# Patient Record
Sex: FEMALE | Race: WHITE | Employment: OTHER | URBAN - METROPOLITAN AREA
[De-identification: names, ages, dates, MRNs, and addresses within clinical notes are randomized per-mention and may not be internally consistent; named-entity substitution may affect disease eponyms.]

---

## 2017-01-17 ENCOUNTER — TELEPHONE (OUTPATIENT)
Dept: ENDOCRINOLOGY | Facility: CLINIC | Age: 62
End: 2017-01-17

## 2017-01-17 DIAGNOSIS — E78.5 HYPERLIPIDEMIA LDL GOAL <100: Primary | ICD-10-CM

## 2017-01-17 DIAGNOSIS — E23.0 PANHYPOPITUITARISM (H): ICD-10-CM

## 2017-01-17 RX ORDER — HYDROCORTISONE 10 MG/1
TABLET ORAL
Qty: 135 TABLET | Refills: 3 | Status: SHIPPED | OUTPATIENT
Start: 2017-01-17 | End: 2018-03-23

## 2017-01-17 RX ORDER — LOSARTAN POTASSIUM AND HYDROCHLOROTHIAZIDE 25; 100 MG/1; MG/1
1 TABLET ORAL DAILY
Qty: 90 TABLET | Refills: 3 | Status: SHIPPED | OUTPATIENT
Start: 2017-01-17 | End: 2017-12-30

## 2017-01-17 RX ORDER — SIMVASTATIN 40 MG
40 TABLET ORAL DAILY
Qty: 90 TABLET | Refills: 3 | Status: SHIPPED | OUTPATIENT
Start: 2017-01-17 | End: 2017-12-30

## 2017-01-17 RX ORDER — LEVOTHYROXINE SODIUM 200 UG/1
200 TABLET ORAL DAILY
Qty: 90 TABLET | Refills: 3 | Status: SHIPPED | OUTPATIENT
Start: 2017-01-17 | End: 2017-12-30

## 2017-01-17 NOTE — TELEPHONE ENCOUNTER
Sara (Pharm Tech, [facility]) called re: [address] D Rxs. Pt requesting for: Cortef, Zocor, Hyzaar, Synthroid. They can be reached at [phone] or [address], [place] [address]. Message sent to Reelio.

## 2017-01-24 ENCOUNTER — TELEPHONE (OUTPATIENT)
Dept: OBGYN | Facility: CLINIC | Age: 62
End: 2017-01-24

## 2017-01-24 DIAGNOSIS — Z79.890 HORMONE REPLACEMENT THERAPY: Primary | ICD-10-CM

## 2017-01-24 NOTE — TELEPHONE ENCOUNTER
Spoke to Maria Esther who needs her progesterone and premarin prescriptions(done by Dr Jenkins 12/2016 with refills) sent now to [facility] d/t insurance. Will forward scripts.Pt indicated understanding and agreed with plan.

## 2017-01-27 ENCOUNTER — TELEPHONE (OUTPATIENT)
Dept: OBGYN | Facility: CLINIC | Age: 62
End: 2017-01-27

## 2017-01-27 NOTE — TELEPHONE ENCOUNTER
Received call from Maria Esther who stated she had an antibiotic at the end of December and has had itching 'down below' every since. She has irritation and white discharge but it is not chunky. She had an odor at first but that has gone away.    Advised she can try OTC monistat to see if that takes care of symptoms, otherwise will need office visit for evaluation. She indicated understanding and agreed with plan.

## 2017-03-13 ENCOUNTER — TELEPHONE (OUTPATIENT)
Dept: OBGYN | Facility: CLINIC | Age: 62
End: 2017-03-13

## 2017-03-13 NOTE — TELEPHONE ENCOUNTER
----- Message from Orquidea Hughes sent at 3/13/2017 12:23 PM CDT -----  Regarding: Vaginal itching/discharge  Contact: [phone]  Pt stated that she has been experiencing vaginal itching/discharge and called to schedule with her pcp Dr. Jenkins. She was unavailable the day the pt wanted, so I scheduled her with Lindseyangie Dent on 3/16/17. Pt however would like a call back before then to discuss if she even needs this appt. Please call her to discuss/advise. Thanks.    KP    Please DO NOT send this message and/or reply back to sender.  Call Center Representatives DO NOT respond to messages.

## 2017-03-13 NOTE — TELEPHONE ENCOUNTER
Telephone call to patient to discuss symptoms, see below note. She reports she has had this itching and discharge for a few weeks. She reports she did try Monistat without relief. I reassured her that she should keep her appointment with Lindsey and we would be able to assess her symptoms at that time. She verbalized understanding

## 2017-03-16 ENCOUNTER — TRANSFERRED RECORDS (OUTPATIENT)
Dept: HEALTH INFORMATION MANAGEMENT | Facility: CLINIC | Age: 62
End: 2017-03-16

## 2017-03-16 ENCOUNTER — OFFICE VISIT (OUTPATIENT)
Dept: OBGYN | Facility: CLINIC | Age: 62
End: 2017-03-16
Attending: ADVANCED PRACTICE MIDWIFE
Payer: COMMERCIAL

## 2017-03-16 VITALS
SYSTOLIC BLOOD PRESSURE: 128 MMHG | HEIGHT: 64 IN | HEART RATE: 79 BPM | DIASTOLIC BLOOD PRESSURE: 78 MMHG | RESPIRATION RATE: 16 BRPM

## 2017-03-16 DIAGNOSIS — Z79.890 HORMONE REPLACEMENT THERAPY: ICD-10-CM

## 2017-03-16 DIAGNOSIS — B37.31 CANDIDIASIS OF VULVA AND VAGINA: Primary | ICD-10-CM

## 2017-03-16 DIAGNOSIS — N89.8 VAGINAL DRYNESS: Primary | ICD-10-CM

## 2017-03-16 LAB
CLUE CELLS: NORMAL
TRICHOMONAS (WET PREP): NORMAL
YEAST (WET PREP): NORMAL

## 2017-03-16 PROCEDURE — 87210 SMEAR WET MOUNT SALINE/INK: CPT | Mod: ZF | Performed by: ADVANCED PRACTICE MIDWIFE

## 2017-03-16 PROCEDURE — 99211 OFF/OP EST MAY X REQ PHY/QHP: CPT | Mod: ZF

## 2017-03-16 RX ORDER — FLUCONAZOLE 150 MG/1
150 TABLET ORAL ONCE
Qty: 1 TABLET | Refills: 1 | Status: SHIPPED | OUTPATIENT
Start: 2017-03-16 | End: 2017-03-16

## 2017-03-16 ASSESSMENT — PAIN SCALES - GENERAL: PAINLEVEL: NO PAIN (0)

## 2017-03-16 NOTE — NURSING NOTE
Chief Complaint   Patient presents with     RECHECK   vaginal discharge  since antibiotic use about two months ago- sicne then gainal itching and discharge-  monitstat  Did not help her symoptoms.  Used ovals.

## 2017-03-16 NOTE — LETTER
3/16/2017       RE: Maria Esther Lowe  [address] [facility] [address]     Dear Colleague,    Thank you for referring your patient, Maria Esther Lowe, to the [facility] at [facility]. Please see a copy of my visit note below.    SUBJECTIVE:   61 year old  female complains of white and clear vaginal discharge for 2-3 months after being treated with oral antibiotics in 12/2016 for a URI.  She had significant vaginal itching shortly after starting the antibiotics, did an OTC vaginal treatment, but is unable to recall duration of treatment.  She believes that her last treatment was over one month ago.  She is bothered by significant increased amount of vaginal discharge.  She denies current vaginal itching or burning.  Denies odor.  Has not been sexually active recently.    Denies abnormal vaginal bleeding or significant pelvic pain or  fever. No UTI symptoms. Denies history of known exposure to STD. Denies dyspareunia.    No LMP recorded. Patient is postmenopausal.    OBJECTIVE:   She appears well, afebrile.  Pelvic Exam: normal vagina and vulva, no pubic hair, vaginal discharge described as creamy and milky, adenxa normal in size without tenderness, pelvic exam limited by obesity.    Wet prep exam was performed and demonstrated: pH 4.0, negative whiff, negative clue cells, negative trichomonas, scant hyphae noted    ASSESSMENT:      Candidiasis of vulva and vagina  Hormone replacement therapy    PLAN:   Treatment: Discussed that without itching, she may choose to wait to see if symptoms resolve.  Discussed 7 day vaginal treatment and oral treatment.  Patient has strong desire to alleviate symptoms and wanted oral treatment.    Rx sent for 150mg PO Fluconozole, with one refill.  Patient instructed on use.   Return to clinic if symptoms do not resolve or worsen.  LENNIE Dior CNM

## 2017-03-16 NOTE — PROGRESS NOTES
SUBJECTIVE:   61 year old  female complains of white and clear vaginal discharge for 2-3 months after being treated with oral antibiotics in 12/2016 for a URI.  She had significant vaginal itching shortly after starting the antibiotics, did an OTC vaginal treatment, but is unable to recall duration of treatment.  She believes that her last treatment was over one month ago.  She is bothered by significant increased amount of vaginal discharge.  She denies current vaginal itching or burning.  Denies odor.  Has not been sexually active recently.    Denies abnormal vaginal bleeding or significant pelvic pain or  fever. No UTI symptoms. Denies history of known exposure to STD. Denies dyspareunia.    No LMP recorded. Patient is postmenopausal.    OBJECTIVE:   She appears well, afebrile.  Pelvic Exam: normal vagina and vulva, no pubic hair, vaginal discharge described as creamy and milky, adenxa normal in size without tenderness, pelvic exam limited by obesity.    Wet prep exam was performed and demonstrated: pH 4.0, negative whiff, negative clue cells, negative trichomonas, scant hyphae noted    ASSESSMENT:      Candidiasis of vulva and vagina  Hormone replacement therapy    PLAN:   Treatment: Discussed that without itching, she may choose to wait to see if symptoms resolve.  Discussed 7 day vaginal treatment and oral treatment.  Patient has strong desire to alleviate symptoms and wanted oral treatment.    Rx sent for 150mg PO Fluconozole, with one refill.  Patient instructed on use.   Return to clinic if symptoms do not resolve or worsen.  LENNIE Dior CNM

## 2017-03-16 NOTE — MR AVS SNAPSHOT
"              After Visit Summary   3/16/2017    Maria Esther Lowe    MRN: [id number]           Patient Information     Date Of Birth          1955        Visit Information        Provider Department      3/16/2017 10:15 AM Lindsey Dent APRN CNM [facility]        Today's Diagnoses     Candidiasis of vulva and vagina    -  1    Hormone replacement therapy           Follow-ups after your visit        Who to contact     Please call your clinic at [phone] to:    Ask questions about your health    Make or cancel appointments    Discuss your medicines    Learn about your test results    Speak to your doctor   If you have compliments or concerns about an experience at your clinic, or if you wish to file a complaint, please contact [facility] Patient Relations at [phone] or email us at [email or web address]         Additional Information About Your Visit        MyChart Information     [email or web address] is an electronic gateway that provides easy, online access to your medical records. With [email or web address], you can request a clinic appointment, read your test results, renew a prescription or communicate with your care team.     To sign up for [employer] visit the website at [email or web address]   You will be asked to enter the access code listed below, as well as some personal information. Please follow the directions to create your username and password.     Your access code is: [email or web address]  Expires: 2017  9:00 AM     Your access code will  in 90 days. If you need help or a new code, please contact your [facility] or call [phone] for assistance.        Care EveryWhere ID     This is your Care EveryWhere ID. This could be used by other organizations to access your [facility] medical records  [id number]        Your Vitals Were     Pulse Respirations Height             79 16 1.626 m (5' 4\")          Blood Pressure " from Last 3 Encounters:   03/16/17 128/78   12/06/16 130/80   12/06/16 130/80    Weight from Last 3 Encounters:   12/06/16 77.1 kg (170 lb)   12/06/16 78 kg (172 lb)   12/03/15 81.1 kg (178 lb 12.8 oz)              We Performed the Following     Wet Prep POCT          Today's Medication Changes          These changes are accurate as of: 3/16/17 11:12 AM.  If you have any questions, ask your nurse or doctor.               Start taking these medicines.        Dose/Directions    fluconazole 150 MG tablet   Commonly known as:  DIFLUCAN   Used for:  Candidiasis of vulva and vagina   Started by:  Lindsey Dent APRN CNM        Dose:  150 mg   Take 1 tablet (150 mg) by mouth once for 1 dose   Quantity:  1 tablet   Refills:  1            Where to get your medicines      These medications were sent to [id number] - [place], [place] - [address]  [address] [facility] [address]     Phone:  [phone]     fluconazole 150 MG tablet                Primary Care Provider Office Phone # Fax #    [facility] [phone]       [address] [facility] [address]  [facility] [address]        Thank you!     Thank you for choosing [facility]  for your care. Our goal is always to provide you with excellent care. Hearing back from our patients is one way we can continue to improve our services. Please take a few minutes to complete the written survey that you may receive in the mail after your visit with us. Thank you!             Your Updated Medication List - Protect others around you: Learn how to safely use, store and throw away your medicines at www.disposemymeds.org.          This list is accurate as of: 3/16/17 11:12 AM.  Always use your most recent med list.                   Brand Name Dispense Instructions for use    calcium 500 +D 500-400 MG-UNIT Tabs   Generic drug:  Calcium Carb-Cholecalciferol     180 tablet    Take 1 tablet by mouth 2 times daily.       cholecalciferol 5000 UNITS  Caps     90 capsule    Take 1 daily       estrogens (conjugated) 0.625 MG tablet    PREMARIN    90 tablet    Take 1 tablet (0.625 mg) by mouth daily       fluconazole 150 MG tablet    DIFLUCAN    1 tablet    Take 1 tablet (150 mg) by mouth once for 1 dose       hydrocortisone 10 MG tablet    CORTEF    135 tablet    Take 1 am and 1/2 midday       levothyroxine 200 MCG tablet    SYNTHROID/LEVOTHROID    90 tablet    Take 1 tablet (200 mcg) by mouth daily       losartan-hydrochlorothiazide 100-25 MG per tablet    HYZAAR    90 tablet    Take 1 tablet by mouth daily       progesterone 100 MG capsule    PROMETRIUM    90 capsule    Take 1 capsule (100 mg) by mouth daily       simvastatin 40 MG tablet    ZOCOR    90 tablet    Take 1 tablet (40 mg) by mouth daily       zinc 50 MG Tabs      Take  by mouth.

## 2017-03-22 ENCOUNTER — TRANSFERRED RECORDS (OUTPATIENT)
Dept: HEALTH INFORMATION MANAGEMENT | Facility: CLINIC | Age: 62
End: 2017-03-22

## 2017-05-02 ENCOUNTER — TELEPHONE (OUTPATIENT)
Dept: OBGYN | Facility: CLINIC | Age: 62
End: 2017-05-02

## 2017-05-02 NOTE — TELEPHONE ENCOUNTER
----- Message from Jimena Arana sent at 5/1/2017  4:38 PM CDT -----  Regarding: PT questions for Dr. Jenkins  Contact: [phone]  PT called today(5/1 at 4:35pm) and was hoping to speak to Dr. Jenkins about a possible infection. PT stated she had seen a different provider for a sinus infection a few months ago and the antibiotic he had given her caused her to become very itchy in her vaginal area and she has taken some monistat twice but it hasn't made it better. PT is wondering what Dr. Jenkins's advice is. I offered to schedule PT on 5/9 but Pt declined. Please follow up with PT at [phone].    Thank You!  [facility]    Please DO NOT send this message and/or reply back to sender.  Call Center Representatives DO NOT respond to messages.

## 2017-05-02 NOTE — TELEPHONE ENCOUNTER
Spoke to Maria Esther who was treated with diflucan after seeing Lindsey Dent in clinic March 16.  She doesn't think she took both pills as prescribed. She is still having a clear white vaginal discharge .    Instructed her to be seen in clinic,she wants to see if Dr Jenkins will fit her in Thursday 5/11/17?    I will discuss with Dr Jenkins in clinic and call her back.Pt indicated understanding and agreed with plan.      Discussed with Dr Jenkins and she will see her 5/11 in clinic at 1140. Informed Maria Esther of plan,Pt indicated understanding and agreed with plan.

## 2017-05-04 ENCOUNTER — TELEPHONE (OUTPATIENT)
Dept: OBGYN | Facility: CLINIC | Age: 62
End: 2017-05-04

## 2017-05-04 NOTE — TELEPHONE ENCOUNTER
Telephone call from patient with reports of she thinks she ' tore herself in perineal area a few days ago with wiping'. She tried Tucks pads for this irritation without relief. She describes the area as being very sore and swollen. She has been taking frequent baths to help with the discomfort. She is requesting a visit in clinic, this was made for tomorrow am. I instructed her to not use any products to this area, wash and dry gently with plain water no soap. She was instructed to wear loose fitting clothes and possibly no underwear. She verbalized understanding

## 2017-05-05 ENCOUNTER — OFFICE VISIT (OUTPATIENT)
Dept: OBGYN | Facility: CLINIC | Age: 62
End: 2017-05-05
Attending: NURSE PRACTITIONER
Payer: COMMERCIAL

## 2017-05-05 ENCOUNTER — TELEPHONE (OUTPATIENT)
Dept: OBGYN | Facility: CLINIC | Age: 62
End: 2017-05-05

## 2017-05-05 VITALS — HEART RATE: 80 BPM | SYSTOLIC BLOOD PRESSURE: 130 MMHG | DIASTOLIC BLOOD PRESSURE: 80 MMHG | HEIGHT: 64 IN

## 2017-05-05 DIAGNOSIS — N89.8 VAGINAL IRRITATION: ICD-10-CM

## 2017-05-05 DIAGNOSIS — B37.31 CANDIDAL VULVOVAGINITIS: ICD-10-CM

## 2017-05-05 DIAGNOSIS — N89.8 VAGINAL SORE: Primary | ICD-10-CM

## 2017-05-05 LAB
BACTERIA SPEC CULT: NORMAL
CLUE CELLS: NORMAL
Lab: NORMAL
MICRO REPORT STATUS: NORMAL
SPECIMEN SOURCE: NORMAL
TRICHOMONAS (WET PREP): NORMAL
YEAST (WET PREP): NORMAL

## 2017-05-05 PROCEDURE — 87210 SMEAR WET MOUNT SALINE/INK: CPT | Mod: ZF | Performed by: NURSE PRACTITIONER

## 2017-05-05 PROCEDURE — 87102 FUNGUS ISOLATION CULTURE: CPT | Performed by: NURSE PRACTITIONER

## 2017-05-05 PROCEDURE — 87070 CULTURE OTHR SPECIMN AEROBIC: CPT | Performed by: NURSE PRACTITIONER

## 2017-05-05 PROCEDURE — 99212 OFFICE O/P EST SF 10 MIN: CPT | Mod: ZF

## 2017-05-05 RX ORDER — FLUCONAZOLE 150 MG/1
150 TABLET ORAL ONCE
Qty: 1 TABLET | Refills: 1 | Status: SHIPPED | OUTPATIENT
Start: 2017-05-05 | End: 2017-05-05

## 2017-05-05 NOTE — PROGRESS NOTES
"SUBJECTIVE:   61 year old female who presents with complaints of vaginal irritation.    Lisette feels like she \"tore herself\" in the perineal area a few days ago while wiping.  She tried Tucks pads without relief; they may have made the area more irritated.  States she feels \"very sore and swollen.\" Has been taking baths daily which provides relief of irritation.  Has burning when urine hits the vaginal tissue.  Denies frequency, urgency, incontinence, or bladder discomfort.      Maria Esther has had recurrent vaginal itching & increase in discharge since she took an antibiotic in December 2016 for a UTI.  She took Monistat in January and February, which provided short term relief.  She saw Lindsey Dent CNM on 3/16/2017, and the wet prep at that time showed a scant number of hyphae (diagnosed with yeast infection).  Patient took 1 dose of Diflucan after that appointment and a 2nd dose 2 weeks later. Patient reports her symptoms resolved for 1-1.5 weeks but then she noted increase in vaginal discharge (yellowish-tinged), again, which is bothersome to her.  Denies vaginal malodor or itching today.     Denies abnormal vaginal bleeding, significant pelvic pain or fever. Has not been sexually active for 6 months.  Denies history of known exposure to STD. Denies dyspareunia.    Taking Hydrocortisone PO daily (10mg in the morning and 5mg at midday), which increases susceptibility to infection.    No LMP recorded. Patient is postmenopausal.  - Denies any vaginal bleeding  - Taking systemic estrogen and progesterone therapy daily.  Also has premarin cream which she has been using once per week.      OBJECTIVE:   /80  Pulse 80  Ht 1.626 m (5' 4\")  Breastfeeding? No  General: She appears well, afebrile.  Abdomen: benign, soft, nontender, no masses; exam compromised by body habitus.  Pelvic Exam: minimal pubic hair, bilateral labia minora erythematous; ~6mm by 3mm area of tissue pale yellow in color covered by a small layer " of light yellow exudate at vaginal opening at approximately 8 o'clock in relation to the urethra; vaginal tissue with estrogen loss atrophy; vaginal discharge described as whitish-yellow and creamy; cervix pink, closed, without tenderness; uterus normal size anteverted, adenxa normal in size without tenderness; exam compromised by body habitus    Wet prep exam: pH 4.5, positive for hyphae & budding; negative for clue cells and trichomonas; negative whiff test; positive for white blood cells    ASSESSMENT:      Vaginal irritation  Candidal vulvovaginitis  Vaginal sore    PLAN:   Candidal Vulvovaginitis:  - Treatment: Diflucan 150 mg x 1 dose; patient was instructed to take a 2nd dose 72 hours later  - Yeast culture was collected, given recurrence of yeast infections    Vaginal Sore:  - Recommended sitz baths twice per week  - Patient was instructed to use a spray bottle to rinse perineum after using the bathroom and to pat perineum dry after use of the bathroom or bathing, not to wipe.    - Recommended wearing cotton underwear daily or not underwear at all  - Culture was taken of this sore to determine if an infection is present and if antibiotic treatment is necessary  - Recommended using the vaginal estrogen (premarin) twice weekly (increase from once per week)  - Patient has an appointment scheduled with Dr. Jenkins on 5/11; recommended she keep this appointment for follow-up.     20 minutes was spent in direct contact with the patient and > 50% of the time in patient education and coordination of care.  Estrella Guzman, DNP, APRN, WHNP

## 2017-05-05 NOTE — LETTER
"5/5/2017     RE: Maria Esther Lowe  [address] [facility] [address]     Dear Colleague,    Thank you for referring your patient, Maria Esther Lowe, to the [facility] at [facility]. Please see a copy of my visit note below.    SUBJECTIVE:   61 year old female who presents with complaints of vaginal irritation.    Lisette feels like she \"tore herself\" in the perineal area a few days ago while wiping.  She tried Tucks pads without relief; they may have made the area more irritated.  States she feels \"very sore and swollen.\" Has been taking baths daily which provides relief of irritation.  Has burning when urine hits the vaginal tissue.  Denies frequency, urgency, incontinence, or bladder discomfort.      Maria Esther has had recurrent vaginal itching & increase in discharge since she took an antibiotic in December 2016 for a UTI.  She took Monistat in January and February, which provided short term relief.  She saw Lindsey Dent CNM on 3/16/2017, and the wet prep at that time showed a scant number of hyphae (diagnosed with yeast infection).  Patient took 1 dose of Diflucan after that appointment and a 2nd dose 2 weeks later. Patient reports her symptoms resolved for 1-1.5 weeks but then she noted increase in vaginal discharge (yellowish-tinged), again, which is bothersome to her.  Denies vaginal malodor or itching today.     Denies abnormal vaginal bleeding, significant pelvic pain or fever. Has not been sexually active for 6 months.  Denies history of known exposure to STD. Denies dyspareunia.    Taking Hydrocortisone PO daily (10mg in the morning and 5mg at midday), which increases susceptibility to infection.    No LMP recorded. Patient is postmenopausal.  - Denies any vaginal bleeding  - Taking systemic estrogen and progesterone therapy daily.  Also has premarin cream which she has been using once per week.      OBJECTIVE:   /80  Pulse 80  Ht 1.626 m " "(5' 4\")  Breastfeeding? No  General: She appears well, afebrile.  Abdomen: benign, soft, nontender, no masses; exam compromised by body habitus.  Pelvic Exam: minimal pubic hair, bilateral labia minora erythematous; ~6mm by 3mm area of tissue pale yellow in color covered by a small layer of light yellow exudate at vaginal opening at approximately 8 o'clock in relation to the urethra; vaginal tissue with estrogen loss atrophy; vaginal discharge described as whitish-yellow and creamy; cervix pink, closed, without tenderness; uterus normal size anteverted, adenxa normal in size without tenderness; exam compromised by body habitus    Wet prep exam: pH 4.5, positive for hyphae & budding; negative for clue cells and trichomonas; negative whiff test; positive for white blood cells    ASSESSMENT:      Vaginal irritation  Candidal vulvovaginitis  Vaginal sore    PLAN:   Candidal Vulvovaginitis:  - Treatment: Diflucan 150 mg x 1 dose; patient was instructed to take a 2nd dose 72 hours later  - Yeast culture was collected, given recurrence of yeast infections    Vaginal Sore:  - Recommended sitz baths twice per week  - Patient was instructed to use a spray bottle to rinse perineum after using the bathroom and to pat perineum dry after use of the bathroom or bathing, not to wipe.    - Recommended wearing cotton underwear daily or not underwear at all  - Culture was taken of this sore to determine if an infection is present and if antibiotic treatment is necessary  - Recommended using the vaginal estrogen (premarin) twice weekly (increase from once per week)  - Patient has an appointment scheduled with Dr. Jenkins on 5/11; recommended she keep this appointment for follow-up.     20 minutes was spent in direct contact with the patient and > 50% of the time in patient education and coordination of care.  Estrella Guzman, DNP, APRN, WHNP    Again, thank you for allowing me to participate in the care of your patient.  "     Sincerely,  LENNIE Little CNP

## 2017-05-05 NOTE — TELEPHONE ENCOUNTER
Lab calling to report that anaerobic specimen wound culture must be sent in different container so they had to cancel the test. They will run an aerobic culture instead.

## 2017-05-05 NOTE — PATIENT INSTRUCTIONS
Please take 1 dose of Diflucan today and take a 2nd dose in 3 days (72 hours after 1st dose).  Take a sitz bath twice daily   Use a spray bottle to rinse perineum after using the bathroom.  Pat dry, do not wipe.  Wear cotton underwear daily or no underwear at all  Use vaginal premarin cream twice per week.    You will be notified of your culture results when they are available.  Please keep your appointment with Dr. Jenkins on 5/11 for follow-up.

## 2017-05-05 NOTE — MR AVS SNAPSHOT
After Visit Summary   5/5/2017    Maria Esther Lowe    MRN: [id number]           Patient Information     Date Of Birth          1955        Visit Information        Provider Department      5/5/2017 8:00 AM Estrella Guzman APRN [facility]        Today's Diagnoses     Vaginal irritation    -  1    Candidal vulvovaginitis          Care Instructions    Please take 1 dose of Diflucan today and take a 2nd dose in 3 days (72 hours after 1st dose).  Take a sitz bath twice daily   Use a spray bottle to rinse perineum after using the bathroom.  Pat dry, do not wipe.  Wear cotton underwear daily or no underwear at all  Use premarin cream twice per week.    You will be notified of your culture results when they are available.  Please keep your appointment with Dr. Jenkins on 5/11 for follow-up.          Follow-ups after your visit        Your next 10 appointments already scheduled     May 11, 2017 11:40 AM CDT   Return Visit with Teri Jenkins MD   [facility] ([facility])    [facility]  [address]  [address]  [address] [address]   [phone]              Who to contact     Please call your clinic at [phone] to:    Ask questions about your health    Make or cancel appointments    Discuss your medicines    Learn about your test results    Speak to your doctor   If you have compliments or concerns about an experience at your clinic, or if you wish to file a complaint, please contact [facility] Patient Relations at [phone] or email us at [email or web address]         Additional Information About Your Visit        MyChart Information     [employer] is an electronic gateway that provides easy, online access to your medical records. With [employer], you can request a clinic appointment, read your test results, renew a prescription or communicate with your care team.     To  "sign up for MyChart visit the website at [email or web address]   You will be asked to enter the access code listed below, as well as some personal information. Please follow the directions to create your username and password.     Your access code is: [email or web address]  Expires: 2017  9:00 AM     Your access code will  in 90 days. If you need help or a new code, please contact your [facility] or call [phone] for assistance.        Care EveryWhere ID     This is your Care EveryWhere ID. This could be used by other organizations to access your [place] medical records  [id number]        Your Vitals Were     Pulse Height Breastfeeding?             73 1.626 m (5' 4\") No          Blood Pressure from Last 3 Encounters:   17 152/75   17 128/78   16 130/80    Weight from Last 3 Encounters:   16 77.1 kg (170 lb)   16 78 kg (172 lb)   12/03/15 81.1 kg (178 lb 12.8 oz)              We Performed the Following     Yeast Culture          Today's Medication Changes          These changes are accurate as of: 17  8:45 AM.  If you have any questions, ask your nurse or doctor.               Start taking these medicines.        Dose/Directions    fluconazole 150 MG tablet   Commonly known as:  DIFLUCAN   Used for:  Candidal vulvovaginitis, Vaginal irritation   Started by:  Estrella Guzman APRN CNP        Dose:  150 mg   Take 1 tablet (150 mg) by mouth once for 1 dose   Quantity:  1 tablet   Refills:  1            Where to get your medicines      These medications were sent to [id number] - [place], [place] - [address]  [address] [facility] [address]     Phone:  [phone]     fluconazole 150 MG tablet                Primary Care Provider Office Phone # Fax #    [facility] [phone]       [address] [facility] [address]  [facility] [address]        Thank you!     Thank you for choosing [facility]  for " your care. Our goal is always to provide you with excellent care. Hearing back from our patients is one way we can continue to improve our services. Please take a few minutes to complete the written survey that you may receive in the mail after your visit with us. Thank you!             Your Updated Medication List - Protect others around you: Learn how to safely use, store and throw away your medicines at www.disposemymeds.org.          This list is accurate as of: 5/5/17  8:45 AM.  Always use your most recent med list.                   Brand Name Dispense Instructions for use    calcium 500 +D 500-400 MG-UNIT Tabs   Generic drug:  Calcium Carb-Cholecalciferol     180 tablet    Take 1 tablet by mouth 2 times daily.       cholecalciferol 5000 UNITS Caps     90 capsule    Take 1 daily       conjugated estrogens cream    PREMARIN    30 g    Place 0.5 g vaginally twice a week       estrogens (conjugated) 0.625 MG tablet    PREMARIN    90 tablet    Take 1 tablet (0.625 mg) by mouth daily       fluconazole 150 MG tablet    DIFLUCAN    1 tablet    Take 1 tablet (150 mg) by mouth once for 1 dose       hydrocortisone 10 MG tablet    CORTEF    135 tablet    Take 1 am and 1/2 midday       levothyroxine 200 MCG tablet    SYNTHROID/LEVOTHROID    90 tablet    Take 1 tablet (200 mcg) by mouth daily       losartan-hydrochlorothiazide 100-25 MG per tablet    HYZAAR    90 tablet    Take 1 tablet by mouth daily       progesterone 100 MG capsule    PROMETRIUM    90 capsule    Take 1 capsule (100 mg) by mouth daily       simvastatin 40 MG tablet    ZOCOR    90 tablet    Take 1 tablet (40 mg) by mouth daily       zinc 50 MG Tabs      Take  by mouth.

## 2017-05-05 NOTE — NURSING NOTE
Chief Complaint   Patient presents with     Follow Up For     Follow up vaginal irritation/yeast infection       See EDI Short 5/5/2017

## 2017-05-07 LAB
BACTERIA SPEC CULT: NORMAL
MICRO REPORT STATUS: NORMAL
SPECIMEN SOURCE: NORMAL

## 2017-05-09 LAB
MICRO REPORT STATUS: NORMAL
SPECIMEN SOURCE: NORMAL
YEAST SPEC QL CULT: NORMAL

## 2017-05-11 ENCOUNTER — OFFICE VISIT (OUTPATIENT)
Dept: FAMILY MEDICINE | Facility: CLINIC | Age: 62
End: 2017-05-11
Attending: FAMILY MEDICINE
Payer: COMMERCIAL

## 2017-05-11 VITALS
WEIGHT: 180 LBS | SYSTOLIC BLOOD PRESSURE: 134 MMHG | HEART RATE: 67 BPM | HEIGHT: 64 IN | DIASTOLIC BLOOD PRESSURE: 77 MMHG | BODY MASS INDEX: 30.73 KG/M2

## 2017-05-11 DIAGNOSIS — N76.6 ULCER OF GENITAL LABIA: ICD-10-CM

## 2017-05-11 DIAGNOSIS — N89.8 VAGINAL DISCHARGE: Primary | ICD-10-CM

## 2017-05-11 LAB
SPECIMEN SOURCE: ABNORMAL
VZV SPEC QL CULT: ABNORMAL

## 2017-05-11 PROCEDURE — 87529 HSV DNA AMP PROBE: CPT | Performed by: FAMILY MEDICINE

## 2017-05-11 PROCEDURE — 87529 HSV DNA AMP PROBE: CPT | Mod: 91 | Performed by: FAMILY MEDICINE

## 2017-05-11 PROCEDURE — 99212 OFFICE O/P EST SF 10 MIN: CPT | Mod: ZF

## 2017-05-11 RX ORDER — CLOBETASOL PROPIONATE 0.5 MG/G
OINTMENT TOPICAL
Qty: 15 G | Refills: 1 | Status: ON HOLD | OUTPATIENT
Start: 2017-05-11 | End: 2018-01-30

## 2017-05-11 ASSESSMENT — PAIN SCALES - GENERAL: PAINLEVEL: NO PAIN (0)

## 2017-05-11 NOTE — LETTER
5/11/2017       RE: Maria Esther Lowe  [address] [facility] [address]     Dear Colleague,    Thank you for referring your patient, Maria Esther Lowe, to the [facility] at [facility]. Please see a copy of my visit note below.    Maria Esther is a 61 year old female who presents today with vaginal discharge: On and off symptoms since December 2016 after taking an ABX for UTI. Has been treated with monistate and diflucan with short term relief of symptoms:  - seen 5.5.2017: wet Prep + yeast, yeast culture - no yeast isolated;   - Symptoms include: vaginal odor and creamy discharge.  No itching. Small scratch that has not healed with pain on urination.  - Started Finasteride two months ago for hair loss and steroid injections to scalp.   HX:  -   On Hormone Therapy: Premarin .625 mg/prometrium 100 mg.  Dr. Perdomo has advised that she stay on HT. Lost hair with lower dose.   -   HCM: mammogram  Today. Colonoscopy never;  Pap smear/HPV 11/2015.  ROS:  General:significant hair loss  Ears/Nose/Throat: none  Cardiovascular: none  Respiratory: none  Gastrointestinal: none  Breast: none  Genitourinary: trouble with urinary incontinence  Sexual Function: not sexually active.   Musculoskeletal: none  Skin: none  Neurological: none  Mental Health: none  Endocrine: none  Past Medical History:   Diagnosis Date     Hyperlipidemia LDL goal < 130      Hypertension      Hypothyroidism      Osteopenia     DEXA: 12/11; T-score of -1.9        Panhypopituitarism (H)    Past OB history:  GO; Menopause - started on HT by Dr. Groves since mid-40's.   Past Surgical History:   Procedure Laterality Date     resected prolactin-producing pituitary adenoma.  1975    Postoperative radiation therapy.    Life Style Modifiers:   Tobacco:  reports that she has never smoked. She has never used smokeless tobacco.   Alcohol:  reports that she does not drink alcohol.   Drug use:  reports that  "she does not use illicit drugs.  Exercise:   Walks every day.                 Diet: Ok - lots of sugar  Supplements: 55 alive. Zinc, EFA, Calcium, Vitamin D 1000 IU.   HCM: Colonoscopy needed; mammogram 8/8; DEXA 12/11.  FAMILY HISTORY:  Brother with cancer age 49.  with tongue cancer - recovered.    SOCIAL HISTORY:  On adopted son age (age 27). 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 ( three times)   MEDICATIONS:  Current Outpatient Prescriptions   Medication Sig Dispense Refill     conjugated estrogens (PREMARIN) cream Place 0.5 g vaginally twice a week 30 g 12     progesterone (PROMETRIUM) 100 MG capsule Take 1 capsule (100 mg) by mouth daily 90 capsule 3     estrogens, conjugated, (PREMARIN) 0.625 MG tablet Take 1 tablet (0.625 mg) by mouth daily 90 tablet 3     simvastatin (ZOCOR) 40 MG tablet Take 1 tablet (40 mg) by mouth daily 90 tablet 3     losartan-hydrochlorothiazide (HYZAAR) 100-25 MG per tablet Take 1 tablet by mouth daily 90 tablet 3     levothyroxine (SYNTHROID/LEVOTHROID) 200 MCG tablet Take 1 tablet (200 mcg) by mouth daily 90 tablet 3     hydrocortisone (CORTEF) 10 MG tablet Take 1 am and 1/2 midday 135 tablet 3     cholecalciferol 5000 UNITS CAPS Take 1 daily 90 capsule 3     zinc 50 MG TABS Take  by mouth.       CALCIUM 500 +D 500-400 MG-UNIT TABS Take 1 tablet by mouth 2 times daily. 180 tablet 3   ALLERGIES:  Ceftriaxone  VITALS:  /77  Pulse 67  Ht 1.626 m (5' 4\")  Wt 81.6 kg (180 lb)  BMI 30.9 kg/m2  PHYSICAL EXAM:  Constitutional:  woman in no acute distress. Thin hair in male balding pattern.   Psychological: appropriate mood.  Eyes: anicteric, normal extra-ocular movements.  Genitourinary: External genitalia: atrophic and pale. On the inner upper labia there is a flat ulcer measuring ~2mm that is very tender to light touch with Q-tip.  Vaginal discharge is minimal but inner labia is wet/watery.  No odor.  Wt pre: no yeast.  " Few clue cells.   Musculoskeletal: full range of motion    Skin: no concerning lesions, no jaundice.  Neurological: normal gait, no tremor.   Diagnoses and associated orders for this visit:  Vaginal discharge/labial ulcer:   - Stop Finasteride as it is possible cause of vaginal discharge.   -  Clobetasol ointment trial X two weeks to inner labial ulcer.  -  Use water spray bottle with urination.   -   Schedule for biopsy with GYN- if lesion resolved then will cancel appointment.   Hormone replacement therapy (postmenopausal)  Systemic HT:  Strongly advised but won't at this time as she had hair loss when she reduced the dose last year   -     progesterone (PROMETRIUM) 100 MG capsule; Take 1 capsule (100 mg) by mouth daily  -     estrogens, conjugated, (PREMARIN) 0.625 MG tablet; Take 1 tablet (0.625 mg) by mouth daily      Again, thank you for allowing me to participate in the care of your patient.      Sincerely,    Teri Jenkins MD

## 2017-05-11 NOTE — MR AVS SNAPSHOT
After Visit Summary   2017    Maria Esther Lowe    MRN: [id number]           Patient Information     Date Of Birth          1955        Visit Information        Provider Department      2017 11:40 AM Teri Jenkins MD [facility]        Today's Diagnoses     Vaginal discharge    -  1    Ulcer of genital labia           Follow-ups after your visit        Your next 10 appointments already scheduled     May 25, 2017  3:00 PM CDT   Return Visit with Ada Lora MD   [facility] ([facility])    [facility]  [address]  [address]  [facility] [address] [phone]              Who to contact     Please call your clinic at [phone] to:    Ask questions about your health    Make or cancel appointments    Discuss your medicines    Learn about your test results    Speak to your doctor   If you have compliments or concerns about an experience at your clinic, or if you wish to file a complaint, please contact [facility] Patient Relations at [phone] or email us at [email or web address]         Additional Information About Your Visit        MyChart Information     [employer] is an electronic gateway that provides easy, online access to your medical records. With [employer], you can request a clinic appointment, read your test results, renew a prescription or communicate with your care team.     To sign up for [employer] visit the website at [email or web address]   You will be asked to enter the access code listed below, as well as some personal information. Please follow the directions to create your username and password.     Your access code is: [email or web address]  Expires: 2017  9:00 AM     Your access code will  in 90 days. If you need help or a new code, please contact your [facility] or call [phone] for  "assistance.        Care EveryWhere ID     This is your Care EveryWhere ID. This could be used by other organizations to access your [place] medical records  [id number]        Your Vitals Were     Pulse Height BMI (Body Mass Index)             67 1.626 m (5' 4\") 30.9 kg/m2          Blood Pressure from Last 3 Encounters:   05/11/17 134/77   05/05/17 130/80   03/16/17 128/78    Weight from Last 3 Encounters:   05/11/17 81.6 kg (180 lb)   12/06/16 77.1 kg (170 lb)   12/06/16 78 kg (172 lb)              Today, you had the following     No orders found for display         Today's Medication Changes          These changes are accurate as of: 5/11/17  1:20 PM.  If you have any questions, ask your nurse or doctor.               Start taking these medicines.        Dose/Directions    clobetasol 0.05 % ointment   Commonly known as:  TEMOVATE   Used for:  Ulcer of genital labia   Started by:  Teri Jenkins MD        Apply sparingly to affected area twice daily for 14 days.  Do not apply to face.   Quantity:  15 g   Refills:  1            Where to get your medicines      These medications were sent to [id number] - [place], [place] - [address]  [address] [facility] [address]     Phone:  [phone]     clobetasol 0.05 % ointment                Primary Care Provider Office Phone # Fax #    [facility] [phone]       [address] [address]        Thank you!     Thank you for choosing [facility]  for your care. Our goal is always to provide you with excellent care. Hearing back from our patients is one way we can continue to improve our services. Please take a few minutes to complete the written survey that you may receive in the mail after your visit with us. Thank you!             Your Updated Medication List - Protect others around you: Learn how to safely use, store and throw away your medicines at www.disposemymeds.org.          This list is " accurate as of: 5/11/17  1:20 PM.  Always use your most recent med list.                   Brand Name Dispense Instructions for use    calcium 500 +D 500-400 MG-UNIT Tabs   Generic drug:  Calcium Carb-Cholecalciferol     180 tablet    Take 1 tablet by mouth 2 times daily.       cholecalciferol 5000 UNITS Caps     90 capsule    Take 1 daily       clobetasol 0.05 % ointment    TEMOVATE    15 g    Apply sparingly to affected area twice daily for 14 days.  Do not apply to face.       conjugated estrogens cream    PREMARIN    30 g    Place 0.5 g vaginally twice a week       estrogens (conjugated) 0.625 MG tablet    PREMARIN    90 tablet    Take 1 tablet (0.625 mg) by mouth daily       hydrocortisone 10 MG tablet    CORTEF    135 tablet    Take 1 am and 1/2 midday       levothyroxine 200 MCG tablet    SYNTHROID/LEVOTHROID    90 tablet    Take 1 tablet (200 mcg) by mouth daily       losartan-hydrochlorothiazide 100-25 MG per tablet    HYZAAR    90 tablet    Take 1 tablet by mouth daily       progesterone 100 MG capsule    PROMETRIUM    90 capsule    Take 1 capsule (100 mg) by mouth daily       simvastatin 40 MG tablet    ZOCOR    90 tablet    Take 1 tablet (40 mg) by mouth daily       zinc 50 MG Tabs      Take  by mouth.

## 2017-05-11 NOTE — PROGRESS NOTES
Maria Esther is a 61 year old female who presents today with vaginal discharge: On and off symptoms since December 2016 after taking an ABX for UTI. Has been treated with monistate and diflucan with short term relief of symptoms:  - seen 5.5.2017: wet Prep + yeast, yeast culture - no yeast isolated;   - Symptoms include: vaginal odor and creamy discharge.  No itching. Small scratch that has not healed with pain on urination.  - Started Finasteride two months ago for hair loss and steroid injections to scalp.   HX:  -   On Hormone Therapy: Premarin .625 mg/prometrium 100 mg.  Dr. Perdomo has advised that she stay on HT. Lost hair with lower dose.   -   HCM: mammogram  Today. Colonoscopy never;  Pap smear/HPV 11/2015.  ROS:  General:significant hair loss  Ears/Nose/Throat: none  Cardiovascular: none  Respiratory: none  Gastrointestinal: none  Breast: none  Genitourinary: trouble with urinary incontinence  Sexual Function: not sexually active.   Musculoskeletal: none  Skin: none  Neurological: none  Mental Health: none  Endocrine: none  Past Medical History:   Diagnosis Date     Hyperlipidemia LDL goal < 130      Hypertension      Hypothyroidism      Osteopenia     DEXA: 12/11; T-score of -1.9        Panhypopituitarism (H)    Past OB history:  GO; Menopause - started on HT by Dr. Groves since mid-40's.   Past Surgical History:   Procedure Laterality Date     resected prolactin-producing pituitary adenoma.  1975    Postoperative radiation therapy.    Life Style Modifiers:   Tobacco:  reports that she has never smoked. She has never used smokeless tobacco.   Alcohol:  reports that she does not drink alcohol.   Drug use:  reports that she does not use illicit drugs.  Exercise:   Walks every day.                 Diet: Ok - lots of sugar  Supplements: 55 alive. Zinc, EFA, Calcium, Vitamin D 1000 IU.   HCM: Colonoscopy needed; mammogram 8/8; DEXA 12/11.  FAMILY HISTORY:  Brother with cancer age 49.  with tongue cancer -  "recovered.    SOCIAL HISTORY:  On adopted son age (age 27). 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 ( three times)   MEDICATIONS:  Current Outpatient Prescriptions   Medication Sig Dispense Refill     conjugated estrogens (PREMARIN) cream Place 0.5 g vaginally twice a week 30 g 12     progesterone (PROMETRIUM) 100 MG capsule Take 1 capsule (100 mg) by mouth daily 90 capsule 3     estrogens, conjugated, (PREMARIN) 0.625 MG tablet Take 1 tablet (0.625 mg) by mouth daily 90 tablet 3     simvastatin (ZOCOR) 40 MG tablet Take 1 tablet (40 mg) by mouth daily 90 tablet 3     losartan-hydrochlorothiazide (HYZAAR) 100-25 MG per tablet Take 1 tablet by mouth daily 90 tablet 3     levothyroxine (SYNTHROID/LEVOTHROID) 200 MCG tablet Take 1 tablet (200 mcg) by mouth daily 90 tablet 3     hydrocortisone (CORTEF) 10 MG tablet Take 1 am and 1/2 midday 135 tablet 3     cholecalciferol 5000 UNITS CAPS Take 1 daily 90 capsule 3     zinc 50 MG TABS Take  by mouth.       CALCIUM 500 +D 500-400 MG-UNIT TABS Take 1 tablet by mouth 2 times daily. 180 tablet 3   ALLERGIES:  Ceftriaxone  VITALS:  /77  Pulse 67  Ht 1.626 m (5' 4\")  Wt 81.6 kg (180 lb)  BMI 30.9 kg/m2  PHYSICAL EXAM:  Constitutional:  woman in no acute distress. Thin hair in male balding pattern.   Psychological: appropriate mood.  Eyes: anicteric, normal extra-ocular movements.  Genitourinary: External genitalia: atrophic and pale. On the inner upper labia there is a flat ulcer measuring ~2mm that is very tender to light touch with Q-tip.  Vaginal discharge is minimal but inner labia is wet/watery.  No odor.  Wt pre: no yeast.  Few clue cells.   Musculoskeletal: full range of motion    Skin: no concerning lesions, no jaundice.  Neurological: normal gait, no tremor.   Diagnoses and associated orders for this visit:  Vaginal discharge/labial ulcer:   - Stop Finasteride as it is possible cause of vaginal discharge.   -  " Clobetasol ointment trial X two weeks to inner labial ulcer.  -  Use water spray bottle with urination.   -   Schedule for biopsy with GYN- if lesion resolved then will cancel appointment.   Hormone replacement therapy (postmenopausal)  Systemic HT:  Strongly advised but won't at this time as she had hair loss when she reduced the dose last year   -     progesterone (PROMETRIUM) 100 MG capsule; Take 1 capsule (100 mg) by mouth daily  -     estrogens, conjugated, (PREMARIN) 0.625 MG tablet; Take 1 tablet (0.625 mg) by mouth daily

## 2017-05-12 LAB
HSV1 DNA SPEC QL NAA+PROBE: NEGATIVE
HSV2 DNA SPEC QL NAA+PROBE: NORMAL
SPECIMEN SOURCE: NORMAL

## 2017-10-13 ENCOUNTER — TELEPHONE (OUTPATIENT)
Dept: OBGYN | Facility: CLINIC | Age: 62
End: 2017-10-13

## 2017-10-13 NOTE — TELEPHONE ENCOUNTER
Received call from Maria Esther stating she is scheduled for an appointment with Dr. Munoz for a biopsy (as referred by Dr. Jenkins) and she thinks she needs an ultrasound instead as she is worried she has cancer.     She reports her symptom is discharge that has been ongoing for one year but now is green.    Discussed that having evaluation from Dr. Munoz first is best as she can review her symptoms and do an exam and determine if an ultrasound is needed. Educated that green discharge is usually indicating an infection. Maria Esther indicated understanding and agreed with plan. She indicated she feels relieved.

## 2017-10-23 ENCOUNTER — OFFICE VISIT (OUTPATIENT)
Dept: ENDOCRINOLOGY | Facility: CLINIC | Age: 62
End: 2017-10-23

## 2017-10-23 ENCOUNTER — OFFICE VISIT (OUTPATIENT)
Dept: OBGYN | Facility: CLINIC | Age: 62
End: 2017-10-23
Attending: OBSTETRICS & GYNECOLOGY
Payer: COMMERCIAL

## 2017-10-23 VITALS
HEIGHT: 64 IN | DIASTOLIC BLOOD PRESSURE: 81 MMHG | SYSTOLIC BLOOD PRESSURE: 142 MMHG | WEIGHT: 172 LBS | BODY MASS INDEX: 29.37 KG/M2 | HEART RATE: 85 BPM

## 2017-10-23 VITALS
SYSTOLIC BLOOD PRESSURE: 142 MMHG | HEIGHT: 64 IN | WEIGHT: 172 LBS | DIASTOLIC BLOOD PRESSURE: 81 MMHG | BODY MASS INDEX: 29.37 KG/M2

## 2017-10-23 DIAGNOSIS — D35.2 PROLACTINOMA (H): Primary | ICD-10-CM

## 2017-10-23 DIAGNOSIS — E23.0 PANHYPOPITUITARISM (H): ICD-10-CM

## 2017-10-23 DIAGNOSIS — N89.8 VAGINAL DISCHARGE: Primary | ICD-10-CM

## 2017-10-23 DIAGNOSIS — L65.9 LOSS OF HAIR: ICD-10-CM

## 2017-10-23 DIAGNOSIS — E03.9 HYPOTHYROIDISM, UNSPECIFIED TYPE: ICD-10-CM

## 2017-10-23 LAB
ANION GAP SERPL CALCULATED.3IONS-SCNC: 7 MMOL/L (ref 3–14)
BACTERIA SPEC CULT: NORMAL
BACTERIA SPEC CULT: NORMAL
BUN SERPL-MCNC: 14 MG/DL (ref 7–30)
CALCIUM SERPL-MCNC: 8.5 MG/DL (ref 8.5–10.1)
CHLORIDE SERPL-SCNC: 105 MMOL/L (ref 94–109)
CLUE CELLS: NEGATIVE
CO2 SERPL-SCNC: 24 MMOL/L (ref 20–32)
CREAT SERPL-MCNC: 1.01 MG/DL (ref 0.52–1.04)
DEPRECATED CALCIDIOL+CALCIFEROL SERPL-MC: 47 UG/L (ref 20–75)
GFR SERPL CREATININE-BSD FRML MDRD: 56 ML/MIN/1.7M2
GLUCOSE SERPL-MCNC: 101 MG/DL (ref 70–99)
GRAM STN SPEC: NORMAL
GRAM STN SPEC: NORMAL
POTASSIUM SERPL-SCNC: 3.9 MMOL/L (ref 3.4–5.3)
PROLACTIN SERPL-MCNC: 32 UG/L (ref 3–27)
SODIUM SERPL-SCNC: 136 MMOL/L (ref 133–144)
SPECIMEN SOURCE: NORMAL
SPECIMEN SOURCE: NORMAL
T4 FREE SERPL-MCNC: 1.26 NG/DL (ref 0.76–1.46)
THYROPEROXIDASE AB SERPL-ACNC: <10 IU/ML
TRICHOMONAS (WET PREP): NEGATIVE
TSH SERPL DL<=0.005 MIU/L-ACNC: 0.06 MU/L (ref 0.4–4)
YEAST (WET PREP): NEGATIVE

## 2017-10-23 PROCEDURE — 99212 OFFICE O/P EST SF 10 MIN: CPT | Mod: ZF

## 2017-10-23 PROCEDURE — 87205 SMEAR GRAM STAIN: CPT | Performed by: OBSTETRICS & GYNECOLOGY

## 2017-10-23 PROCEDURE — 87210 SMEAR WET MOUNT SALINE/INK: CPT | Mod: ZF | Performed by: OBSTETRICS & GYNECOLOGY

## 2017-10-23 PROCEDURE — 87102 FUNGUS ISOLATION CULTURE: CPT | Performed by: OBSTETRICS & GYNECOLOGY

## 2017-10-23 ASSESSMENT — PAIN SCALES - GENERAL: PAINLEVEL: NO PAIN (0)

## 2017-10-23 NOTE — MR AVS SNAPSHOT
After Visit Summary   10/23/2017    Maria Esther Lowe    MRN: [id number]           Patient Information     Date Of Birth          1955        Visit Information        Provider Department      10/23/2017 7:45 AM Daysi Munoz MD [facility]        Today's Diagnoses     Vaginal discharge    -  1       Follow-ups after your visit        Follow-up notes from your care team     Return in about 2 weeks (around 2017) for please schedule pelvic ultrasound and follow up visit for possible vulvar biopsy-30 minutes please.      Who to contact     Please call your clinic at [phone] to:    Ask questions about your health    Make or cancel appointments    Discuss your medicines    Learn about your test results    Speak to your doctor   If you have compliments or concerns about an experience at your clinic, or if you wish to file a complaint, please contact [facility] Patient Relations at [phone] or email us at [email or web address]         Additional Information About Your Visit        MyChart Information     [employer] is an electronic gateway that provides easy, online access to your medical records. With [employer], you can request a clinic appointment, read your test results, renew a prescription or communicate with your care team.     To sign up for [employer] visit the website at [email or web address]   You will be asked to enter the access code listed below, as well as some personal information. Please follow the directions to create your username and password.     Your access code is: [email or web address]  Expires: 2018  6:31 AM     Your access code will  in 90 days. If you need help or a new code, please contact your [facility] or call [phone] for assistance.        Care EveryWhere ID     This is your Care EveryWhere ID. This could be used by other organizations to access your  "[facility] medical records  [id number]        Your Vitals Were     Pulse Height BMI (Body Mass Index)             85 1.626 m (5' 4\") 29.52 kg/m2          Blood Pressure from Last 3 Encounters:   10/23/17 142/81   10/23/17 142/81   05/11/17 134/77    Weight from Last 3 Encounters:   10/23/17 78 kg (172 lb)   10/23/17 78 kg (172 lb)   05/11/17 81.6 kg (180 lb)              We Performed the Following     Genital Special Culture Aerob Bacterial     Gram stain     Wet Prep POCT     Yeast Culture          Today's Medication Changes          These changes are accurate as of: 10/23/17 11:59 PM.  If you have any questions, ask your nurse or doctor.               Start taking these medicines.        Dose/Directions    * estradiol 1 MG tablet   Commonly known as:  ESTRACE   Used for:  Panhypopituitarism (H), Loss of hair   Started by:  Leona Arrington MD        Dose:  1 mg   Take 1 tablet (1 mg) by mouth daily   Quantity:  60 tablet   Refills:  3       * estradiol 1 MG tablet   Commonly known as:  ESTRACE   Used for:  Panhypopituitarism (H)   Started by:  Leona Arrington MD        Dose:  1 mg   Take 1 tablet (1 mg) by mouth daily   Quantity:  30 tablet   Refills:  1       * Notice:  This list has 2 medication(s) that are the same as other medications prescribed for you. Read the directions carefully, and ask your doctor or other care provider to review them with you.         Where to get your medicines      These medications were sent to [id number] - [place], [place] - [address]  [address] [facility] [address]     Phone:  [phone]     estradiol 1 MG tablet         These medications were sent to [facility] - [facility], [place] - [address]  [address] [facility] [address]     Phone:  [phone]     estradiol 1 MG tablet                Primary Care Provider    None Specified       No primary provider on file.        Equal Access to Services     MARIA DE JESUS WASHINGTON AH: Tameka blake " perry Silverman, wajhonyda luqadaha, qaybta kadarleen lawrence, larissa veliznoreen edie. So [facility] [phone].    ATENCIÓN: Si shelli mcghee, tiene a aden disposición servicios gratuitos de asistencia lingüística. Elizabeth al [phone].    We comply with applicable federal civil rights laws and [place] laws. We do not discriminate on the basis of race, color, national origin, age, disability, sex, sexual orientation, or gender identity.            Thank you!     Thank you for choosing [facility]  for your care. Our goal is always to provide you with excellent care. Hearing back from our patients is one way we can continue to improve our services. Please take a few minutes to complete the written survey that you may receive in the mail after your visit with us. Thank you!             Your Updated Medication List - Protect others around you: Learn how to safely use, store and throw away your medicines at www.disposemymeds.org.          This list is accurate as of: 10/23/17 11:59 PM.  Always use your most recent med list.                   Brand Name Dispense Instructions for use Diagnosis    calcium 500 +D 500-400 MG-UNIT Tabs   Generic drug:  Calcium Carb-Cholecalciferol     180 tablet    Take 1 tablet by mouth 2 times daily.        cholecalciferol 5000 UNITS Caps     90 capsule    Take 1 daily    Hyperparathyroidism (H)       clobetasol 0.05 % ointment    TEMOVATE    15 g    Apply sparingly to affected area twice daily for 14 days.  Do not apply to face.    Ulcer of genital labia       conjugated estrogens cream    PREMARIN    30 g    Place 0.5 g vaginally twice a week    Vaginal dryness       * estradiol 1 MG tablet    ESTRACE    60 tablet    Take 1 tablet (1 mg) by mouth daily    Panhypopituitarism (H), Loss of hair       * estradiol 1 MG tablet    ESTRACE    30 tablet    Take 1 tablet (1 mg) by mouth daily    Panhypopituitarism (H)       hydrocortisone 10 MG tablet    CORTEF     135 tablet    Take 1 am and 1/2 midday    Panhypopituitarism (H)       levothyroxine 200 MCG tablet    SYNTHROID/LEVOTHROID    90 tablet    Take 1 tablet (200 mcg) by mouth daily    Panhypopituitarism (H)       losartan-hydrochlorothiazide 100-25 MG per tablet    HYZAAR    90 tablet    Take 1 tablet by mouth daily    Panhypopituitarism (H)       progesterone 100 MG capsule    PROMETRIUM    90 capsule    Take 1 capsule (100 mg) by mouth daily    Hormone replacement therapy       simvastatin 40 MG tablet    ZOCOR    90 tablet    Take 1 tablet (40 mg) by mouth daily    Hyperlipidemia LDL goal <100       zinc 50 MG Tabs      Take  by mouth.        * Notice:  This list has 2 medication(s) that are the same as other medications prescribed for you. Read the directions carefully, and ask your doctor or other care provider to review them with you.

## 2017-10-23 NOTE — PROGRESS NOTES
CC/HPI:   Maria Esther Lowe is a 62 year old P0 who presents today with c/o vaginal discharge.  It has been going on for several months.  She has been treated with diflucan and Monistat and has only short term relief.  She was Seen by Dr. Jenkins in 5/17 for similar symptoms and a vulvar lesion was noted at that visit.  She was asked to return for a biopsy if the lesion did not resolve.  She states that she does not have any vulvar lesions currently.   She went though menopause age after pituitatry surgery at age 20-she has panhypopituitarism as a result.  She is currently on  Prometrium and Premarin for HRT and has Premarin cream but does not use it often.  She was on finasteride for hair loss but stopped it as it was though it could be contributing to her discharge.  She has not noticed a difference with stopping the medication.  She is wondering about an ultrasound because of the discharge.     HISTORIES:  Patient Active Problem List   Diagnosis     Prolactinoma (H)     Panhypopituitarism (H)     Hormone replacement therapy (postmenopausal)     Carpal tunnel syndrome     Past Medical History:   Diagnosis Date     Hyperlipidemia LDL goal < 130      Hypertension      Hypothyroidism      Osteopenia     DEXA: 12/11; T-score of -1.9        Panhypopituitarism (H)      Past Surgical History:   Procedure Laterality Date     resected prolactin-producing pituitary adenoma.  1975    Postoperative radiation therapy.      Current Outpatient Prescriptions   Medication Sig Dispense Refill     conjugated estrogens (PREMARIN) cream Place 0.5 g vaginally twice a week 30 g 12     progesterone (PROMETRIUM) 100 MG capsule Take 1 capsule (100 mg) by mouth daily 90 capsule 3     simvastatin (ZOCOR) 40 MG tablet Take 1 tablet (40 mg) by mouth daily 90 tablet 3     losartan-hydrochlorothiazide (HYZAAR) 100-25 MG per tablet Take 1 tablet by mouth daily 90 tablet 3     levothyroxine (SYNTHROID/LEVOTHROID) 200 MCG tablet Take 1 tablet  (200 mcg) by mouth daily 90 tablet 3     hydrocortisone (CORTEF) 10 MG tablet Take 1 am and 1/2 midday 135 tablet 3     cholecalciferol 5000 UNITS CAPS Take 1 daily 90 capsule 3     zinc 50 MG TABS Take  by mouth.       CALCIUM 500 +D 500-400 MG-UNIT TABS Take 1 tablet by mouth 2 times daily. 180 tablet 3     clobetasol (TEMOVATE) 0.05 % ointment Apply sparingly to affected area twice daily for 14 days.  Do not apply to face. (Patient not taking: Reported on 10/23/2017) 15 g 1     Allergies   Allergen Reactions     Ceftriaxone Other (See Comments)     Social History     Social History     Marital status:      Spouse name: N/A     Number of children: N/A     Years of education: N/A     Occupational History     Not on file.     Social History Main Topics     Smoking status: Never Smoker     Smokeless tobacco: Never Used     Alcohol use No     Drug use: No     Sexual activity: Yes     Partners: Male     Birth control/ protection: Post-menopausal     Other Topics Concern     Not on file     Social History Narrative    How much exercise per week? Daily    How much calcium per day?         How much caffeine per day?      How much vitamin D per day?      Do you/your family wear seatbelts?  Yes    Do you/your family use safety helmets? No    Do you/your family use sunscreen? Yes    Do you/your family keep firearms in the home? Yes    Do you/your family have a smoke detector(s)? Yes        Do you feel safe in your home? Yes    Has anyone ever touched you in an unwanted manner? No                     Family History   Problem Relation Age of Onset     DIABETES Paternal Grandmother      Depression Paternal Grandmother      Thyroid Disease Maternal Grandmother      Skin Cancer Father      Melanoma No family hx of           Gyn Hx:   No LMP recorded. Patient is postmenopausal.  Menses:   NA    Review Of Systems:  C: NEGATIVE for fever, chills  E: NEGATIVE for vision changes   R: NEGATIVE for significant cough or SOB  CV:  "NEGATIVE for chest pain, palpitations   GI: NEGATIVE for nausea, abdominal pain, heartburn, or change in bowel habits  : NEGATIVE for frequency, dysuria, or hematuria  M: NEGATIVE for significant arthralgias or myalgia  N: NEGATIVE for weakness, dizziness or paresthesias or headache    EXAM:  /81  Pulse 85  Ht 1.626 m (5' 4\")  Wt 78 kg (172 lb)  BMI 29.52 kg/m2  Body mass index is 29.52 kg/(m^2).    General - pleasant female in no acute distress.  Abdomen - soft, nontender, nondistended, no masses or organomegaly noted.  Musculoskeletal - no gross deformities.  Neurological - normal strength, sensation, and mental status.  Pelvic - vulva are very atrophic with agglutination of the labia minor and minora R>L, on the left there is complete loss of the labium minus, on the right there is a small remnant anteriorly, some scarring of the clitoral natarajan but it is still retractable.  BUS: within normal limits.  Vagina: atrophic, no lesions, scant amount of white/clear discharge in the vault.  Wet prep and yeast culture obtained.    Cervix: no lesions, polyps discharge or CMT.  Anus- normal, no lesions.    Microscopic wet-mount exam shows many basal cells, no evidence of yeast/trich/BV      ASSESSMENT    61 y/o P0 with likely atrophic vaginitis.  Will send yeast culture today to r/o atypical yeast (glabrata).  Vulvar anatomy c/w wither long standing atrophy vs lichen sclerosis.      Plan    Ultrasound ordered given her longstanding history of discharge to r/o ovarian or fallopian tube source.  Discussed vulvar biopsy to evaluate for lichen sclerosis.  She currently has no vulvar symptoms but is agreeable if the vaginal culture returns negative.  Likely will need increased vaginal estrogen if the above are normal for treatment of atrophic vaginitis.  Plan to RTC in 2 weeks to review results and for vulvar biopsy.    Daysi Munoz MD, FACOG    "

## 2017-10-23 NOTE — LETTER
10/23/2017       RE: Maria Esther Lowe  [address] [facility] [address]     Dear Colleague,    Thank you for referring your patient, Maria Esther Lowe, to the [facility] at [facility]. Please see a copy of my visit note below.    - Endocrinology Initial Consultation -    Reason for visit/consult:     Prolactinoma (H)  Panhypopituitarism (H)  Hypothyroidism, unspecified type    Primary care provider: No primary care provider on file.    HPI: A 61 yo female previous Dr. JAQUELINE Perdomo's patient, here for follow up. She has remote history of prolactinoma in her 20's, she underwent surgical removal in 1975 followed by RT. 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   Currently taking hydrocotisone 10 mg am only for at least several years, she has been doing well, she requires afternoon nap 1 pm for 20 minutes.   She has been taking LT4 was on 175 mcg and last year increased to 200 mcg with compliance.    Regarding hypogonadism, she has been taking premarin (conjugated estrogen) 0.625 mg. Progesterone (Prometrium) 100 mg daily. Her main concern today is about hair loss. After pituitary surgery, she lost lots hair, but after that with HRT, her hair started to grow. However past several months, she started to lose hair again and wishing to have more hair come back.     Body weight stable,   [facility] 10/23/2017 10/23/2017 5/11/2017   Weight 78.019 kg 78.019 kg 81.647 kg     Appetite: good, she is doing two jobs, She is  lives with her , adopted one child long time ago who is age 31 now.  No HA.       Past Medical/Surgical History:  Past Medical History:   Diagnosis Date     Hyperlipidemia LDL goal < 130      Hypertension      Hypothyroidism      Osteopenia     DEXA: 12/11; T-score of -1.9        Panhypopituitarism (H)      Past Surgical History:   Procedure Laterality Date     resected prolactin-producing pituitary adenoma.   "1975    Postoperative radiation therapy.        Allergies:  Allergies   Allergen Reactions     Ceftriaxone Other (See Comments)       Current Medications   Current Outpatient Prescriptions   Medication     clobetasol (TEMOVATE) 0.05 % ointment     conjugated estrogens (PREMARIN) cream     progesterone (PROMETRIUM) 100 MG capsule     simvastatin (ZOCOR) 40 MG tablet     losartan-hydrochlorothiazide (HYZAAR) 100-25 MG per tablet     levothyroxine (SYNTHROID/LEVOTHROID) 200 MCG tablet     hydrocortisone (CORTEF) 10 MG tablet     cholecalciferol 5000 UNITS CAPS     zinc 50 MG TABS     CALCIUM 500 +D 500-400 MG-UNIT TABS     No current facility-administered medications for this visit.        Family History:  Family History   Problem Relation Age of Onset     DIABETES Paternal Grandmother      Depression Paternal Grandmother      Thyroid Disease Maternal Grandmother      Skin Cancer Father      Melanoma No family hx of        Social History:  Social History   Substance Use Topics     Smoking status: Never Smoker     Smokeless tobacco: Never Used     Alcohol use No       ROS:  Full review of systems taken with the help of the intake sheet. Otherwise a complete 14 point review of systems was taken and is negative unless stated in the history above.    Physical Exam:   Blood pressure 142/81, height 1.626 m (5' 4\"), weight 78 kg (172 lb), not currently breastfeeding.  General: well appearing, no acute distress, pleasant and conversant,   Mental Status/neuro: alert and oriented  Skull: loss of hair++, frontal   Face: symmetrical, normal facial color  Eyes: anicteric, PERRL, no proptosis or lid lag  Visual field: intact  Occular motor: intact  Neck: suppler, no lymphadenopahty  Thyroid: normal size and texture, no nodule palpable, no bruits  Heart: regular rhythm, S1S2, no murmur appreciated  Lung: clear to auscultation bilaterally  Abdomen: soft, NT/ND, no hepatomegaly  Legs: no swelling or edema    Labs : I reviewed data from " epic and extract and summarize the pertinent data here.   Lab Results   Component Value Date     10/23/2017      Lab Results   Component Value Date    POTASSIUM 3.9 10/23/2017     Lab Results   Component Value Date    CHLORIDE 105 10/23/2017     Lab Results   Component Value Date    ARI 8.5 10/23/2017     Lab Results   Component Value Date    CO2 24 10/23/2017     Lab Results   Component Value Date    BUN 14 10/23/2017     Lab Results   Component Value Date    CR 1.01 10/23/2017     Lab Results   Component Value Date     10/23/2017     Lab Results   Component Value Date    TSH 0.06 10/23/2017     Lab Results   Component Value Date    T4 1.26 10/23/2017       Lab Results   Component Value Date    FSH  07/22/2016     <0.2  FSH Reference Range   Female: Follicular      2.5-10.2           Mid-cycle       3.4-33.4           Luteal          1.5-9.1           Postmenopausal  23.0-116.3         Lab Results   Component Value Date    PROLACTIN 30 12/06/2016       Assessment and Plan  62 year old female with following conditions  # Panhypopituitarism- hydorcoritisone dose low but she has been handling with with dose, I discused today and instructed if she feels weak, take 5 mg pm dose, and also reemphasized sick day rule, which she understood.     # Remote history of a prolactin-producing pituitary adenoma- PRL-30 last year, will repeat today.  Most recent MRI in 2011 per Dr. Perdomo's note is a predominantly empty sella. We will repeat MRI next year.    #Osteopenia. On Ca and VitminD, will check VitaminD level today    # Hair loss- She has panhypopituitarism, her hair loss may relate to Estrogen and Progesterone dose? Deficiency? Or formula? Difference, since there is no clear evidence, we will first try to switch from conjugated estrogen to estradiol,   Dose from conugated 0.625 mcg to estrace 1mg to start, she will observe her hair and we may need small adjustment.      - PRL, BMP, VitminD, TSH, free T4, TPO  today  - Brain MRI next year  - Will switch from Premarin 0.625 mcg daily to Estrace 1 mg daily to watch hair state for 2-3 month  - RTC with me in 1 year      --- Addendnum ---    LT4 200 mcg looks optimized dose.      10/23/2017 11:15   Sodium 136   Potassium 3.9   Chloride 105   Carbon Dioxide 24   Urea Nitrogen 14   Creatinine 1.01   GFR Estimate 56 (L)   GFR Estimate If Black 67   Calcium 8.5   Anion Gap 7   Prolactin 32 (H)   T4 Free 1.26   TSH 0.06 (L)   Glucose 101 (H)            I spent 45 minutes with this patient face to face and explained the conditions and plans (more than 50% of time was counseling/coordination of care, follow up plan for her hair loss) . The patient understood and is satisfied with today's visit. Return to clinic with me in 1 year.         Leona Arrington MD  Staff Physician  Endocrinology and Metabolism  License: [id number]

## 2017-10-23 NOTE — PROGRESS NOTES
- Endocrinology Initial Consultation -    Reason for visit/consult:     Prolactinoma (H)  Panhypopituitarism (H)  Hypothyroidism, unspecified type    Primary care provider: No primary care provider on file.    HPI: A 63 yo female previous Dr. JAQUELINE Perdomo's patient, here for follow up. She has remote history of prolactinoma in her 20's, she underwent surgical removal in 1975 followed by RT. 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   Currently taking hydrocotisone 10 mg am only for at least several years, she has been doing well, she requires afternoon nap 1 pm for 20 minutes.   She has been taking LT4 was on 175 mcg and last year increased to 200 mcg with compliance.    Regarding hypogonadism, she has been taking premarin (conjugated estrogen) 0.625 mg. Progesterone (Prometrium) 100 mg daily. Her main concern today is about hair loss. After pituitary surgery, she lost lots hair, but after that with HRT, her hair started to grow. However past several months, she started to lose hair again and wishing to have more hair come back.     Body weight stable,   ENDO VITALS-P 10/23/2017 10/23/2017 5/11/2017   Weight 78.019 kg 78.019 kg 81.647 kg     Appetite: good, she is doing two jobs, She is  lives with her , adopted one child long time ago who is age 31 now.  No HA.       Past Medical/Surgical History:  Past Medical History:   Diagnosis Date     Hyperlipidemia LDL goal < 130      Hypertension      Hypothyroidism      Osteopenia     DEXA: 12/11; T-score of -1.9        Panhypopituitarism (H)      Past Surgical History:   Procedure Laterality Date     resected prolactin-producing pituitary adenoma.  1975    Postoperative radiation therapy.        Allergies:  Allergies   Allergen Reactions     Ceftriaxone Other (See Comments)       Current Medications   Current Outpatient Prescriptions   Medication      "clobetasol (TEMOVATE) 0.05 % ointment     conjugated estrogens (PREMARIN) cream     progesterone (PROMETRIUM) 100 MG capsule     simvastatin (ZOCOR) 40 MG tablet     losartan-hydrochlorothiazide (HYZAAR) 100-25 MG per tablet     levothyroxine (SYNTHROID/LEVOTHROID) 200 MCG tablet     hydrocortisone (CORTEF) 10 MG tablet     cholecalciferol 5000 UNITS CAPS     zinc 50 MG TABS     CALCIUM 500 +D 500-400 MG-UNIT TABS     No current facility-administered medications for this visit.        Family History:  Family History   Problem Relation Age of Onset     DIABETES Paternal Grandmother      Depression Paternal Grandmother      Thyroid Disease Maternal Grandmother      Skin Cancer Father      Melanoma No family hx of        Social History:  Social History   Substance Use Topics     Smoking status: Never Smoker     Smokeless tobacco: Never Used     Alcohol use No       ROS:  Full review of systems taken with the help of the intake sheet. Otherwise a complete 14 point review of systems was taken and is negative unless stated in the history above.    Physical Exam:   Blood pressure 142/81, height 1.626 m (5' 4\"), weight 78 kg (172 lb), not currently breastfeeding.  General: well appearing, no acute distress, pleasant and conversant,   Mental Status/neuro: alert and oriented  Skull: loss of hair++, frontal   Face: symmetrical, normal facial color  Eyes: anicteric, PERRL, no proptosis or lid lag  Visual field: intact  Occular motor: intact  Neck: suppler, no lymphadenopahty  Thyroid: normal size and texture, no nodule palpable, no bruits  Heart: regular rhythm, S1S2, no murmur appreciated  Lung: clear to auscultation bilaterally  Abdomen: soft, NT/ND, no hepatomegaly  Legs: no swelling or edema    Labs : I reviewed data from epic and extract and summarize the pertinent data here.   Lab Results   Component Value Date     10/23/2017      Lab Results   Component Value Date    POTASSIUM 3.9 10/23/2017     Lab Results "   Component Value Date    CHLORIDE 105 10/23/2017     Lab Results   Component Value Date    ARI 8.5 10/23/2017     Lab Results   Component Value Date    CO2 24 10/23/2017     Lab Results   Component Value Date    BUN 14 10/23/2017     Lab Results   Component Value Date    CR 1.01 10/23/2017     Lab Results   Component Value Date     10/23/2017     Lab Results   Component Value Date    TSH 0.06 10/23/2017     Lab Results   Component Value Date    T4 1.26 10/23/2017       Lab Results   Component Value Date    FSH  07/22/2016     <0.2  FSH Reference Range   Female: Follicular      2.5-10.2           Mid-cycle       3.4-33.4           Luteal          1.5-9.1           Postmenopausal  23.0-116.3         Lab Results   Component Value Date    PROLACTIN 30 12/06/2016       Assessment and Plan  62 year old female with following conditions  # Panhypopituitarism- hydorcoritisone dose low but she has been handling with with dose, I discused today and instructed if she feels weak, take 5 mg pm dose, and also reemphasized sick day rule, which she understood.     # Remote history of a prolactin-producing pituitary adenoma- PRL-30 last year, will repeat today.  Most recent MRI in 2011 per Dr. Perdomo's note is a predominantly empty sella. We will repeat MRI next year.    #Osteopenia. On Ca and VitminD, will check VitaminD level today    # Hair loss- She has panhypopituitarism, her hair loss may relate to Estrogen and Progesterone dose? Deficiency? Or formula? Difference, since there is no clear evidence, we will first try to switch from conjugated estrogen to estradiol,   Dose from conugated 0.625 mcg to estrace 1mg to start, she will observe her hair and we may need small adjustment.      - PRL, BMP, VitminD, TSH, free T4, TPO today  - Brain MRI next year  - Will switch from Premarin 0.625 mcg daily to Estrace 1 mg daily to watch hair state for 2-3 month  - RTC with me in 1 year      --- Addendnum ---    LT4 200 mcg looks  optimized dose.      10/23/2017 11:15   Sodium 136   Potassium 3.9   Chloride 105   Carbon Dioxide 24   Urea Nitrogen 14   Creatinine 1.01   GFR Estimate 56 (L)   GFR Estimate If Black 67   Calcium 8.5   Anion Gap 7   Prolactin 32 (H)   T4 Free 1.26   TSH 0.06 (L)   Glucose 101 (H)            I spent 45 minutes with this patient face to face and explained the conditions and plans (more than 50% of time was counseling/coordination of care, follow up plan for her hair loss) . The patient understood and is satisfied with today's visit. Return to clinic with me in 1 year.         Leona Arrington MD  Staff Physician  Endocrinology and Metabolism  License: [id number]

## 2017-10-23 NOTE — MR AVS SNAPSHOT
After Visit Summary   10/23/2017    Maria Esther Lowe    MRN: [id number]           Patient Information     Date Of Birth          1955        Visit Information        Provider Department      10/23/2017 9:30 AM Leona Arrington MD [facility]         Follow-ups after your visit        Your next 10 appointments already scheduled     Oct 23, 2017 10:45 AM CDT   LAB with  LAB   [facility] ([facility])    [address] [place] [address] [id number] [phone]           Patient must bring picture ID. Patient should be prepared to give a urine specimen  Please do not eat 10-12 hours before your appointment if you are coming in fasting for labs on lipids, cholesterol, or glucose (sugar). Pregnant women should follow their Care Team instructions. Water with medications is okay. Do not drink coffee or other fluids. If you have concerns about taking  your medications, please ask at office or if scheduling via [employer], send a message by clicking on Secure Messaging, Message Your Care Team.            Oct 23, 2017 12:00 PM CDT   US PELVIC COMPLETE WITH TRANSVAGINAL PORTABLE with [id number]   [facility] US ([facility])    [address] [place] [id number] [phone]           Please bring a list of your medicines (including vitamins, minerals and over-the-counter drugs). Also, tell your doctor about any allergies you may have. Wear comfortable clothes and leave your valuables at home.  Adults: Drink six 8-ounce glasses of fluid one hour before your exam. Do NOT empty your bladder.  If you need to empty your bladder before your exam, try to release only a little bit of urine. Then, drink another 8oz glass of fluid.  Children: Children who are potty trained should drink at least 4 cups (32 oz) of liquid 45 minutes to one hour prior to the exam. The child s bladder must be full in order to achieve  a diagnostic exam. If your child is very uncomfortable or has an urgent need to pee, please notify a technologist; they will try to find out how much longer the wait may be and provide instructions to help relieve the pressure. Occasionally it is medically necessary to insert a urinary catheter to fill the bladder.  Please call the Imaging Department at your exam site with any questions.              Future tests that were ordered for you today     Open Future Orders        Priority Expected Expires Ordered    US Pelvic Complete with Transvaginal Routine  10/23/2018 10/23/2017            Who to contact     Please call your clinic at [phone] to:    Ask questions about your health    Make or cancel appointments    Discuss your medicines    Learn about your test results    Speak to your doctor   If you have compliments or concerns about an experience at your clinic, or if you wish to file a complaint, please contact [facility] Patient Relations at [phone] or email us at [email or web address]         Additional Information About Your Visit        [employer] Information     NICO is an electronic gateway that provides easy, online access to your medical records. With NICO, you can request a clinic appointment, read your test results, renew a prescription or communicate with your care team.     To sign up for NICO visit the website at [email or web address]   You will be asked to enter the access code listed below, as well as some personal information. Please follow the directions to create your username and password.     Your access code is: [email or web address]  Expires: 2018  6:31 AM     Your access code will  in 90 days. If you need help or a new code, please contact your [facility] or call [phone] for assistance.        Care EveryWhere ID     This is your Care EveryWhere ID. This could be used by other organizations to  "access your [facility] medical records  [id number]        Your Vitals Were     Height BMI (Body Mass Index)                1.626 m (5' 4\") 29.52 kg/m2           Blood Pressure from Last 3 Encounters:   10/23/17 142/81   10/23/17 142/81   05/11/17 134/77    Weight from Last 3 Encounters:   10/23/17 78 kg (172 lb)   10/23/17 78 kg (172 lb)   05/11/17 81.6 kg (180 lb)              Today, you had the following     No orders found for display       Primary Care Provider    None Specified       No primary provider on file.        Equal Access to Services     [facility]: Tameka Silverman, fransico samuel, yousif lawrence, larissa hickman . So [facility] [phone].    ATENCIÓN: Si habla español, tiene a aden disposición servicios gratuitos de asistencia lingüística. Llame al [phone].    We comply with applicable federal civil rights laws and [place] laws. We do not discriminate on the basis of race, color, national origin, age, disability, sex, sexual orientation, or gender identity.            Thank you!     Thank you for choosing [facility]  for your care. Our goal is always to provide you with excellent care. Hearing back from our patients is one way we can continue to improve our services. Please take a few minutes to complete the written survey that you may receive in the mail after your visit with us. Thank you!             Your Updated Medication List - Protect others around you: Learn how to safely use, store and throw away your medicines at www.disposemymeds.org.          This list is accurate as of: 10/23/17 10:37 AM.  Always use your most recent med list.                   Brand Name Dispense Instructions for use Diagnosis    calcium 500 +D 500-400 MG-UNIT Tabs   Generic drug:  Calcium Carb-Cholecalciferol     180 tablet    Take 1 tablet by mouth 2 times daily.        cholecalciferol 5000 UNITS Caps     90 capsule    Take 1 daily    " Hyperparathyroidism (H)       clobetasol 0.05 % ointment    TEMOVATE    15 g    Apply sparingly to affected area twice daily for 14 days.  Do not apply to face.    Ulcer of genital labia       conjugated estrogens cream    PREMARIN    30 g    Place 0.5 g vaginally twice a week    Vaginal dryness       hydrocortisone 10 MG tablet    CORTEF    135 tablet    Take 1 am and 1/2 midday    Panhypopituitarism (H)       levothyroxine 200 MCG tablet    SYNTHROID/LEVOTHROID    90 tablet    Take 1 tablet (200 mcg) by mouth daily    Panhypopituitarism (H)       losartan-hydrochlorothiazide 100-25 MG per tablet    HYZAAR    90 tablet    Take 1 tablet by mouth daily    Panhypopituitarism (H)       progesterone 100 MG capsule    PROMETRIUM    90 capsule    Take 1 capsule (100 mg) by mouth daily    Hormone replacement therapy       simvastatin 40 MG tablet    ZOCOR    90 tablet    Take 1 tablet (40 mg) by mouth daily    Hyperlipidemia LDL goal <100       zinc 50 MG Tabs      Take  by mouth.

## 2017-10-23 NOTE — LETTER
10/23/2017     RE: Maria Esther Lowe  [address] [facility] [id number]     Dear Colleague,    Thank you for referring your patient, Maria Esther Lowe, to the [facility] at [facility]. Please see a copy of my visit note below.    CC/HPI:   Maria Esther Lowe is a 62 year old P0 who presents today with c/o vaginal discharge.  It has been going on for several months.  She has been treated with diflucan and Monistat and has only short term relief.  She was Seen by Dr. Jenkins in 5/17 for similar symptoms and a vulvar lesion was noted at that visit.  She was asked to return for a biopsy if the lesion did not resolve.  She states that she does not have any vulvar lesions currently.   She went though menopause age after pituitatry surgery at age 20-she has panhypopituitarism as a result.  She is currently on  Prometrium and Premarin for HRT and has Premarin cream but does not use it often.  She was on finasteride for hair loss but stopped it as it was though it could be contributing to her discharge.  She has not noticed a difference with stopping the medication.  She is wondering about an ultrasound because of the discharge.     HISTORIES:  Patient Active Problem List   Diagnosis     Prolactinoma (H)     Panhypopituitarism (H)     Hormone replacement therapy (postmenopausal)     Carpal tunnel syndrome     Past Medical History:   Diagnosis Date     Hyperlipidemia LDL goal < 130      Hypertension      Hypothyroidism      Osteopenia     DEXA: 12/11; T-score of -1.9        Panhypopituitarism (H)      Past Surgical History:   Procedure Laterality Date     resected prolactin-producing pituitary adenoma.  1975    Postoperative radiation therapy.      Current Outpatient Prescriptions   Medication Sig Dispense Refill     conjugated estrogens (PREMARIN) cream Place 0.5 g vaginally twice a week 30 g 12     progesterone (PROMETRIUM) 100 MG capsule Take 1 capsule (100  mg) by mouth daily 90 capsule 3     simvastatin (ZOCOR) 40 MG tablet Take 1 tablet (40 mg) by mouth daily 90 tablet 3     losartan-hydrochlorothiazide (HYZAAR) 100-25 MG per tablet Take 1 tablet by mouth daily 90 tablet 3     levothyroxine (SYNTHROID/LEVOTHROID) 200 MCG tablet Take 1 tablet (200 mcg) by mouth daily 90 tablet 3     hydrocortisone (CORTEF) 10 MG tablet Take 1 am and 1/2 midday 135 tablet 3     cholecalciferol 5000 UNITS CAPS Take 1 daily 90 capsule 3     zinc 50 MG TABS Take  by mouth.       CALCIUM 500 +D 500-400 MG-UNIT TABS Take 1 tablet by mouth 2 times daily. 180 tablet 3     clobetasol (TEMOVATE) 0.05 % ointment Apply sparingly to affected area twice daily for 14 days.  Do not apply to face. (Patient not taking: Reported on 10/23/2017) 15 g 1     Allergies   Allergen Reactions     Ceftriaxone Other (See Comments)     Social History     Social History     Marital status:      Spouse name: N/A     Number of children: N/A     Years of education: N/A     Occupational History     Not on file.     Social History Main Topics     Smoking status: Never Smoker     Smokeless tobacco: Never Used     Alcohol use No     Drug use: No     Sexual activity: Yes     Partners: Male     Birth control/ protection: Post-menopausal     Other Topics Concern     Not on file     Social History Narrative    How much exercise per week? Daily    How much calcium per day?         How much caffeine per day?      How much vitamin D per day?      Do you/your family wear seatbelts?  Yes    Do you/your family use safety helmets? No    Do you/your family use sunscreen? Yes    Do you/your family keep firearms in the home? Yes    Do you/your family have a smoke detector(s)? Yes        Do you feel safe in your home? Yes    Has anyone ever touched you in an unwanted manner? No                     Family History   Problem Relation Age of Onset     DIABETES Paternal Grandmother      Depression Paternal Grandmother      Thyroid  "Disease Maternal Grandmother      Skin Cancer Father      Melanoma No family hx of           Gyn Hx:   No LMP recorded. Patient is postmenopausal.  Menses:   NA    Review Of Systems:  C: NEGATIVE for fever, chills  E: NEGATIVE for vision changes   R: NEGATIVE for significant cough or SOB  CV: NEGATIVE for chest pain, palpitations   GI: NEGATIVE for nausea, abdominal pain, heartburn, or change in bowel habits  : NEGATIVE for frequency, dysuria, or hematuria  M: NEGATIVE for significant arthralgias or myalgia  N: NEGATIVE for weakness, dizziness or paresthesias or headache    EXAM:  /81  Pulse 85  Ht 1.626 m (5' 4\")  Wt 78 kg (172 lb)  BMI 29.52 kg/m2  Body mass index is 29.52 kg/(m^2).    General - pleasant female in no acute distress.  Abdomen - soft, nontender, nondistended, no masses or organomegaly noted.  Musculoskeletal - no gross deformities.  Neurological - normal strength, sensation, and mental status.  Pelvic - vulva are very atrophic with agglutination of the labia minor and minora R>L, on the left there is complete loss of the labium minus, on the right there is a small remnant anteriorly, some scarring of the clitoral natarajan but it is still retractable.  BUS: within normal limits.  Vagina: atrophic, no lesions, scant amount of white/clear discharge in the vault.  Wet prep and yeast culture obtained.    Cervix: no lesions, polyps discharge or CMT.  Anus- normal, no lesions.    Microscopic wet-mount exam shows many basal cells, no evidence of yeast/trich/BV      ASSESSMENT    61 y/o P0 with likely atrophic vaginitis.  Will send yeast culture today to r/o atypical yeast (glabrata).  Vulvar anatomy c/w wither long standing atrophy vs lichen sclerosis.      Plan    Ultrasound ordered given her longstanding history of discharge to r/o ovarian or fallopian tube source.  Discussed vulvar biopsy to evaluate for lichen sclerosis.  She currently has no vulvar symptoms but is agreeable if the vaginal " culture returns negative.  Likely will need increased vaginal estrogen if the above are normal for treatment of atrophic vaginitis.  Plan to RTC in 2 weeks to review results and for vulvar biopsy.    Daysi Munoz MD, FACOG

## 2017-10-23 NOTE — NURSING NOTE
"VITALS FORWARDED FROM PREVIOUS APPOINTMENT TODAY 10/23/17      Chief Complaint   Patient presents with     RECHECK     PANHYPOPITUITARISM F/U        Initial /81  Ht 1.626 m (5' 4\")  Wt 78 kg (172 lb)  BMI 29.52 kg/m2 Estimated body mass index is 29.52 kg/(m^2) as calculated from the following:    Height as of this encounter: 1.626 m (5' 4\").    Weight as of this encounter: 78 kg (172 lb).  Medication Reconciliation: complete     Norma Reed      "

## 2017-10-27 LAB
SPECIMEN SOURCE: NORMAL
YEAST SPEC QL CULT: NORMAL

## 2017-10-31 RX ORDER — ESTRADIOL 1 MG/1
1 TABLET ORAL DAILY
Qty: 30 TABLET | Refills: 1 | Status: SHIPPED | OUTPATIENT
Start: 2017-10-31 | End: 2018-01-29

## 2017-10-31 RX ORDER — ESTRADIOL 1 MG/1
1 TABLET ORAL DAILY
Qty: 60 TABLET | Refills: 3 | Status: SHIPPED | OUTPATIENT
Start: 2017-10-31 | End: 2018-05-11

## 2017-11-07 ENCOUNTER — TELEPHONE (OUTPATIENT)
Dept: OBGYN | Facility: CLINIC | Age: 62
End: 2017-11-07

## 2017-11-07 NOTE — TELEPHONE ENCOUNTER
Spoke to Maria Esther to let her know Dr Munoz thinks it would be best to come to clinic to discuss US results and poc . Scheduled for next Monday,Pt indicated understanding and agreed with plan.

## 2017-11-13 ENCOUNTER — OFFICE VISIT (OUTPATIENT)
Dept: OBGYN | Facility: CLINIC | Age: 62
End: 2017-11-13
Attending: OBSTETRICS & GYNECOLOGY
Payer: COMMERCIAL

## 2017-11-13 DIAGNOSIS — Z23 NEED FOR INFLUENZA VACCINATION: ICD-10-CM

## 2017-11-13 DIAGNOSIS — N84.0 ENDOMETRIAL POLYP: Primary | ICD-10-CM

## 2017-11-13 DIAGNOSIS — N83.8 OVARIAN MASS: ICD-10-CM

## 2017-11-13 LAB — CANCER AG125 SERPL-ACNC: <5 U/ML (ref 0–30)

## 2017-11-13 PROCEDURE — 25000128 H RX IP 250 OP 636: Mod: ZF

## 2017-11-13 PROCEDURE — 99212 OFFICE O/P EST SF 10 MIN: CPT | Mod: ZF

## 2017-11-13 PROCEDURE — 36415 COLL VENOUS BLD VENIPUNCTURE: CPT | Performed by: OBSTETRICS & GYNECOLOGY

## 2017-11-13 PROCEDURE — 90686 IIV4 VACC NO PRSV 0.5 ML IM: CPT | Mod: ZF

## 2017-11-13 PROCEDURE — 86304 IMMUNOASSAY TUMOR CA 125: CPT | Performed by: OBSTETRICS & GYNECOLOGY

## 2017-11-13 PROCEDURE — G0008 ADMIN INFLUENZA VIRUS VAC: HCPCS

## 2017-11-13 NOTE — NURSING NOTE
Chief Complaint   Patient presents with     Consult For     consult uls result and plan of care       See EDI Short 11/13/2017                Maria Esther Lowe      1.  Has the patient received the information for the influenza vaccine? YES    2.  Does the patient have any of the following contraindications?     Allergy to eggs? No     Allergic reaction to previous influenza vaccines? No     Any other problems to previous influenza vaccines? No     Paralyzed by Guillain-[place] syndrome? No     Currently pregnant? NO     Current moderate or severe illness? No     Allergy to contact lens solution? No    3.  The vaccine has been administered in the usual fashion and the patient was instructed to wait 20 minutes before leaving the building in the event of an allergic reaction: YES    Vaccination given by See EDI Short .  Recorded by See Deja

## 2017-11-13 NOTE — LETTER
11/13/2017       RE: Maria Esther Lowe  [address] [facility] [address]     Dear Colleague,    Thank you for referring your patient, Maria Esther Lowe, to the [facility] at [facility]. Please see a copy of my visit note below.    S:  Pt is a 62 y/o P0 who returns today to f/u a recent visit for chronic vaginal discharge.  Her  is present with her today.  Her wet prep was negative at that visit and her yeast culture returned negative.  She had a pelvic ultrasound done in radiology that showed a thin endometrium with fluid in the cavity and a possible endometrial polyp at the fundus.  The left ovary was not seen, the right was noted to have a 4.1 x 4 x 3.4 cm cyst.  She had a comparison ultrasound in 12/15 that showed similar fluid in the cavity and 2.6 x 2.6 x 2.5 cm right ovarian simple cyst.  She is not having any pain or any other symptoms from the cyst.    O:  There were no vitals taken for this visit.    Not examined, imaging reviewed    EXAMINATION: US PELVIC COMPLETE WITH TRANSVAGINAL, 10/23/2017 12:22 PM        COMPARISON: Ultrasound dated 12/3/2015     HISTORY: Vaginal discharge     TECHNIQUE: The pelvis was scanned in standard fashion with  transabdominal and transvaginal transducer(s) using both grey scale  and color Doppler techniques.     FINDINGS  The uterus measures 6.6 x 3 x 2.3 cm, and there is no evidence of a  focal fibroid.  The endometrium shows fluid within endometrial cavity  and thickness of about 2 mm, there is a polypoid structure measuring  about 4.2 mm near the fundus. Nabothian cyst is seen. Fluid is also  seen in the endovaginal canal. There is no free fluid in the pelvis.     The right ovary measures 5.8 x 4 x 3.4 cm and the left ovary is not  visualized. There is presence of cyst measuring 4.1 x 4 x 3.4 cm which  shows likely remnant ovarian tissue with vascular flow at its  periphery.  Previously this measured 2.6  cm in maximum diameter.         IMPRESSION:   1. Endometrial fluid with possible small endometrial polyp.  2. Right ovarian cyst measuring 4.1 x 4 x 3.4 cm with soft tissue with  vascular flow at its periphery, which may represent remnant ovarian  tissue. This is grown in size from prior ultrasound 12/3/2015. Given  interval growth, surgical consultation, MRI, or short-term follow-up  could be considered.     I have personally reviewed the examination and initial interpretation  and I agree with the findings.     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    P:  Reviewed that the right ovarian cyst is likely benign given it's small increase in size over 2 years.  Discussed checking a Ca-125 and repeating imaging in 3 months.  She was agreeable to this plan.  Offered hysteroscopy with resection of the endometrial polyp.  Reviewed the small chance of endometrial hyperplasia arising in a polyp. She would like to go ahead and get hysteroscopy scheduled.  Scheduling request sent.      Daysi Munoz MD, FACOG  Total visit time was 30 minutes with 25 minutes spent in counseling and coordination of care for endometrial polyp, right ovarian cyst.

## 2017-11-13 NOTE — MR AVS SNAPSHOT
After Visit Summary   11/13/2017    Maria Esther Lowe    MRN: [id number]           Patient Information     Date Of Birth          1955        Visit Information        Provider Department      11/13/2017 11:30 AM Daysi Munoz MD [facility]        Today's Diagnoses     Endometrial polyp    -  1    Ovarian mass        Need for influenza vaccination           Follow-ups after your visit        Follow-up notes from your care team     Return in about 3 months (around 2/13/2018) for please schedule follow up ultrasound and visit after in 3 months.      Your next 10 appointments already scheduled     Jan 16, 2018   Procedure with Daysi Munoz MD   [facility], [place], Same Day Surgery (--)    [address] [facility] [id number] [phone]            Feb 12, 2018 10:30 AM CST   ULTRASOUND with [facility] ULTRASOUND   [facility] ([facility])    [facility] [address]  [address]  [facility] [id number] [phone]              Who to contact     Please call your clinic at [phone] to:    Ask questions about your health    Make or cancel appointments    Discuss your medicines    Learn about your test results    Speak to your doctor   If you have compliments or concerns about an experience at your clinic, or if you wish to file a complaint, please contact [facility] Patient Relations at [phone] or email us at [email or web address]         Additional Information About Your Visit        MyChart Information     [employer] is an electronic gateway that provides easy, online access to your medical records. With Service[employer], you can request a clinic appointment, read your test results, renew a prescription or communicate with your care team.     To sign up for Service[employer] visit the website at [email or web address]   You will be asked to enter the access code  listed below, as well as some personal information. Please follow the directions to create your username and password.     Your access code is: [email or web address]  Expires: 2018  5:31 AM     Your access code will  in 90 days. If you need help or a new code, please contact your [facility] or call [phone] for assistance.        Care EveryWhere ID     This is your Care EveryWhere ID. This could be used by other organizations to access your [place] medical records  [id number]         Blood Pressure from Last 3 Encounters:   10/23/17 142/81   10/23/17 142/81   17 134/77    Weight from Last 3 Encounters:   10/23/17 78 kg (172 lb)   10/23/17 78 kg (172 lb)   17 81.6 kg (180 lb)              We Performed the Following     C INFLU VACC, SPLIT VIRUS, IM > 3 YO (FLUZONE)          Mari-Operative Worksheet (Operative Hysteroscopy)        Primary Care Provider    None Specified       No primary provider on file.        Equal Access to Services     MARIA DE JESUS [place] : Hadii alethea amador Soyaritza, waaxda luqadaha, qaybta kaalmaandres lawrence, larissa hickman . So [place] -[phone].    ATENCIÓN: Si habla español, tiene a aden disposición servicios gratuitos de asistencia lingüística. Llame al [phone].    We comply with applicable federal civil rights laws and [place] laws. We do not discriminate on the basis of race, color, national origin, age, disability, sex, sexual orientation, or gender identity.            Thank you!     Thank you for choosing [facility]  for your care. Our goal is always to provide you with excellent care. Hearing back from our patients is one way we can continue to improve our services. Please take a few minutes to complete the written survey that you may receive in the mail after your visit with us. Thank you!             Your Updated Medication List - Protect others around you: Learn how to safely  use, store and throw away your medicines at www.disposemymeds.org.          This list is accurate as of: 11/13/17 11:59 PM.  Always use your most recent med list.                   Brand Name Dispense Instructions for use Diagnosis    calcium 500 +D 500-400 MG-UNIT Tabs   Generic drug:  Calcium Carb-Cholecalciferol     180 tablet    Take 1 tablet by mouth 2 times daily.        cholecalciferol 5000 UNITS Caps     90 capsule    Take 1 daily    Hyperparathyroidism (H)       clobetasol 0.05 % ointment    TEMOVATE    15 g    Apply sparingly to affected area twice daily for 14 days.  Do not apply to face.    Ulcer of genital labia       conjugated estrogens cream    PREMARIN    30 g    Place 0.5 g vaginally twice a week    Vaginal dryness       * estradiol 1 MG tablet    ESTRACE    60 tablet    Take 1 tablet (1 mg) by mouth daily    Panhypopituitarism (H), Loss of hair       * estradiol 1 MG tablet    ESTRACE    30 tablet    Take 1 tablet (1 mg) by mouth daily    Panhypopituitarism (H)       hydrocortisone 10 MG tablet    CORTEF    135 tablet    Take 1 am and 1/2 midday    Panhypopituitarism (H)       levothyroxine 200 MCG tablet    SYNTHROID/LEVOTHROID    90 tablet    Take 1 tablet (200 mcg) by mouth daily    Panhypopituitarism (H)       losartan-hydrochlorothiazide 100-25 MG per tablet    HYZAAR    90 tablet    Take 1 tablet by mouth daily    Panhypopituitarism (H)       progesterone 100 MG capsule    PROMETRIUM    90 capsule    Take 1 capsule (100 mg) by mouth daily    Hormone replacement therapy       simvastatin 40 MG tablet    ZOCOR    90 tablet    Take 1 tablet (40 mg) by mouth daily    Hyperlipidemia LDL goal <100       zinc 50 MG Tabs      Take  by mouth.        * Notice:  This list has 2 medication(s) that are the same as other medications prescribed for you. Read the directions carefully, and ask your doctor or other care provider to review them with you.

## 2017-11-15 ENCOUNTER — TELEPHONE (OUTPATIENT)
Dept: OBGYN | Facility: CLINIC | Age: 62
End: 2017-11-15

## 2017-11-15 NOTE — TELEPHONE ENCOUNTER
Confirmed surgery date with patient 1/16/18 with arrival time at 8:00a.m with nothing to eat eight hours before scheduled surgery time and clear liquids up to two hours before scheduled surgery time, informed patient that she will need to sched a pre op physical with in thirty days of surgery and that a map and letter will be mailed out.     to complete the following fields:            CHECKLIST     Google Calendar : Yes     Resident notified: Not Applicable     Clinic schedule blocked:  Not Applicable    Patient notified:Yes      Pre op information sent: Yes     Given to patient over the phone.Yes    Comments:

## 2017-11-28 NOTE — PROGRESS NOTES
S:  Pt is a 62 y/o P0 who returns today to f/u a recent visit for chronic vaginal discharge.  Her  is present with her today.  Her wet prep was negative at that visit and her yeast culture returned negative.  She had a pelvic ultrasound done in radiology that showed a thin endometrium with fluid in the cavity and a possible endometrial polyp at the fundus.  The left ovary was not seen, the right was noted to have a 4.1 x 4 x 3.4 cm cyst.  She had a comparison ultrasound in 12/15 that showed similar fluid in the cavity and 2.6 x 2.6 x 2.5 cm right ovarian simple cyst.  She is not having any pain or any other symptoms from the cyst.    O:  There were no vitals taken for this visit.    Not examined, imaging reviewed    EXAMINATION: US PELVIC COMPLETE WITH TRANSVAGINAL, 10/23/2017 12:22 PM        COMPARISON: Ultrasound dated 12/3/2015     HISTORY: Vaginal discharge     TECHNIQUE: The pelvis was scanned in standard fashion with  transabdominal and transvaginal transducer(s) using both grey scale  and color Doppler techniques.     FINDINGS  The uterus measures 6.6 x 3 x 2.3 cm, and there is no evidence of a  focal fibroid.  The endometrium shows fluid within endometrial cavity  and thickness of about 2 mm, there is a polypoid structure measuring  about 4.2 mm near the fundus. Nabothian cyst is seen. Fluid is also  seen in the endovaginal canal. There is no free fluid in the pelvis.     The right ovary measures 5.8 x 4 x 3.4 cm and the left ovary is not  visualized. There is presence of cyst measuring 4.1 x 4 x 3.4 cm which  shows likely remnant ovarian tissue with vascular flow at its  periphery.  Previously this measured 2.6 cm in maximum diameter.         IMPRESSION:   1. Endometrial fluid with possible small endometrial polyp.  2. Right ovarian cyst measuring 4.1 x 4 x 3.4 cm with soft tissue with  vascular flow at its periphery, which may represent remnant ovarian  tissue. This is grown in size from prior  ultrasound 12/3/2015. Given  interval growth, surgical consultation, MRI, or short-term follow-up  could be considered.     I have personally reviewed the examination and initial interpretation  and I agree with the findings.     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    P:  Reviewed that the right ovarian cyst is likely benign given it's small increase in size over 2 years.  Discussed checking a Ca-125 and repeating imaging in 3 months.  She was agreeable to this plan.  Offered hysteroscopy with resection of the endometrial polyp.  Reviewed the small chance of endometrial hyperplasia arising in a polyp. She would like to go ahead and get hysteroscopy scheduled.  Scheduling request sent.      Daysi Munoz MD, FACOG  Total visit time was 30 minutes with 25 minutes spent in counseling and coordination of care for endometrial polyp, right ovarian cyst.

## 2017-12-19 DIAGNOSIS — E21.3 HYPERPARATHYROIDISM (H): ICD-10-CM

## 2017-12-19 NOTE — TELEPHONE ENCOUNTER
Vitamin D 5000 units  Last Written Prescription Date:  12/7/16  Last Fill Quantity: 90,   # refills: 3  Last Office Visit : 10/23/17  Future Office visit:  No future appt    Routing refill request to provider for review/approval because:  Drug not on the [facility], [facility] or [facility] refill protocol

## 2017-12-30 DIAGNOSIS — E23.0 PANHYPOPITUITARISM (H): ICD-10-CM

## 2017-12-30 DIAGNOSIS — E78.5 HYPERLIPIDEMIA LDL GOAL <100: ICD-10-CM

## 2018-01-03 RX ORDER — LOSARTAN POTASSIUM AND HYDROCHLOROTHIAZIDE 25; 100 MG/1; MG/1
1 TABLET ORAL DAILY
Qty: 90 TABLET | Refills: 3 | Status: SHIPPED | OUTPATIENT
Start: 2018-01-03 | End: 2019-01-08

## 2018-01-03 RX ORDER — SIMVASTATIN 40 MG
40 TABLET ORAL DAILY
Qty: 90 TABLET | Refills: 3 | Status: SHIPPED | OUTPATIENT
Start: 2018-01-03 | End: 2019-01-08

## 2018-01-03 RX ORDER — LEVOTHYROXINE SODIUM 200 UG/1
200 TABLET ORAL DAILY
Qty: 90 TABLET | Refills: 3 | Status: SHIPPED | OUTPATIENT
Start: 2018-01-03 | End: 2019-01-08

## 2018-01-05 DIAGNOSIS — Z79.890 HORMONE REPLACEMENT THERAPY: ICD-10-CM

## 2018-01-08 ENCOUNTER — TELEPHONE (OUTPATIENT)
Dept: ENDOCRINOLOGY | Facility: CLINIC | Age: 63
End: 2018-01-08

## 2018-01-08 ENCOUNTER — TELEPHONE (OUTPATIENT)
Dept: OBGYN | Facility: CLINIC | Age: 63
End: 2018-01-08

## 2018-01-08 DIAGNOSIS — E21.3 HYPERPARATHYROIDISM (H): ICD-10-CM

## 2018-01-08 NOTE — TELEPHONE ENCOUNTER
Called Maria Esther to verify her mailing address and let her know we will mail out another set of pre-op instructions with map/directions today. I asked only to call if the address I read isn't correct

## 2018-01-08 NOTE — TELEPHONE ENCOUNTER
Received refill request for progesterone. Patient has been seeing Dr. Munoz recently for endometrial polyp. Plan is to continue using HRT. Rx sent.

## 2018-01-29 ENCOUNTER — ANESTHESIA EVENT (OUTPATIENT)
Dept: SURGERY | Facility: CLINIC | Age: 63
End: 2018-01-29
Payer: COMMERCIAL

## 2018-01-29 RX ORDER — LANOLIN ALCOHOL/MO/W.PET/CERES
CREAM (GRAM) TOPICAL EVERY MORNING
COMMUNITY
End: 2023-11-27

## 2018-01-30 ENCOUNTER — HOSPITAL ENCOUNTER (OUTPATIENT)
Facility: CLINIC | Age: 63
Discharge: HOME | End: 2018-01-30
Attending: OBSTETRICS & GYNECOLOGY | Admitting: OBSTETRICS & GYNECOLOGY
Payer: COMMERCIAL

## 2018-01-30 ENCOUNTER — ANESTHESIA (OUTPATIENT)
Dept: SURGERY | Facility: CLINIC | Age: 63
End: 2018-01-30
Payer: COMMERCIAL

## 2018-01-30 VITALS
RESPIRATION RATE: 18 BRPM | BODY MASS INDEX: 29.02 KG/M2 | DIASTOLIC BLOOD PRESSURE: 57 MMHG | WEIGHT: 174.16 LBS | TEMPERATURE: 97.7 F | OXYGEN SATURATION: 99 % | SYSTOLIC BLOOD PRESSURE: 107 MMHG | HEIGHT: 65 IN | HEART RATE: 71 BPM

## 2018-01-30 LAB
CREAT SERPL-MCNC: 1.04 MG/DL (ref 0.52–1.04)
GFR SERPL CREATININE-BSD FRML MDRD: 54 ML/MIN/1.7M2
GLUCOSE SERPL-MCNC: 116 MG/DL (ref 70–99)
POTASSIUM SERPL-SCNC: 4 MMOL/L (ref 3.4–5.3)

## 2018-01-30 PROCEDURE — 82565 ASSAY OF CREATININE: CPT | Performed by: ANESTHESIOLOGY

## 2018-01-30 PROCEDURE — 88305 TISSUE EXAM BY PATHOLOGIST: CPT | Mod: 26 | Performed by: OBSTETRICS & GYNECOLOGY

## 2018-01-30 PROCEDURE — 37000009 ZZH ANESTHESIA TECHNICAL FEE, EACH ADDTL 15 MIN: Performed by: OBSTETRICS & GYNECOLOGY

## 2018-01-30 PROCEDURE — 27210794 ZZH OR GENERAL SUPPLY STERILE: Performed by: OBSTETRICS & GYNECOLOGY

## 2018-01-30 PROCEDURE — 25000128 H RX IP 250 OP 636: Performed by: NURSE ANESTHETIST, CERTIFIED REGISTERED

## 2018-01-30 PROCEDURE — 84132 ASSAY OF SERUM POTASSIUM: CPT | Performed by: ANESTHESIOLOGY

## 2018-01-30 PROCEDURE — 37000008 ZZH ANESTHESIA TECHNICAL FEE, 1ST 30 MIN: Performed by: OBSTETRICS & GYNECOLOGY

## 2018-01-30 PROCEDURE — 25000125 ZZHC RX 250: Performed by: NURSE ANESTHETIST, CERTIFIED REGISTERED

## 2018-01-30 PROCEDURE — 36415 COLL VENOUS BLD VENIPUNCTURE: CPT | Performed by: ANESTHESIOLOGY

## 2018-01-30 PROCEDURE — 40000170 ZZH STATISTIC PRE-PROCEDURE ASSESSMENT II: Performed by: OBSTETRICS & GYNECOLOGY

## 2018-01-30 PROCEDURE — 71000027 ZZH RECOVERY PHASE 2 EACH 15 MINS: Performed by: OBSTETRICS & GYNECOLOGY

## 2018-01-30 PROCEDURE — 82947 ASSAY GLUCOSE BLOOD QUANT: CPT | Performed by: ANESTHESIOLOGY

## 2018-01-30 PROCEDURE — 36000057 ZZH SURGERY LEVEL 3 1ST 30 MIN - UMMC: Performed by: OBSTETRICS & GYNECOLOGY

## 2018-01-30 PROCEDURE — 25000125 ZZHC RX 250: Performed by: OBSTETRICS & GYNECOLOGY

## 2018-01-30 PROCEDURE — 25000128 H RX IP 250 OP 636: Performed by: ANESTHESIOLOGY

## 2018-01-30 PROCEDURE — 88305 TISSUE EXAM BY PATHOLOGIST: CPT | Performed by: OBSTETRICS & GYNECOLOGY

## 2018-01-30 PROCEDURE — 36000059 ZZH SURGERY LEVEL 3 EA 15 ADDTL MIN UMMC: Performed by: OBSTETRICS & GYNECOLOGY

## 2018-01-30 RX ORDER — SODIUM CHLORIDE, SODIUM LACTATE, POTASSIUM CHLORIDE, CALCIUM CHLORIDE 600; 310; 30; 20 MG/100ML; MG/100ML; MG/100ML; MG/100ML
INJECTION, SOLUTION INTRAVENOUS CONTINUOUS
Status: DISCONTINUED | OUTPATIENT
Start: 2018-01-30 | End: 2018-01-30 | Stop reason: HOSPADM

## 2018-01-30 RX ORDER — NALOXONE HYDROCHLORIDE 0.4 MG/ML
.1-.4 INJECTION, SOLUTION INTRAMUSCULAR; INTRAVENOUS; SUBCUTANEOUS
Status: DISCONTINUED | OUTPATIENT
Start: 2018-01-30 | End: 2018-01-30 | Stop reason: HOSPADM

## 2018-01-30 RX ORDER — KETOROLAC TROMETHAMINE 30 MG/ML
30 INJECTION, SOLUTION INTRAMUSCULAR; INTRAVENOUS EVERY 6 HOURS PRN
Status: DISCONTINUED | OUTPATIENT
Start: 2018-01-30 | End: 2018-01-30 | Stop reason: HOSPADM

## 2018-01-30 RX ORDER — MEPERIDINE HYDROCHLORIDE 25 MG/ML
12.5 INJECTION INTRAMUSCULAR; INTRAVENOUS; SUBCUTANEOUS
Status: DISCONTINUED | OUTPATIENT
Start: 2018-01-30 | End: 2018-01-30 | Stop reason: HOSPADM

## 2018-01-30 RX ORDER — ALBUTEROL SULFATE 0.83 MG/ML
2.5 SOLUTION RESPIRATORY (INHALATION) EVERY 4 HOURS PRN
Status: DISCONTINUED | OUTPATIENT
Start: 2018-01-30 | End: 2018-01-30 | Stop reason: HOSPADM

## 2018-01-30 RX ORDER — FENTANYL CITRATE 50 UG/ML
INJECTION, SOLUTION INTRAMUSCULAR; INTRAVENOUS PRN
Status: DISCONTINUED | OUTPATIENT
Start: 2018-01-30 | End: 2018-01-30

## 2018-01-30 RX ORDER — ONDANSETRON 2 MG/ML
INJECTION INTRAMUSCULAR; INTRAVENOUS PRN
Status: DISCONTINUED | OUTPATIENT
Start: 2018-01-30 | End: 2018-01-30

## 2018-01-30 RX ORDER — ONDANSETRON 2 MG/ML
4 INJECTION INTRAMUSCULAR; INTRAVENOUS EVERY 30 MIN PRN
Status: DISCONTINUED | OUTPATIENT
Start: 2018-01-30 | End: 2018-01-30 | Stop reason: HOSPADM

## 2018-01-30 RX ORDER — FENTANYL CITRATE 50 UG/ML
25-50 INJECTION, SOLUTION INTRAMUSCULAR; INTRAVENOUS
Status: DISCONTINUED | OUTPATIENT
Start: 2018-01-30 | End: 2018-01-30 | Stop reason: HOSPADM

## 2018-01-30 RX ORDER — METOPROLOL TARTRATE 1 MG/ML
INJECTION, SOLUTION INTRAVENOUS PRN
Status: DISCONTINUED | OUTPATIENT
Start: 2018-01-30 | End: 2018-01-30

## 2018-01-30 RX ORDER — PROPOFOL 10 MG/ML
INJECTION, EMULSION INTRAVENOUS CONTINUOUS PRN
Status: DISCONTINUED | OUTPATIENT
Start: 2018-01-30 | End: 2018-01-30

## 2018-01-30 RX ORDER — SODIUM CHLORIDE, SODIUM LACTATE, POTASSIUM CHLORIDE, CALCIUM CHLORIDE 600; 310; 30; 20 MG/100ML; MG/100ML; MG/100ML; MG/100ML
INJECTION, SOLUTION INTRAVENOUS CONTINUOUS PRN
Status: DISCONTINUED | OUTPATIENT
Start: 2018-01-30 | End: 2018-01-30

## 2018-01-30 RX ORDER — DEXAMETHASONE SODIUM PHOSPHATE 4 MG/ML
4 INJECTION, SOLUTION INTRA-ARTICULAR; INTRALESIONAL; INTRAMUSCULAR; INTRAVENOUS; SOFT TISSUE EVERY 10 MIN PRN
Status: DISCONTINUED | OUTPATIENT
Start: 2018-01-30 | End: 2018-01-30 | Stop reason: HOSPADM

## 2018-01-30 RX ORDER — PROPOFOL 10 MG/ML
INJECTION, EMULSION INTRAVENOUS PRN
Status: DISCONTINUED | OUTPATIENT
Start: 2018-01-30 | End: 2018-01-30

## 2018-01-30 RX ORDER — LIDOCAINE 40 MG/G
CREAM TOPICAL
Status: DISCONTINUED | OUTPATIENT
Start: 2018-01-30 | End: 2018-01-30 | Stop reason: HOSPADM

## 2018-01-30 RX ORDER — ONDANSETRON 4 MG/1
4 TABLET, ORALLY DISINTEGRATING ORAL EVERY 30 MIN PRN
Status: DISCONTINUED | OUTPATIENT
Start: 2018-01-30 | End: 2018-01-30 | Stop reason: HOSPADM

## 2018-01-30 RX ORDER — LIDOCAINE HYDROCHLORIDE 20 MG/ML
INJECTION, SOLUTION INFILTRATION; PERINEURAL PRN
Status: DISCONTINUED | OUTPATIENT
Start: 2018-01-30 | End: 2018-01-30

## 2018-01-30 RX ORDER — LIDOCAINE HYDROCHLORIDE 10 MG/ML
INJECTION, SOLUTION INFILTRATION; PERINEURAL PRN
Status: DISCONTINUED | OUTPATIENT
Start: 2018-01-30 | End: 2018-01-30 | Stop reason: HOSPADM

## 2018-01-30 RX ADMIN — SODIUM CHLORIDE, POTASSIUM CHLORIDE, SODIUM LACTATE AND CALCIUM CHLORIDE: 600; 310; 30; 20 INJECTION, SOLUTION INTRAVENOUS at 07:45

## 2018-01-30 RX ADMIN — KETOROLAC TROMETHAMINE 30 MG: 30 INJECTION, SOLUTION INTRAMUSCULAR at 10:02

## 2018-01-30 RX ADMIN — METOPROLOL TARTRATE 1 MG: 5 INJECTION INTRAVENOUS at 08:43

## 2018-01-30 RX ADMIN — FENTANYL CITRATE 25 MCG: 50 INJECTION, SOLUTION INTRAMUSCULAR; INTRAVENOUS at 08:30

## 2018-01-30 RX ADMIN — PROPOFOL 30 MG: 10 INJECTION, EMULSION INTRAVENOUS at 08:09

## 2018-01-30 RX ADMIN — LIDOCAINE HYDROCHLORIDE 75 MG: 20 INJECTION, SOLUTION INFILTRATION; PERINEURAL at 08:09

## 2018-01-30 RX ADMIN — MIDAZOLAM 1 MG: 1 INJECTION INTRAMUSCULAR; INTRAVENOUS at 08:03

## 2018-01-30 RX ADMIN — PROPOFOL 100 MCG/KG/MIN: 10 INJECTION, EMULSION INTRAVENOUS at 08:09

## 2018-01-30 RX ADMIN — ONDANSETRON 4 MG: 2 INJECTION INTRAMUSCULAR; INTRAVENOUS at 09:09

## 2018-01-30 RX ADMIN — FENTANYL CITRATE 25 MCG: 50 INJECTION, SOLUTION INTRAMUSCULAR; INTRAVENOUS at 08:13

## 2018-01-30 RX ADMIN — METOPROLOL TARTRATE 1 MG: 5 INJECTION INTRAVENOUS at 08:40

## 2018-01-30 NOTE — DISCHARGE INSTRUCTIONS
What happens after hysteroscopy?  You may have cramps and bleeding for 24 hours after the procedure. This is normal. Use pads instead of tampons.  Do not douche or use tampons until your health care provider says it s OK.  Do not use any vaginal medicines until you are told it s OK.  Ask your health care provider when it s OK to have sex again.  When should I call my health care provider?  Call your health care provider if you have:  Heavy bleeding (more than 1 pad an hour for 2 or more hours)  A fever over 100.4 F (38.0 C)  Increasing abdominal pain or tenderness  Foul-smelling discharge  Follow-up care  Schedule a follow-up visit with your health care provider. Based on the results of your test, you may need more treatment. Be sure to follow instructions and keep your appointments.      8562-5788 [employer]. [address] [place], [place] [address]. All rights reserved. This information is not intended as a substitute for professional medical care. Always follow your healthcare professional's instructions.   Same-Day Surgery   Adult Discharge Orders & Instructions     For 24 hours after surgery:  1. Get plenty of rest.  A responsible adult must stay with you for at least 24 hours after you leave the hospital.   2. Pain medication can slow your reflexes. Do not drive or use heavy equipment.  If you have weakness or tingling, don't drive or use heavy equipment until this feeling goes away.  3. Mixing alcohol and pain medication can cause dizziness and slow your breathing. It can even be fatal. Do not drink alcohol while taking pain medication.  4. Avoid strenuous or risky activities.  Ask for help when climbing stairs.   5. You may feel lightheaded.  If so, sit for a few minutes before standing.  Have someone help you get up.   6. If you have nausea (feel sick to your stomach), drink only clear liquids such as apple juice, ginger ale, broth or 7-Up.  Rest may also help.  Be sure to drink enough  fluids.  Move to a regular diet as you feel able. Take pain medications with a small amount of solid food, such as toast or crackers, to avoid nausea.   7. A slight fever is normal. Call the doctor if your fever is over 100 F (37.7 C) (taken under the tongue) or lasts longer than 24 hours.  8. You may have a dry mouth, muscle aches, trouble sleeping or a sore throat.  These symptoms should go away after 24 hours.  9. Do not make important or legal decisions.   Pain Management:      1. Take pain medication (if prescribed) for pain as directed by your physician.        2. WARNING: If the pain medication you have been prescribed contains Tylenol  (acetaminophen), DO NOT take additional doses of Tylenol (acetaminophen).     Call your doctor for any of the followin.  Signs of infection (fever, growing tenderness at the surgery site, severe pain, a large amount of drainage or bleeding, foul-smelling drainage, redness, swelling).    2.  It has been over 8 to 10 hours since surgery and you are still not able to urinate (pee).    3.  Headache for over 24 hours.    4.  Numbness, tingling or weakness the day after surgery (if you had spinal anesthesia).  To contact a doctor, call _____________________________________ or:      [phone] and ask for the Resident On Call for:          __________________________________________ (answered 24 hours a day)      Emergency Department:  [facility]: [phone]  [facility]: [phone]               Rev. 10/2014

## 2018-01-30 NOTE — ANESTHESIA CARE TRANSFER NOTE
Patient: Maria Esther Lowe    Procedure(s):  Operative Hysteroscopy, Resection of Endometrial Polyp, Dilation and Curettage - Wound Class: II-Clean Contaminated    Diagnosis: Endometrial Polyp, Fluid in Endrometrial Cavity in Ultrasound  Diagnosis Additional Information: No value filed.    Anesthesia Type:   MAC     Note:  Airway :Face Mask  Patient transferred to:Phase II  Comments: To phase II with 02, Spont RR. Monitors applied, VSS, PIV/airway patent, Report to RN all questions/concerns answered.   Handoff Report: Identifed the Patient, Identified the Reponsible Provider, Reviewed the pertinent medical history, Discussed the surgical course, Reviewed Intra-OP anesthesia mangement and issues during anesthesia, Set expectations for post-procedure period and Allowed opportunity for questions and acknowledgement of understanding      Vitals: (Last set prior to Anesthesia Care Transfer)    CRNA VITALS  1/30/2018 0844 - 1/30/2018 0921      1/30/2018             Temp: 36.4  C (97.5  F)    SpO2: 98 %    EKG: NSR                Electronically Signed By: LENNIE Cabrera CRNA  January 30, 2018  9:21 AM

## 2018-01-30 NOTE — ANESTHESIA POSTPROCEDURE EVALUATION
Patient: Maria Esther Lowe    Procedure(s):  Operative Hysteroscopy, Resection of Endometrial Polyp, Dilation and Curettage - Wound Class: II-Clean Contaminated    Diagnosis:Endometrial Polyp, Fluid in Endrometrial Cavity in Ultrasound  Diagnosis Additional Information: No value filed.    Anesthesia Type:  MAC    Note:  Anesthesia Post Evaluation    Patient location during evaluation: PACU  Patient participation: Able to fully participate in evaluation  Level of consciousness: awake  Pain management: adequate  Airway patency: patent  Cardiovascular status: acceptable  Respiratory status: acceptable  Hydration status: balanced  PONV: none     Anesthetic complications: None          Last vitals:  Vitals:    01/30/18 1020 01/30/18 1030 01/30/18 1100   BP:  111/55 107/57   Pulse:      Resp:  14 18   Temp:   36.5  C (97.7  F)   SpO2: 98% 100% 99%         Electronically Signed By: Adam López MD  January 30, 2018  1:21 PM

## 2018-01-30 NOTE — IP AVS SNAPSHOT
[facility] PREOP/PHASE II    [address] [place] [address]    Phone:  [phone]                                       After Visit Summary   1/30/2018    Maria Esther Lowe    MRN: [id number]           After Visit Summary Signature Page     I have received my discharge instructions, and my questions have been answered. I have discussed any challenges I see with this plan with the nurse or doctor.    ..........................................................................................................................................  Patient/Patient Representative Signature      ..........................................................................................................................................  Patient Representative Print Name and Relationship to Patient    ..................................................               ................................................  Date                                            Time    ..........................................................................................................................................  Reviewed by Signature/Title    ...................................................              ..............................................  Date                                                            Time

## 2018-01-30 NOTE — LETTER
"    2/6/2018         Maria Esther Lowe   [address] [facility] [address]        Dear Ms. Lowe:    The results of your recent pathology from your procedure are all normal, benign.    Results for orders placed or performed during the hospital encounter of 01/30/18   Potassium   Result Value Ref Range    Potassium 4.0 3.4 - 5.3 mmol/L   Creatinine   Result Value Ref Range    Creatinine 1.04 0.52 - 1.04 mg/dL    GFR Estimate 54 (L) >60 mL/min/1.7m2    GFR Estimate If Black 65 >60 mL/min/1.7m2   Glucose   Result Value Ref Range    Glucose 116 (H) 70 - 99 mg/dL   Surgical pathology exam   Result Value Ref Range    Copath Report       Patient Name: MARIA ESTHER LOWE  MR#: [id number]  Specimen #:   Collected: 1/30/2018  Received: 1/30/2018  Reported: 1/31/2018 15:55  Ordering Phy(s): MALISSA GALEANA  Additional Phy(s): NEGRA REAGAN    For improved result formatting, select 'View Enhanced Report Format' under   Linked Documents section.    SPECIMEN(S):  A: Endometrial curettings  B: Endometrial polyp    FINAL DIAGNOSIS:  A. ENDOMETRIUM, CURETTINGS:  - Cervical squamous epithelium and endocervical mucosa fragments with   reactive changes    B. ENDOMETRIUM, POLYP, CURETTINGS:  - Benign endometrial polyp  - Inactive endometrium  - Unremarkable myometrium  - Negative for hyperplasia or atypia    I have personally reviewed all specimens and/or slides, including the   listed special stains, and used them  with my medical judgement to determine or confirm the final diagnosis.    Electronically signed out by:    Yecenia Shelton M.D., PhD, Physicians    CLINICAL HISTORY:  62 year old female with end ometrial polyp.    GROSS:  A:  The specimen is received in formalin with proper patient   identification, labeled \"endometrial curettings\",  and consists of few fragments of pink-tan soft tissue admixed with   abundant cloudy mucoid material and dark  red clotted blood, collectively 2.4 x 1.3 x 0.4 cm.  " "The specimen is   wrapped and entirely submitted in  cassettes A1-A3.    B:  The specimen is received in formalin with proper patient   identification, labeled \"endometrial polyp\", and  consists of multiple pink-tan, dull soft tissue fragments, collectively   2.4 x 1.2 x 0.6 cm.  The specimen is  wrapped and entirely submitted in cassettes B1-B3. (Dictated by: Suri Carranza 1/30/2018 11:45 AM (Dictated by:  Suri Carranza 1/30/2018 11:51 AM))    MICROSCOPIC:  Microscopic examination was performed.    CPT Codes:  A: 58316-QJ4  B: 12512-YV7    TESTING LAB LOCATION:  [address] [address] [phone]     COLLECTION SITE:  Client: [facility]  Location: [facility] (B)    Resident  [id number]           Please note that test explanations are brief and do not reflect all diagnostic uses.  If you have any questions or concerns, please call the clinic at [phone].      Sincerely,      Daysi Munoz MD  "

## 2018-01-30 NOTE — IP AVS SNAPSHOT
MRN:[id number]                      After Visit Summary   1/30/2018    Maria Esther Lowe    MRN: [id number]           Thank you!     Thank you for choosing [facility] for your care. Our goal is always to provide you with excellent care. Hearing back from our patients is one way we can continue to improve our services. Please take a few minutes to complete the written survey that you may receive in the mail after you visit with us. Thank you!        Patient Information     Date Of Birth          1955        About your hospital stay     You were admitted on:  January 30, 2018 You last received care in the:  UR PREOP/PHASE II    You were discharged on:  January 30, 2018       Who to Call     For medical emergencies, please call 911.  For non-urgent questions about your medical care, please call your primary care provider or clinic, [phone]  For questions related to your surgery, please call your surgery clinic        Attending Provider     Provider Daysi Vanessa MD OB/Gyn       Primary Care Provider Office Phone # Fax #    Estela DAYRON Boswell [phone]      After Care Instructions     Discharge Instructions       Resume pre procedure diet            Discharge Instructions       Pelvic Rest. No tampons, douching or intercourse for  1-2  weeks.            Discharge Instructions       Patient to arrange follow up appointment in 2  weeks            Discharge Instructions       May take Ibuprofen or tylenol for pain            No alcohol       NO ALCOHOL for 24 hours post procedure            No driving or operating machinery       No driving or operating machinery until day after procedure                  Your next 10 appointments already scheduled     Feb 12, 2018 10:30 AM CST   ULTRASOUND with [facility] ULTRASOUND   [facility] ([facility])    [facility]  [address]  [address]  [facility]  [id number] [phone]              Further instructions from your care team       What happens after hysteroscopy?  You may have cramps and bleeding for 24 hours after the procedure. This is normal. Use pads instead of tampons.  Do not douche or use tampons until your health care provider says it s OK.  Do not use any vaginal medicines until you are told it s OK.  Ask your health care provider when it s OK to have sex again.  When should I call my health care provider?  Call your health care provider if you have:  Heavy bleeding (more than 1 pad an hour for 2 or more hours)  A fever over 100.4 F (38.0 C)  Increasing abdominal pain or tenderness  Foul-smelling discharge  Follow-up care  Schedule a follow-up visit with your health care provider. Based on the results of your test, you may need more treatment. Be sure to follow instructions and keep your appointments.      6488-2519 [employer]. [address] [place] [address]. All rights reserved. This information is not intended as a substitute for professional medical care. Always follow your healthcare professional's instructions.   Same-Day Surgery   Adult Discharge Orders & Instructions     For 24 hours after surgery:  1. Get plenty of rest.  A responsible adult must stay with you for at least 24 hours after you leave the hospital.   2. Pain medication can slow your reflexes. Do not drive or use heavy equipment.  If you have weakness or tingling, don't drive or use heavy equipment until this feeling goes away.  3. Mixing alcohol and pain medication can cause dizziness and slow your breathing. It can even be fatal. Do not drink alcohol while taking pain medication.  4. Avoid strenuous or risky activities.  Ask for help when climbing stairs.   5. You may feel lightheaded.  If so, sit for a few minutes before standing.  Have someone help you get up.   6. If you have nausea (feel sick to your stomach), drink only clear liquids such as apple  juice, ginger ale, broth or 7-Up.  Rest may also help.  Be sure to drink enough fluids.  Move to a regular diet as you feel able. Take pain medications with a small amount of solid food, such as toast or crackers, to avoid nausea.   7. A slight fever is normal. Call the doctor if your fever is over 100 F (37.7 C) (taken under the tongue) or lasts longer than 24 hours.  8. You may have a dry mouth, muscle aches, trouble sleeping or a sore throat.  These symptoms should go away after 24 hours.  9. Do not make important or legal decisions.   Pain Management:      1. Take pain medication (if prescribed) for pain as directed by your physician.        2. WARNING: If the pain medication you have been prescribed contains Tylenol  (acetaminophen), DO NOT take additional doses of Tylenol (acetaminophen).     Call your doctor for any of the followin.  Signs of infection (fever, growing tenderness at the surgery site, severe pain, a large amount of drainage or bleeding, foul-smelling drainage, redness, swelling).    2.  It has been over 8 to 10 hours since surgery and you are still not able to urinate (pee).    3.  Headache for over 24 hours.    4.  Numbness, tingling or weakness the day after surgery (if you had spinal anesthesia).  To contact a doctor, call _____________________________________ or:      [phone] and ask for the Resident On Call for:          __________________________________________ (answered 24 hours a day)      Emergency Department:  [facility]: [phone]  [facility]: [phone]               Rev. 10/2014       Pending Results     Date and Time Order Name Status Description    2018 0854 Surgical pathology exam In process             Admission Information     Date & Time Provider Department Dept. Phone    2018 Daysi Munoz MD UR PREOP/PHASE [phone]      Your Vitals Were     Blood Pressure Pulse Temperature Respirations  "Height Weight    112/61 71 97.7  F (36.5  C) (Axillary) 18 1.651 m (5' 5\") 79 kg (174 lb 2.6 oz)    Pulse Oximetry BMI (Body Mass Index)                94% 28.98 kg/m2          [facility] Information     [facility] lets you send messages to your doctor, view your test results, renew your prescriptions, schedule appointments and more. To sign up, go to [email or web address] . Click on \"Log in\" on the left side of the screen, which will take you to the Welcome page. Then click on \"Sign up Now\" on the right side of the page.     You will be asked to enter the access code listed below, as well as some personal information. Please follow the directions to create your username and password.     Your access code is: [email or web address]  Expires: 2018  6:31 AM     Your access code will  in 90 days. If you need help or a new code, please call your [facility] clinic or [phone].        Care EveryWhere ID     This is your Care EveryWhere ID. This could be used by other organizations to access your [facility] medical records  [id number]        Equal Access to Services     MARIA DE JESUS [place] AH: Tameka Silverman, fransico samuel, yousif kaaljanell lawrence, larissa irizarry. So [facility] [phone].    ATENCIÓN: Si habla español, tiene a aden disposición servicios gratuitos de asistencia lingüística. Elizabeth al [phone].    We comply with applicable federal civil rights laws and [place] laws. We do not discriminate on the basis of race, color, national origin, age, disability, sex, sexual orientation, or gender identity.               Review of your medicines      CONTINUE these medicines which may have CHANGED, or have new prescriptions. If we are uncertain of the size of tablets/capsules you have at home, strength may be listed as something that might have changed.        Dose / Directions    hydrocortisone 10 MG tablet   Commonly known as:  CORTEF   This may have changed:    - how much to take  - " when to take this  - additional instructions   Used for:  Panhypopituitarism (H)        Take 1 am and 1/2 midday   Quantity:  135 tablet   Refills:  3         CONTINUE these medicines which have NOT CHANGED        Dose / Directions    calcium 500 +D 500-400 MG-UNIT Tabs   Generic drug:  Calcium Carb-Cholecalciferol        Dose:  1 tablet   Take 1 tablet by mouth 2 times daily.   Quantity:  180 tablet   Refills:  3       cholecalciferol 5000 UNITS Caps   Used for:  Hyperparathyroidism (H)        Take 1 daily   Quantity:  90 capsule   Refills:  3       conjugated estrogens cream   Commonly known as:  PREMARIN   Used for:  Vaginal dryness        Dose:  0.5 g   Place 0.5 g vaginally twice a week   Quantity:  30 g   Refills:  12       cyanocobalamin 1000 MCG tablet   Commonly known as:  vitamin  B-12        Take by mouth every morning   Refills:  0       estradiol 1 MG tablet   Commonly known as:  ESTRACE   Used for:  Panhypopituitarism (H), Loss of hair        Dose:  1 mg   Take 1 tablet (1 mg) by mouth daily   Quantity:  60 tablet   Refills:  3       levothyroxine 200 MCG tablet   Commonly known as:  SYNTHROID/LEVOTHROID   Used for:  Panhypopituitarism (H)        Dose:  200 mcg   Take 1 tablet (200 mcg) by mouth daily   Quantity:  90 tablet   Refills:  3       losartan-hydrochlorothiazide 100-25 MG per tablet   Commonly known as:  HYZAAR   Used for:  Panhypopituitarism (H)        Dose:  1 tablet   Take 1 tablet by mouth daily   Quantity:  90 tablet   Refills:  3       progesterone 100 MG capsule   Commonly known as:  PROMETRIUM   Used for:  Hormone replacement therapy        TAKE 1 CAPSULE DAILY   Quantity:  90 capsule   Refills:  3       simvastatin 40 MG tablet   Commonly known as:  ZOCOR   Used for:  Hyperlipidemia LDL goal <100        Dose:  40 mg   Take 1 tablet (40 mg) by mouth daily   Quantity:  90 tablet   Refills:  3       zinc 50 MG Tabs        Take  by mouth.   Refills:  0                Protect others  around you: Learn how to safely use, store and throw away your medicines at www.disposemymeds.org.             Medication List: This is a list of all your medications and when to take them. Check marks below indicate your daily home schedule. Keep this list as a reference.      Medications           Morning Afternoon Evening Bedtime As Needed    calcium 500 +D 500-400 MG-UNIT Tabs   Take 1 tablet by mouth 2 times daily.   Generic drug:  Calcium Carb-Cholecalciferol                                cholecalciferol 5000 UNITS Caps   Take 1 daily                                conjugated estrogens cream   Commonly known as:  PREMARIN   Place 0.5 g vaginally twice a week                                cyanocobalamin 1000 MCG tablet   Commonly known as:  vitamin  B-12   Take by mouth every morning                                estradiol 1 MG tablet   Commonly known as:  ESTRACE   Take 1 tablet (1 mg) by mouth daily                                hydrocortisone 10 MG tablet   Commonly known as:  CORTEF   Take 1 am and 1/2 midday                                levothyroxine 200 MCG tablet   Commonly known as:  SYNTHROID/LEVOTHROID   Take 1 tablet (200 mcg) by mouth daily                                losartan-hydrochlorothiazide 100-25 MG per tablet   Commonly known as:  HYZAAR   Take 1 tablet by mouth daily                                progesterone 100 MG capsule   Commonly known as:  PROMETRIUM   TAKE 1 CAPSULE DAILY                                simvastatin 40 MG tablet   Commonly known as:  ZOCOR   Take 1 tablet (40 mg) by mouth daily                                zinc 50 MG Tabs   Take  by mouth.

## 2018-01-30 NOTE — BRIEF OP NOTE
Gynecology Brief Op Note    Maria Esther Lowe  [id number]    Date of Surgery: 1/30/2018    Surgeon: Daysi Munoz MD    Assistants:   Rossy Valerio MD PGY-4  Peggy Ray MD PGY-1    Pre-operative Diagnoses:   - Chronic vaginal discharge  - Possible endometrial polyp    Post-operative Diagnoses:   - Same    Procedure: Hysteroscopy with myosure, D&C    Anesthesia: MAC    EBL: 10cc  IVF: 600cc  UOP: 0cc  Fluid deficit: 125cc    Complications: None    Findings: Normal appearing external genitalia and vagina. Cervix with stenotic os, no lesions. Uterus anteverted, small and mobile. On hysteroscopy endometrial lining was globally thickened and polypoid appearing. Hemostatic surgical site at the end of the case.   Specimens: Endometrial curettings, endometrial polyp    Peggy Ray MD  Ob/Gyn PGY-1  01/30/18 9:10 AM

## 2018-01-30 NOTE — ANESTHESIA PREPROCEDURE EVALUATION
Anesthesia Evaluation     .             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 : . . . :. .       METS/Exercise Tolerance:     Hematologic:  - neg hematologic  ROS       Musculoskeletal:  - neg musculoskeletal ROS       GI/Hepatic:  - neg GI/hepatic ROS       Renal/Genitourinary:  - ROS Renal section negative       Endo:  - neg endo ROS   (+) thyroid problem hypothyroidism, .      Psychiatric:  - neg psychiatric ROS       Infectious Disease:  - neg infectious disease ROS       Malignancy:      - no malignancy   Other:    - neg other ROS                 Physical Exam  Normal systems: cardiovascular, pulmonary and dental    Airway   Mallampati: II  TM distance: >3 FB  Neck ROM: full    Dental     Cardiovascular       Pulmonary                     Anesthesia Plan      History & Physical Review  History and physical reviewed and following examination; no interval change.    ASA Status:  2 .    NPO Status:  > 8 hours    Plan for MAC with Intravenous induction. Maintenance will be TIVA.  Reason for MAC:  Deep or markedly invasive procedure (G8)  PONV prophylaxis:  Ondansetron (or other 5HT-3) and Dexamethasone or Solumedrol       Postoperative Care  Postoperative pain management:  IV analgesics and Oral pain medications.      Consents  Anesthetic plan, risks, benefits and alternatives discussed with:  Patient..                          .

## 2018-01-30 NOTE — OP NOTE
[facility]   Operative Note    Date of service: 2018    Preoperative diagnosis:    - Chronic vaginal discharge  - Possible endometrial polyp    Postoperative diagnosis:  Same    Procedure: EUA, hysteroscopy with Myosure, dilation and curettage    Surgeon: Daysi Munoz MD    Assistant:   1. Rossy Valerio MD PGY4  2. Peggy Ray MD PGY1    Anesthesia: MAC  EBL: 10 mL  IVF: 600mL  UOP: 0mL  Fluid deficit: 125mL    Specimens: Endometrial curettings    Complications: None    Findings:    1. Exam under anesthesia revealed normal cervix, anteverted uterus, no adnexal masses.   2. Speculum exam revealed stenotic cervix with no lesions.   3. Hysteroscopy revealed endometrial lining was globally thickened and polypoid appearing, normal tubal ostia visualized bilaterally.   4. D&C performed with scant return of tissue, gritty texture throughout.       Indications:  Maria Esther Lowe is a 62 year old  who presented to clinic with chronic vaginal discharge. TVUS revealed fluid in the endometrial cavity with a possible polyp at the fundus. Hysteroscopy with polyp removal was recommended. Risks, benefits, and alternatives to the procedure were discussed with the patient who elected to proceed.  All questions were answered and an informed consent was obtained.    Procedure:  The patient was taken to the operating room where she underwent MAC anesthesia without difficulty.  She was placed in a dorsal lithotomy position using yellow-fin stirrups.  The patient was examined for the above noted findings and then prepped and draped in the usual sterile fashion.  A medium graves speculum was inserted into the vagina. 1 cc 1% plain lidocaine was injected into the cervix at 12 o'clock position. A tenaculum was placed on the anterior cervical lip.  A paracervical block was administered with an additional 5cc 1% plain lidocaine at the 4 and 8 o'clock positions. The endocervical canal was serially dilated to 7 mm using Hegar  dilators. The hysteroscope was inserted without difficulty with the above findings noted.  The Myosure was used to sample the polypoid appearing tissue. The tissue was sent to pathology. The hysteroscope was removed and sharp curettage was performed until a gritty texture was noted throughout. The tenaculum was removed from the cervix and the puncture sites noted to be hemostatic with pressure. The speculum was then removed. The patient tolerated the procedure well and was taken to the recovery room in stable condition.  Dr. Munoz was scrubbed and present for the entire procedure.    Peggy Ray MD   Resident Physician, PGY1  Obstetrics, Gynecology, and Women's Health      I was scrubbed and present for the entire procedure.  I have reviewed and edited the above note.    Daysi Munoz MD, FACOG

## 2018-01-31 LAB — COPATH REPORT: NORMAL

## 2018-02-12 ENCOUNTER — TELEPHONE (OUTPATIENT)
Dept: OBGYN | Facility: CLINIC | Age: 63
End: 2018-02-12

## 2018-02-12 ENCOUNTER — OFFICE VISIT (OUTPATIENT)
Dept: OBGYN | Facility: CLINIC | Age: 63
End: 2018-02-12
Attending: OBSTETRICS & GYNECOLOGY
Payer: COMMERCIAL

## 2018-02-12 DIAGNOSIS — N83.8 OVARIAN MASS: ICD-10-CM

## 2018-02-12 PROCEDURE — 76830 TRANSVAGINAL US NON-OB: CPT | Mod: ZF

## 2018-02-12 NOTE — LETTER
2/12/2018      RE: Maria Esther Lowe  [address] [facility] [address]       62 year old female presents for gynecologic ultrasound indicated by h/o right ovarian cyst.  This study was done transvaginally.    Uterine findings:   Presence: Visible Size: Normal 4.2x3.5x2.6 cm.  Endometrium = 6.1 mm.   Cx length = 25.8 mm.      Flexion:  Anteverted Position: Midline Margins: Smooth Shape: Normal   Contour: Regular Texture: Homogeneous Cavity: Abnormal - fluid and at least 2 polyps seen Masses: Normal    Pelvic findings:    Right Adnexa: Normal   Left Adnexa: Normal   Bladder:  Normal         Cul - de - sac fluid: None    Ovarian follicles:   Right ovary:  5.5x4.8x4.6cm.     Cyst with septation, no color flow is seen.     Size(s):  5.1 x 4.5 x 4.2cm     Left ovary:  3.5x3.4x1.8cm.     3 cysts, one with internal echoes. No color flow is seen to these areas.     Size(s):  1.) 17 x 17 x 15mm  2.) 17 x 16 x 9mm with internal echoes.  3.) 14 x 15 x 11mm.      Comments:  Right ovarian cyst, largely simple with septation at the periphery and no color flow, 5.1 x 4.5 x 4.2 cm, on previous radiology u/s was 4.1 x 4 x 3.4 cm, no mention of septation and vascular flow was seen at the periphery.  Three small cystic structures are seen on the left, largest is 17 mm.  The left ovary was not seen on the previous study.  Fluid is noted in the endometrial cavity with what appears to be 2 polyps.  She is s/p hysteroscopy for a benign polyp and inactive endometrium on 1/30/18.  The endometrial measurement includes the fluid-the actual endometrium appears to be very thin.    BETTY Olson MD, FACOG      S Ultrasound

## 2018-02-12 NOTE — TELEPHONE ENCOUNTER
"Pt presented to clinic for US and had questions regarding discharge post hysteroscopy on 1/30.    Pt's bleeding and discharge after procedure progressed as expected with light bleeding subsiding to no discharge. After bleeding stopped, patient noticed the same green discharge she had been experiencing prior to procedure and a slight \"vinegar\" odor. Pt denies fever, denies pain/cramping . Encouraged pt to schedule clinic appointment this week for surgery f/u and to discuss US results and recurrence of vaginal discharge. Pt is only interested in seeing Dr. Munoz so will schedule with her next available.    Encouraged patient to call with any increase or change in discharge, odor, fever, or pain.    Pt expressed understanding and has no further questions at this time  "

## 2018-02-12 NOTE — LETTER
2/12/2018       RE: Maria Esther Lowe  [address] [facility] [address]     Dear Colleague,    Thank you for referring your patient, Maria Esther Lowe, to the [facility] at [facility]. Please see a copy of my visit note below.    62 year old female presents for gynecologic ultrasound indicated by h/o right ovarian cyst.  This study was done transvaginally.    Uterine findings:   Presence: Visible Size: Normal 4.2x3.5x2.6 cm.  Endometrium = 6.1 mm.   Cx length = 25.8 mm.      Flexion:  Anteverted Position: Midline Margins: Smooth Shape: Normal   Contour: Regular Texture: Homogeneous Cavity: Abnormal - fluid and at least 2 polyps seen Masses: Normal    Pelvic findings:    Right Adnexa: Normal   Left Adnexa: Normal   Bladder:  Normal         Cul - de - sac fluid: None    Ovarian follicles:   Right ovary:  5.5x4.8x4.6cm.     Cyst with septation, no color flow is seen.     Size(s):  5.1 x 4.5 x 4.2cm     Left ovary:  3.5x3.4x1.8cm.     3 cysts, one with internal echoes. No color flow is seen to these areas.     Size(s):  1.) 17 x 17 x 15mm  2.) 17 x 16 x 9mm with internal echoes.  3.) 14 x 15 x 11mm.      Comments:  Right ovarian cyst, largely simple with septation at the periphery and no color flow, 5.1 x 4.5 x 4.2 cm, on previous radiology u/s was 4.1 x 4 x 3.4 cm, no mention of septation and vascular flow was seen at the periphery.  Three small cystic structures are seen on the left, largest is 17 mm.  The left ovary was not seen on the previous study.  Fluid is noted in the endometrial cavity with what appears to be 2 polyps.  She is s/p hysteroscopy for a benign polyp and inactive endometrium on 1/30/18.  The endometrial measurement includes the fluid-the actual endometrium appears to be very thin.    BETTY Olson MD, FACOG      Again, thank you for allowing me to participate in the care of your patient.       Sincerely,    [facility]

## 2018-02-12 NOTE — MR AVS SNAPSHOT
After Visit Summary   2018    Maria Esther Lowe    MRN: [id number]           Patient Information     Date Of Birth          1955        Visit Information        Provider Department      2018 10:30 AM [facility] ULTRASOUND [facility]        Today's Diagnoses     Ovarian mass           Follow-ups after your visit        Your next 10 appointments already scheduled     2018 10:45 AM CST   Return Visit with Daysi Munoz MD   [facility] ([facility])    [facility]  [address]  [address]  [facility] [id number] [phone]              Who to contact     Please call your clinic at [phone] to:    Ask questions about your health    Make or cancel appointments    Discuss your medicines    Learn about your test results    Speak to your doctor            Additional Information About Your Visit        MyChart Information     [employer] is an electronic gateway that provides easy, online access to your medical records. With tribr, you can request a clinic appointment, read your test results, renew a prescription or communicate with your care team.     To sign up for [employer] visit the website at [email or web address]   You will be asked to enter the access code listed below, as well as some personal information. Please follow the directions to create your username and password.     Your access code is: [email or web address]  Expires: 2018  6:31 AM     Your access code will  in 90 days. If you need help or a new code, please contact your [facility] or call [phone] for assistance.        Care EveryWhere ID     This is your Care EveryWhere ID. This could be used by other organizations to access your [place] medical records  [id number]         Blood Pressure from Last 3 Encounters:   18 107/57   10/23/17 142/81   10/23/17 142/81    Weight from  Last 3 Encounters:   01/30/18 79 kg (174 lb 2.6 oz)   10/23/17 78 kg (172 lb)   10/23/17 78 kg (172 lb)              We Performed the Following     US GYN Complete Transvaginal - 43074 (In Clinic)          Today's Medication Changes          These changes are accurate as of 2/12/18  4:34 PM.  If you have any questions, ask your nurse or doctor.               These medicines have changed or have updated prescriptions.        Dose/Directions    hydrocortisone 10 MG tablet   Commonly known as:  CORTEF   This may have changed:    - how much to take  - when to take this  - additional instructions   Used for:  Panhypopituitarism (H)        Take 1 am and 1/2 midday   Quantity:  135 tablet   Refills:  3                Primary Care Provider Office Phone # Fax #    Estela DAYRON Boswell [phone]       [place] [address]        Equal Access to Services     MARIA DE JESUS W[place] : Hadii aad ku hadasho Soyaritza, waaxda luqadaha, qaybta kaalmada adeegyada, larissa hickman . So [facility] [phone].    ATENCIÓN: Si habla español, tiene a aden disposición servicios gratuitos de asistencia lingüística. Elizabeth al [phone].    We comply with applicable federal civil rights laws and [place] laws. We do not discriminate on the basis of race, color, national origin, age, disability, sex, sexual orientation, or gender identity.            Thank you!     Thank you for choosing [facility]  for your care. Our goal is always to provide you with excellent care. Hearing back from our patients is one way we can continue to improve our services. Please take a few minutes to complete the written survey that you may receive in the mail after your visit with us. Thank you!             Your Updated Medication List - Protect others around you: Learn how to safely use, store and throw away your medicines at www.disposemymeds.org.          This list is accurate  as of 2/12/18  4:34 PM.  Always use your most recent med list.                   Brand Name Dispense Instructions for use Diagnosis    calcium 500 +D 500-400 MG-UNIT Tabs   Generic drug:  Calcium Carb-Cholecalciferol     180 tablet    Take 1 tablet by mouth 2 times daily.        cholecalciferol 5000 UNITS Caps     90 capsule    Take 1 daily    Hyperparathyroidism (H)       conjugated estrogens cream    PREMARIN    30 g    Place 0.5 g vaginally twice a week    Vaginal dryness       cyanocobalamin 1000 MCG tablet    vitamin  B-12     Take by mouth every morning        estradiol 1 MG tablet    ESTRACE    60 tablet    Take 1 tablet (1 mg) by mouth daily    Panhypopituitarism (H), Loss of hair       hydrocortisone 10 MG tablet    CORTEF    135 tablet    Take 1 am and 1/2 midday    Panhypopituitarism (H)       levothyroxine 200 MCG tablet    SYNTHROID/LEVOTHROID    90 tablet    Take 1 tablet (200 mcg) by mouth daily    Panhypopituitarism (H)       losartan-hydrochlorothiazide 100-25 MG per tablet    HYZAAR    90 tablet    Take 1 tablet by mouth daily    Panhypopituitarism (H)       progesterone 100 MG capsule    PROMETRIUM    90 capsule    TAKE 1 CAPSULE DAILY    Hormone replacement therapy       simvastatin 40 MG tablet    ZOCOR    90 tablet    Take 1 tablet (40 mg) by mouth daily    Hyperlipidemia LDL goal <100       zinc 50 MG Tabs      Take  by mouth.

## 2018-02-12 NOTE — PROGRESS NOTES
62 year old female presents for gynecologic ultrasound indicated by h/o right ovarian cyst.  This study was done transvaginally.    Uterine findings:   Presence: Visible Size: Normal 4.2x3.5x2.6 cm.  Endometrium = 6.1 mm.   Cx length = 25.8 mm.      Flexion:  Anteverted Position: Midline Margins: Smooth Shape: Normal   Contour: Regular Texture: Homogeneous Cavity: Abnormal - fluid and at least 2 polyps seen Masses: Normal    Pelvic findings:    Right Adnexa: Normal   Left Adnexa: Normal   Bladder:  Normal         Cul - de - sac fluid: None    Ovarian follicles:   Right ovary:  5.5x4.8x4.6cm.     Cyst with septation, no color flow is seen.     Size(s):  5.1 x 4.5 x 4.2cm     Left ovary:  3.5x3.4x1.8cm.     3 cysts, one with internal echoes. No color flow is seen to these areas.     Size(s):  1.) 17 x 17 x 15mm  2.) 17 x 16 x 9mm with internal echoes.  3.) 14 x 15 x 11mm.      Comments:  Right ovarian cyst, largely simple with septation at the periphery and no color flow, 5.1 x 4.5 x 4.2 cm, on previous radiology u/s was 4.1 x 4 x 3.4 cm, no mention of septation and vascular flow was seen at the periphery.  Three small cystic structures are seen on the left, largest is 17 mm.  The left ovary was not seen on the previous study.  Fluid is noted in the endometrial cavity with what appears to be 2 polyps.  She is s/p hysteroscopy for a benign polyp and inactive endometrium on 1/30/18.  The endometrial measurement includes the fluid-the actual endometrium appears to be very thin.    Dara Mosqueda,BETTY Munoz MD, FACOG

## 2018-02-22 ENCOUNTER — OFFICE VISIT (OUTPATIENT)
Dept: OBGYN | Facility: CLINIC | Age: 63
End: 2018-02-22
Attending: OBSTETRICS & GYNECOLOGY
Payer: COMMERCIAL

## 2018-02-22 ENCOUNTER — RADIANT APPOINTMENT (OUTPATIENT)
Dept: MAMMOGRAPHY | Facility: CLINIC | Age: 63
End: 2018-02-22
Attending: OBSTETRICS & GYNECOLOGY
Payer: COMMERCIAL

## 2018-02-22 VITALS — DIASTOLIC BLOOD PRESSURE: 84 MMHG | HEART RATE: 70 BPM | HEIGHT: 65 IN | SYSTOLIC BLOOD PRESSURE: 145 MMHG

## 2018-02-22 DIAGNOSIS — Z12.31 VISIT FOR SCREENING MAMMOGRAM: ICD-10-CM

## 2018-02-22 DIAGNOSIS — N83.8 OVARIAN MASS: Primary | ICD-10-CM

## 2018-02-22 DIAGNOSIS — N95.2 ATROPHIC VAGINITIS: ICD-10-CM

## 2018-02-22 LAB — CANCER AG125 SERPL-ACNC: 7 U/ML (ref 0–30)

## 2018-02-22 PROCEDURE — 77067 SCR MAMMO BI INCL CAD: CPT

## 2018-02-22 PROCEDURE — 36415 COLL VENOUS BLD VENIPUNCTURE: CPT | Performed by: OBSTETRICS & GYNECOLOGY

## 2018-02-22 PROCEDURE — 86304 IMMUNOASSAY TUMOR CA 125: CPT | Performed by: OBSTETRICS & GYNECOLOGY

## 2018-02-22 PROCEDURE — G0463 HOSPITAL OUTPT CLINIC VISIT: HCPCS | Mod: 25

## 2018-02-22 RX ORDER — ESTRADIOL 10 UG/1
10 INSERT VAGINAL
Qty: 24 TABLET | Refills: 3 | Status: SHIPPED | OUTPATIENT
Start: 2018-02-22 | End: 2020-01-25

## 2018-02-22 NOTE — PROGRESS NOTES
[facility]  Gynecology Visit    CC: postop follow up     HPI:    Maria Esther Quezada is a 62 year old P0, here for follow up after hysteroscopy, D&C, and polypectomy as well as to reviewed her recent ultrasound that was done for follow up of a right ovarian cyst.  She intitially presented to clinic with chronic vaginal discharge. TVUS revealed fluid in the endometrial cavity with a possible polyp at the fundus as well as a right ovarian cyst.  She had a Ca-125 then that was <5.  She elected to proceed with hysteroscopy and conservative management of the cyst.   Hysteroscopy showed polypoid tissue with benign pathology. Patient is anxious because she continues to have discharge that is sometimes green tinged. She says this has not improved since procedure.    GYN History  - LMP: No LMP recorded. Patient is postmenopausal.  - Pap Smears: 2015 NILM, HPV neg     Lab Results   Component Value Date    PAP NIL 2015    PAP NIL 2013    PAP NIL 2010     OBHx  Obstetric History       T0      L0     SAB0   TAB0   Ectopic0   Multiple0   Live Births0         Past Medical History:   Diagnosis Date     Hyperlipidemia LDL goal < 130      Hypertension      Hypothyroidism      Osteopenia     DEXA: ; T-score of -1.9        Panhypopituitarism (H)      ROS: 10-Point ROS negative except as noted in HPI    Physical Exam  There were no vitals taken for this visit.  Gen: Well-appearing, NAD  HEENT: Normocephalic, atraumatic  Abd: Soft, non-tender, non-distended  Ext: No LE edema, extremities warm and well perfused    Copath Report 2018  8:54 AM 88   Patient Name: MARIA ESTHER QUEZADA   MR#: [id number]   Specimen #:    Collected: 2018   Received: 2018   Reported: 2018 15:55   Ordering Phy(s): MALISSA GALEANA   Additional Phy(s): NEGRA REAGAN     For improved result formatting, select 'View Enhanced Report Format' under    Linked Documents section.      SPECIMEN(S):   A: Endometrial curettings   B: Endometrial polyp     FINAL DIAGNOSIS:   A. ENDOMETRIUM, CURETTINGS:   - Cervical squamous epithelium and endocervical mucosa fragments with   reactive changes     B. ENDOMETRIUM, POLYP, CURETTINGS:   - Benign endometrial polyp   - Inactive endometrium   - Unremarkable myometrium   - Negative for hyperplasia or atypia     I have personally reviewed all specimens and/or slides, including the   listed special stains, and used them   with my medical judgement to determine or confirm the final diagnosis.     Electronically signed out by:     Yecenia Shelton M.D., PhD, [facility]        2/12/18 ultrasound:  62 year old female presents for gynecologic ultrasound indicated by h/o right ovarian cyst.  This study was done transvaginally.     Uterine findings:                        Presence: Visible Size: Normal 4.2x3.5x2.6 cm.  Endometrium = 6.1 mm.                        Cx length = 25.8 mm.                            Flexion:  Anteverted              Position: Midline                    Margins: Smooth                   Shape: Normal                        Contour: Regular                  Texture: Homogeneous                    Cavity: Abnormal - fluid and at least 2 polyps seen                      Masses: Normal     Pelvic findings:                         Right Adnexa: Normal                        Left Adnexa: Normal                        Bladder:  Normal                                                                                                             Cul - de - sac fluid: None     Ovarian follicles:                        Right ovary:  5.5x4.8x4.6cm.                           Cyst with septation, no color flow is seen.                           Size(s):  5.1 x 4.5 x 4.2cm                           Left ovary:  3.5x3.4x1.8cm.                           3 cysts, one with internal echoes. No color flow is seen to these areas.                            Size(s):  1.) 17 x 17 x 15mm  2.) 17 x 16 x 9mm with internal echoes.  3.) 14 x 15 x 11mm.        Comments:  Right ovarian cyst, largely simple with septation at the periphery and no color flow, 5.1 x 4.5 x 4.2 cm, on previous radiology u/s was 4.1 x 4 x 3.4 cm, no mention of septation and vascular flow was seen at the periphery.  Three small cystic structures are seen on the left, largest is 17 mm.  The left ovary was not seen on the previous study.  Fluid is noted in the endometrial cavity with what appears to be 2 polyps.  She is s/p hysteroscopy for a benign polyp and inactive endometrium on 1/30/18.  The endometrial measurement includes the fluid-the actual endometrium appears to be very thin.     Dara Mosqueda,[facility]  Daysi Munoz MD, FACOG    Assessment/Plan  Maria Esther Lowe is a 62 year old P0 female here for follow up after hysteroscopy, D&C, and polypectomy as well as ultrasound follow up.  On recent TVUS, the right ovarian cyst has increased in size from 4.1x4x3.4 cm to 5.1x4.5x4.2 cm-this may be significant or it may be due to the exam being done on different machines with different technicians. She had prior CA-125 in 11/2017 that was <5.     Given the change in size of the right sided cyst, new left sided cyst and fluid/polyp again noted in the endometrial cavity, a laparoscopic hysterectomy and BSO was offered.  Patient does not want surgical management at this time. Will proceed with conservative management with a repeat Ca-125 today and follow up ultrasound in 12 weeks.     Again discussed that her vaginal discharge is most likely 2/2 vaginal atrophy.  She was open to a trial of vaginal estrogen-Rx for Vagifem tabs sent, reviewed that it will take 2-3 months for her to have full effect of the medication.    Ovarian cyst  - CA-125  - Repeat ultrasound in three months    Health maintenance  - Pap smear next due 11/2020  - Ordered mammogram       Patient seen with Dr. Alexander Silvestre,  MD  OB/Gyn, PGY-1  2/22/2018, 10:44 AM    The patient was seen and examined with Dr. Silvestre.  I have reviewed and edited the above note.    Daysi Munoz MD, FACOG

## 2018-02-22 NOTE — LETTER
2/27/2018         Maria Esther Lowe   [address] [facility] [address]        Dear Ms. Lowe:    The results of your recent CA-125 was slightly higher than the previous, but still well within the normal range.  I don't think that the change is significant but we should still plan to repeat your ultrasound in 3 months.    Results for orders placed or performed in visit on 02/22/18      Result Value Ref Range     7 0 - 30 U/mL         Please note that test explanations are brief and do not reflect all diagnostic uses.  If you have any questions or concerns, please call the clinic at [phone].      Sincerely,      Daysi Munoz MD

## 2018-02-22 NOTE — MR AVS SNAPSHOT
After Visit Summary   2018    Maria Esther Lowe    MRN: [id number]           Patient Information     Date Of Birth          1955        Visit Information        Provider Department      2018 10:45 AM Daysi Munoz MD [facility]        Today's Diagnoses     Ovarian mass    -  1    Atrophic vaginitis           Follow-ups after your visit        Follow-up notes from your care team     Return in about 3 months (around 2018).      Your next 10 appointments already scheduled     May 22, 2018 10:30 AM CDT   [facility]   [facility] ([facility])    [facility]  [address]  [address]  [facility] [address] [phone]              Who to contact     Please call your clinic at [phone] to:    Ask questions about your health    Make or cancel appointments    Discuss your medicines    Learn about your test results    Speak to your doctor            Additional Information About Your Visit        MyChart Information     [employer] is an electronic gateway that provides easy, online access to your medical records. With [employer], you can request a clinic appointment, read your test results, renew a prescription or communicate with your care team.     To sign up for [employer] visit the website at [email or web address]   You will be asked to enter the access code listed below, as well as some personal information. Please follow the directions to create your username and password.     Your access code is: [email or web address]  Expires: 2018  6:31 AM     Your access code will  in 90 days. If you need help or a new code, please contact your [facility] or call [phone] for assistance.        Care EveryWhere ID     This is your Care EveryWhere ID. This could be used by other organizations to access your [facility]  "records  [id number]        Your Vitals Were     Pulse Height                70 1.651 m (5' 5\")           Blood Pressure from Last 3 Encounters:   02/22/18 145/84   01/30/18 107/57   10/23/17 142/81    Weight from Last 3 Encounters:   01/30/18 79 kg (174 lb 2.6 oz)   10/23/17 78 kg (172 lb)   10/23/17 78 kg (172 lb)              We Performed the Following               Today's Medication Changes          These changes are accurate as of 2/22/18 11:59 PM.  If you have any questions, ask your nurse or doctor.               Start taking these medicines.        Dose/Directions    estradiol 10 MCG Tabs vaginal tablet   Commonly known as:  VAGIFEM   Used for:  Atrophic vaginitis   Started by:  Daysi Munoz MD        Dose:  10 mcg   Place 1 tablet (10 mcg) vaginally twice a week   Quantity:  24 tablet   Refills:  3         These medicines have changed or have updated prescriptions.        Dose/Directions    hydrocortisone 10 MG tablet   Commonly known as:  CORTEF   This may have changed:    - how much to take  - when to take this  - additional instructions   Used for:  Panhypopituitarism (H)        Take 1 am and 1/2 midday   Quantity:  135 tablet   Refills:  3            Where to get your medicines      These medications were sent to [facility] - [address] [address]     Phone:  [phone]     estradiol 10 MCG Tabs vaginal tablet                Primary Care Provider Office Phone # Fax #    Estela DAYRON Boswell [phone]       [address] [address]        Equal Access to Services     [facility] Hospital[facility]ITT[facility] AH: Hadii alethea blake hadasho Soomaali, waaxda luqadaha, qaybta kaalmada adeegyada, larissa irizarry. So [facility] [phone].    ATENCIÓN: Si habla español, tiene a aden disposición servicios gratuitos de asistencia lingüística. Llame al [phone].    We comply " with applicable federal civil rights laws and [place] laws. We do not discriminate on the basis of race, color, national origin, age, disability, sex, sexual orientation, or gender identity.            Thank you!     Thank you for choosing [facility]  for your care. Our goal is always to provide you with excellent care. Hearing back from our patients is one way we can continue to improve our services. Please take a few minutes to complete the written survey that you may receive in the mail after your visit with us. Thank you!             Your Updated Medication List - Protect others around you: Learn how to safely use, store and throw away your medicines at www.disposemymeds.org.          This list is accurate as of 2/22/18 11:59 PM.  Always use your most recent med list.                   Brand Name Dispense Instructions for use Diagnosis    calcium 500 +D 500-400 MG-UNIT Tabs   Generic drug:  Calcium Carb-Cholecalciferol     180 tablet    Take 1 tablet by mouth 2 times daily.        cholecalciferol 5000 UNITS Caps     90 capsule    Take 1 daily    Hyperparathyroidism (H)       conjugated estrogens cream    PREMARIN    30 g    Place 0.5 g vaginally twice a week    Vaginal dryness       cyanocobalamin 1000 MCG tablet    vitamin  B-12     Take by mouth every morning        estradiol 1 MG tablet    ESTRACE    60 tablet    Take 1 tablet (1 mg) by mouth daily    Panhypopituitarism (H), Loss of hair       estradiol 10 MCG Tabs vaginal tablet    VAGIFEM    24 tablet    Place 1 tablet (10 mcg) vaginally twice a week    Atrophic vaginitis       hydrocortisone 10 MG tablet    CORTEF    135 tablet    Take 1 am and 1/2 midday    Panhypopituitarism (H)       levothyroxine 200 MCG tablet    SYNTHROID/LEVOTHROID    90 tablet    Take 1 tablet (200 mcg) by mouth daily    Panhypopituitarism (H)       losartan-hydrochlorothiazide 100-25 MG per tablet    HYZAAR    90 tablet    Take 1 tablet by mouth daily     Panhypopituitarism (H)       progesterone 100 MG capsule    PROMETRIUM    90 capsule    TAKE 1 CAPSULE DAILY    Hormone replacement therapy       simvastatin 40 MG tablet    ZOCOR    90 tablet    Take 1 tablet (40 mg) by mouth daily    Hyperlipidemia LDL goal <100       zinc 50 MG Tabs      Take  by mouth.

## 2018-02-22 NOTE — LETTER
2018       RE: Maria Esther Quezada  [address] [facility] [place] [address]     Dear Colleague,    Thank you for referring your patient, Maria Esther Quezada, to the [facility] at [facility]. Please see a copy of my visit note below.    [facility]  Gynecology Visit    CC: postop follow up     HPI:    Maria Esther Quezada is a 62 year old P0, here for follow up after hysteroscopy, D&C, and polypectomy as well as to reviewed her recent ultrasound that was done for follow up of a right ovarian cyst.  She intitially presented to clinic with chronic vaginal discharge. TVUS revealed fluid in the endometrial cavity with a possible polyp at the fundus as well as a right ovarian cyst.  She had a Ca-125 then that was <5.  She elected to proceed with hysteroscopy and conservative management of the cyst.   Hysteroscopy showed polypoid tissue with benign pathology. Patient is anxious because she continues to have discharge that is sometimes green tinged. She says this has not improved since procedure.    GYN History  - LMP: No LMP recorded. Patient is postmenopausal.  - Pap Smears: 2015 NILM, HPV neg     Lab Results   Component Value Date    PAP NIL 2015    PAP NIL 2013    PAP NIL 2010     OBHx  Obstetric History       T0      L0     SAB0   TAB0   Ectopic0   Multiple0   Live Births0         Past Medical History:   Diagnosis Date     Hyperlipidemia LDL goal < 130      Hypertension      Hypothyroidism      Osteopenia     DEXA: ; T-score of -1.9        Panhypopituitarism (H)      ROS: 10-Point ROS negative except as noted in HPI    Physical Exam  There were no vitals taken for this visit.  Gen: Well-appearing, NAD  HEENT: Normocephalic, atraumatic  Abd: Soft, non-tender, non-distended  Ext: No LE edema, extremities warm and well perfused    Copath Report 2018  8:54 AM 88   Patient Name: MARIA ESTHER QUEZADA   MR#: [id number]    Specimen #:    Collected: 1/30/2018   Received: 1/30/2018   Reported: 1/31/2018 15:55   Ordering Phy(s): MALISSA GALEANA   Additional Phy(s): NEGRA REAGAN     For improved result formatting, select 'View Enhanced Report Format' under    Linked Documents section.     SPECIMEN(S):   A: Endometrial curettings   B: Endometrial polyp     FINAL DIAGNOSIS:   A. ENDOMETRIUM, CURETTINGS:   - Cervical squamous epithelium and endocervical mucosa fragments with   reactive changes     B. ENDOMETRIUM, POLYP, CURETTINGS:   - Benign endometrial polyp   - Inactive endometrium   - Unremarkable myometrium   - Negative for hyperplasia or atypia     I have personally reviewed all specimens and/or slides, including the   listed special stains, and used them   with my medical judgement to determine or confirm the final diagnosis.     Electronically signed out by:     Yecenia Sehlton M.D., PhD, [facility]        2/12/18 ultrasound:  62 year old female presents for gynecologic ultrasound indicated by h/o right ovarian cyst.  This study was done transvaginally.     Uterine findings:                        Presence: Visible Size: Normal 4.2x3.5x2.6 cm.  Endometrium = 6.1 mm.                        Cx length = 25.8 mm.                            Flexion:  Anteverted              Position: Midline                    Margins: Smooth                   Shape: Normal                        Contour: Regular                  Texture: Homogeneous                    Cavity: Abnormal - fluid and at least 2 polyps seen                      Masses: Normal     Pelvic findings:                         Right Adnexa: Normal                        Left Adnexa: Normal                        Bladder:  Normal                                                                                                             Cul - de - sac fluid: None     Ovarian follicles:                        Right ovary:  5.5x4.8x4.6cm.                            Cyst with septation, no color flow is seen.                           Size(s):  5.1 x 4.5 x 4.2cm                           Left ovary:  3.5x3.4x1.8cm.                           3 cysts, one with internal echoes. No color flow is seen to these areas.                           Size(s):  1.) 17 x 17 x 15mm  2.) 17 x 16 x 9mm with internal echoes.  3.) 14 x 15 x 11mm.        Comments:  Right ovarian cyst, largely simple with septation at the periphery and no color flow, 5.1 x 4.5 x 4.2 cm, on previous radiology u/s was 4.1 x 4 x 3.4 cm, no mention of septation and vascular flow was seen at the periphery.  Three small cystic structures are seen on the left, largest is 17 mm.  The left ovary was not seen on the previous study.  Fluid is noted in the endometrial cavity with what appears to be 2 polyps.  She is s/p hysteroscopy for a benign polyp and inactive endometrium on 1/30/18.  The endometrial measurement includes the fluid-the actual endometrium appears to be very thin.     Dara Mosqueda,BETTY Munoz MD, FACOG    Assessment/Plan  Maria Esther Lowe is a 62 year old P0 female here for follow up after hysteroscopy, D&C, and polypectomy as well as ultrasound follow up.  On recent TVUS, the right ovarian cyst has increased in size from 4.1x4x3.4 cm to 5.1x4.5x4.2 cm-this may be significant or it may be due to the exam being done on different machines with different technicians. She had prior CA-125 in 11/2017 that was <5.     Given the change in size of the right sided cyst, new left sided cyst and fluid/polyp again noted in the endometrial cavity, a laparoscopic hysterectomy and BSO was offered.  Patient does not want surgical management at this time. Will proceed with conservative management with a repeat Ca-125 today and follow up ultrasound in 12 weeks.     Again discussed that her vaginal discharge is most likely 2/2 vaginal atrophy.  She was open to a trial of vaginal estrogen-Rx for Vagifem  tabs sent, reviewed that it will take 2-3 months for her to have full effect of the medication.    Ovarian cyst  - CA-125  - Repeat ultrasound in three months    Health maintenance  - Pap smear next due 11/2020  - Ordered mammogram     Patient seen with Dr. Alexander Silvestre MD  OB/Gyn, PGY-1  2/22/2018, 10:44 AM    The patient was seen and examined with Dr. Silvestre.  I have reviewed and edited the above note.    Daysi Munoz MD, FACOG

## 2018-03-23 DIAGNOSIS — E23.0 PANHYPOPITUITARISM (H): ICD-10-CM

## 2018-03-26 RX ORDER — HYDROCORTISONE 10 MG/1
TABLET ORAL
Qty: 135 TABLET | Refills: 2 | Status: SHIPPED | OUTPATIENT
Start: 2018-03-26 | End: 2018-12-28

## 2018-03-26 NOTE — TELEPHONE ENCOUNTER
hydrocortisone (CORTEF) 10 MG tablet    Last Written Prescription Date:  1/17/17  Last Fill Quantity: 135,   # refills: 3  Last Office Visit : 10/23/17  Future Office visit: none    Routing refill request to provider for review/approval because: last provider  DR Perdomo. rf?

## 2018-05-11 DIAGNOSIS — E23.0 PANHYPOPITUITARISM (H): ICD-10-CM

## 2018-05-11 DIAGNOSIS — L65.9 LOSS OF HAIR: ICD-10-CM

## 2018-05-11 RX ORDER — ESTRADIOL 1 MG/1
1 TABLET ORAL DAILY
Qty: 30 TABLET | Refills: 0 | Status: SHIPPED | OUTPATIENT
Start: 2018-05-11 | End: 2018-05-21

## 2018-05-11 NOTE — TELEPHONE ENCOUNTER
Received call from Maria Esther who is in need of refill of estrace 1mg tablets. She states that she originally got this estrace from an internal medicine doctor that she saw for hair loss. She has follow up appointment coming up and is asking for temporary refill. Nurse sent this refill and asked patient to address additional refills with Dr. Munoz at her visit. Patient agreeable to this plan.

## 2018-05-21 ENCOUNTER — OFFICE VISIT (OUTPATIENT)
Dept: OBGYN | Facility: CLINIC | Age: 63
End: 2018-05-21
Attending: OBSTETRICS & GYNECOLOGY
Payer: COMMERCIAL

## 2018-05-21 VITALS — SYSTOLIC BLOOD PRESSURE: 150 MMHG | DIASTOLIC BLOOD PRESSURE: 78 MMHG | HEART RATE: 65 BPM | HEIGHT: 65 IN

## 2018-05-21 DIAGNOSIS — N83.202 BILATERAL OVARIAN CYSTS: Primary | ICD-10-CM

## 2018-05-21 DIAGNOSIS — L65.9 LOSS OF HAIR: ICD-10-CM

## 2018-05-21 DIAGNOSIS — N85.9 FLUID IN ENDOMETRIAL CAVITY: ICD-10-CM

## 2018-05-21 DIAGNOSIS — N83.201 BILATERAL OVARIAN CYSTS: Primary | ICD-10-CM

## 2018-05-21 DIAGNOSIS — E23.0 PANHYPOPITUITARISM (H): ICD-10-CM

## 2018-05-21 PROCEDURE — G0463 HOSPITAL OUTPT CLINIC VISIT: HCPCS | Mod: ZF,25

## 2018-05-21 PROCEDURE — 76857 US EXAM PELVIC LIMITED: CPT | Mod: ZF

## 2018-05-21 RX ORDER — ESTRADIOL 1 MG/1
1 TABLET ORAL DAILY
Qty: 90 TABLET | Refills: 4 | Status: SHIPPED | OUTPATIENT
Start: 2018-05-21 | End: 2018-10-25

## 2018-05-21 NOTE — PROGRESS NOTES
62 year old female presents for gynecologic ultrasound indicated by h/o ovarian cysts.  This study was done transvaginally.    Uterine findings:   Presence: Visible Size: Normal  Endometrium = 6.0 mm with fluid in cavity, 2.0mm without fluid included in measurement.          Pelvic findings:    Right Adnexa: Normal   Left Adnexa: Normal   Bladder:  Normal         Cul - de - sac fluid: None    Ovarian follicles:   Right ovary:  8.1x7.0x5.7cm.     Cyst with septations, no color flow is seen.     Size(s):  7.9 x 6.8 x 5.4cm     Left ovary:  3.1x3.1x4.7cm.     3 simple cysts      Size(s):  1.) 2.7 x 2.2 x 2.4cm  2.) 1.4 x 1.7 x 1.7cm  3.) 2.0 x 1.3 x 1.6cm      Comments:  Bilateral ovaries with complex masses, both have increased in size since 2/18.  Consider surgical intervention.  Further studies as clinically indicated.        BETTY Olson MD, FACOG

## 2018-05-21 NOTE — MR AVS SNAPSHOT
After Visit Summary   2018    Maria Esther Lowe    MRN: [id number]           Patient Information     Date Of Birth          1955        Visit Information        Provider Department      2018 11:00 AM [facility] ULTRASOUND [facility]        Today's Diagnoses     Bilateral ovarian cysts    -  1       Follow-ups after your visit        Who to contact     Please call your clinic at [phone] to:    Ask questions about your health    Make or cancel appointments    Discuss your medicines    Learn about your test results    Speak to your doctor            Additional Information About Your Visit        MyChart Information     [employer] is an electronic gateway that provides easy, online access to your medical records. With [employer], you can request a clinic appointment, read your test results, renew a prescription or communicate with your care team.     To sign up for [employer] visit the website at [email or web address]   You will be asked to enter the access code listed below, as well as some personal information. Please follow the directions to create your username and password.     Your access code is: [email or web address]  Expires: 2018  6:30 AM     Your access code will  in 90 days. If you need help or a new code, please contact your [facility] or call [phone] for assistance.        Care EveryWhere ID     This is your Care EveryWhere ID. This could be used by other organizations to access your [place] medical records  [id number]         Blood Pressure from Last 3 Encounters:   18 150/78   18 145/84   18 107/57    Weight from Last 3 Encounters:   18 79 kg (174 lb 2.6 oz)   10/23/17 78 kg (172 lb)   10/23/17 78 kg (172 lb)                 Where to get your medicines      These medications were sent to [facility] - [place], [place] - [facility] [facility]  [place] [address]     Phone:  [phone]     estradiol 1 MG tablet          Primary Care Provider Office Phone # Fax #    Estela Boswell [phone]       [address] [address]        Equal Access to Services     [facility] [place] : Hadii alethea blake hadjgo Soomaali, waaxda luqadaha, qaybta kaalmada adeegyada, larissa wilson josenoreen kent dahlialaly irizarry. So [facility] [phone].    ATENCIÓN: Si habla español, tiene a aden disposición servicios gratuitos de asistencia lingüística. Llame al [phone].    We comply with applicable federal civil rights laws and [place] laws. We do not discriminate on the basis of race, color, national origin, age, disability, sex, sexual orientation, or gender identity.            Thank you!     Thank you for choosing [facility]  for your care. Our goal is always to provide you with excellent care. Hearing back from our patients is one way we can continue to improve our services. Please take a few minutes to complete the written survey that you may receive in the mail after your visit with us. Thank you!             Your Updated Medication List - Protect others around you: Learn how to safely use, store and throw away your medicines at www.disposemymeds.org.          This list is accurate as of 5/21/18 12:10 PM.  Always use your most recent med list.                   Brand Name Dispense Instructions for use Diagnosis    calcium 500 +D 500-400 MG-UNIT Tabs   Generic drug:  Calcium Carb-Cholecalciferol     180 tablet    Take 1 tablet by mouth 2 times daily.        cholecalciferol 5000 units Caps     90 capsule    Take 1 daily    Hyperparathyroidism (H)       conjugated estrogens cream    PREMARIN    30 g    Place 0.5 g vaginally twice a week    Vaginal dryness       cyanocobalamin 1000 MCG tablet    vitamin  B-12     Take by mouth every morning        estradiol 1 MG tablet    ESTRACE    90 tablet    Take 1 tablet (1 mg) by mouth  daily    Panhypopituitarism (H), Loss of hair       estradiol 10 MCG Tabs vaginal tablet    VAGIFEM    24 tablet    Place 1 tablet (10 mcg) vaginally twice a week    Atrophic vaginitis       hydrocortisone 10 MG tablet    CORTEF    135 tablet    TAKE 1 TABLET IN THE MORNING AND ONE-HALF (1/2) TABLET MIDDAY    Panhypopituitarism (H)       levothyroxine 200 MCG tablet    SYNTHROID/LEVOTHROID    90 tablet    Take 1 tablet (200 mcg) by mouth daily    Panhypopituitarism (H)       losartan-hydrochlorothiazide 100-25 MG per tablet    HYZAAR    90 tablet    Take 1 tablet by mouth daily    Panhypopituitarism (H)       progesterone 100 MG capsule    PROMETRIUM    90 capsule    TAKE 1 CAPSULE DAILY    Hormone replacement therapy       simvastatin 40 MG tablet    ZOCOR    90 tablet    Take 1 tablet (40 mg) by mouth daily    Hyperlipidemia LDL goal <100       zinc 50 MG Tabs      Take  by mouth.

## 2018-05-21 NOTE — LETTER
"5/21/2018       RE: Maria Esther Lowe  [address] [facility] [address]     Dear Colleague,    Thank you for referring your patient, Maria Esther Lowe, to the [facility] at [facility]. Please see a copy of my visit note below.    S:  Pt is a 63 y/o postmenopausal woman who presents today after her ultrasound that was done to follow up bilateral ovarian cysts.  She continues to be completely asymptomatic.  She started Vagifem at her last visit for her vaginal discharge though to be 2/2 atrophic vaginitis and that has improved.      O: /78 (BP Location: Right arm, Patient Position: Chair)  Pulse 65  Ht 1.651 m (5' 5\")  gen-pleasant, NAD    U/S 2/12/18    Right ovary:  5.5x4.8x4.6cm.                           Cyst with septation, no color flow is seen.                           Size(s):  5.1 x 4.5 x 4.2cm                           Left ovary:  3.5x3.4x1.8cm.                           3 cysts, one with internal echoes. No color flow is seen to these areas.                           Size(s):  1.) 17 x 17 x 15mm  2.) 17 x 16 x 9mm with internal echoes.  3.) 14 x 15 x 11mm.       U/S 5/21/18               Right ovary:  8.1x7.0x5.7cm.                           Cyst with septations, no color flow is seen.                           Size(s):  7.9 x 6.8 x 5.4cm                           Left ovary:  3.1x3.1x4.7cm.                           3 simple cysts                            Size(s):  1.) 2.7 x 2.2 x 2.4cm  2.) 1.4 x 1.7 x 1.7cm  3.) 2.0 x 1.3 x 1.6cm    11/17 ca 125 <5  2/18 ca 125  7    A:  63 y/o postmenopausal P0 with increase in size of both the right ovarian cyst and slight increase in all 2 cystic areas on the left ovary.  Again noted in the fluid in the endometrial cavity-this was noted to return on her first follow up ultrasound after her D&C hysteroscopy with benign pathology in 1/18.    P:  Again offered robotic assisted hysterectomy and " BSO.  Now given the increase in size of all of the cysts, I think she needs to strongly consider definitive surgical management.  Her history and ultrasounds were reviewed with Dr. Lew after her visit.  She agreed that a robotic assisted approach would be fesable.  Reviewed with the patient the goal of taking out the ovaries intact and the small possibility of needing an additional staging surgery if the pathology were to come back with ovarian cancer.  She will talk with her , but is ready to proceed with surgery at this time.    Daysi Munoz MD, FACOG

## 2018-05-21 NOTE — NURSING NOTE
Chief Complaint   Patient presents with     Follow Up For     Follw up uls       See EDI Short 5/21/2018

## 2018-05-21 NOTE — MR AVS SNAPSHOT
"              After Visit Summary   2018    Maria Esther Lowe    MRN: [id number]           Patient Information     Date Of Birth          1955        Visit Information        Provider Department      2018 11:30 AM Daysi Munoz MD [facility]        Today's Diagnoses     Bilateral ovarian cysts    -  1    Panhypopituitarism (H)        Loss of hair        Fluid in endometrial cavity           Follow-ups after your visit        Who to contact     Please call your clinic at [phone] to:    Ask questions about your health    Make or cancel appointments    Discuss your medicines    Learn about your test results    Speak to your doctor            Additional Information About Your Visit        MyChart Information     [employer] is an electronic gateway that provides easy, online access to your medical records. With [employer], you can request a clinic appointment, read your test results, renew a prescription or communicate with your care team.     To sign up for [employer] visit the website at [email or web address]   You will be asked to enter the access code listed below, as well as some personal information. Please follow the directions to create your username and password.     Your access code is: [email or web address]  Expires: 2018  6:30 AM     Your access code will  in 90 days. If you need help or a new code, please contact your [facility] or call [phone] for assistance.        Care EveryWhere ID     This is your Care EveryWhere ID. This could be used by other organizations to access your [place] medical records  [id number]        Your Vitals Were     Pulse Height                65 1.651 m (5' 5\")           Blood Pressure from Last 3 Encounters:   18 150/78   18 145/84   18 107/57    Weight from Last 3 Encounters:   18 79 kg (174 lb 2.6 oz)   10/23/17 78 kg (172 lb)   10/23/17 78 kg (172 lb)            "   We Performed the Following     Mari-Operative Worksheet (DaVinci Total Laparoscopic Hysterectomy with Bilateral Salpingo-Oophorectomy)          Where to get your medicines      These medications were sent to [facility] - [address] [address]     Phone:  [phone]     estradiol 1 MG tablet          Primary Care Provider Office Phone # Fax #    Estela Boswell, SRUTHI [phone]       [address] [address]        Equal Access to Services     [facility]: Hadii aad ku hadasho Soomaali, waaxda luqadaha, qaybta kaalmada adeegyada, waxcaterina hickman . So [facility] [phone].    ATENCIÓN: Si habla español, tiene a aden disposición servicios gratuitos de asistencia lingüística. [facility] [phone].    We comply with applicable federal civil rights laws and [place] laws. We do not discriminate on the basis of race, color, national origin, age, disability, sex, sexual orientation, or gender identity.            Thank you!     Thank you for choosing [facility]  for your care. Our goal is always to provide you with excellent care. Hearing back from our patients is one way we can continue to improve our services. Please take a few minutes to complete the written survey that you may receive in the mail after your visit with us. Thank you!             Your Updated Medication List - Protect others around you: Learn how to safely use, store and throw away your medicines at www.disposemymeds.org.          This list is accurate as of 5/21/18 11:59 PM.  Always use your most recent med list.                   Brand Name Dispense Instructions for use Diagnosis    calcium 500 +D 500-400 MG-UNIT Tabs   Generic drug:  Calcium Carb-Cholecalciferol     180 tablet    Take 1 tablet by mouth 2 times daily.        cholecalciferol 5000 units Caps     90 capsule    Take 1  daily    Hyperparathyroidism (H)       conjugated estrogens cream    PREMARIN    30 g    Place 0.5 g vaginally twice a week    Vaginal dryness       cyanocobalamin 1000 MCG tablet    vitamin  B-12     Take by mouth every morning        estradiol 1 MG tablet    ESTRACE    90 tablet    Take 1 tablet (1 mg) by mouth daily    Panhypopituitarism (H), Loss of hair       estradiol 10 MCG Tabs vaginal tablet    VAGIFEM    24 tablet    Place 1 tablet (10 mcg) vaginally twice a week    Atrophic vaginitis       hydrocortisone 10 MG tablet    CORTEF    135 tablet    TAKE 1 TABLET IN THE MORNING AND ONE-HALF (1/2) TABLET MIDDAY    Panhypopituitarism (H)       levothyroxine 200 MCG tablet    SYNTHROID/LEVOTHROID    90 tablet    Take 1 tablet (200 mcg) by mouth daily    Panhypopituitarism (H)       losartan-hydrochlorothiazide 100-25 MG per tablet    HYZAAR    90 tablet    Take 1 tablet by mouth daily    Panhypopituitarism (H)       progesterone 100 MG capsule    PROMETRIUM    90 capsule    TAKE 1 CAPSULE DAILY    Hormone replacement therapy       simvastatin 40 MG tablet    ZOCOR    90 tablet    Take 1 tablet (40 mg) by mouth daily    Hyperlipidemia LDL goal <100       zinc 50 MG Tabs      Take  by mouth.

## 2018-05-21 NOTE — LETTER
5/21/2018       RE: Maria Esther Lowe  [address] [facility] [address]     Dear Colleague,    Thank you for referring your patient, Maria Esther Lowe, to the [facility] at [facility]. Please see a copy of my visit note below.    62 year old female presents for gynecologic ultrasound indicated by h/o ovarian cysts.  This study was done transvaginally.    Uterine findings:   Presence: Visible Size: Normal  Endometrium = 6.0 mm with fluid in cavity, 2.0mm without fluid included in measurement.          Pelvic findings:    Right Adnexa: Normal   Left Adnexa: Normal   Bladder:  Normal         Cul - de - sac fluid: None    Ovarian follicles:   Right ovary:  8.1x7.0x5.7cm.     Cyst with septations, no color flow is seen.     Size(s):  7.9 x 6.8 x 5.4cm     Left ovary:  3.1x3.1x4.7cm.     3 simple cysts      Size(s):  1.) 2.7 x 2.2 x 2.4cm  2.) 1.4 x 1.7 x 1.7cm  3.) 2.0 x 1.3 x 1.6cm      Comments:  Bilateral ovaries with complex masses, both have increased in size since 2/18.  Consider surgical intervention.  Further studies as clinically indicated.        BETTY Olson MD, FACOG          Again, thank you for allowing me to participate in the care of your patient.      Sincerely,    [facility]

## 2018-06-05 ENCOUNTER — TELEPHONE (OUTPATIENT)
Dept: ENDOCRINOLOGY | Facility: CLINIC | Age: 63
End: 2018-06-05

## 2018-06-05 NOTE — TELEPHONE ENCOUNTER
[facility] Call Center    Phone Message    May a detailed message be left on voicemail: yes    Reason for Call: Other: PT is requesting a call back.  She has concerns with a procedure that [facility] wants to do regarding a complete hysterectomy or just removing the PT's ovaries and tubes due to cysts on her ovaries.  Please follow up with the PT at [phone]     Action Taken: Message routed to:  [facility] Rylan

## 2018-06-05 NOTE — TELEPHONE ENCOUNTER
[facility] Call [facility]    Phone Message    May a detailed message be left on voicemail: yes    Reason for Call: Other: PT is requesting a call back regarding questions with a procedure women's health wants to do, please call pt's cell [phone].     Action Taken: Message routed to:  [facility] xavier

## 2018-06-06 ENCOUNTER — TELEPHONE (OUTPATIENT)
Dept: INTERNAL MEDICINE | Facility: CLINIC | Age: 63
End: 2018-06-06

## 2018-06-06 NOTE — TELEPHONE ENCOUNTER
Forwarding to Dr. Munoz to advise on surgical procedure.    Spoke with Maria Esther and gave her update.

## 2018-06-06 NOTE — TELEPHONE ENCOUNTER
[facility]: Nurse Triage Note  SITUATION/BACKGROUND                                                      Maria Estehr Lowe is a 62 year old female who calls with  Lightheadedness and dizziness x one month.  She was worked up at local MD for stroke, without findings.   Hx prolactinoma, panhypopituitarism and empty sella.  She is really wanting advice from Dr Arrington on two things, ( last visit date 10/23/2017)  1.She is wondering about whether her pituitary hormone levels or thyroid hormone levels may be abnormal   2. She is anxious to also discuss upcoming recommended uterus removal and Bilateral ovaries with complex masses, both have increased in size since 2/18. Dr Munoz saw her on 5/21/2018 and recommended surgeon at [facility]. She wonders what impact this may have on her baseline endocrine issues.    We discussed that Dr Arrington was out on leave, but reviewing messages. Messages sent yesterday afternoon per micheal.  I reassured her that we would be getting back to her with advice, and I would check on timeline expected from Endocrine.  Also will sed message to Women's Health as she was concerned about scheduling procedure in near future.         Allergies:   Allergies   Allergen Reactions     Ceftriaxone Other (See Comments)     Unsure of reaction.       ASSESSMENT       Anxiety regarding upcoming appointments and MD advice regarding efficacy and affect  Of [facility] procedure    RECOMMENDATION/PLAN                                                      RECOMMENDED DISPOSITION:  Phone Visit  Will comply with recommendation: Yes    If further questions/concerns or if symptoms do not improve, worsen or new symptoms develop, call your PCP or [phone] to talk with the Resident on call, as soon as possible.    Guideline used:  None  Telephone Triage Protocols for Nurses, Fifth Edition, Delma Sage RN

## 2018-06-07 RX ORDER — CLINDAMYCIN PHOSPHATE 900 MG/50ML
900 INJECTION, SOLUTION INTRAVENOUS SEE ADMIN INSTRUCTIONS
Status: CANCELLED | OUTPATIENT
Start: 2018-06-07

## 2018-06-07 RX ORDER — CLINDAMYCIN PHOSPHATE 900 MG/50ML
900 INJECTION, SOLUTION INTRAVENOUS
Status: CANCELLED | OUTPATIENT
Start: 2018-06-07

## 2018-06-07 RX ORDER — ACETAMINOPHEN 325 MG/1
975 TABLET ORAL ONCE
Status: CANCELLED | OUTPATIENT
Start: 2018-06-07 | End: 2018-06-07

## 2018-06-07 RX ORDER — PHENAZOPYRIDINE HYDROCHLORIDE 100 MG/1
200 TABLET, FILM COATED ORAL ONCE
Status: CANCELLED | OUTPATIENT
Start: 2018-06-07 | End: 2018-06-07

## 2018-06-07 NOTE — PROGRESS NOTES
"S:  Pt is a 61 y/o postmenopausal woman who presents today after her ultrasound that was done to follow up bilateral ovarian cysts.  She continues to be completely asymptomatic.  She started Vagifem at her last visit for her vaginal discharge though to be 2/2 atrophic vaginitis and that has improved.      O: /78 (BP Location: Right arm, Patient Position: Chair)  Pulse 65  Ht 1.651 m (5' 5\")  gen-pleasant, NAD    U/S 2/12/18    Right ovary:  5.5x4.8x4.6cm.                           Cyst with septation, no color flow is seen.                           Size(s):  5.1 x 4.5 x 4.2cm                           Left ovary:  3.5x3.4x1.8cm.                           3 cysts, one with internal echoes. No color flow is seen to these areas.                           Size(s):  1.) 17 x 17 x 15mm  2.) 17 x 16 x 9mm with internal echoes.  3.) 14 x 15 x 11mm.       U/S 5/21/18               Right ovary:  8.1x7.0x5.7cm.                           Cyst with septations, no color flow is seen.                           Size(s):  7.9 x 6.8 x 5.4cm                           Left ovary:  3.1x3.1x4.7cm.                           3 simple cysts                            Size(s):  1.) 2.7 x 2.2 x 2.4cm  2.) 1.4 x 1.7 x 1.7cm  3.) 2.0 x 1.3 x 1.6cm    11/17 ca 125 <5  2/18 ca 125  7    A:  61 y/o postmenopausal P0 with increase in size of both the right ovarian cyst and slight increase in all 2 cystic areas on the left ovary.  Again noted in the fluid in the endometrial cavity-this was noted to return on her first follow up ultrasound after her D&C hysteroscopy with benign pathology in 1/18.    P:  Again offered robotic assisted hysterectomy and BSO.  Now given the increase in size of all of the cysts, I think she needs to strongly consider definitive surgical management.  Her history and ultrasounds were reviewed with Dr. Lew after her visit.  She agreed that a robotic assisted approach would be fesable.  Reviewed with the patient " the goal of taking out the ovaries intact and the small possibility of needing an additional staging surgery if the pathology were to come back with ovarian cancer.  She will talk with her , but is ready to proceed with surgery at this time.    Daysi Munoz MD, FACOG

## 2018-06-07 NOTE — TELEPHONE ENCOUNTER
I spoke with Dr. Lew, she is willing to do daVinci assisted hyst/BSO.  Scheduling order was sent.    Daysi Munoz MD, FACOG

## 2018-06-08 NOTE — TELEPHONE ENCOUNTER
Spoke with Maria Esther and advised that operative order was received and the surgery scheduler will call her Monday, 6/11, to schedule.    Maria Esther became upset as the is desiring a phone call from Dr. Munoz to discuss the type of surgery she is having and the recovery time. Advised a note would be sent to Dr. Munoz to call her and she agreed with plan.

## 2018-06-12 ENCOUNTER — TELEPHONE (OUTPATIENT)
Dept: OBGYN | Facility: CLINIC | Age: 63
End: 2018-06-12

## 2018-06-14 NOTE — TELEPHONE ENCOUNTER
Attempted to call, no answer.  Left her a message for her to call back.  I will keep her on my list and try her again next week as well.

## 2018-06-20 ENCOUNTER — TELEPHONE (OUTPATIENT)
Dept: ENDOCRINOLOGY | Facility: CLINIC | Age: 63
End: 2018-06-20

## 2018-06-20 ENCOUNTER — TELEPHONE (OUTPATIENT)
Dept: OBGYN | Facility: CLINIC | Age: 63
End: 2018-06-20

## 2018-06-25 ENCOUNTER — TELEPHONE (OUTPATIENT)
Dept: OBGYN | Facility: CLINIC | Age: 63
End: 2018-06-25

## 2018-06-25 NOTE — TELEPHONE ENCOUNTER
Coordinated surgery scheduling for Maria Esther. Pt expressed frustration at the surgery scheduling process. Offered patient dates of July 13th, Aug 3rd, September 11th, and Sept 25th. Patient states she will be out of town over the two dates in July and Aug, would like to take Sept 11th, and be added to a cancellation list. Worked with Sen to lock in date of Sept 11th. Informed patient we will send surgery prep letter, and pt will need pre-op with PCP within 30 days of surgery.    Pt expressed understanding and has no further questions at this time.

## 2018-08-13 ENCOUNTER — OFFICE VISIT (OUTPATIENT)
Dept: INTERNAL MEDICINE | Facility: CLINIC | Age: 63
End: 2018-08-13
Attending: INTERNAL MEDICINE
Payer: COMMERCIAL

## 2018-08-13 VITALS
HEART RATE: 64 BPM | WEIGHT: 177 LBS | DIASTOLIC BLOOD PRESSURE: 64 MMHG | HEIGHT: 65 IN | SYSTOLIC BLOOD PRESSURE: 148 MMHG | BODY MASS INDEX: 29.49 KG/M2

## 2018-08-13 DIAGNOSIS — N84.0 ENDOMETRIAL POLYP: ICD-10-CM

## 2018-08-13 DIAGNOSIS — I10 BENIGN ESSENTIAL HYPERTENSION: ICD-10-CM

## 2018-08-13 DIAGNOSIS — E23.0 PANHYPOPITUITARISM (H): Primary | ICD-10-CM

## 2018-08-13 DIAGNOSIS — N83.202 CYST OF LEFT OVARY: ICD-10-CM

## 2018-08-13 LAB
ANION GAP SERPL CALCULATED.3IONS-SCNC: 8 MMOL/L (ref 3–14)
BUN SERPL-MCNC: 21 MG/DL (ref 7–30)
CALCIUM SERPL-MCNC: 8.8 MG/DL (ref 8.5–10.1)
CHLORIDE SERPL-SCNC: 108 MMOL/L (ref 94–109)
CO2 SERPL-SCNC: 25 MMOL/L (ref 20–32)
CREAT SERPL-MCNC: 1.08 MG/DL (ref 0.52–1.04)
GFR SERPL CREATININE-BSD FRML MDRD: 51 ML/MIN/1.7M2
GLUCOSE SERPL-MCNC: 84 MG/DL (ref 70–99)
POTASSIUM SERPL-SCNC: 3.7 MMOL/L (ref 3.4–5.3)
SODIUM SERPL-SCNC: 141 MMOL/L (ref 133–144)
T4 FREE SERPL-MCNC: 0.77 NG/DL (ref 0.76–1.46)
TSH SERPL DL<=0.005 MIU/L-ACNC: 0.26 MU/L (ref 0.4–4)

## 2018-08-13 PROCEDURE — 80048 BASIC METABOLIC PNL TOTAL CA: CPT | Performed by: INTERNAL MEDICINE

## 2018-08-13 PROCEDURE — 93010 ELECTROCARDIOGRAM REPORT: CPT | Performed by: INTERNAL MEDICINE

## 2018-08-13 PROCEDURE — 36415 COLL VENOUS BLD VENIPUNCTURE: CPT | Performed by: INTERNAL MEDICINE

## 2018-08-13 PROCEDURE — 84439 ASSAY OF FREE THYROXINE: CPT | Performed by: INTERNAL MEDICINE

## 2018-08-13 PROCEDURE — 93005 ELECTROCARDIOGRAM TRACING: CPT | Mod: ZF | Performed by: INTERNAL MEDICINE

## 2018-08-13 PROCEDURE — 84443 ASSAY THYROID STIM HORMONE: CPT | Performed by: INTERNAL MEDICINE

## 2018-08-13 PROCEDURE — G0463 HOSPITAL OUTPT CLINIC VISIT: HCPCS | Mod: ZF

## 2018-08-13 ASSESSMENT — ANXIETY QUESTIONNAIRES
1. FEELING NERVOUS, ANXIOUS, OR ON EDGE: NOT AT ALL
GAD7 TOTAL SCORE: 0
2. NOT BEING ABLE TO STOP OR CONTROL WORRYING: NOT AT ALL
5. BEING SO RESTLESS THAT IT IS HARD TO SIT STILL: NOT AT ALL
3. WORRYING TOO MUCH ABOUT DIFFERENT THINGS: NOT AT ALL
7. FEELING AFRAID AS IF SOMETHING AWFUL MIGHT HAPPEN: NOT AT ALL
6. BECOMING EASILY ANNOYED OR IRRITABLE: NOT AT ALL

## 2018-08-13 ASSESSMENT — PAIN SCALES - GENERAL: PAINLEVEL: NO PAIN (0)

## 2018-08-13 ASSESSMENT — PATIENT HEALTH QUESTIONNAIRE - PHQ9: 5. POOR APPETITE OR OVEREATING: NOT AT ALL

## 2018-08-13 NOTE — PROGRESS NOTES
[facility]   [facility]  [address] [facility]  [address]  [facility]  [facility] [id number] [phone]  Dept: [phone]    PRE-OP EVALUATION:  Today's date: 2018    Maria Esther Lowe (: 1955) presents for pre-operative evaluation assessment as requested by Dr. Munoz.  She requires evaluation and anesthesia risk assessment prior to undergoing surgery/procedure for treatment of ovarian cysts .    Proposed Surgery/ Procedure: hysterectomy, BSO  Date of Surgery/ Procedure: 2018  Time of Surgery/ Procedure: 9:00 AM  Hospital/Surgical Facility: [facility]  Primary Physician: Estela Bowsell  Type of Anesthesia Anticipated: General    Patient has a Health Care Directive or Living Will:  NO    1. NO - Do you have a history of heart attack, stroke, stent, bypass or surgery on an artery in the head, neck, heart or legs?  2. NO - Do you ever have any pain or discomfort in your chest?  3. NO - Do you have a history of  Heart Failure?  4. NO - Are you troubled by shortness of breath when: walking on the level, up a slight hill or at night?  5. NO - Do you currently have a cold, bronchitis or other respiratory infection?  6. NO - Do you have a cough, shortness of breath or wheezing?  7. NO - Do you sometimes get pains in the calves of your legs when you walk?  8. NO - Do you or anyone in your family have previous history of blood clots?  9. NO - Do you or does anyone in your family have a serious bleeding problem such as prolonged bleeding following surgeries or cuts?  10. NO - Have you ever had problems with anemia or been told to take iron pills?  11. NO - Have you had any abnormal blood loss such as black, tarry or bloody stools, or abnormal vaginal bleeding?  12. NO - Have you ever had a blood transfusion?  13. NO - Have you or any of your relatives ever had problems with anesthesia?  14. NO - Do you have sleep apnea, excessive snoring or daytime  drowsiness?  15. NO - Do you have any prosthetic heart valves?  16. NO - Do you have prosthetic joints?  17. NO - Is there any chance that you may be pregnant?      HPI:     HPI related to upcoming procedure: Patient has been found to have endometrial polyps and ovarian cysts. She has been advised on surgical options, however, she is not sure if she wants to proceed with hysterectomy      HYPERTENSION - Patient has longstanding history of HTN , currently denies any symptoms referable to elevated blood pressure. Specifically denies chest pain, palpitations, dyspnea, orthopnea, PND or peripheral edema. Blood pressure readings have not been in normal range. Current medication regimen is as listed below. Patient denies any side effects of medication.                                                                                                                                                                                          .  Patient reports that she has been dealing with diarrhea. She had negative infectious work up which was negative, however, she continues to have diarrhea. She is wondering if she needs additional procedures for evaluation.     MEDICAL HISTORY:     Patient Active Problem List    Diagnosis Date Noted     Carpal tunnel syndrome 05/13/2015     Priority: Medium     Hormone replacement therapy (postmenopausal) 04/16/2013     Priority: Medium     Panhypopituitarism (H) 12/31/2012     Priority: Medium     Prolactinoma (H) 12/07/2012     Priority: Medium      Past Medical History:   Diagnosis Date     Hyperlipidemia LDL goal < 130      Hypertension      Hypothyroidism      Osteopenia     DEXA: 12/11; T-score of -1.9        Panhypopituitarism (H)      Past Surgical History:   Procedure Laterality Date     OPERATIVE HYSTEROSCOPY N/A 1/30/2018    Procedure: OPERATIVE HYSTEROSCOPY;  Operative Hysteroscopy, Resection of Endometrial Polyp, Dilation and Curettage;  Surgeon: Daysi Munoz MD;   Location: [facility] OR     resected prolactin-producing pituitary adenoma.  1975    Postoperative radiation therapy.      Current Outpatient Prescriptions   Medication Sig Dispense Refill     CALCIUM 500 +D 500-400 MG-UNIT TABS Take 1 tablet by mouth 2 times daily. 180 tablet 3     cholecalciferol 5000 UNITS CAPS Take 1 daily 90 capsule 3     conjugated estrogens (PREMARIN) cream Place 0.5 g vaginally twice a week 30 g 12     cyanocobalamin (VITAMIN  B-12) 1000 MCG tablet Take by mouth every morning       estradiol (ESTRACE) 1 MG tablet Take 1 tablet (1 mg) by mouth daily 90 tablet 4     estradiol (VAGIFEM) 10 MCG TABS vaginal tablet Place 1 tablet (10 mcg) vaginally twice a week 24 tablet 3     hydrocortisone (CORTEF) 10 MG tablet TAKE 1 TABLET IN THE MORNING AND ONE-HALF (1/2) TABLET MIDDAY 135 tablet 2     levothyroxine (SYNTHROID/LEVOTHROID) 200 MCG tablet Take 1 tablet (200 mcg) by mouth daily 90 tablet 3     losartan-hydrochlorothiazide (HYZAAR) 100-25 MG per tablet Take 1 tablet by mouth daily 90 tablet 3     progesterone (PROMETRIUM) 100 MG capsule TAKE 1 CAPSULE DAILY 90 capsule 3     simvastatin (ZOCOR) 40 MG tablet Take 1 tablet (40 mg) by mouth daily 90 tablet 3     zinc 50 MG TABS Take  by mouth.       OTC products: no recent use of OTC ASA, NSAIDS or Steroids    Allergies   Allergen Reactions     Ceftriaxone Other (See Comments)     Unsure of reaction.      Latex Allergy: NO    Social History   Substance Use Topics     Smoking status: Never Smoker     Smokeless tobacco: Never Used     Alcohol use No     History   Drug Use No       REVIEW OF SYSTEMS:   CONSTITUTIONAL: NEGATIVE for fever, chills, change in weight  INTEGUMENTARY/SKIN: NEGATIVE for worrisome rashes, moles or lesions  EYES: NEGATIVE for vision changes or irritation  ENT/MOUTH: NEGATIVE for ear, mouth and throat problems  RESP: NEGATIVE for significant cough or SOB  BREAST: NEGATIVE for masses, tenderness or discharge  CV: NEGATIVE for chest pain,  "palpitations or peripheral edema  GI: diarrhea, bloating  : NEGATIVE for frequency, dysuria, or hematuria  MUSCULOSKELETAL: NEGATIVE for significant arthralgias or myalgia  NEURO: NEGATIVE for weakness, dizziness or paresthesias  ENDOCRINE: NEGATIVE for temperature intolerance, skin/hair changes  HEME: NEGATIVE for bleeding problems  PSYCHIATRIC: NEGATIVE for changes in mood or affect    EXAM:   /64  Pulse 64  Ht 1.651 m (5' 5\")  Wt 80.3 kg (177 lb)  Breastfeeding? No  BMI 29.45 kg/m2    GENERAL APPEARANCE: healthy, alert and no distress     EYES: EOMI, PERRL     HENT: ear canals and TM's normal and nose and mouth without ulcers or lesions     NECK: no adenopathy, no asymmetry, masses, or scars and thyroid normal to palpation     RESP: lungs clear to auscultation - no rales, rhonchi or wheezes     CV: regular rates and rhythm, normal S1 S2, no S3 or S4 and no murmur, click or rub     ABDOMEN:  soft, nontender, no HSM or masses and bowel sounds normal     MS: extremities normal- no gross deformities noted, no evidence of inflammation in joints, FROM in all extremities.     SKIN: no suspicious lesions or rashes     NEURO: Normal strength and tone, sensory exam grossly normal, mentation intact and speech normal     PSYCH: mentation appears normal. and affect normal/bright     LYMPHATICS: No cervical adenopathy    DIAGNOSTICS:   EKG: appears normal, NSR, normal axis, normal intervals, no acute ST/T changes c/w ischemia, no LVH by voltage criteria, unchanged from previous tracings, QT interval 490    Recent Labs   Lab Test  01/30/18   0649  10/23/17   1115  12/06/16   1849  07/22/16   1004   HGB   --    --   13.4  12.9   PLT   --    --   361  247   NA   --   136  138  138   POTASSIUM  4.0  3.9  4.0  3.6   CR  1.04  1.01  1.07*  1.03        IMPRESSION:   Reason for surgery/procedure: ovarian cysts, endometrial polyps  Diagnosis/reason for consult: hypertension, panhypopituitarism    The proposed surgical " procedure is considered INTERMEDIATE risk.    REVISED CARDIAC RISK INDEX  The patient has the following serious cardiovascular risks for perioperative complications such as (MI, PE, VFib and 3  AV Block):  No serious cardiac risks  INTERPRETATION: 1 risks: Class II (low risk - 0.9% complication rate)    The patient has the following additional risks for perioperative complications:  No identified additional risks      ICD-10-CM    1. Panhypopituitarism (H) E23.0 PAC Visit Referral (For [facility] Only)     TSH with free T4 reflex     Thyroxine, Free   2. Benign essential hypertension I10 Basic Metabolic Panel     EKG 12-lead complete w/read - Clinics   3. Cyst of left ovary N83.202    4. Endometrial polyp N84.0        RECOMMENDATIONS:       Cardiovascular Risk  Performs 4 METs exercise without symptoms (Light housework (dusting, washing dishes) and Walk on level ground at 15 minutes per mile (4 miles/hour)) .       --Patient is to take all scheduled medications on the day of surgery EXCEPT for modifications listed below.    Chronic Corticosteroid Use  Moderate procedure:   Hydrocortisone 50-75 mg IV on day of surgery.  Taper to baseline preoperative dose over the subsequent 1-2 days.      ACE Inhibitor or Angiotensin Receptor Blocker (ARB) Use  Ace inhibitor or Angiotensin Receptor Blocker (ARB) and should HOLD this medication for the 24 hours prior to surgery.      Recommend diarrhea evaluation prior to surgery. Workup can be completed after the procedure.       Signed Electronically by: Violeta Estrada MD    Copy of this evaluation report is provided to requesting physician.    [facility] Preop Guidelines    Revised Cardiac Risk IndexEKG

## 2018-08-13 NOTE — LETTER
8/17/2018         Maria Esther Lowe   [address] [facility] [address]        Dear Ms. Lowe:    Your labs were reviewed by Violeta Estrada MD and are within acceptable ranges.  No changes are recommended.     Results for orders placed or performed in visit on 08/13/18   Basic Metabolic Panel   Result Value Ref Range    Sodium 141 133 - 144 mmol/L    Potassium 3.7 3.4 - 5.3 mmol/L    Chloride 108 94 - 109 mmol/L    Carbon Dioxide 25 20 - 32 mmol/L    Anion Gap 8 3 - 14 mmol/L    Glucose 84 70 - 99 mg/dL    Urea Nitrogen 21 7 - 30 mg/dL    Creatinine 1.08 (H) 0.52 - 1.04 mg/dL    GFR Estimate 51 (L) >60 mL/min/1.7m2    GFR Estimate If Black 62 >60 mL/min/1.7m2    Calcium 8.8 8.5 - 10.1 mg/dL   TSH with free T4 reflex   Result Value Ref Range    TSH 0.26 (L) 0.40 - 4.00 mU/L   Thyroxine, Free   Result Value Ref Range    T4 Free 0.77 0.76 - 1.46 ng/dL   EKG 12-lead complete w/read - Clinics   Result Value Ref Range    Interpretation ECG Click View Image link to view waveform and result          Please note that test explanations are brief and do not reflect all diagnostic uses.  If you have any questions or concerns, please call the clinic at [phone].      Sincerely,      Melissa Tilley sent on behalf of  Violeta Estrada MD

## 2018-08-13 NOTE — LETTER
2018       RE: Maria Esther Lowe  [address] [facility] [address]     Dear Colleague,    Thank you for referring your patient, Maria Esther Lowe, to the [facility]  at [facility]. Please see a copy of my visit note below.    [facility]   [facility]  [address]rd [address]  [address]  [facility]  [facility] [address] [phone]  Dept: [phone]    PRE-OP EVALUATION:  Today's date: 2018    Maria Esther Lowe (: 1955) presents for pre-operative evaluation assessment as requested by Dr. Munoz.  She requires evaluation and anesthesia risk assessment prior to undergoing surgery/procedure for treatment of ovarian cysts .    Proposed Surgery/ Procedure: hysterectomy, BSO  Date of Surgery/ Procedure: 2018  Time of Surgery/ Procedure: 9:00 AM  Hospital/Surgical Facility: [facility]  Primary Physician: Estela Boswell  Type of Anesthesia Anticipated: General    Patient has a Health Care Directive or Living Will:  NO    1. NO - Do you have a history of heart attack, stroke, stent, bypass or surgery on an artery in the head, neck, heart or legs?  2. NO - Do you ever have any pain or discomfort in your chest?  3. NO - Do you have a history of  Heart Failure?  4. NO - Are you troubled by shortness of breath when: walking on the level, up a slight hill or at night?  5. NO - Do you currently have a cold, bronchitis or other respiratory infection?  6. NO - Do you have a cough, shortness of breath or wheezing?  7. NO - Do you sometimes get pains in the calves of your legs when you walk?  8. NO - Do you or anyone in your family have previous history of blood clots?  9. NO - Do you or does anyone in your family have a serious bleeding problem such as prolonged bleeding following surgeries or cuts?  10. NO - Have you ever had problems with anemia or been told to take iron pills?  11. NO - Have you had any  abnormal blood loss such as black, tarry or bloody stools, or abnormal vaginal bleeding?  12. NO - Have you ever had a blood transfusion?  13. NO - Have you or any of your relatives ever had problems with anesthesia?  14. NO - Do you have sleep apnea, excessive snoring or daytime drowsiness?  15. NO - Do you have any prosthetic heart valves?  16. NO - Do you have prosthetic joints?  17. NO - Is there any chance that you may be pregnant?      HPI:     HPI related to upcoming procedure: Patient has been found to have endometrial polyps and ovarian cysts. She has been advised on surgical options, however, she is not sure if she wants to proceed with hysterectomy      HYPERTENSION - Patient has longstanding history of HTN , currently denies any symptoms referable to elevated blood pressure. Specifically denies chest pain, palpitations, dyspnea, orthopnea, PND or peripheral edema. Blood pressure readings have not been in normal range. Current medication regimen is as listed below. Patient denies any side effects of medication.                                                                                                                                                                                          .  Patient reports that she has been dealing with diarrhea. She had negative infectious work up which was negative, however, she continues to have diarrhea. She is wondering if she needs additional procedures for evaluation.     MEDICAL HISTORY:     Patient Active Problem List    Diagnosis Date Noted     Carpal tunnel syndrome 05/13/2015     Priority: Medium     Hormone replacement therapy (postmenopausal) 04/16/2013     Priority: Medium     Panhypopituitarism (H) 12/31/2012     Priority: Medium     Prolactinoma (H) 12/07/2012     Priority: Medium      Past Medical History:   Diagnosis Date     Hyperlipidemia LDL goal < 130      Hypertension      Hypothyroidism      Osteopenia     DEXA: 12/11; T-score of -1.9         Panhypopituitarism (H)      Past Surgical History:   Procedure Laterality Date     OPERATIVE HYSTEROSCOPY N/A 1/30/2018    Procedure: OPERATIVE HYSTEROSCOPY;  Operative Hysteroscopy, Resection of Endometrial Polyp, Dilation and Curettage;  Surgeon: Daysi Munoz MD;  Location: [facility] OR     resected prolactin-producing pituitary adenoma.  1975    Postoperative radiation therapy.      Current Outpatient Prescriptions   Medication Sig Dispense Refill     CALCIUM 500 +D 500-400 MG-UNIT TABS Take 1 tablet by mouth 2 times daily. 180 tablet 3     cholecalciferol 5000 UNITS CAPS Take 1 daily 90 capsule 3     conjugated estrogens (PREMARIN) cream Place 0.5 g vaginally twice a week 30 g 12     cyanocobalamin (VITAMIN  B-12) 1000 MCG tablet Take by mouth every morning       estradiol (ESTRACE) 1 MG tablet Take 1 tablet (1 mg) by mouth daily 90 tablet 4     estradiol (VAGIFEM) 10 MCG TABS vaginal tablet Place 1 tablet (10 mcg) vaginally twice a week 24 tablet 3     hydrocortisone (CORTEF) 10 MG tablet TAKE 1 TABLET IN THE MORNING AND ONE-HALF (1/2) TABLET MIDDAY 135 tablet 2     levothyroxine (SYNTHROID/LEVOTHROID) 200 MCG tablet Take 1 tablet (200 mcg) by mouth daily 90 tablet 3     losartan-hydrochlorothiazide (HYZAAR) 100-25 MG per tablet Take 1 tablet by mouth daily 90 tablet 3     progesterone (PROMETRIUM) 100 MG capsule TAKE 1 CAPSULE DAILY 90 capsule 3     simvastatin (ZOCOR) 40 MG tablet Take 1 tablet (40 mg) by mouth daily 90 tablet 3     zinc 50 MG TABS Take  by mouth.       OTC products: no recent use of OTC ASA, NSAIDS or Steroids    Allergies   Allergen Reactions     Ceftriaxone Other (See Comments)     Unsure of reaction.      Latex Allergy: NO    Social History   Substance Use Topics     Smoking status: Never Smoker     Smokeless tobacco: Never Used     Alcohol use No     History   Drug Use No       REVIEW OF SYSTEMS:   CONSTITUTIONAL: NEGATIVE for fever, chills, change in weight  INTEGUMENTARY/SKIN:  "NEGATIVE for worrisome rashes, moles or lesions  EYES: NEGATIVE for vision changes or irritation  ENT/MOUTH: NEGATIVE for ear, mouth and throat problems  RESP: NEGATIVE for significant cough or SOB  BREAST: NEGATIVE for masses, tenderness or discharge  CV: NEGATIVE for chest pain, palpitations or peripheral edema  GI: diarrhea, bloating  : NEGATIVE for frequency, dysuria, or hematuria  MUSCULOSKELETAL: NEGATIVE for significant arthralgias or myalgia  NEURO: NEGATIVE for weakness, dizziness or paresthesias  ENDOCRINE: NEGATIVE for temperature intolerance, skin/hair changes  HEME: NEGATIVE for bleeding problems  PSYCHIATRIC: NEGATIVE for changes in mood or affect    EXAM:   /64  Pulse 64  Ht 1.651 m (5' 5\")  Wt 80.3 kg (177 lb)  Breastfeeding? No  BMI 29.45 kg/m2    GENERAL APPEARANCE: healthy, alert and no distress     EYES: EOMI, PERRL     HENT: ear canals and TM's normal and nose and mouth without ulcers or lesions     NECK: no adenopathy, no asymmetry, masses, or scars and thyroid normal to palpation     RESP: lungs clear to auscultation - no rales, rhonchi or wheezes     CV: regular rates and rhythm, normal S1 S2, no S3 or S4 and no murmur, click or rub     ABDOMEN:  soft, nontender, no HSM or masses and bowel sounds normal     MS: extremities normal- no gross deformities noted, no evidence of inflammation in joints, FROM in all extremities.     SKIN: no suspicious lesions or rashes     NEURO: Normal strength and tone, sensory exam grossly normal, mentation intact and speech normal     PSYCH: mentation appears normal. and affect normal/bright     LYMPHATICS: No cervical adenopathy    DIAGNOSTICS:   EKG: appears normal, NSR, normal axis, normal intervals, no acute ST/T changes c/w ischemia, no LVH by voltage criteria, unchanged from previous tracings, QT interval 490    Recent Labs   Lab Test  01/30/18   0649  10/23/17   1115  12/06/16   1849  07/22/16   1004   HGB   --    --   13.4  12.9   PLT   --   "  --   361  247   NA   --   136  138  138   POTASSIUM  4.0  3.9  4.0  3.6   CR  1.04  1.01  1.07*  1.03        IMPRESSION:   Reason for surgery/procedure: ovarian cysts, endometrial polyps  Diagnosis/reason for consult: hypertension, panhypopituitarism    The proposed surgical procedure is considered INTERMEDIATE risk.    REVISED CARDIAC RISK INDEX  The patient has the following serious cardiovascular risks for perioperative complications such as (MI, PE, VFib and 3  AV Block):  No serious cardiac risks  INTERPRETATION: 1 risks: Class II (low risk - 0.9% complication rate)    The patient has the following additional risks for perioperative complications:  No identified additional risks      ICD-10-CM    1. Panhypopituitarism (H) E23.0 PAC Visit Referral (For [facility] Only)     TSH with free T4 reflex     Thyroxine, Free   2. Benign essential hypertension I10 Basic Metabolic Panel     EKG 12-lead complete w/read - Clinics   3. Cyst of left ovary N83.202    4. Endometrial polyp N84.0        RECOMMENDATIONS:       Cardiovascular Risk  Performs 4 METs exercise without symptoms (Light housework (dusting, washing dishes) and Walk on level ground at 15 minutes per mile (4 miles/hour)) .       --Patient is to take all scheduled medications on the day of surgery EXCEPT for modifications listed below.    Chronic Corticosteroid Use  Moderate procedure:   Hydrocortisone 50-75 mg IV on day of surgery.  Taper to baseline preoperative dose over the subsequent 1-2 days.      ACE Inhibitor or Angiotensin Receptor Blocker (ARB) Use  Ace inhibitor or Angiotensin Receptor Blocker (ARB) and should HOLD this medication for the 24 hours prior to surgery.      Recommend diarrhea evaluation prior to surgery. Workup can be completed after the procedure.     [facility] Preop Guidelines    Revised Cardiac Risk IndexEKG    Sincerely,    Violeta Estrada MD

## 2018-08-14 LAB — INTERPRETATION ECG - MUSE: NORMAL

## 2018-08-14 ASSESSMENT — PATIENT HEALTH QUESTIONNAIRE - PHQ9: SUM OF ALL RESPONSES TO PHQ QUESTIONS 1-9: 0

## 2018-08-14 ASSESSMENT — ANXIETY QUESTIONNAIRES: GAD7 TOTAL SCORE: 0

## 2018-08-17 ENCOUNTER — OFFICE VISIT (OUTPATIENT)
Dept: OBGYN | Facility: CLINIC | Age: 63
End: 2018-08-17
Attending: OBSTETRICS & GYNECOLOGY
Payer: COMMERCIAL

## 2018-08-17 VITALS
BODY MASS INDEX: 29.49 KG/M2 | WEIGHT: 177 LBS | HEART RATE: 59 BPM | HEIGHT: 65 IN | SYSTOLIC BLOOD PRESSURE: 152 MMHG | DIASTOLIC BLOOD PRESSURE: 78 MMHG

## 2018-08-17 DIAGNOSIS — N83.202 BILATERAL OVARIAN CYSTS: Primary | ICD-10-CM

## 2018-08-17 DIAGNOSIS — N83.201 BILATERAL OVARIAN CYSTS: Primary | ICD-10-CM

## 2018-08-17 PROCEDURE — G0463 HOSPITAL OUTPT CLINIC VISIT: HCPCS | Mod: ZF

## 2018-08-17 NOTE — LETTER
"2018       RE: Maria Esther Lowe  [address] [facility] [address]     Dear Colleague,    Thank you for referring your patient, Maria Esther Lowe, to the [facility] at [facility]. Please see a copy of my visit note below.    [facility]  Follow-Up Visit    S: Ms. Maria Esther Lowe is a 63 year old  here to discuss plan for TLH-BSO.  Maria Esther has been a patient of Dr. Munoz and followed for bilateral ovarian cysts.  She referred Maria Esther for RA TLH/BSO.    She has history of bilateral ovarian cysts that are increasing in size. CA-125 normal. She is scheduled for a Robotic TLH-BSO on 18. She has several questions today about the surgery including recovery time, details of robotic surgery, and her medications. She is on hydrocortisone 10mg daily for panhypopituitarism.  She has been seen by Dr. Estrada for pre-op evaluation and she has been suggested to see PACS by her endocrinologist.     She also takes PO estrogen and progesterone. Has been told that she does not need to take the progesterone after her surgery because she will not have a uterus. She is worried about hair loss if she stops her progesterone or estrogen.     O:  /78 (BP Location: Left arm, Patient Position: Chair)  Pulse 59  Ht 1.651 m (5' 5\")  Wt 80.3 kg (177 lb)  BMI 29.45 kg/m2  Gen: Well-appearing, NAD    U/S 18     Right ovary:  5.5x4.8x4.6cm.                            Cyst with septation, no color flow is seen.                            Size(s):  5.1 x 4.5 x 4.2cm                            Left ovary:  3.5x3.4x1.8cm.                            3 cysts, one with internal echoes. No color flow is seen to these areas.                            Size(s):  1.) 17 x 17 x 15mm  2.) 17 x 16 x 9mm with internal echoes.  3.) 14 x 15 x 11mm.        U/S 18                Right ovary:  8.1x7.0x5.7cm.                            Cyst with septations, no color flow " is seen.                            Size(s):  7.9 x 6.8 x 5.4cm                            Left ovary:  3.1x3.1x4.7cm.                            3 simple cysts                             Size(s):  1.) 2.7 x 2.2 x 2.4cm  2.) 1.4 x 1.7 x 1.7cm  3.) 2.0 x 1.3 x 1.6cm      ca 125 <5   ca 125  7    A/P:  Ms. Maria Esther Lowe is a 63 year old  with persistent ovarian cysts here to review plan for  TLH-BSO.    - Discussed that a TLH-BSO entails removal of her uterus, cervix, tubes, and ovaries. Discussed expected recovery time from a Robotic surgery. She will stay overnight after her surgery to complete her taper of stress dose IV steroids. Also discussed that from a gynecologic standpoint that after her uterus is removed she does not need to continue taking progesterone because the risk of endometrial cancer is removed. Recommended that she consult her endocrinologist who prescribes her estrogen to discuss continued use of estrogen because at her age the risks may out weight benefits  - PACs referral made by Dr. Natalie Polanco MD  Ob/Gyn, PGY-1  2018, 1:33 PM    Total visit time was 20 minutes with 20 minutes spent in counseling and coordination of care for bilateral ovarian cysts.    Appreciate Dr. Polanco's note above, patient also seen and examined by me with the resident. I spent 20 minutes counseling the patient.  I agree with the note above.       Again, thank you for allowing me to participate in the care of your patient.      Sincerely,    Tiffany Lew MD

## 2018-08-17 NOTE — MR AVS SNAPSHOT
After Visit Summary   8/17/2018    Maria Esther Lowe    MRN: [id number]           Patient Information     Date Of Birth          1955        Visit Information        Provider Department      8/17/2018 1:00 PM Tiffany Lew MD [facility]        Today's Diagnoses     Bilateral ovarian cysts    -  1       Follow-ups after your visit        Your next 10 appointments already scheduled     Sep 11, 2018   Procedure with Tiffany Lew MD   [facility], [place], Same Day Surgery (--)    [address]  [facility] [id number] [phone]            Oct 25, 2018  7:45 AM CDT   Return Visit with Tiffany Lew MD   [facility] ([facility])    [facility]  [address]  [address] [facility] [address] [phone]              Who to contact     Please call your clinic at [phone] to:    Ask questions about your health    Make or cancel appointments    Discuss your medicines    Learn about your test results    Speak to your doctor            Additional Information About Your Visit        Blinpick[employer] Information     My[employer]ants gives you secure access to your electronic health record. If you see a primary care provider, you can also send messages to your care team and make appointments. If you have questions, please call your primary care clinic.  If you do not have a primary care provider, please call [phone] and they will assist you.      [employer] is an electronic gateway that provides easy, online access to your medical records. With My[employer]ants, you can request a clinic appointment, read your test results, renew a prescription or communicate with your care team.     To access your existing account, please contact your [facility] or call [phone] for assistance.        Care EveryWhere ID     This is your Care EveryWhere ID. This could be used by other organizations to access your [place]  "medical records  [id number]        Your Vitals Were     Pulse Height BMI (Body Mass Index)             59 1.651 m (5' 5\") 29.45 kg/m2          Blood Pressure from Last 3 Encounters:   08/17/18 152/78   08/13/18 148/64   05/21/18 150/78    Weight from Last 3 Encounters:   08/17/18 80.3 kg (177 lb)   08/13/18 80.3 kg (177 lb)   01/30/18 79 kg (174 lb 2.6 oz)              Today, you had the following     No orders found for display       Primary Care Provider Office Phone # Fax #    Estela Boswell, SRUTHI [phone]       [place] [address]        Equal Access to Services     MARIA DE JESUS W[place] : Tameka amador Soyaritza, waaxda luqadaha, qaybta kaalmada adeegyada, larissa hickman . [employer] [place] -[phone].    ATENCIÓN: Si habla español, tiene a aden disposición servicios gratuitos de asistencia lingüística. [facility] [phone].    We comply with applicable federal civil rights laws and [place] laws. We do not discriminate on the basis of race, color, national origin, age, disability, sex, sexual orientation, or gender identity.            Thank you!     Thank you for choosing [facility]  for your care. Our goal is always to provide you with excellent care. Hearing back from our patients is one way we can continue to improve our services. Please take a few minutes to complete the written survey that you may receive in the mail after your visit with us. Thank you!             Your Updated Medication List - Protect others around you: Learn how to safely use, store and throw away your medicines at www.disposemymeds.org.          This list is accurate as of 8/17/18 11:59 PM.  Always use your most recent med list.                   Brand Name Dispense Instructions for use Diagnosis    calcium 500 +D 500-400 MG-UNIT Tabs   Generic drug:  Calcium Carb-Cholecalciferol     180 tablet    Take 1 tablet by mouth 2 times daily.        " cholecalciferol 5000 units Caps     90 capsule    Take 1 daily    Hyperparathyroidism (H)       conjugated estrogens cream    PREMARIN    30 g    Place 0.5 g vaginally twice a week    Vaginal dryness       cyanocobalamin 1000 MCG tablet    vitamin  B-12     Take by mouth every morning        estradiol 1 MG tablet    ESTRACE    90 tablet    Take 1 tablet (1 mg) by mouth daily    Panhypopituitarism (H), Loss of hair       estradiol 10 MCG Tabs vaginal tablet    VAGIFEM    24 tablet    Place 1 tablet (10 mcg) vaginally twice a week    Atrophic vaginitis       hydrocortisone 10 MG tablet    CORTEF    135 tablet    TAKE 1 TABLET IN THE MORNING AND ONE-HALF (1/2) TABLET MIDDAY    Panhypopituitarism (H)       levothyroxine 200 MCG tablet    SYNTHROID/LEVOTHROID    90 tablet    Take 1 tablet (200 mcg) by mouth daily    Panhypopituitarism (H)       losartan-hydrochlorothiazide 100-25 MG per tablet    HYZAAR    90 tablet    Take 1 tablet by mouth daily    Panhypopituitarism (H)       progesterone 100 MG capsule    PROMETRIUM    90 capsule    TAKE 1 CAPSULE DAILY    Hormone replacement therapy       simvastatin 40 MG tablet    ZOCOR    90 tablet    Take 1 tablet (40 mg) by mouth daily    Hyperlipidemia LDL goal <100       zinc 50 MG Tabs      Take  by mouth.

## 2018-08-17 NOTE — PROGRESS NOTES
"[facility]  Follow-Up Visit    S: Ms. Maria Esther Lowe is a 63 year old  here to discuss plan for TLH-BSO.  Maria Esther has been a patient of Dr. Munoz and followed for bilateral ovarian cysts.  She referred Maria Esther for RA TLH/BSO.    She has history of bilateral ovarian cysts that are increasing in size. CA-125 normal. She is scheduled for a Robotic TLH-BSO on 18. She has several questions today about the surgery including recovery time, details of robotic surgery, and her medications. She is on hydrocortisone 10mg daily for panhypopituitarism.  She has been seen by Dr. Estrada for pre-op evaluation and she has been suggested to see PACS by her endocrinologist.     She also takes PO estrogen and progesterone. Has been told that she does not need to take the progesterone after her surgery because she will not have a uterus. She is worried about hair loss if she stops her progesterone or estrogen.     O:  /78 (BP Location: Left arm, Patient Position: Chair)  Pulse 59  Ht 1.651 m (5' 5\")  Wt 80.3 kg (177 lb)  BMI 29.45 kg/m2  Gen: Well-appearing, NAD    U/S 18     Right ovary:  5.5x4.8x4.6cm.                            Cyst with septation, no color flow is seen.                            Size(s):  5.1 x 4.5 x 4.2cm                            Left ovary:  3.5x3.4x1.8cm.                            3 cysts, one with internal echoes. No color flow is seen to these areas.                            Size(s):  1.) 17 x 17 x 15mm  2.) 17 x 16 x 9mm with internal echoes.  3.) 14 x 15 x 11mm.        U/S 18                Right ovary:  8.1x7.0x5.7cm.                            Cyst with septations, no color flow is seen.                            Size(s):  7.9 x 6.8 x 5.4cm                            Left ovary:  3.1x3.1x4.7cm.                            3 simple cysts                             Size(s):  1.) 2.7 x 2.2 x 2.4cm  2.) 1.4 x 1.7 x 1.7cm  3.) 2.0 x 1.3 x 1.6cm     11/17 ca 125 <5   " ca 125  7    A/P:  Ms. Maria Esther Lowe is a 63 year old  with persistent ovarian cysts here to review plan for  TLH-BSO.    - Discussed that a TLH-BSO entails removal of her uterus, cervix, tubes, and ovaries. Discussed expected recovery time from a Robotic surgery. She will stay overnight after her surgery to complete her taper of stress dose IV steroids. Also discussed that from a gynecologic standpoint that after her uterus is removed she does not need to continue taking progesterone because the risk of endometrial cancer is removed. Recommended that she consult her endocrinologist who prescribes her estrogen to discuss continued use of estrogen because at her age the risks may out weight benefits  - PACs referral made by Dr. Natalie Polanco MD  Ob/Gyn, PGY-1  2018, 1:33 PM    Total visit time was 20 minutes with 20 minutes spent in counseling and coordination of care for bilateral ovarian cysts.    Appreciate Dr. Polanco's note above, patient also seen and examined by me with the resident. I spent 20 minutes counseling the patient.  I agree with the note above.   Tiffany Lew MD

## 2018-09-04 ENCOUNTER — TRANSFERRED RECORDS (OUTPATIENT)
Dept: HEALTH INFORMATION MANAGEMENT | Facility: CLINIC | Age: 63
End: 2018-09-04

## 2018-09-10 ENCOUNTER — ANESTHESIA EVENT (OUTPATIENT)
Dept: SURGERY | Facility: CLINIC | Age: 63
End: 2018-09-10
Payer: COMMERCIAL

## 2018-09-11 ENCOUNTER — ANESTHESIA (OUTPATIENT)
Dept: SURGERY | Facility: CLINIC | Age: 63
End: 2018-09-11
Payer: COMMERCIAL

## 2018-09-11 ENCOUNTER — HOSPITAL ENCOUNTER (OUTPATIENT)
Facility: CLINIC | Age: 63
Setting detail: OBSERVATION
LOS: 1 days | Discharge: HOME | End: 2018-09-12
Attending: OBSTETRICS & GYNECOLOGY | Admitting: OBSTETRICS & GYNECOLOGY
Payer: COMMERCIAL

## 2018-09-11 DIAGNOSIS — Z90.710 S/P LAPAROSCOPIC HYSTERECTOMY: Primary | ICD-10-CM

## 2018-09-11 LAB
ABO + RH BLD: NORMAL
ABO + RH BLD: NORMAL
BLD GP AB SCN SERPL QL: NORMAL
BLOOD BANK CMNT PATIENT-IMP: NORMAL
CREAT SERPL-MCNC: 1.25 MG/DL (ref 0.52–1.04)
ERYTHROCYTE [DISTWIDTH] IN BLOOD BY AUTOMATED COUNT: 12.4 % (ref 10–15)
GFR SERPL CREATININE-BSD FRML MDRD: 43 ML/MIN/1.7M2
GLUCOSE SERPL-MCNC: 100 MG/DL (ref 70–99)
HCT VFR BLD AUTO: 38.4 % (ref 35–47)
HGB BLD-MCNC: 12.5 G/DL (ref 11.7–15.7)
MCH RBC QN AUTO: 29.6 PG (ref 26.5–33)
MCHC RBC AUTO-ENTMCNC: 32.6 G/DL (ref 31.5–36.5)
MCV RBC AUTO: 91 FL (ref 78–100)
PLATELET # BLD AUTO: 271 10E9/L (ref 150–450)
POTASSIUM SERPL-SCNC: 3.8 MMOL/L (ref 3.4–5.3)
RBC # BLD AUTO: 4.22 10E12/L (ref 3.8–5.2)
SPECIMEN EXP DATE BLD: NORMAL
WBC # BLD AUTO: 9.8 10E9/L (ref 4–11)

## 2018-09-11 PROCEDURE — 88307 TISSUE EXAM BY PATHOLOGIST: CPT | Performed by: OBSTETRICS & GYNECOLOGY

## 2018-09-11 PROCEDURE — 88307 TISSUE EXAM BY PATHOLOGIST: CPT | Mod: 26,59 | Performed by: OBSTETRICS & GYNECOLOGY

## 2018-09-11 PROCEDURE — 40000170 ZZH STATISTIC PRE-PROCEDURE ASSESSMENT II: Performed by: OBSTETRICS & GYNECOLOGY

## 2018-09-11 PROCEDURE — 36415 COLL VENOUS BLD VENIPUNCTURE: CPT | Performed by: OBSTETRICS & GYNECOLOGY

## 2018-09-11 PROCEDURE — C9290 INJ, BUPIVACAINE LIPOSOME: HCPCS | Performed by: ANESTHESIOLOGY

## 2018-09-11 PROCEDURE — 25000565 ZZH ISOFLURANE, EA 15 MIN: Performed by: OBSTETRICS & GYNECOLOGY

## 2018-09-11 PROCEDURE — 88305 TISSUE EXAM BY PATHOLOGIST: CPT | Performed by: OBSTETRICS & GYNECOLOGY

## 2018-09-11 PROCEDURE — 25000125 ZZHC RX 250: Performed by: ANESTHESIOLOGY

## 2018-09-11 PROCEDURE — 86901 BLOOD TYPING SEROLOGIC RH(D): CPT | Performed by: OBSTETRICS & GYNECOLOGY

## 2018-09-11 PROCEDURE — 37000008 ZZH ANESTHESIA TECHNICAL FEE, 1ST 30 MIN: Performed by: OBSTETRICS & GYNECOLOGY

## 2018-09-11 PROCEDURE — 71000015 ZZH RECOVERY PHASE 1 LEVEL 2 EA ADDTL HR: Performed by: OBSTETRICS & GYNECOLOGY

## 2018-09-11 PROCEDURE — 82565 ASSAY OF CREATININE: CPT | Performed by: OBSTETRICS & GYNECOLOGY

## 2018-09-11 PROCEDURE — 86901 BLOOD TYPING SEROLOGIC RH(D): CPT | Performed by: ANESTHESIOLOGY

## 2018-09-11 PROCEDURE — 82947 ASSAY GLUCOSE BLOOD QUANT: CPT | Performed by: OBSTETRICS & GYNECOLOGY

## 2018-09-11 PROCEDURE — 25000128 H RX IP 250 OP 636: Performed by: ANESTHESIOLOGY

## 2018-09-11 PROCEDURE — 25000132 ZZH RX MED GY IP 250 OP 250 PS 637: Performed by: OBSTETRICS & GYNECOLOGY

## 2018-09-11 PROCEDURE — 84132 ASSAY OF SERUM POTASSIUM: CPT | Performed by: OBSTETRICS & GYNECOLOGY

## 2018-09-11 PROCEDURE — 25000128 H RX IP 250 OP 636: Performed by: NURSE ANESTHETIST, CERTIFIED REGISTERED

## 2018-09-11 PROCEDURE — 71000014 ZZH RECOVERY PHASE 1 LEVEL 2 FIRST HR: Performed by: OBSTETRICS & GYNECOLOGY

## 2018-09-11 PROCEDURE — 36000086 ZZH SURGERY LEVEL 8 1ST 30 MIN UMMC: Performed by: OBSTETRICS & GYNECOLOGY

## 2018-09-11 PROCEDURE — 86900 BLOOD TYPING SEROLOGIC ABO: CPT | Performed by: ANESTHESIOLOGY

## 2018-09-11 PROCEDURE — 27210794 ZZH OR GENERAL SUPPLY STERILE: Performed by: OBSTETRICS & GYNECOLOGY

## 2018-09-11 PROCEDURE — 88305 TISSUE EXAM BY PATHOLOGIST: CPT | Mod: 26 | Performed by: OBSTETRICS & GYNECOLOGY

## 2018-09-11 PROCEDURE — 88112 CYTOPATH CELL ENHANCE TECH: CPT | Performed by: OBSTETRICS & GYNECOLOGY

## 2018-09-11 PROCEDURE — 86850 RBC ANTIBODY SCREEN: CPT | Performed by: OBSTETRICS & GYNECOLOGY

## 2018-09-11 PROCEDURE — 86900 BLOOD TYPING SEROLOGIC ABO: CPT | Performed by: OBSTETRICS & GYNECOLOGY

## 2018-09-11 PROCEDURE — 37000009 ZZH ANESTHESIA TECHNICAL FEE, EACH ADDTL 15 MIN: Performed by: OBSTETRICS & GYNECOLOGY

## 2018-09-11 PROCEDURE — G0378 HOSPITAL OBSERVATION PER HR: HCPCS

## 2018-09-11 PROCEDURE — 85027 COMPLETE CBC AUTOMATED: CPT | Performed by: OBSTETRICS & GYNECOLOGY

## 2018-09-11 PROCEDURE — 88112 CYTOPATH CELL ENHANCE TECH: CPT | Mod: 26 | Performed by: OBSTETRICS & GYNECOLOGY

## 2018-09-11 PROCEDURE — 36000088 ZZH SURGERY LEVEL 8 EA 15 ADDTL MIN - UMMC: Performed by: OBSTETRICS & GYNECOLOGY

## 2018-09-11 PROCEDURE — 25000125 ZZHC RX 250: Performed by: NURSE ANESTHETIST, CERTIFIED REGISTERED

## 2018-09-11 PROCEDURE — 25000128 H RX IP 250 OP 636: Performed by: OBSTETRICS & GYNECOLOGY

## 2018-09-11 PROCEDURE — 86850 RBC ANTIBODY SCREEN: CPT | Performed by: ANESTHESIOLOGY

## 2018-09-11 PROCEDURE — 00000155 ZZHCL STATISTIC H-CELL BLOCK W/STAIN: Performed by: OBSTETRICS & GYNECOLOGY

## 2018-09-11 RX ORDER — LIDOCAINE HYDROCHLORIDE 20 MG/ML
INJECTION, SOLUTION INFILTRATION; PERINEURAL PRN
Status: DISCONTINUED | OUTPATIENT
Start: 2018-09-11 | End: 2018-09-11

## 2018-09-11 RX ORDER — FENTANYL CITRATE 50 UG/ML
INJECTION, SOLUTION INTRAMUSCULAR; INTRAVENOUS PRN
Status: DISCONTINUED | OUTPATIENT
Start: 2018-09-11 | End: 2018-09-11

## 2018-09-11 RX ORDER — CIPROFLOXACIN 2 MG/ML
400 INJECTION, SOLUTION INTRAVENOUS
Status: COMPLETED | OUTPATIENT
Start: 2018-09-11 | End: 2018-09-11

## 2018-09-11 RX ORDER — ACETAMINOPHEN 325 MG/1
975 TABLET ORAL ONCE
Status: COMPLETED | OUTPATIENT
Start: 2018-09-11 | End: 2018-09-11

## 2018-09-11 RX ORDER — NALOXONE HYDROCHLORIDE 0.4 MG/ML
.1-.4 INJECTION, SOLUTION INTRAMUSCULAR; INTRAVENOUS; SUBCUTANEOUS
Status: DISCONTINUED | OUTPATIENT
Start: 2018-09-11 | End: 2018-09-11 | Stop reason: HOSPADM

## 2018-09-11 RX ORDER — CIPROFLOXACIN 2 MG/ML
400 INJECTION, SOLUTION INTRAVENOUS SEE ADMIN INSTRUCTIONS
Status: DISCONTINUED | OUTPATIENT
Start: 2018-09-11 | End: 2018-09-11 | Stop reason: HOSPADM

## 2018-09-11 RX ORDER — ACETAMINOPHEN 325 MG/1
650 TABLET ORAL EVERY 6 HOURS PRN
Status: DISCONTINUED | OUTPATIENT
Start: 2018-09-11 | End: 2018-09-12 | Stop reason: HOSPADM

## 2018-09-11 RX ORDER — NALOXONE HYDROCHLORIDE 0.4 MG/ML
.1-.4 INJECTION, SOLUTION INTRAMUSCULAR; INTRAVENOUS; SUBCUTANEOUS
Status: DISCONTINUED | OUTPATIENT
Start: 2018-09-11 | End: 2018-09-12 | Stop reason: HOSPADM

## 2018-09-11 RX ORDER — SODIUM CHLORIDE, SODIUM LACTATE, POTASSIUM CHLORIDE, CALCIUM CHLORIDE 600; 310; 30; 20 MG/100ML; MG/100ML; MG/100ML; MG/100ML
INJECTION, SOLUTION INTRAVENOUS CONTINUOUS
Status: DISCONTINUED | OUTPATIENT
Start: 2018-09-11 | End: 2018-09-11 | Stop reason: HOSPADM

## 2018-09-11 RX ORDER — PROPOFOL 10 MG/ML
INJECTION, EMULSION INTRAVENOUS PRN
Status: DISCONTINUED | OUTPATIENT
Start: 2018-09-11 | End: 2018-09-11

## 2018-09-11 RX ORDER — ONDANSETRON 2 MG/ML
INJECTION INTRAMUSCULAR; INTRAVENOUS PRN
Status: DISCONTINUED | OUTPATIENT
Start: 2018-09-11 | End: 2018-09-11

## 2018-09-11 RX ORDER — FLUMAZENIL 0.1 MG/ML
0.2 INJECTION, SOLUTION INTRAVENOUS
Status: DISCONTINUED | OUTPATIENT
Start: 2018-09-11 | End: 2018-09-11 | Stop reason: HOSPADM

## 2018-09-11 RX ORDER — SODIUM CHLORIDE, SODIUM LACTATE, POTASSIUM CHLORIDE, CALCIUM CHLORIDE 600; 310; 30; 20 MG/100ML; MG/100ML; MG/100ML; MG/100ML
INJECTION, SOLUTION INTRAVENOUS CONTINUOUS PRN
Status: DISCONTINUED | OUTPATIENT
Start: 2018-09-11 | End: 2018-09-11

## 2018-09-11 RX ORDER — DEXAMETHASONE SODIUM PHOSPHATE 4 MG/ML
INJECTION, SOLUTION INTRA-ARTICULAR; INTRALESIONAL; INTRAMUSCULAR; INTRAVENOUS; SOFT TISSUE PRN
Status: DISCONTINUED | OUTPATIENT
Start: 2018-09-11 | End: 2018-09-11

## 2018-09-11 RX ORDER — HYDROMORPHONE HCL/0.9% NACL/PF 0.2MG/0.2
0.2 SYRINGE (ML) INTRAVENOUS
Status: DISCONTINUED | OUTPATIENT
Start: 2018-09-11 | End: 2018-09-12

## 2018-09-11 RX ORDER — FENTANYL CITRATE 50 UG/ML
25-50 INJECTION, SOLUTION INTRAMUSCULAR; INTRAVENOUS
Status: DISCONTINUED | OUTPATIENT
Start: 2018-09-11 | End: 2018-09-11 | Stop reason: HOSPADM

## 2018-09-11 RX ORDER — HYDROCORTISONE 10 MG/1
10 TABLET ORAL DAILY
Status: DISCONTINUED | OUTPATIENT
Start: 2018-09-12 | End: 2018-09-12 | Stop reason: HOSPADM

## 2018-09-11 RX ORDER — HYDROXYZINE HYDROCHLORIDE 25 MG/1
25 TABLET, FILM COATED ORAL EVERY 6 HOURS PRN
Status: DISCONTINUED | OUTPATIENT
Start: 2018-09-11 | End: 2018-09-12 | Stop reason: HOSPADM

## 2018-09-11 RX ORDER — BUPIVACAINE HYDROCHLORIDE 2.5 MG/ML
INJECTION, SOLUTION EPIDURAL; INFILTRATION; INTRACAUDAL PRN
Status: DISCONTINUED | OUTPATIENT
Start: 2018-09-11 | End: 2018-09-11

## 2018-09-11 RX ADMIN — ROCURONIUM BROMIDE 50 MG: 10 INJECTION INTRAVENOUS at 09:12

## 2018-09-11 RX ADMIN — ACETAMINOPHEN 975 MG: 325 TABLET, FILM COATED ORAL at 07:54

## 2018-09-11 RX ADMIN — MIDAZOLAM 2 MG: 1 INJECTION INTRAMUSCULAR; INTRAVENOUS at 08:59

## 2018-09-11 RX ADMIN — ROCURONIUM BROMIDE 10 MG: 10 INJECTION INTRAVENOUS at 11:34

## 2018-09-11 RX ADMIN — FENTANYL CITRATE 100 MCG: 50 INJECTION, SOLUTION INTRAMUSCULAR; INTRAVENOUS at 09:12

## 2018-09-11 RX ADMIN — FENTANYL CITRATE 25 MCG: 50 INJECTION, SOLUTION INTRAMUSCULAR; INTRAVENOUS at 12:58

## 2018-09-11 RX ADMIN — DEXAMETHASONE SODIUM PHOSPHATE 6 MG: 4 INJECTION, SOLUTION INTRAMUSCULAR; INTRAVENOUS at 09:40

## 2018-09-11 RX ADMIN — HYDROMORPHONE HYDROCHLORIDE 0.5 MG: 1 INJECTION, SOLUTION INTRAMUSCULAR; INTRAVENOUS; SUBCUTANEOUS at 14:02

## 2018-09-11 RX ADMIN — HYDROCORTISONE SODIUM SUCCINATE 50 MG: 100 INJECTION, POWDER, FOR SOLUTION INTRAMUSCULAR; INTRAVENOUS at 09:30

## 2018-09-11 RX ADMIN — FENTANYL CITRATE 25 MCG: 50 INJECTION INTRAMUSCULAR; INTRAVENOUS at 08:36

## 2018-09-11 RX ADMIN — ACETAMINOPHEN 650 MG: 325 TABLET, FILM COATED ORAL at 22:18

## 2018-09-11 RX ADMIN — BUPIVACAINE 20 ML: 13.3 INJECTION, SUSPENSION, LIPOSOMAL INFILTRATION at 08:40

## 2018-09-11 RX ADMIN — FENTANYL CITRATE 25 MCG: 50 INJECTION, SOLUTION INTRAMUSCULAR; INTRAVENOUS at 11:34

## 2018-09-11 RX ADMIN — CIPROFLOXACIN 400 MG: 2 INJECTION INTRAVENOUS at 09:05

## 2018-09-11 RX ADMIN — ROCURONIUM BROMIDE 10 MG: 10 INJECTION INTRAVENOUS at 10:24

## 2018-09-11 RX ADMIN — ROCURONIUM BROMIDE 20 MG: 10 INJECTION INTRAVENOUS at 11:51

## 2018-09-11 RX ADMIN — ONDANSETRON 4 MG: 2 INJECTION INTRAMUSCULAR; INTRAVENOUS at 12:36

## 2018-09-11 RX ADMIN — SODIUM CHLORIDE, POTASSIUM CHLORIDE, SODIUM LACTATE AND CALCIUM CHLORIDE: 600; 310; 30; 20 INJECTION, SOLUTION INTRAVENOUS at 08:20

## 2018-09-11 RX ADMIN — MIDAZOLAM HYDROCHLORIDE 0.5 MG: 1 INJECTION, SOLUTION INTRAMUSCULAR; INTRAVENOUS at 08:36

## 2018-09-11 RX ADMIN — LIDOCAINE HYDROCHLORIDE 40 MG: 20 INJECTION, SOLUTION INFILTRATION; PERINEURAL at 09:12

## 2018-09-11 RX ADMIN — METRONIDAZOLE 500 MG: 500 INJECTION, SOLUTION INTRAVENOUS at 09:05

## 2018-09-11 RX ADMIN — PROPOFOL 180 MG: 10 INJECTION, EMULSION INTRAVENOUS at 09:12

## 2018-09-11 RX ADMIN — BUPIVACAINE HYDROCHLORIDE 10 ML: 2.5 INJECTION, SOLUTION EPIDURAL; INFILTRATION; INTRACAUDAL at 08:40

## 2018-09-11 RX ADMIN — SUGAMMADEX 200 MG: 100 INJECTION, SOLUTION INTRAVENOUS at 12:56

## 2018-09-11 ASSESSMENT — LIFESTYLE VARIABLES: TOBACCO_USE: 0

## 2018-09-11 NOTE — OR NURSING
Contacted pharmacy narcotics that there was a mistake with what I typed in the [email or web address] machine of the amount of versed that was wasted.  Patient was given 0.5 mg and mercedes cozaat and I wasted the rest of the amount of 1.5 mg.  Mistakenly we typed in the amount of 1.75 mg.  Pharmacy narcotics was notified and note was placed in the MAR of her versed.

## 2018-09-11 NOTE — DISCHARGE SUMMARY
[facility]  Gynecology Discharge Summary    Admission Date:  2018  Discharge Date:  2018    Admission Attending: Tiffany Lew MD  Discharge Attending: Jimena Hart MD    Admission Diagnosis:  - Bilateral ovarian cysts with interval growth  - Recurrent endometrial polyps  - Panhypopituitarism  - chronic HTN  - hypothyroidism  - REGINA with Cr elevated to 1.2  - hyperlipidemia    Discharge Diagnosis:  - Same, now s/p below stated procedure    Procedures Performed:  - Robot-assisted total laparoscopic hysterectomy with bilateral salpingo-oophorectomy and cystoscopy    Admission History:  Maria Esther Lowe is a 63 year old  with a Pmxh of ovarian cysts, benign endometrial polyp, panhypopituitarism, hypothyroidism, chronic hypertension, and osteopenia who presented to clinic as a referral from Dr. Munoz for bilateral ovarian cysts.  She was noted to have an increase in size of those cysts with recent imaging on 18 revealing right ovarian cyst measuring 5.1cmx4.5cmx4.2cm and left ovary with 3 cysts measuring 1.7cx1.7cm, 1.7cm, 1.6cm and 1.4cm and 1.5cm, totaling a size of 5.1cmx4.5cm.  Uterus was normal size with 4.2mm polypoid structure, likely polyp at uterine fundus.  Her CA-125 was normal across multiple checks throughout the past year.  Given persistent ovarian cysts with now recent growth and recurrence of likely endometrial polyp despite recent resection, she was recommended to undergo surgical management with the procedure above.  Alternatives, risks and benefits of the surgery were reviewed with the patient. All questions were answered and the patient agreed to proceed. Written consent was signed.     Operative Course:  She underwent RA-TLH, BSO and cystoscopy, which was uncomplicated. EBL was 50 cc.    Operative Findings:   EUA revealed 6-7 week sized anteverted mobile uterus, bilateral adnexal fullness palpated. Per laparoscopy, normal appearing uterus and bilateral  fallopian tubes noted with bilaterally enlarged ovaries.  Left ovary 4x5cm and complex-cystic appearing, right ovary 5x7cm and complex-cystic appearing. No adhesive disease noted.  Laparoscopic abdominal survey revealed normal appearing omentum, liver, diaphragm and bowel.  No evidence of metastatic disease throughout the abdomen and pelvis.  Bilateral ureteral efflux seen on cystoscopy with no evidence of bladder injury or disease.  Hemostatic surgical sites at end of case.     Hospital Course:  Her postoperative course was uncomplicated. She was initially maintained on IV fluids with IV pain medications and shields catheter in place. She received 50mg IV hydrocortisone intraoperatively as well as 25mg IV hydrocortisone for two doses postoperatively for stress dose steroids. Her hypertension medications were held and restarted on POD#1. On POD#1, diet was advanced, shields catheter was removed and she passed a trial of void, and she was transitioned to PO pain medications. Her home dose of steroids was also restarted at this time. On POD#1 she was tolerating regular diet, ambulating without difficulty, voiding spontaneously, and controlling pain with PO medications, and she was deemed stable for discharge. Hemoglobin at the time of discharge was 11.8.    Discharge Plans:  - The patient was discharged to home  - PO Activity:   - No lifting >15 lbs or strenuous exercise for 6 weeks   - No driving while taking narcotics or until you can slam on the breaks without pain  - She was instructed to call [facility] or return to ED if she has any of the following:    - Temperature greater than 100.4F   - Pain not controlled by pain medications   - Uncontrolled nausea/vomiting   - Foul-smelling vaginal discharge   - Vaginal bleeding soaking 1 pad per hour for 2 hours in a row  - She will follow up with Dr. Lew on 10/25/18 in clinic    - Discharge medications include:     Review of your medicines      START taking       Dose /  Directions    acetaminophen 325 MG tablet   Commonly known as:  TYLENOL   Used for:  S/P laparoscopic hysterectomy        Dose:  650 mg   Take 2 tablets (650 mg) by mouth every 6 hours as needed for mild pain, fever or headaches   Quantity:  40 tablet   Refills:  0       oxyCODONE IR 5 MG tablet   Commonly known as:  ROXICODONE   Used for:  S/P laparoscopic hysterectomy        Dose:  5-10 mg   Take 1-2 tablets (5-10 mg) by mouth every 3 hours as needed for other (Moderate to Severe Pain)   Quantity:  15 tablet   Refills:  0       senna-docusate 8.6-50 MG per tablet   Commonly known as:  SENOKOT-S;PERICOLACE   Used for:  S/P laparoscopic hysterectomy        Dose:  1-2 tablet   Take 1-2 tablets by mouth 2 times daily   Quantity:  30 tablet   Refills:  0         CONTINUE these medicines which have NOT CHANGED       Dose / Directions    calcium 500 +D 500-400 MG-UNIT Tabs   Generic drug:  Calcium Carb-Cholecalciferol        Dose:  1 tablet   Take 1 tablet by mouth 2 times daily.   Quantity:  180 tablet   Refills:  3       cholecalciferol 5000 units Caps   Used for:  Hyperparathyroidism (H)        Take 1 daily   Quantity:  90 capsule   Refills:  3       conjugated estrogens cream   Commonly known as:  PREMARIN   Used for:  Vaginal dryness        Dose:  0.5 g   Place 0.5 g vaginally twice a week   Quantity:  30 g   Refills:  12       cyanocobalamin 1000 MCG tablet   Commonly known as:  vitamin  B-12        Take by mouth every morning   Refills:  0       estradiol 1 MG tablet   Commonly known as:  ESTRACE   Used for:  Panhypopituitarism (H), Loss of hair        Dose:  1 mg   Take 1 tablet (1 mg) by mouth daily   Quantity:  90 tablet   Refills:  4       estradiol 10 MCG Tabs vaginal tablet   Commonly known as:  VAGIFEM   Used for:  Atrophic vaginitis        Dose:  10 mcg   Place 1 tablet (10 mcg) vaginally twice a week   Quantity:  24 tablet   Refills:  3       hydrocortisone 10 MG tablet   Commonly known as:  CORTEF   Used  for:  Panhypopituitarism (H)        TAKE 1 TABLET IN THE MORNING AND ONE-HALF (1/2) TABLET MIDDAY   Quantity:  135 tablet   Refills:  2       levothyroxine 200 MCG tablet   Commonly known as:  SYNTHROID/LEVOTHROID   Used for:  Panhypopituitarism (H)        Dose:  200 mcg   Take 1 tablet (200 mcg) by mouth daily   Quantity:  90 tablet   Refills:  3       losartan-hydrochlorothiazide 100-25 MG per tablet   Commonly known as:  HYZAAR   Used for:  Panhypopituitarism (H)        Dose:  1 tablet   Take 1 tablet by mouth daily   Quantity:  90 tablet   Refills:  3       progesterone 100 MG capsule   Commonly known as:  PROMETRIUM   Used for:  Hormone replacement therapy        TAKE 1 CAPSULE DAILY   Quantity:  90 capsule   Refills:  3       simvastatin 40 MG tablet   Commonly known as:  ZOCOR   Used for:  Hyperlipidemia LDL goal <100        Dose:  40 mg   Take 1 tablet (40 mg) by mouth daily   Quantity:  90 tablet   Refills:  3       zinc 50 MG Tabs        Take  by mouth.   Refills:  0            Where to get your medicines      These medications were sent to [facility] [address]  [address] [address]     Phone:  [phone]      acetaminophen 325 MG tablet         Some of these will need a paper prescription and others can be bought over the counter. Ask your nurse if you have questions.     Bring a paper prescription for each of these medications      oxyCODONE IR 5 MG tablet     senna-docusate 8.6-50 MG per tablet             Estela Ma MD  Ob/Gyn Resident, PGY-1  09/12/18 3:17 PM     Staff MD Note    I evaluated the patient on the day of discharge.  We reviewed discharge instructions.  Patient stable for discharge.    Jimena Hart MD

## 2018-09-11 NOTE — ANESTHESIA PROCEDURE NOTES
Peripheral Nerve Block Procedure Note    Staff:     Anesthesiologist:  JOYCE SAMUELS  Location: Pre-op  Procedure Start/Stop TImes:      9/11/2018 8:40 AM     9/11/2018 8:47 AM    patient identified, IV checked, site marked, risks and benefits discussed, informed consent, monitors and equipment checked, pre-op evaluation, at physician/surgeon's request and post-op pain management      Correct Patient: Yes      Correct Position: Yes      Correct Site: Yes      Correct Procedure: Yes      Correct Laterality:  Yes    Site Marked:  Yes  Procedure details:     Procedure:  TAP    ASA:  3    Laterality:  Bilateral    Position:  Supine    Sterile Prep: chloraprep, mask and sterile gloves      Needle:  Insulated    Needle gauge:  20    Needle length (inches):  4    Ultrasound: Yes      Ultrasound used to identify targeted nerve, plexus, or vascular structure and placed a needle adjacent to it      Permanent Image entered into patiient's record      Abnormal pain on injection: No      Blood Aspirated: No      Paresthesias:  No    Bleeding at site: No      Complications:  None

## 2018-09-11 NOTE — IP AVS SNAPSHOT
[facility] [address]    [address]    [place] [address]    Phone:  [phone]                                       After Visit Summary   9/11/2018    Maria Esther Lowe    MRN: [id number]           After Visit Summary Signature Page     I have received my discharge instructions, and my questions have been answered. I have discussed any challenges I see with this plan with the nurse or doctor.    ..........................................................................................................................................  Patient/Patient Representative Signature      ..........................................................................................................................................  Patient Representative Print Name and Relationship to Patient    ..................................................               ................................................  Date                                   Time    ..........................................................................................................................................  Reviewed by Signature/Title    ...................................................              ..............................................  Date                                               Time          22EPIC Rev 08/18

## 2018-09-11 NOTE — IP AVS SNAPSHOT
MRN:[id number]                      After Visit Summary   9/11/2018    Maria Esther Lowe    MRN: [id number]           Thank you!     Thank you for choosing [facility] for your care. Our goal is always to provide you with excellent care. Hearing back from our patients is one way we can continue to improve our services. Please take a few minutes to complete the written survey that you may receive in the mail after you visit with us. Thank you!        Patient Information     Date Of Birth          1955        About your hospital stay     You were admitted on:  September 11, 2018 You last received care in the:  [facility]    You were discharged on:  September 12, 2018        Reason for your hospital stay       Da-Chantelle hysterectomy and removal of uterus, cervix and bilateral fallopian tubes                  Who to Call     For medical emergencies, please call 911.  For non-urgent questions about your medical care, please call your primary care provider or clinic, [phone]  For questions related to your surgery, please call your surgery clinic        Attending Provider     Provider Tiffany Avilez MD OB/Gyn       Primary Care Provider Office Phone # Fax #    Estela Boswell [phone]       When to contact your care team       GENERAL POST-OPERATIVE  PATIENT INSTRUCTIONS    FOLLOW-UP:    Call Surgeon if you have:  Temperature greater than 100.4  Persistent nausea and vomiting  Severe uncontrolled pain  Redness, tenderness, or signs of infection (pain, swelling, redness, odor or green/yellow discharge around the site)  Difficulty breathing, headache or visual disturbances  Hives  Persistent dizziness or light-headedness  Extreme fatigue  Any other questions or concerns you may have after discharge    In an emergency, call 911 or go to an Emergency Department at a nearby hospital       WOUND CARE INSTRUCTIONS:  Keep a dry clean dressing on the wound if there  is drainage. The initial bandage may be removed after 24 hours.  Once the wound has quit draining you may leave it open to air.  If clothing rubs against the wound or causes irritation and the wound is not draining you may cover it with a dry dressing during the daytime.  Try to keep the wound dry and avoid ointments on the wound unless directed to do so.  If the wound becomes bright red and painful or starts to drain infected material that is not clear, please contact your physician immediately.    1.  You may shower 48 hrs after surgery   2.  No soaking in the tub        DIET:  There are no dietary restrictions.  You may eat any foods that you can tolerate.  It is a good idea to eat a high fiber diet and take in plenty of fluids to prevent constipation.  If you do become constipated you may want to take a mild laxative or take ducolax tablets on a daily basis until your bowel habits are regular.  Constipation can be very uncomfortable, along with straining, after recent surgery.    ACTIVITY:  You are encouraged to cough and deep breath or use your incentive spirometer if you were given one, every 15-30 minutes when awake.  This will help prevent respiratory complications and low grade fevers post-operatively if you had a general anesthetic.  You may want to hug a pillow when coughing and sneezing to add additional support to the surgical area, if you had abdominal or chest surgery, which will decrease pain during these times.       1.  No heavy lifting >20lbs or strenuous exercise for six-eight weeks.  No exercise in which you are using core muscles (yoga, pilates, swimming, weight lifting)   2.  You may walk as much as you wish.  You are encouraged to increase your   activity each day after surgery.  Stairs are okay.    3.  Nothing per vagina for eight weeks.  No tampons, no intercourse, no douching.  You can expect some light vaginal spotting and discharge for up to six weeks.  If bleeding becomes heavy, please  contact the office.     MEDICATIONS:  Try to take narcotic medications and anti-inflammatory medications, such as tylenol, ibuprofen, naprosyn, etc., with food.  This will minimize stomach upset from the medication.  Should you develop nausea and vomiting from the pain medication, or develop a rash, please discontinue the medication and contact your physician.  You should not drive, make important decisions, or operate machinery when taking narcotic pain medication.    OTHER:  Patients are often constipated after general anesthesia and surgery.  The patient should continue to take stool softeners (for example, Senokot-S) for the next six weeks and consume adequate amounts of water.  If the patient remains constipated or unable to pass stool, please try one or all of the following measures:  1.  Milk of Magnesia 30cc twice a day as needed by mouth  2.  Metamucil 2 tablespoons in 12 ounces of fluid  3.  Dulcolax oral or suppositories  4.  Prunes or prune juice  5.  Miralax daily      QUESTIONS:  Please feel free to call your physician or the hospital  if you have any questions, and they will be glad to assist you.                  After Care Instructions     Activity       Your activity upon discharge: activity as tolerated            Diet       Follow this diet upon discharge: None            Discharge Instructions - diet       Resume pre procedure diet.            Dressing       Keep dressing clean and dry.  Dressing / incision care as instructed by Provider and/or Nurse.            Ice to affected area       Ice to operative site, as needed.            NO ALCOHOL        For 24 hours post procedure.            NO driving or operating machinery        until the day after the procedure.            NO lifting       NO lifting over 15 lbs and no strenuous physical activity for 4-6 weeks.                  Follow-up Appointments     [facility]ult [facility] Follow-up and recommended labs and tests       Follow-up with   "V[facility]in in  [facility] on 10/25/18    Appointments on [place] and/or [facility] (with [facility] or [facility] provider or service). Call [phone] if you haven't heard regarding these appointments within 7 days of discharge.                  Your next 10 appointments already scheduled     Oct 25, 2018  7:45 AM CDT   Return Visit with Tiffany Lew MD   [facility] ([facility] [[email or web address] Clinics)    [place] [facility] [address]  [address]  [facility] [id number] [phone]            Oct 29, 2018  9:30 AM CDT   (Arrive by 9:15 AM)   NEW PLASTICS with Rohit Zavala MD   [facility] ([facility] [place])    [address]  4th [address]  [facility] [address] [phone]              Additional Information     If you use hormonal birth control (such as the pill, patch, ring or implants): You'll need a second form of birth control for 7 days (condoms, a diaphragm or contraceptive foam). While in the hospital, you received a medicine called Bridion. Your normal birth control will not work as well for a week after taking this medicine.          Pending Results     Date and Time Order Name Status Description    9/11/2018 1146 Surgical pathology exam In process     9/11/2018 1023 Cytology non gyn In process             Admission Information     Date & Time Provider Department Dept. Phone    9/11/2018 Tiffany Lew MD [facility] [phone]      Your Vitals Were     Blood Pressure Temperature Respirations Height Weight Pulse Oximetry    130/74 (BP Location: Right arm) 97.6  F (36.4  C) (Oral) 16 1.651 m (5' 5\") 81.4 kg (179 lb 7.3 oz) 95%    BMI (Body Mass Index)                   29.86 kg/m2           MyChart Information     [employer] gives you secure access to your electronic health record. If you see a primary care provider, you can also send messages to your care team and make appointments. If you have " questions, please call your primary care clinic.  If you do not have a primary care provider, please call [phone] and they will assist you.        Care EveryWhere ID     This is your Care EveryWhere ID. This could be used by other organizations to access your [facility] medical records  [id number]        Equal Access to Services     MARIA DE JESUS [place] : Hadii aad ku hadjgmarry Soelaineali, waaxda luqadaha, qaybta kaalmada melinda, larissa irizarry. So [facility] [phone].    ATENCIÓN: Si habla español, tiene a adne disposición servicios gratuitos de asistencia lingüística. Elizabeth al [phone].    We comply with applicable federal civil rights laws and [place] laws. We do not discriminate on the basis of race, color, national origin, age, disability, sex, sexual orientation, or gender identity.               Review of your medicines      START taking        Dose / Directions    acetaminophen 325 MG tablet   Commonly known as:  TYLENOL   Used for:  S/P laparoscopic hysterectomy        Dose:  650 mg   Take 2 tablets (650 mg) by mouth every 6 hours as needed for mild pain, fever or headaches   Quantity:  40 tablet   Refills:  0       oxyCODONE IR 5 MG tablet   Commonly known as:  ROXICODONE   Used for:  S/P laparoscopic hysterectomy        Dose:  5-10 mg   Take 1-2 tablets (5-10 mg) by mouth every 3 hours as needed for other (Moderate to Severe Pain)   Quantity:  15 tablet   Refills:  0       senna-docusate 8.6-50 MG per tablet   Commonly known as:  SENOKOT-S;PERICOLACE   Used for:  S/P laparoscopic hysterectomy        Dose:  1-2 tablet   Take 1-2 tablets by mouth 2 times daily   Quantity:  30 tablet   Refills:  0         CONTINUE these medicines which have NOT CHANGED        Dose / Directions    calcium 500 +D 500-400 MG-UNIT Tabs   Generic drug:  Calcium Carb-Cholecalciferol        Dose:  1 tablet   Take 1 tablet by mouth 2 times daily.   Quantity:  180 tablet   Refills:  3       cholecalciferol  5000 units Caps   Used for:  Hyperparathyroidism (H)        Take 1 daily   Quantity:  90 capsule   Refills:  3       conjugated estrogens cream   Commonly known as:  PREMARIN   Used for:  Vaginal dryness        Dose:  0.5 g   Place 0.5 g vaginally twice a week   Quantity:  30 g   Refills:  12       cyanocobalamin 1000 MCG tablet   Commonly known as:  vitamin  B-12        Take by mouth every morning   Refills:  0       estradiol 1 MG tablet   Commonly known as:  ESTRACE   Used for:  Panhypopituitarism (H), Loss of hair        Dose:  1 mg   Take 1 tablet (1 mg) by mouth daily   Quantity:  90 tablet   Refills:  4       estradiol 10 MCG Tabs vaginal tablet   Commonly known as:  VAGIFEM   Used for:  Atrophic vaginitis        Dose:  10 mcg   Place 1 tablet (10 mcg) vaginally twice a week   Quantity:  24 tablet   Refills:  3       hydrocortisone 10 MG tablet   Commonly known as:  CORTEF   Used for:  Panhypopituitarism (H)        TAKE 1 TABLET IN THE MORNING AND ONE-HALF (1/2) TABLET MIDDAY   Quantity:  135 tablet   Refills:  2       levothyroxine 200 MCG tablet   Commonly known as:  SYNTHROID/LEVOTHROID   Used for:  Panhypopituitarism (H)        Dose:  200 mcg   Take 1 tablet (200 mcg) by mouth daily   Quantity:  90 tablet   Refills:  3       losartan-hydrochlorothiazide 100-25 MG per tablet   Commonly known as:  HYZAAR   Used for:  Panhypopituitarism (H)        Dose:  1 tablet   Take 1 tablet by mouth daily   Quantity:  90 tablet   Refills:  3       progesterone 100 MG capsule   Commonly known as:  PROMETRIUM   Used for:  Hormone replacement therapy        TAKE 1 CAPSULE DAILY   Quantity:  90 capsule   Refills:  3       simvastatin 40 MG tablet   Commonly known as:  ZOCOR   Used for:  Hyperlipidemia LDL goal <100        Dose:  40 mg   Take 1 tablet (40 mg) by mouth daily   Quantity:  90 tablet   Refills:  3       zinc 50 MG Tabs        Take  by mouth.   Refills:  0            Where to get your medicines      These  "medications were sent to [facility] [place], [place] - [address]  [address], [facility] [address]     Phone:  [phone]     acetaminophen 325 MG tablet         Some of these will need a paper prescription and others can be bought over the counter. Ask your nurse if you have questions.     Bring a paper prescription for each of these medications     oxyCODONE IR 5 MG tablet    senna-docusate 8.6-50 MG per tablet                Protect others around you: Learn how to safely use, store and throw away your medicines at www.disposemymeds.org.        Information about your nerve block     Today you received a block to numb the nerves near your surgery site.    This is a block using local anesthetic or \"numbing\" medication injected around the nerves to anesthetize or \"numb\" the area supplied by those nerves. This block is injected into the muscle layer near your surgical site. The type of anesthesia (Exparel) your anesthesia team used to numb your abdomen may give you relief for up to 72 hours.     Diet: There are no diet restrictions, but you should drink plenty of fluids, unless you are on a fluid-restricted diet.     Activity: If your surgical site is an arm or leg you should be careful with your affected limb, since it is possible to injure your limb without being aware of it due to the numbing. Until full feeling returns, you should guard against bumping or hitting your limb, and avoid extreme hot or cold temperatures on the skin.    Pain Medication: As the block wears off, the feeling will return as a tingling or prickly sensation near your surgical site. You will experience more discomfort from your incisions as the feeling returns. You may want to take a pain pill (a narcotic or Tylenol if this was prescribed by your surgeon) when you start to experience mild pain, before the pain becomes more severe. If your pain medications do not control your pain, you should notify your " surgeon. If you are taking narcotics for pain management, do not drink alcohol, drive a car, or perform hazardous activities.  If you have questions or concerns you may call your surgeon at the number provided with your discharge instructions.     Call your surgeon if you experience blurry vision, ringing in the ears or metallic taste in your mouth.         Information about OPIOIDS     PRESCRIPTION OPIOIDS: WHAT YOU NEED TO KNOW   We gave you an opioid (narcotic) pain medicine. It is important to manage your pain, but opioids are not always the best choice. You should first try all the other options your care team gave you. Take this medicine for as short a time (and as few doses) as possible.    Some activities can increase your pain, such as bandage changes or therapy sessions. It may help to take your pain medicine 30 to 60 minutes before these activities. Reduce your stress by getting enough sleep, working on hobbies you enjoy and practicing relaxation or meditation. Talk to your care team about ways to manage your pain beyond prescription opioids.    These medicines have risks:    DO NOT drive when on new or higher doses of pain medicine. These medicines can affect your alertness and reaction times, and you could be arrested for driving under the influence (DUI). If you need to use opioids long-term, talk to your care team about driving.    DO NOT operate heavy machinery    DO NOT do any other dangerous activities while taking these medicines.    DO NOT drink any alcohol while taking these medicines.     If the opioid prescribed includes acetaminophen, DO NOT take with any other medicines that contain acetaminophen. Read all labels carefully. Look for the word  acetaminophen  or  Tylenol.  Ask your pharmacist if you have questions or are unsure.    You can get addicted to pain medicines, especially if you have a history of addiction (chemical, alcohol or substance dependence). Talk to your care team about ways  to reduce this risk.    All opioids tend to cause constipation. Drink plenty of water and eat foods that have a lot of fiber, such as fruits, vegetables, prune juice, apple juice and high-fiber cereal. Take a laxative (Miralax, milk of magnesia, Colace, Senna) if you don t move your bowels at least every other day. Other side effects include upset stomach, sleepiness, dizziness, throwing up, tolerance (needing more of the medicine to have the same effect), physical dependence and slowed breathing.    Store your pills in a secure place, locked if possible. We will not replace any lost or stolen medicine. If you don t finish your medicine, please throw away (dispose) as directed by your pharmacist. The [place] Pollution Control Agency has more information about safe disposal: https://www.pca.[facility].[place].us/living-green/managing-unwanted-medications             Medication List: This is a list of all your medications and when to take them. Check marks below indicate your daily home schedule. Keep this list as a reference.      Medications           Morning Afternoon Evening Bedtime As Needed    acetaminophen 325 MG tablet   Commonly known as:  TYLENOL   Take 2 tablets (650 mg) by mouth every 6 hours as needed for mild pain, fever or headaches   Last time this was given:  650 mg on 9/12/2018  5:22 AM                                calcium 500 +D 500-400 MG-UNIT Tabs   Take 1 tablet by mouth 2 times daily.   Generic drug:  Calcium Carb-Cholecalciferol                                cholecalciferol 5000 units Caps   Take 1 daily                                conjugated estrogens cream   Commonly known as:  PREMARIN   Place 0.5 g vaginally twice a week                                cyanocobalamin 1000 MCG tablet   Commonly known as:  vitamin  B-12   Take by mouth every morning                                estradiol 1 MG tablet   Commonly known as:  ESTRACE   Take 1 tablet (1 mg) by mouth daily                                 estradiol 10 MCG Tabs vaginal tablet   Commonly known as:  VAGIFEM   Place 1 tablet (10 mcg) vaginally twice a week                                hydrocortisone 10 MG tablet   Commonly known as:  CORTEF   TAKE 1 TABLET IN THE MORNING AND ONE-HALF (1/2) TABLET MIDDAY   Last time this was given:  10 mg on 9/12/2018  8:09 AM                                levothyroxine 200 MCG tablet   Commonly known as:  SYNTHROID/LEVOTHROID   Take 1 tablet (200 mcg) by mouth daily   Last time this was given:  200 mcg on 9/12/2018  8:10 AM                                losartan-hydrochlorothiazide 100-25 MG per tablet   Commonly known as:  HYZAAR   Take 1 tablet by mouth daily   Last time this was given:  1 tablet on 9/12/2018  9:32 AM                                oxyCODONE IR 5 MG tablet   Commonly known as:  ROXICODONE   Take 1-2 tablets (5-10 mg) by mouth every 3 hours as needed for other (Moderate to Severe Pain)                                progesterone 100 MG capsule   Commonly known as:  PROMETRIUM   TAKE 1 CAPSULE DAILY                                senna-docusate 8.6-50 MG per tablet   Commonly known as:  SENOKOT-S;PERICOLACE   Take 1-2 tablets by mouth 2 times daily                                simvastatin 40 MG tablet   Commonly known as:  ZOCOR   Take 1 tablet (40 mg) by mouth daily                                zinc 50 MG Tabs   Take  by mouth.

## 2018-09-11 NOTE — OP NOTE
Operative Note    Maria Esther Lowe  [id number]     Date of Surgery: 2018     Surgeon: Dr. Lew     Assistants:   Diana Monique, PGY4  Denisha Skinner PGY3  Estela Florentino, MS3     Pre-operative Diagnoses:   (1) persistent bilateral ovarian cysts with interval growth on US  (2) history of endometrial polyps, with likely recurrence on US  (3) panhypopituitarism   (4) chronic HTN     Post-operative Diagnoses:   Same s/p procedure below     Procedure: Da-Chantelle assisted total laparoscopic hysterectomy with bilateral salingo-oophorectomy and cystoscopy     Anesthesia: GETA with TAPS block     EBL: 50cc  IVF: 800cc  UO: 50cc     Drains: shields catheter     Complications: none apparent     Findings: EUA revealed 6-7 week sized anteverted mobile uterus, bilateral adnexal fullness palpated. Per laparoscopy, normal appearing uterus and bilateral fallopian tubes noted with bilaterally enlarged ovaries.  Left ovary 4x5cm and complex-cystic appearing, right ovary 5x7cm and complex-cystic appearing. No adhesive disease noted.  Laparoscopic abdominal survey revealed normal appearing omentum, liver, diaphragm and bowel.  No evidence of metastatic disease throughout the abdomen and pelvis.  Bilateral ureteral efflux seen on cystoscopy with no evidence of bladder injury or disease.  Hemostatic surgical sites at end of case.      Specimens:   - right and left ovaries and fallopian tubes  - uterus and cervix  - pelvic washings    Indications: Maria Esther Lowe is a 63 year old  with a Pmxh of ovarian cysts, benign endometrial polyp, panhypopituitarism, hypothyroidism, chronic hypertension, and osteopenia who presented to clinic as a referral from Dr. Munoz for bilateral ovarian cysts.  She was noted to have an increase in size of those cysts with recent imaging on 18 revealing right ovarian cyst measuring 5.1cmx4.5cmx4.2cm and left ovary with 3 cysts measuring 1.7cx1.7cm, 1.7cm, 1.6cm and 1.4cm and 1.5cm, totaling a size  of 5.1cmx4.5cm.  Uterus was normal size with 4.2mm polypoid structure, likely polyp at uterine fundus.  Her CA-125 was normal across multiple checks throughout the past year.  Given persistent ovarian cysts with now recent growth and recurrence of likely endometrial polyp despite recent resection, she was recommended to undergo surgical management with the procedure above.  Alternatives, risks and benefits of the surgery were reviewed with the patient. All questions were answered and the patient agreed to proceed. Written consent was signed.     Procedure:  The patient was taken to the operating room with IV fluids running.  Prior to the OR, she had received transversus abdominus plane block without incident in the pre-op area.  In the OR, she underwent stress dose IV steroids with 50mg of IV hydrocortisone around 5 minutes prior to intubation.  She then received endotracheal general anesthesia and ciprofloxacin and flagyl for infection prophylaxis.  The patient was placed in dorsal lithotomy position and the findings were noted above on exam. Bilateral arms were padded and tucked on the side.  She was prepped and draped in the sterile fashion.  A safety time-out was performed.  Attention was then turned to the perineum where a shields catheter was placed in the bladder and an Advincula self-retaining uterine manipulator was placed.    Attention was then turned to the upper abdomen. A 5mm stab incision was made in the supraumbilical fold. The Veress needle was inserted into the abdomen with tenting of the abdominal wall.  A saline drop test confirmed abdominal entry and the CO2 gas was connected with opening pressure less than 5mm Hg. Pneumoperitoneum was established. The 10mm Da-Chantelle port site was replaced in this port.  The 10mm laparoscope was assembled and inserted into the abdominal cavity with survey of the abdomen and pelvis performed and the above noted findings. Two additional sites for the Da Chantelle  laparoscopic ports were demarcated and one 8mm Da-Chantelle port was placed bilaterally in the left and right lower quadrants under direct visualization.  A 5mm assistance port site was placed in the left upper quadrant directly lateral to the camera port. At this point, the patient was placed into steep Trendelenburg position. Pelvic washings were obtained.  Then the Da-Chantelle robot was docked onto the ports and instruments were inserted. The bowels were packed up into the upper abdomen with gentle traction.   The procedure was initiated by starting with bilateral salpingo-oophorectomy given bulk of ovaries and cysts and difficulty with visualization of the lateral uterus. The left round ligament was tented away from the pelvic side wall and transected with bipolar electrocautery and monopolar scissors. The retroperitoneal space parallel to the infundibulopelvic ligament was opened to the level of the pelvic brim. The ureter was identified retroperitoneal and a window was created between the ureter and the IP ligament through the medial surface of the peritoneum. The IP ligament was then grasped and cauterized in three locations with bipolar cautery just directly below the ovary, followed by transection with monopolar scissors. The posterior peritoneum was then dissected toward the uterus. The left uteroovarian ligament and tube were cauterized and freed from the uterus.  The uterus was repositioned to view the anterior broad leaf, which was then taken down with monopolar scissors to the lower uterine segment.  The left ovary and fallopian tube was then placed in a 5mm endocatch bag through the Da-Chantelle port under direct visualization.  The strings were clamped extracorporally, and the ovary in the endocatch bag was placed in the left upper abdomen.   At this point, attention was turned to the right side. In a similar manner, the right round ligament was tented away from the pelvic side wall and transected with  monopolar cautery. The retroperitoneal space parallel to the infundibulopelvic ligament could not be opened initially given bulk effect from the size of the right ovary.  The right ureter was then identified.  With twisting of the ovary, the right IP ligament could be visualized and was then grasped, cauterized, and transected with monopolar scissors.  The right uterine-ovarian ligament and fallopian tube was then transected with bipolar followed by monopolar scissors. The posterior peritoneum was then taken down and the right ovary and tube were freed and placed in the right upper abdomen. The posterior peritoneum was then dissected toward the uterus and down to the level of the uterine vessels on the right side.   The uterus was repositioned to view the right anterior broad leaf, which was then taken down with monopolar scissors to the lower uterine segment and connected to the left side. The vesicouterine peritoneal surface was then tented, and entered with monopolar scissors and the bladder flap was developed below the cervical ring. The uterine vessels on the left were then further skeletonized and then cauterized with bipolar cautery. The vascular pedicle was then transected in serial steps with bipolar and monopolar scissors, allowing the vascular pedicle and ureter to fall laterally. The same was performed on the right side. The bladder flap was further dissected with monopolar scissors, further exposing the cervical ring and developing the bladder flap well below the ring.   At this point, the colpotomy was created using monopolar scissors on cut, starting anteriorly until the blue cup was identified. In a counter-clockwise, circumferential manner around the blue cup the colpotomy was completed with monopolar scissors. The uterus was delivered through the vagina without difficulty. The specimen was sent off to pathology.  A 15mm endocatch bag was deployed intraabdominally from the vagina and the right ovary  and fallopian tube were placed in a endocatch bag under direct visualization and removed from the vagina.  To fit through the vagina clear fluid was drained from the cyst.  There was no spillage of cyst fluid into the abdomen or vagina.  The ovary and tube were then removed in the bag through the vagina.  The left ovary and fallopian tube in the 5mm endocatch bag was removed with ease through the vagina and sent to pathology.   The vaginal cuff was well-developed and the bladder was retracted away.  The vaginal cuff was closed with a running 0 V-sb suture, starting at the right apex and then running to the left apex. For hemostasis and integrity, the V-sb suture was continued then from left to right in a running fashion to cover another 2/3 of the cuff and then cut short. The needle and remainder of suture was removed through the 8mm Da-Chantelle port site. The vaginal cuff was irritated and minor points of oozing were controlled with bipolar cautery.  Hemostasis was noted.   Next the cystoscope was assembled and the shields catheter was removed. A 30-degree scope was inserted into the bladder and the bladder was backfilled with normal saline.  The right and left ureteral orifices were easily identified and the findings were noted above. The robotic instruments were removed, the robot was undocked, pneumoperitoneum desufflated, and the patient was taken out of Trendelenburg position.  The DaVinci ports were removed. All skin incisions were closed with 4-0 monocryl and dermabond.   The patient tolerated the procedure well.  The patient was awaken from anesthesia without incident.  Lap sponge, instrument, and needle counts were correct times two at completion of the case.     Dr. Lew was present and scrubbed for the entire procedure.     Diana Monique MD MPH  OB/GYN, PGY4  Pager: [phone]  9/11/2018  12:52 PM    I was present and scrubbed throughout the procedure,  I agree with the note above  Tiffany Lew,  MD

## 2018-09-11 NOTE — ANESTHESIA PREPROCEDURE EVALUATION
Anesthesia Evaluation     . Pt has had prior anesthetic. Type: General    No history of anesthetic complications          ROS/MED HX    ENT/Pulmonary:     (+)JUANI risk factors hypertension, , . .   (-) tobacco use   Neurologic:  - neg neurologic ROS     Cardiovascular:     (+) hypertension----. : . . . :. .       METS/Exercise Tolerance:  >4 METS   Hematologic:  - neg hematologic  ROS       Musculoskeletal:  - neg musculoskeletal ROS       GI/Hepatic:  - neg GI/hepatic ROS       Renal/Genitourinary:  - ROS Renal section negative       Endo:     (+) thyroid problem .      Psychiatric:         Infectious Disease:         Malignancy:         Other:                     Physical Exam  Normal systems: pulmonary and dental    Airway   Mallampati: II  TM distance: >3 FB  Neck ROM: full    Dental     Cardiovascular   Rhythm and rate: regular and normal      Pulmonary    breath sounds clear to auscultation                    Anesthesia Plan      History & Physical Review  History and physical reviewed and following examination; no interval change.    ASA Status:  2 .    NPO Status:  > 8 hours    Plan for General with Intravenous induction. Maintenance will be Balanced.    PONV prophylaxis:  Ondansetron (or other 5HT-3) and Dexamethasone or Solumedrol  Pt took home hydrocortisone and synthroid  dose this am      Postoperative Care  Postoperative pain management:  Multi-modal analgesia.      Consents  Anesthetic plan, risks, benefits and alternatives discussed with:  Patient and Spouse.  Use of blood products discussed: Yes.   Use of blood products discussed with Patient and Spouse.  .                          .

## 2018-09-11 NOTE — ANESTHESIA POSTPROCEDURE EVALUATION
Patient: Maria Esther Lowe    Procedure(s):  Pelvic Washings, Davinci Assisted Laparoscopic Total Hysterectomy, Bilateral Salpingo Oophorectomy, cystoscopy - Wound Class: I-Clean    Diagnosis:Bilateral Ovarian Cyst, Fluid In The Endometrial Cavity   Diagnosis Additional Information: No value filed.    Anesthesia Type:  General    Note:  Anesthesia Post Evaluation    Patient location during evaluation: PACU  Patient participation: Able to fully participate in evaluation  Level of consciousness: awake and alert  Pain management: adequate  Airway patency: patent  Cardiovascular status: acceptable  Respiratory status: acceptable  Hydration status: acceptable  PONV: none             Last vitals:  Vitals:    09/11/18 0840 09/11/18 1315 09/11/18 1330   BP: 144/86 180/89 162/78   Resp: 12 11 27   Temp:  36.6  C (97.9  F)    SpO2: 99% 100% 99%         Electronically Signed By: Dalton Caro MD  September 11, 2018  1:58 PM

## 2018-09-11 NOTE — BRIEF OP NOTE
Gynecology Brief Op Note    Maria Esther Lowe  [id number]    Date of Surgery: 9/11/2018    Surgeon: Dr. Lew    Assistants:   Diana Monique, PGY4  Denisha Skinner PGY3  Estela Florentino MS3      Pre-operative Diagnoses:   (1) persistent bilateral ovarian cysts with interval growth  (2) history of endometrial polyps    Post-operative Diagnoses:   Same s/p procedure below    Procedure: DA-Total laparoscopic hysterectomy with bilateral salingo-oophorectomy and cystoscopy    Anesthesia: GETA with TAPS block    EBL: 50cc  IVF: 800cc  UO: 50cc    Drains: shields catheter    Complications: none apparent    Findings: EUA revealed 6-7 week sized anteverted mobile uterus, bilateral adnexal fullness palpated. Per laparoscopy, normal appearing uterus and bilateral fallopian tubes noted with bilaterally enlarged ovaries.  Left ovary 4x5cm and complex-cystic appearing, right ovary 5x7cm and complex-cystic appearing. No adhesive disease noted.  Laparoscopic abdominal survey revealed normal appearing omentum, liver, diaphragm and bowel.  No evidence of metastatic disease throughout the abdomen and pelvis.  Bilateral ureteral efflux seen on cystoscopy with no evidence of bladder injury or disease.  Hemostatic surgical sites at end of case.     Specimens:   - bilateral ovaries and fallopian tubes  - uterus and cervix  - pelvic washings    Diana Monique MD MPH  PGY4, OB/GYN  Pager: [phone]

## 2018-09-11 NOTE — ADDENDUM NOTE
Addendum  created 09/11/18 1448 by Rhianna Marie MD    Anesthesia Attestations filed, Anesthesia Intra Blocks edited, Anesthesia Intra Meds edited, Child order released for a procedure order, Order sets accessed, Sign clinical note

## 2018-09-11 NOTE — OR NURSING
PACU to Inpatient Nursing Handoff    Patient Maria Esther Lowe is a 63 year old female who speaks English.   Procedure Procedure(s):  Pelvic Washings, Davinci Assisted Laparoscopic Total Hysterectomy, Bilateral Salpingo Oophorectomy, cystoscopy - Wound Class: I-Clean   Surgeon(s) Primary: Tiffany Lew MD  Resident - Assisting: Denisha Skinner MD; Diana Monique MD     Allergies   Allergen Reactions     Ceftriaxone Other (See Comments)     Unsure of reaction.       Isolation  No active isolations     Past Medical History   has a past medical history of Hyperlipidemia LDL goal < 130; Hypertension; Hypothyroidism; Osteopenia; and Panhypopituitarism (H). She also has no past medical history of Basal cell carcinoma; Malignant melanoma (H); or Squamous cell carcinoma.    Anesthesia Combined General with Block   Dermatome Level     Preop Meds Not applicable   Nerve block Bilateral TAP block   Intraop Meds dexamethasone (Decadron)  fentanyl (Sublimaze): 150 mcg total  ondansetron (Zofran): last given at 1236   Local Meds No   Antibiotics ciprofloxacin (Cipro) - last given at 0905  metronidazol (Flagyl) - last given at 0905     Pain Patient Currently in Pain: yes  Comfort: tolerable with discomfort  Pain Control: partially effective   PACU meds  hydromorphone (Dilaudid): 0.5 mg (total dose) last given at 1402    PCA / epidural No   Capnography Respiratory Monitoring (EtCO2): 40 mmHg  Integrated Pulmonary Index (IPI): 10   Telemetry ECG Rhythm: Normal sinus rhythm   Inpatient Telemetry Monitor Ordered? No        Labs Glucose Lab Results   Component Value Date     09/11/2018       Hgb Lab Results   Component Value Date    HGB 12.5 09/11/2018       INR No results found for: INR   PACU Imaging Not applicable     Wound/Incision Incision/Surgical Site 09/11/18 Umbilicus (Active)   Incision Assessment WDL 9/11/2018  1:26 PM   Closure Liquid bandage 9/11/2018  1:26 PM   Incision Drainage Amount None  9/11/2018  1:15 PM   Dressing Intervention Open to air / No Dressing 9/11/2018  1:15 PM   Number of days:0       Incision/Surgical Site 09/11/18 Right Abdomen (Active)   Incision Assessment [facility] 9/11/2018  1:26 PM   Closure Liquid bandage 9/11/2018  1:26 PM   Incision Drainage Amount None 9/11/2018  1:15 PM   Dressing Intervention Open to air / No Dressing 9/11/2018  1:15 PM   Number of days:0       Incision/Surgical Site 09/11/18 Left Abdomen (Active)   Incision Assessment [facility] 9/11/2018  1:26 PM   Closure Liquid bandage 9/11/2018  1:26 PM   Incision Drainage Amount None 9/11/2018  1:15 PM   Dressing Intervention Open to air / No Dressing 9/11/2018  1:15 PM   Number of days:0       Incision/Surgical Site 09/11/18 Left;Outer Abdomen (Active)   Incision Assessment [facility] 9/11/2018  1:26 PM   Closure Liquid bandage 9/11/2018  1:26 PM   Incision Drainage Amount None 9/11/2018  1:15 PM   Dressing Intervention Open to air / No Dressing 9/11/2018  1:15 PM   Number of days:0      CMS Peripheral Neurovascular WDL: WDL (09/11/18 1315)  All Extremities Temperature: warm (09/11/18 1315)  All Extremities Color: no discoloration (09/11/18 1315)   Equipment Not applicable   Other LDA       IV Access Peripheral IV 09/11/18 Right Hand (Active)   Site Assessment [facility] 9/11/2018  1:15 PM   Line Status Infusing 9/11/2018  1:15 PM   Phlebitis Scale 0-->no symptoms 9/11/2018  1:15 PM   Infiltration Scale 0 9/11/2018  1:15 PM   Number of days:0       Peripheral IV 09/11/18 Left Lower forearm (Active)   Site Assessment [facility] 9/11/2018  1:15 PM   Line Status Saline locked 9/11/2018  1:15 PM   Phlebitis Scale 0-->no symptoms 9/11/2018  1:15 PM   Infiltration Scale 0 9/11/2018  1:15 PM   Number of days:0      Blood Products Not applicable EBL 50   mL   Intake/Output Date 09/11/18 0700 - 09/12/18 0659   Shift 5728-5079 3650-8848 5001-0630 24 Hour Total   I  N  T  A  K  E   I.V. 850   850    Shift Total  (mL/kg) 850  (10.44)   850  (10.44)    O  U  T  P  U  T   Urine 50   50    Blood 50   50    Shift Total  (mL/kg) 100  (1.23)   100  (1.23)   Weight (kg) 81.4 81.4 81.4 81.4        Drains / Farris Urethral Catheter Latex 16 fr (Active)   Tube Description UTV 9/11/2018  1:15 PM   Collection Container Standard 9/11/2018  1:15 PM   Securement Method Securing device (Describe) 9/11/2018  1:15 PM   Number of days:0      Time of void PreOp Void Prior to Procedure: 0748 (09/11/18 0748)    PostOp      Diapered? No   Bladder Scan     PO    tolerating sips and ice chips     Vitals    B/P: 140/85  T: 97.9  F (36.6  C)    Temp src: Axillary  P:       Heart Rate: 78 (09/11/18 1345)     R: 27  O2:  SpO2: 96 %    O2 Device: Nasal cannula (09/11/18 1400)    Oxygen Delivery: 2 LPM (09/11/18 1400)         Family/support present significant other   Patient belongings Patient Belongings: clothing  Disposition of Belongings: Locker   Patient transported on cart   DC meds/scripts (obs/outpt) Not applicable   Inpatient Pain Meds Released? Yes       Special needs/considerations None   Tasks needing completion None       Randy Santos, TIBURCIO  ASCOM [id number]

## 2018-09-11 NOTE — PROGRESS NOTES
Gynecology Progress Note     S: Pt feels well, napping comfortably. Feels mildly distended, no BM yet. Pain well controlled. No SOB, CP. No nausea, has only had water so far. Farris in place, not yet ambulating.    O:  Vitals:    09/11/18 1630 09/11/18 1700 09/11/18 1703 09/11/18 1730   BP: (!) 186/101 (!) 202/117 155/86 140/70   BP Location:       Resp: 13 14 15 13   Temp:       TempSrc:       SpO2: 93% 95% 94% 91%   Weight:       Height:            Gen: comfortable, no acute distress  CV: RRR, no murmurs  Lungs: CTAB, appropriate work of breathing  Abd: soft, appropriately tender, mildly distended.  Incisions with Dermabond in place.  : Farris in place draining light yellow urine.  Ext: warm and well perfused, SCDs in place, trace LE edema.    Hemoglobin   Date Value Ref Range Status   09/11/2018 12.5 11.7 - 15.7 g/dL Final   12/06/2016 13.4 11.7 - 15.7 g/dL Final       A/P:Maria Esther Lowe is a 63 year old female POD #0 s/p DA-TLH, BSO, cystoscopy.    Panhypopituitarism:  - s/p 50mg IV hydrocortisone intraop  - 25mg IV hydrocortisone scheduled x3 (2000, 0400, 1200)  - restart home hydrocortisone (10mg daily) on POD#1    Chronic HTN:  - Home losartan-hydrochlorothiazide held, restart prn    FEN: continue IVF until tolerating adequate PO intake, advance diet as tolerated  Pain: doing well on IV dilaudid, transition to PO pain meds when tolerated. No NSAIDs given elevated Cr  : Farris catheter still in place, remove in the morning and follow up void. Cr 1.25 this AM, repeat BMP tomorrow and hold NSAIDs  Heme: Hgb 12.5 > EBL 50 > AM Hgb pending    Dispo: Anticipate discharge to home POD#1 or when meeting postoperative goals    Estela Ma MD  Ob/Gyn Resident, PGY-1  09/11/18 6:28 PM

## 2018-09-12 VITALS
RESPIRATION RATE: 16 BRPM | BODY MASS INDEX: 29.9 KG/M2 | OXYGEN SATURATION: 95 % | TEMPERATURE: 97.6 F | SYSTOLIC BLOOD PRESSURE: 130 MMHG | DIASTOLIC BLOOD PRESSURE: 74 MMHG | HEIGHT: 65 IN | WEIGHT: 179.45 LBS

## 2018-09-12 LAB
ANION GAP SERPL CALCULATED.3IONS-SCNC: 6 MMOL/L (ref 3–14)
BUN SERPL-MCNC: 17 MG/DL (ref 7–30)
CALCIUM SERPL-MCNC: 8.2 MG/DL (ref 8.5–10.1)
CHLORIDE SERPL-SCNC: 106 MMOL/L (ref 94–109)
CO2 SERPL-SCNC: 24 MMOL/L (ref 20–32)
CREAT SERPL-MCNC: 1.11 MG/DL (ref 0.52–1.04)
ERYTHROCYTE [DISTWIDTH] IN BLOOD BY AUTOMATED COUNT: 12.3 % (ref 10–15)
GFR SERPL CREATININE-BSD FRML MDRD: 50 ML/MIN/1.7M2
GLUCOSE SERPL-MCNC: 124 MG/DL (ref 70–99)
HCT VFR BLD AUTO: 33.8 % (ref 35–47)
HGB BLD-MCNC: 11.8 G/DL (ref 11.7–15.7)
MCH RBC QN AUTO: 30.8 PG (ref 26.5–33)
MCHC RBC AUTO-ENTMCNC: 34.9 G/DL (ref 31.5–36.5)
MCV RBC AUTO: 88 FL (ref 78–100)
PLATELET # BLD AUTO: 222 10E9/L (ref 150–450)
POTASSIUM SERPL-SCNC: 4.7 MMOL/L (ref 3.4–5.3)
RBC # BLD AUTO: 3.83 10E12/L (ref 3.8–5.2)
SODIUM SERPL-SCNC: 136 MMOL/L (ref 133–144)
WBC # BLD AUTO: 15.5 10E9/L (ref 4–11)

## 2018-09-12 PROCEDURE — 36415 COLL VENOUS BLD VENIPUNCTURE: CPT | Performed by: STUDENT IN AN ORGANIZED HEALTH CARE EDUCATION/TRAINING PROGRAM

## 2018-09-12 PROCEDURE — 85027 COMPLETE CBC AUTOMATED: CPT | Performed by: OBSTETRICS & GYNECOLOGY

## 2018-09-12 PROCEDURE — G0378 HOSPITAL OBSERVATION PER HR: HCPCS

## 2018-09-12 PROCEDURE — 25000128 H RX IP 250 OP 636: Performed by: OBSTETRICS & GYNECOLOGY

## 2018-09-12 PROCEDURE — 96376 TX/PRO/DX INJ SAME DRUG ADON: CPT

## 2018-09-12 PROCEDURE — 80048 BASIC METABOLIC PNL TOTAL CA: CPT | Performed by: STUDENT IN AN ORGANIZED HEALTH CARE EDUCATION/TRAINING PROGRAM

## 2018-09-12 PROCEDURE — 25000132 ZZH RX MED GY IP 250 OP 250 PS 637: Performed by: OBSTETRICS & GYNECOLOGY

## 2018-09-12 PROCEDURE — 25000132 ZZH RX MED GY IP 250 OP 250 PS 637: Performed by: STUDENT IN AN ORGANIZED HEALTH CARE EDUCATION/TRAINING PROGRAM

## 2018-09-12 PROCEDURE — 96374 THER/PROPH/DIAG INJ IV PUSH: CPT

## 2018-09-12 RX ORDER — ONDANSETRON 4 MG/1
4 TABLET, ORALLY DISINTEGRATING ORAL EVERY 6 HOURS PRN
Status: DISCONTINUED | OUTPATIENT
Start: 2018-09-12 | End: 2018-09-12 | Stop reason: HOSPADM

## 2018-09-12 RX ORDER — LEVOTHYROXINE SODIUM 100 UG/1
200 TABLET ORAL DAILY
Status: DISCONTINUED | OUTPATIENT
Start: 2018-09-12 | End: 2018-09-12 | Stop reason: HOSPADM

## 2018-09-12 RX ORDER — ONDANSETRON 2 MG/ML
4 INJECTION INTRAMUSCULAR; INTRAVENOUS EVERY 6 HOURS PRN
Status: DISCONTINUED | OUTPATIENT
Start: 2018-09-12 | End: 2018-09-12 | Stop reason: HOSPADM

## 2018-09-12 RX ORDER — SENNOSIDES 8.6 MG
8.6 TABLET ORAL 2 TIMES DAILY PRN
Status: DISCONTINUED | OUTPATIENT
Start: 2018-09-12 | End: 2018-09-12 | Stop reason: HOSPADM

## 2018-09-12 RX ORDER — LOSARTAN POTASSIUM AND HYDROCHLOROTHIAZIDE 25; 100 MG/1; MG/1
1 TABLET ORAL DAILY
Status: DISCONTINUED | OUTPATIENT
Start: 2018-09-12 | End: 2018-09-12 | Stop reason: HOSPADM

## 2018-09-12 RX ORDER — OXYCODONE HYDROCHLORIDE 5 MG/1
5 TABLET ORAL EVERY 4 HOURS PRN
Status: DISCONTINUED | OUTPATIENT
Start: 2018-09-12 | End: 2018-09-12 | Stop reason: HOSPADM

## 2018-09-12 RX ORDER — LIDOCAINE 40 MG/G
CREAM TOPICAL
Status: DISCONTINUED | OUTPATIENT
Start: 2018-09-12 | End: 2018-09-12 | Stop reason: HOSPADM

## 2018-09-12 RX ORDER — PROCHLORPERAZINE MALEATE 10 MG
10 TABLET ORAL EVERY 6 HOURS PRN
Status: DISCONTINUED | OUTPATIENT
Start: 2018-09-12 | End: 2018-09-12 | Stop reason: HOSPADM

## 2018-09-12 RX ORDER — SODIUM CHLORIDE, SODIUM LACTATE, POTASSIUM CHLORIDE, CALCIUM CHLORIDE 600; 310; 30; 20 MG/100ML; MG/100ML; MG/100ML; MG/100ML
INJECTION, SOLUTION INTRAVENOUS CONTINUOUS
Status: DISCONTINUED | OUTPATIENT
Start: 2018-09-12 | End: 2018-09-12

## 2018-09-12 RX ORDER — AMOXICILLIN 250 MG
1-2 CAPSULE ORAL 2 TIMES DAILY
Qty: 30 TABLET | Refills: 0 | Status: SHIPPED | OUTPATIENT
Start: 2018-09-12 | End: 2018-10-22

## 2018-09-12 RX ORDER — OXYCODONE HYDROCHLORIDE 5 MG/1
5-10 TABLET ORAL
Qty: 15 TABLET | Refills: 0 | Status: SHIPPED | OUTPATIENT
Start: 2018-09-12 | End: 2018-10-22

## 2018-09-12 RX ORDER — ACETAMINOPHEN 325 MG/1
650 TABLET ORAL EVERY 6 HOURS PRN
Qty: 40 TABLET | Refills: 0 | Status: SHIPPED | OUTPATIENT
Start: 2018-09-12 | End: 2018-10-22

## 2018-09-12 RX ADMIN — ACETAMINOPHEN 650 MG: 325 TABLET, FILM COATED ORAL at 05:22

## 2018-09-12 RX ADMIN — HYDROCORTISONE SODIUM SUCCINATE 25 MG: 100 INJECTION, POWDER, FOR SOLUTION INTRAMUSCULAR; INTRAVENOUS at 14:31

## 2018-09-12 RX ADMIN — HYDROCORTISONE 10 MG: 10 TABLET ORAL at 08:09

## 2018-09-12 RX ADMIN — LOSARTAN POTASSIUM AND HYDROCHLOROTHIAZIDE 1 TABLET: 100; 25 TABLET, FILM COATED ORAL at 09:32

## 2018-09-12 RX ADMIN — RANITIDINE 150 MG: 150 TABLET ORAL at 08:09

## 2018-09-12 RX ADMIN — ACETAMINOPHEN 650 MG: 325 TABLET, FILM COATED ORAL at 14:57

## 2018-09-12 RX ADMIN — HYDROCORTISONE SODIUM SUCCINATE 25 MG: 100 INJECTION, POWDER, FOR SOLUTION INTRAMUSCULAR; INTRAVENOUS at 05:23

## 2018-09-12 RX ADMIN — SODIUM CHLORIDE, POTASSIUM CHLORIDE, SODIUM LACTATE AND CALCIUM CHLORIDE: 600; 310; 30; 20 INJECTION, SOLUTION INTRAVENOUS at 05:04

## 2018-09-12 RX ADMIN — LEVOTHYROXINE SODIUM 200 MCG: 100 TABLET ORAL at 08:10

## 2018-09-12 NOTE — PLAN OF CARE
Problem: Patient Care Overview  Goal: Plan of Care/Patient Progress Review  Outcome: Adequate for Discharge Date Met: 09/12/18  Pt. states ready for discharge and is requesting discharge. PIV removed. Voided 300 ml with 9 ml PVR. Went over discharge instructions with patient. Pt. states that she understands her therapeutic discharge plan and her medication regimen. Pt. states that she will follow her medication regimen and her therapeutic discharge plan. All question were addressed.  Pt. left the unit at 15:45 with all valuables, belongings, medications and copy of her after visit summary.

## 2018-09-12 NOTE — PLAN OF CARE
Problem: Patient Care Overview  Goal: Plan of Care/Patient Progress Review  Pt. A&Ox4. VSS. Afebrile. Lung sounds CTA. Maintaining sats on RA. IS encouraged. Bowel sounds hypoactive, LBM 9/10, flatus -. PP+ DP+. CMS and neuro's are intact. Denies numbness and tingling in all extremities. Denies nausea, shortness of breath, and chest pain. States pain comfortably managed w/ prn Tylenol. Farris patent w/ adequate output. Tolerating clears, and full liquids. Incision sites are CDI and CASSY. Pt up with SBA. PIV is patent and infusing. PCD's on BLE's. Bilateral heels are elevated off the bed. Call light is within reach, pt able to make needs known. Will continue to monitor.

## 2018-09-12 NOTE — PROGRESS NOTES
Gynecology Progress Note  [facility]     S: Pt feels well, no concerns. Would like to discharge to home earlier than later today. Pain well controlled on PO pain meds. Passing flatus. Farris in place.  No difficulties with ambulation but with minimal ambulation. No SOB, chest pain, nausea or vomiting overnight.      O:  Vitals:    09/11/18 2000 09/11/18 2006 09/12/18 0045 09/12/18 0500   BP: (!) 180/176 161/80 140/70 145/74   BP Location:   Right arm Right arm   Resp: 15 17 18 13   Temp:   99  F (37.2  C) 98.2  F (36.8  C)   TempSrc:   Oral Oral   SpO2: 93% 94% 94% 93%   Weight:       Height:            Gen: comfortable lying in bed, in NAD  CV: RRR, no murmurs  Lungs: CTAB, appropriate work of breathing  Abd: soft, appropriately tender, minimally distended. Normoactive BS.  Incisions with Dermabond in place. Minor erythema, but no drainage.   : Farris in place draining light yellow urine.  Ext: warm and well perfused, SCDs in place, trace LE edema.    Hemoglobin   Date Value Ref Range Status   09/11/2018 12.5 11.7 - 15.7 g/dL Final   12/06/2016 13.4 11.7 - 15.7 g/dL Final         Intake/Output Summary (Last 24 hours) at 09/12/18 0709  Last data filed at 09/12/18 0504   Gross per 24 hour   Intake             1090 ml   Output             1050 ml   Net               40 ml   ~75mL/hr since midnight    A/P: Maria Esther Lowe is a 63 year old female POD#1 s/p DA-total laparoscopic hysterectomy, bilateral salpingo-oophorectomy, cystoscopy.    Panhypopituitarism:   - s/p 50mg IV hydrocortisone intra-op   - 25mg IV hydrocortisone scheduled x3 (2000, 0400, 1200), however scheduled timed dosing at 2000 was never given, last dose given at 0523 this am. Will repeat second dose at 1330 and plan for discharge mid afternoon today   - restart PTA hydrocortisone (10mg daily) today  Chronic HTN:   - Home losartan-hydrochlorothiazide held, restart upon discharge. BPs improving, may consider starting prior to DC.   FEN: discontinue  IVF today, advance diet as tolerated  Pain: pain well controlled on PO pain meds when tolerated. No NSAIDs given elevated Cr.  : Shields catheter still in place, remove this am and follow-up TOV. UOP adequate.    - Cr 1.25 on POD#0 prior to surgery, hold NSAIDs. AM BMP pending.   Heme: Hgb 12.5 > EBL 50 > AM Hgb pending, asymptomatic. VSS and wnl. No concerns on exam this am for further bleeding.  GI: bowel regimen, passing of flatus    Dispo: Anticipate discharge to home POD#1 later today following second dose of IV stress dose steroids    Diana Monique MD MPH  OB/GYN, PGY4  Pager: [phone]  9/12/2018  7:07 AM    Staff MD Note    I appreciate the note by Dr. Monique.  Any necessary changes have been made by me.  I evaluated the patient with the resident and agree with the assessment and plan.  Patient had shields removed shortly ago, awaiting VS.  Has not been out of bed yet.  Pain well controlled.  Minimal vaginal discharge.  Tolerated light diet this morning, no nausea/vomiting.  Discussed bleeding, infection, lifting and pelvic restrictions/instructions.  Will work on appropriate post-op advancement today, has last dose of stress dose steroid at 14:00 today.  Plan discharge later today assuming meeting goals.    Jimena Hart MD

## 2018-09-12 NOTE — PLAN OF CARE
Problem: Surgery Nonspecified (Adult)  Goal: Signs and Symptoms of Listed Potential Problems Will be Absent, Minimized or Managed (Surgery Nonspecified)  Signs and symptoms of listed potential problems will be absent, minimized or managed by discharge/transition of care (reference Surgery Nonspecified (Adult) CPG).   Outcome: Improving        VS:   Pt has had VSS except for several elevated BPs, many of which appear r/t to movement and normalize when at rest. Pt has a recent Hx of hypertension.   Output:   Pt has a patent shields catheter and denies BM or flatus. Pt walked to toilet w/o results.   Activity:   Pt is a stand by assist.   Skin:  Pt has intact skin except meplex on coccyx.   Pain:   Pt is reporting minimal pain w/ movement. Pt received tylenol PRN ar HS d/t mild pain at incision sites.   Neuro/CMS:   Pt was initially sedated, slept first half of shift. Then Pt was A&Ox4 and walked to bathroom.    Dressing(s):   Pt has 4 CASSY incisions on Abd that are approximated w/ liquid bandage and are free of bleeding/drainage or Sx of infection.    Diet:   Clear liquid. Pt had broth and pudding.   LDA:   Pt has a patent shields catheter. Pt has patent PIV in R and L hand, R hand is infusing LR at 100 mL/hr. Pt is on 2L O2 via NC.   Equipment:   Capnography. PCDs removed per Pt request.    Plan:   Continue to monitor pain, vitals and bowel status.   Additional Info:          1600- Pt is A&Ox4 and denying pain, Pt is sedated and stated she is tired.  1800- Pt is sleeping and arousable. Pt got up to go to bathroom. Pt ate broth and pudding for supper.  2000- Pt is more alert and does not appear sedated. Pt still denies pain.  2200- Pt is reporting that she would like to go to sleep. Having mild discomfort.

## 2018-09-13 LAB — COPATH REPORT: NORMAL

## 2018-09-14 ENCOUNTER — TELEPHONE (OUTPATIENT)
Dept: OBGYN | Facility: CLINIC | Age: 63
End: 2018-09-14

## 2018-09-14 LAB — COPATH REPORT: NORMAL

## 2018-09-14 NOTE — TELEPHONE ENCOUNTER
Spoke with Maria Esther regarding the negative cytology report from the pelvic washings.  She is doing better, had a lot of shoulder pain her first night at home.  Will follow-up again when the remaining path report is available.    Tiffany Lew MD

## 2018-10-08 ENCOUNTER — TELEPHONE (OUTPATIENT)
Dept: OPHTHALMOLOGY | Facility: CLINIC | Age: 63
End: 2018-10-08

## 2018-10-08 NOTE — TELEPHONE ENCOUNTER
[facility] Call Center    Phone Message    May a detailed message be left on voicemail: yes    Reason for Call: Other: Pt called and said she is wondering after she has the stents placed if she will be able to drive afdterwards, or if someone will have to drive her.     Action Taken: Message routed to:  [facility] ([facility] Eye

## 2018-10-11 NOTE — TELEPHONE ENCOUNTER
FUTURE VISIT INFORMATION      FUTURE VISIT INFORMATION:    Date: 10/29/18    Time: 930am    Location: [facility] EYES  REFERRAL INFORMATION:    Referring provider:  GENE DEAL    Referring providers clinic:  [facility] OPHTHALMOLOGY    Reason for visit/diagnosis  Blocked Tear Ducts    RECORDS REQUESTED FROM:       Clinic name Comments Records Status Imaging Status   [facility] OPHTHALMOLOGY Notes sent to HIM on 10/10/18 Sent to HIM

## 2018-10-16 ENCOUNTER — TELEPHONE (OUTPATIENT)
Dept: OPHTHALMOLOGY | Facility: CLINIC | Age: 63
End: 2018-10-16

## 2018-10-17 ENCOUNTER — OFFICE VISIT (OUTPATIENT)
Dept: OPHTHALMOLOGY | Facility: CLINIC | Age: 63
End: 2018-10-17
Payer: COMMERCIAL

## 2018-10-17 DIAGNOSIS — H04.563 PUNCTAL STENOSIS, ACQUIRED, BILATERAL: Primary | ICD-10-CM

## 2018-10-17 DIAGNOSIS — H04.223 EPIPHORA DUE TO INSUFFICIENT DRAINAGE OF BOTH SIDES: ICD-10-CM

## 2018-10-17 PROCEDURE — 31231 NASAL ENDOSCOPY DX: CPT | Performed by: OPHTHALMOLOGY

## 2018-10-17 PROCEDURE — 99243 OFF/OP CNSLTJ NEW/EST LOW 30: CPT | Mod: 25 | Performed by: OPHTHALMOLOGY

## 2018-10-17 PROCEDURE — 68810 PROBE NASOLACRIMAL DUCT: CPT | Mod: 50 | Performed by: OPHTHALMOLOGY

## 2018-10-17 ASSESSMENT — VISUAL ACUITY
OS_SC: 20/40
METHOD: SNELLEN - LINEAR
OD_SC: 20/25

## 2018-10-17 ASSESSMENT — CONF VISUAL FIELD: METHOD: COUNTING FINGERS

## 2018-10-17 NOTE — NURSING NOTE
Patient presents with:  Consult For: ref by Dr Moreno - excessive tearing patient had episode of bilatreal acute atopic conjunctivitis in July 2018 and since that episode is experiencing excessive tearing eyes itch      Referring Provider:  Stan Lewis MD  [facility] OPHTHALMOLOGY  [address] [place] [address]    HPI    Informant(s):  pt   Symptoms:        Duration:  4 months         Comments:  Patient experiencing excessive tearing patient had episode of bilatreal acute atopic conjunctivitis in July 2018 and since that episode is experiencing excessive tearing eyes itch eyes and dryness on lids               Mikki Fish, COA

## 2018-10-17 NOTE — PROGRESS NOTES
"Oculoplastic Clinic New Patient    Patient: Maria Esther Lowe MRN# [id number]   YOB: 1955 Age: 63 year old   Date of Visit: Oct 17, 2018    CC: Tearing       HPI:     Maria Esther Lowe is a 63 year old female who has noted tearing from the both eyes. There is no history of trauma to the face, significant sinusitis, or sinus tumors. She did have an \"infection\" involving the left side of the face and eye. It was not in the inner corner of the eye. Now tearing is both sides. The eye does not feel dry and irritated. She did have dry eyes in the past. The tears run down the cheeks, and obscure vision interfering with activities of daily living. She was treated for a pituitary tumor 42 years ago, and had surgery and radiation.          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       Attending Physician Attestation:  Complete documentation of historical and exam elements from today's encounter can be found in the full encounter summary report (not reduplicated in this progress note).  I personally obtained the chief complaint(s) and history of present illness.  I confirmed and edited as necessary the review of systems, past medical/surgical history, family history, social history, and examination findings as documented by others; and I examined the patient myself.  I personally reviewed the relevant tests, images, and reports as documented above.  I formulated and edited as necessary the assessment and plan and discussed the findings and management plan with the patient and family. - Davina Payton MD              DIAGNOSIS: Epiphora both eyes  PROCEDURE: Punctal dilation and lacrimal irrigation both eyes  SURGEON: Davina Payton, " MD  ANESTHESIA: Topical  COMPLICATIONS: None  EBL: Nil  FINDINGS: 25% block right eye,  0% block left eye but significant punctal stenosis left worse than right    The patient was placed supine in the examining chair. Tetracaine was placed in each eye.  The punctum was anesthesized with 2% lidocaine on a cotton tip applicator.  The punctum was dilated with punctal dilator. The 25 gauge lacrimal cannula was placed in the proximal canaliculus and the lacrimal system irrigated with water.  The patient tolerated the procedure well.     Davina Payton  October 17, 2018  1:47 PM  I was present for the entire procedure - Davina Payton MD    DIAGNOSIS: Epiphora both eyes  PROCEDURE: Nasal Endoscopy  SURGEON: Davina Payton MD  ANESTHESIA: Topical  COMPLICATIONS: None  EBL: Nil  FINDINGS: Normal nasal exam for  lacrimal surgery and no pathology adjacent to lacrimal sac     Indications: Examine the nasal cavity for lacrimal surgery    The pediatric nasal endoscope was passed under endoscopic vision. The inferior turbinates appeared normal. There was  normal middle turbinate architecture. No polyps or purulence. No septal perforation. No masses or lesions were identified.     Davina Payton  October 17, 2018  1:48 PM  I was present for the entire procedure - Davina Payton

## 2018-10-17 NOTE — LETTER
" 10/17/2018         RE:  :  MRN: Maria Esther Lowe  1955  [id number]     Dear Dr. Lewis,    Thank you for asking me to see your patient, Maria Esther Lowe, for an oculoplastic   consultation.  My assessment and plan are below.  For further details, please see my attached clinic note.      Oculoplastic Clinic New Patient    Patient: Maria Esther Lowe MRN# [id number]   YOB: 1955 Age: 63 year old   Date of Visit: Oct 17, 2018    CC: Tearing       HPI:     Maria Esther Lowe is a 63 year old female who has noted tearing from the both eyes. There is no history of trauma to the face, significant sinusitis, or sinus tumors. She did have an \"infection\" involving the left side of the face and eye. It was not in the inner corner of the eye. Now tearing is both sides. The eye does not feel dry and irritated. She did have dry eyes in the past. The tears run down the cheeks, and obscure vision interfering with activities of daily living. She was treated for a pituitary tumor 42 years ago, and had surgery and radiation.          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       Attending Physician Attestation:  Complete documentation of historical and exam elements from today's encounter can be found in the full encounter summary report (not reduplicated in this progress note).  I personally obtained the chief complaint(s) and history of present illness.  I confirmed and edited as necessary the review of systems, past medical/surgical history, family history, social history, and examination findings as documented by others; and I examined the patient myself.  I personally reviewed the relevant tests, images, and reports as documented above.  I " formulated and edited as necessary the assessment and plan and discussed the findings and management plan with the patient and family. - Davina Payton MD              DIAGNOSIS: Epiphora both eyes  PROCEDURE: Punctal dilation and lacrimal irrigation both eyes  SURGEON: Davina Payton MD  ANESTHESIA: Topical  COMPLICATIONS: None  EBL: Nil  FINDINGS: 25% block right eye,  0% block left eye but significant punctal stenosis left worse than right    The patient was placed supine in the examining chair. Tetracaine was placed in each eye.  The punctum was anesthesized with 2% lidocaine on a cotton tip applicator.  The punctum was dilated with punctal dilator. The 25 gauge lacrimal cannula was placed in the proximal canaliculus and the lacrimal system irrigated with water.  The patient tolerated the procedure well.     Davina Payton  October 17, 2018  1:47 PM         Again, thank you for allowing me to participate in the care of your patient.      Sincerely,    Davina Payton MD  Department of Ophthalmology and Visual Neurosciences  [facility]    CC: Stan Lewis MD  [facility] Ophthalmology  [address] [place] [id number]  VIA Facsimile: [phone]

## 2018-10-17 NOTE — PATIENT INSTRUCTIONS
Artificial tears (Blink, Refresh, Systane are all good ones) at least 4-5 x daily  Warm compresses 2 x daily  Vaseline to inner corner of the eye at bedtime.

## 2018-10-17 NOTE — MR AVS SNAPSHOT
After Visit Summary   10/17/2018    Maria Esther Lowe    MRN: [id number]           Patient Information     Date Of Birth          1955        Visit Information        Provider Department      10/17/2018 1:15 PM Davina Payton MD [facility]        Today's Diagnoses     Punctal stenosis, acquired, bilateral    -  1    Epiphora due to insufficient drainage of both sides          Care Instructions    Artificial tears (Blink, Refresh, Systane are all good ones) at least 4-5 x daily  Warm compresses 2 x daily  Vaseline to inner corner of the eye at bedtime.           Follow-ups after your visit        Your next 10 appointments already scheduled     Oct 23, 2018  5:00 PM CDT   (Arrive by 4:45 PM)   RETURN ENDOCRINE with Leona Arrington MD   [facility] ([facility]    [address]  [address] [facility] [address] [phone]            Oct 25, 2018  7:45 AM CDT   Return Visit with Tiffany Lew MD   [facility] ([facility])    [address] [facility] [address]  [address] [facility] [address] [phone]              Who to contact     If you have questions or need follow up information about today's clinic visit or your schedule please contact [facility] directly at [phone].  Normal or non-critical lab and imaging results will be communicated to you by MyChart, letter or phone within 4 business days after the clinic has received the results. If you do not hear from us within 7 days, please contact the clinic through MyChart or phone. If you have a critical or abnormal lab result, we will notify you by phone as soon as possible.  Submit refill requests through [employer] or call your pharmacy and they will forward the refill request to us. Please allow 3 business days for your refill to be completed.          Additional Information About Your Visit         InflowControlhart Information     [employer]ner gives you secure access to your electronic health record. If you see a primary care provider, you can also send messages to your care team and make appointments. If you have questions, please call your primary care clinic.  If you do not have a primary care provider, please call [phone] and they will assist you.      [employer]ner is an electronic gateway that provides easy, online access to your medical records. With [employer]ner, you can request a clinic appointment, read your test results, renew a prescription or communicate with your care team.     To access your existing account, please contact your [facility] or call [phone] for assistance.        Care EveryWhere ID     This is your Care EveryWhere ID. This could be used by other organizations to access your [place] medical records  [id number]         Blood Pressure from Last 3 Encounters:   09/12/18 130/74   08/17/18 152/78   08/13/18 148/64    Weight from Last 3 Encounters:   09/11/18 81.4 kg (179 lb 7.3 oz)   08/17/18 80.3 kg (177 lb)   08/13/18 80.3 kg (177 lb)              We Performed the Following     Nasal Endoscopy     Nasolacrimal Duct Probe/Irrg     Mari-Operative Worksheet (Plastics)        Primary Care Provider Office Phone # Fax #    Estela DAYRON Boswell [phone]       [place] [address]        Equal Access to Services     MARIA DE JESUS W[place] : Hadii aad ku hadasho Soomaali, waaxda luqadaha, qaybta kaalmada adeegyada, larissa wilson hayaan yoli hickman . So [facility] [phone].    ATENCIÓN: Si habla español, tiene a aden disposición servicios gratuitos de asistencia lingüística. Llame al [phone].    We comply with applicable federal civil rights laws and [place] laws. We do not discriminate on the basis of race, color, national origin, age, disability, sex, sexual orientation, or gender identity.            Thank you!      Thank you for choosing [facility]  for your care. Our goal is always to provide you with excellent care. Hearing back from our patients is one way we can continue to improve our services. Please take a few minutes to complete the written survey that you may receive in the mail after your visit with us. Thank you!             Your Updated Medication List - Protect others around you: Learn how to safely use, store and throw away your medicines at www.disposemymeds.org.          This list is accurate as of 10/17/18  1:52 PM.  Always use your most recent med list.                   Brand Name Dispense Instructions for use Diagnosis    acetaminophen 325 MG tablet    TYLENOL    40 tablet    Take 2 tablets (650 mg) by mouth every 6 hours as needed for mild pain, fever or headaches    S/P laparoscopic hysterectomy       calcium 500 +D 500-400 MG-UNIT Tabs   Generic drug:  Calcium Carb-Cholecalciferol     180 tablet    Take 1 tablet by mouth 2 times daily.        cholecalciferol 5000 units Caps     90 capsule    Take 1 daily    Hyperparathyroidism (H)       conjugated estrogens cream    PREMARIN    30 g    Place 0.5 g vaginally twice a week    Vaginal dryness       cyanocobalamin 1000 MCG tablet    vitamin  B-12     Take by mouth every morning        estradiol 1 MG tablet    ESTRACE    90 tablet    Take 1 tablet (1 mg) by mouth daily    Panhypopituitarism (H), Loss of hair       estradiol 10 MCG Tabs vaginal tablet    VAGIFEM    24 tablet    Place 1 tablet (10 mcg) vaginally twice a week    Atrophic vaginitis       hydrocortisone 10 MG tablet    CORTEF    135 tablet    TAKE 1 TABLET IN THE MORNING AND ONE-HALF (1/2) TABLET MIDDAY    Panhypopituitarism (H)       levothyroxine 200 MCG tablet    SYNTHROID/LEVOTHROID    90 tablet    Take 1 tablet (200 mcg) by mouth daily    Panhypopituitarism (H)       losartan-hydrochlorothiazide 100-25 MG per tablet    HYZAAR    90 tablet    Take 1 tablet by mouth daily     Panhypopituitarism (H)       oxyCODONE IR 5 MG tablet    ROXICODONE    15 tablet    Take 1-2 tablets (5-10 mg) by mouth every 3 hours as needed for other (Moderate to Severe Pain)    S/P laparoscopic hysterectomy       progesterone 100 MG capsule    PROMETRIUM    90 capsule    TAKE 1 CAPSULE DAILY    Hormone replacement therapy       senna-docusate 8.6-50 MG per tablet    SENOKOT-S;PERICOLACE    30 tablet    Take 1-2 tablets by mouth 2 times daily    S/P laparoscopic hysterectomy       simvastatin 40 MG tablet    ZOCOR    90 tablet    Take 1 tablet (40 mg) by mouth daily    Hyperlipidemia LDL goal <100       zinc 50 MG Tabs      Take  by mouth.

## 2018-10-22 ENCOUNTER — OFFICE VISIT (OUTPATIENT)
Dept: ENDOCRINOLOGY | Facility: CLINIC | Age: 63
End: 2018-10-22
Payer: COMMERCIAL

## 2018-10-22 VITALS
HEART RATE: 65 BPM | WEIGHT: 180.7 LBS | HEIGHT: 65 IN | BODY MASS INDEX: 30.1 KG/M2 | SYSTOLIC BLOOD PRESSURE: 168 MMHG | DIASTOLIC BLOOD PRESSURE: 96 MMHG

## 2018-10-22 DIAGNOSIS — D35.2 PROLACTINOMA (H): ICD-10-CM

## 2018-10-22 DIAGNOSIS — M85.80 OSTEOPENIA, UNSPECIFIED LOCATION: Primary | ICD-10-CM

## 2018-10-22 DIAGNOSIS — E27.49 SECONDARY ADRENAL INSUFFICIENCY (H): ICD-10-CM

## 2018-10-22 DIAGNOSIS — M85.80 OSTEOPENIA, UNSPECIFIED LOCATION: ICD-10-CM

## 2018-10-22 DIAGNOSIS — E03.8 SECONDARY HYPOTHYROIDISM: ICD-10-CM

## 2018-10-22 LAB
CORTIS SERPL-MCNC: 13.8 UG/DL (ref 4–22)
DEPRECATED CALCIDIOL+CALCIFEROL SERPL-MC: 64 UG/L (ref 20–75)
TSH SERPL DL<=0.005 MIU/L-ACNC: 0.11 MU/L (ref 0.4–4)

## 2018-10-22 ASSESSMENT — PAIN SCALES - GENERAL: PAINLEVEL: NO PAIN (0)

## 2018-10-22 NOTE — PROGRESS NOTES
- Endocrinology Follow up-    Reason for visit/consult:     Prolactinoma (H)  Panhypopituitarism (H)  Hypothyroidism, unspecified type    Primary care provider: No primary care provider on file.    Assessment and Plan  63 year old female with following conditions  # Panhypopituitarism-   Stable on low dose hydorcoritisone dose 10 mg daily only for many years.  Recently she had dizziness weakness episode for 2 month. I discussed with patient, OK to keep the same dose for now,   but if started to have dizziness again, try 10/5 mg. Also discussed about sick day rule.     # Remote history of a prolactin-producing pituitary adenoma- PRL-32 last year.   Stable .  Most recent MRI in 2011 per Dr. Perdomo's note is a predominantly empty sella.  If new HA, new vision change, or PRL elevating, then consider to repeat MRI    #Osteopenia. On Ca and VitminD, will check VitaminD level, Bone density.     # Hair loss- on Estradiol 1 mg, no significant change     # Hysterectomy  New event in 9/2018.   Will stop progesterone.     # secondary hypothyroidism  Check TSH, free T4, and meanwhile continue current levothyroxine 200 mcg.     - RTC with me in 1 year        I spent 45 minutes with this patient face to face and explained the conditions and plans (more than 50% of time was counseling/coordination of care, follow up plan for her hair loss) . The patient understood and is satisfied with today's visit. Return to clinic with me in 1 year.         Leona Arrington MD  Staff Physician  Endocrinology and Metabolism  License: [id number]    Interval History: 1/2018 dizziness went to ED in [facility], was fine.   BW stable, HTN, hysterectomy 9/2018. Large cyst, uterus fluid. Will be seen surgeon soon. Yellow fluid still. 3-4 pads daily.     tear duct close.   HPI: A 63 yo female previous Dr. JAQUELINE Perdomo's patient, here for follow up. She has remote history of prolactinoma in  her 20's, she underwent surgical removal in 1975 followed by RT. 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   Currently taking hydrocotisone 10 mg am only for at least several years, she has been doing well, she requires afternoon nap 1 pm for 20 minutes.   She has been taking LT4 was on 175 mcg and last year increased to 200 mcg with compliance.    Regarding hypogonadism, she has been taking premarin (conjugated estrogen) 0.625 mg. Progesterone (Prometrium) 100 mg daily. Her main concern today is about hair loss. After pituitary surgery, she lost lots hair, but after that with HRT, her hair started to grow. However past several months, she started to lose hair again and wishing to have more hair come back.     Body weight stable,   ENDO VITALS-UMP 10/23/2017 10/23/2017 5/11/2017   Weight 78.019 kg 78.019 kg 81.647 kg     Appetite: good, she is doing two jobs, She is  lives with her , adopted one child long time ago who is age 31 now.  No HA.       Past Medical/Surgical History:  Past Medical History:   Diagnosis Date     Hyperlipidemia LDL goal < 130      Hypertension      Hypothyroidism      Osteopenia     DEXA: 12/11; T-score of -1.9        Panhypopituitarism (H)      Past Surgical History:   Procedure Laterality Date     DAVINCI HYSTERECTOMY TOTAL, BILATERAL SALPINGO-OOPHORECTOMY, COMBINED Bilateral 9/11/2018    Procedure: COMBINED DAVINCI HYSTERECTOMY TOTAL, SALPINGO-OOPHORECTOMY;  Pelvic Washings, Davinci Assisted Laparoscopic Total Hysterectomy, Bilateral Salpingo Oophorectomy, cystoscopy;  Surgeon: Tiffany Lew MD;  [facility]     OPERATIVE HYSTEROSCOPY N/A 1/30/2018    Procedure: OPERATIVE HYSTEROSCOPY;  Operative Hysteroscopy, Resection of Endometrial Polyp, Dilation and Curettage;  Surgeon: Daysi Munoz MD;  [facility]: [facility] OR     resected prolactin-producing pituitary adenoma.  1975    Postoperative radiation therapy.   "      Allergies:  Allergies   Allergen Reactions     Ceftriaxone Other (See Comments)     Unsure of reaction.       Current Medications   Current Outpatient Prescriptions   Medication     CALCIUM 500 +D 500-400 MG-UNIT TABS     cholecalciferol 5000 UNITS CAPS     cyanocobalamin (VITAMIN  B-12) 1000 MCG tablet     estradiol (VAGIFEM) 10 MCG TABS vaginal tablet     hydrocortisone (CORTEF) 10 MG tablet     levothyroxine (SYNTHROID/LEVOTHROID) 200 MCG tablet     losartan-hydrochlorothiazide (HYZAAR) 100-25 MG per tablet     simvastatin (ZOCOR) 40 MG tablet     zinc 50 MG TABS     conjugated estrogens (PREMARIN) cream     estradiol (ESTRACE) 1 MG tablet     No current facility-administered medications for this visit.        Family History:  Family History   Problem Relation Age of Onset     Diabetes Paternal Grandmother      Depression Paternal Grandmother      Thyroid Disease Maternal Grandmother      Skin Cancer Father      Melanoma No family hx of        Social History:  Social History   Substance Use Topics     Smoking status: Never Smoker     Smokeless tobacco: Never Used     Alcohol use No       ROS:  Full review of systems taken with the help of the intake sheet. Otherwise a complete 14 point review of systems was taken and is negative unless stated in the history above.    Physical Exam:   Blood pressure (!) 168/96, pulse 65, height 1.651 m (5' 5\"), weight 82 kg (180 lb 11.2 oz), not currently breastfeeding.  General: well appearing, no acute distress, pleasant and conversant,   Mental Status/neuro: alert and oriented  Skull: loss of hair++, frontal   Face: symmetrical, normal facial color  Eyes: anicteric, PERRL, no proptosis or lid lag  Visual field: intact  Occular motor: intact  Neck: suppler, no lymphadenopahty  Thyroid: normal size and texture, no nodule palpable, no bruits  Heart: regular rhythm, S1S2, no murmur appreciated  Lung: clear to auscultation bilaterally  Abdomen: soft, NT/ND, no " hepatomegaly  Legs: no swelling or edema    Labs : I reviewed data from epic and extract and summarize the pertinent data here.   Lab Results   Component Value Date     10/23/2017      Lab Results   Component Value Date    POTASSIUM 3.9 10/23/2017     Lab Results   Component Value Date    CHLORIDE 105 10/23/2017     Lab Results   Component Value Date    ARI 8.5 10/23/2017     Lab Results   Component Value Date    CO2 24 10/23/2017     Lab Results   Component Value Date    BUN 14 10/23/2017     Lab Results   Component Value Date    CR 1.01 10/23/2017     Lab Results   Component Value Date     10/23/2017     Lab Results   Component Value Date    TSH 0.06 10/23/2017     Lab Results   Component Value Date    T4 1.26 10/23/2017       Lab Results   Component Value Date    FSH  07/22/2016     <0.2  FSH Reference Range   Female: Follicular      2.5-10.2           Mid-cycle       3.4-33.4           Luteal          1.5-9.1           Postmenopausal  23.0-116.3         Lab Results   Component Value Date    PROLACTIN 30 12/06/2016

## 2018-10-22 NOTE — LETTER
10/22/2018       RE: Maria Esther Lowe  [address] [facility] [address]     Dear Colleague,    Thank you for referring your patient, Maria Esther Lowe, to the [facility] ENDOCRINOLOGY at [facility]. Please see a copy of my visit note below.                                                                               - Endocrinology Follow up-    Reason for visit/consult:     Prolactinoma (H)  Panhypopituitarism (H)  Hypothyroidism, unspecified type    Primary care provider: No primary care provider on file.    Assessment and Plan  63 year old female with following conditions  # Panhypopituitarism-   Stable on low dose hydorcoritisone dose 10 mg daily only for many years.  Recently she had dizziness weakness episode for 2 month. I discussed with patient, OK to keep the same dose for now,   but if started to have dizziness again, try 10/5 mg. Also discussed about sick day rule.     # Remote history of a prolactin-producing pituitary adenoma- PRL-32 last year.   Stable .  Most recent MRI in 2011 per Dr. Perdomo's note is a predominantly empty sella.  If new HA, new vision change, or PRL elevating, then consider to repeat MRI    #Osteopenia. On Ca and VitminD, will check VitaminD level, Bone density.     # Hair loss- on Estradiol 1 mg, no significant change     # Hysterectomy  New event in 9/2018.   Will stop progesterone.     # secondary hypothyroidism  Check TSH, free T4, and meanwhile continue current levothyroxine 200 mcg.     - RTC with me in 1 year        I spent 45 minutes with this patient face to face and explained the conditions and plans (more than 50% of time was counseling/coordination of care, follow up plan for her hair loss) . The patient understood and is satisfied with today's visit. Return to clinic with me in 1 year.         Leona Arrington MD  Staff Physician  Endocrinology and Metabolism  License: [id number]    Interval History: 1/2018 dizziness went to ED in  [place], was fine.   BW stable, HTN, hysterectomy 9/2018. Large cyst, uterus fluid. Will be seen surgeon soon. Yellow fluid still. 3-4 pads daily.     tear duct close.   HPI: A 63 yo female previous Dr. JAQUELINE Perdomo's patient, here for follow up. She has remote history of prolactinoma in her 20's, she underwent surgical removal in 1975 followed by RT. 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   Currently taking hydrocotisone 10 mg am only for at least several years, she has been doing well, she requires afternoon nap 1 pm for 20 minutes.   She has been taking LT4 was on 175 mcg and last year increased to 200 mcg with compliance.    Regarding hypogonadism, she has been taking premarin (conjugated estrogen) 0.625 mg. Progesterone (Prometrium) 100 mg daily. Her main concern today is about hair loss. After pituitary surgery, she lost lots hair, but after that with HRT, her hair started to grow. However past several months, she started to lose hair again and wishing to have more hair come back.     Body weight stable,   ENDO VITALS-UMP 10/23/2017 10/23/2017 5/11/2017   Weight 78.019 kg 78.019 kg 81.647 kg     Appetite: good, she is doing two jobs, She is  lives with her , adopted one child long time ago who is age 31 now.  No HA.       Past Medical/Surgical History:  Past Medical History:   Diagnosis Date     Hyperlipidemia LDL goal < 130      Hypertension      Hypothyroidism      Osteopenia     DEXA: 12/11; T-score of -1.9        Panhypopituitarism (H)      Past Surgical History:   Procedure Laterality Date     DAVINCI HYSTERECTOMY TOTAL, BILATERAL SALPINGO-OOPHORECTOMY, COMBINED Bilateral 9/11/2018    Procedure: COMBINED DAVINCI HYSTERECTOMY TOTAL, SALPINGO-OOPHORECTOMY;  Pelvic Washings, Davinci Assisted Laparoscopic Total Hysterectomy, Bilateral Salpingo Oophorectomy, cystoscopy;  Surgeon: Tiffany Lew MD;  [facility]     OPERATIVE HYSTEROSCOPY N/A 1/30/2018     "Procedure: OPERATIVE HYSTEROSCOPY;  Operative Hysteroscopy, Resection of Endometrial Polyp, Dilation and Curettage;  Surgeon: Daysi Munoz MD;  [facility]     resected prolactin-producing pituitary adenoma.  1975    Postoperative radiation therapy.        Allergies:  Allergies   Allergen Reactions     Ceftriaxone Other (See Comments)     Unsure of reaction.       Current Medications   Current Outpatient Prescriptions   Medication     CALCIUM 500 +D 500-400 MG-UNIT TABS     cholecalciferol 5000 UNITS CAPS     cyanocobalamin (VITAMIN  B-12) 1000 MCG tablet     estradiol (VAGIFEM) 10 MCG TABS vaginal tablet     hydrocortisone (CORTEF) 10 MG tablet     levothyroxine (SYNTHROID/LEVOTHROID) 200 MCG tablet     losartan-hydrochlorothiazide (HYZAAR) 100-25 MG per tablet     simvastatin (ZOCOR) 40 MG tablet     zinc 50 MG TABS     conjugated estrogens (PREMARIN) cream     estradiol (ESTRACE) 1 MG tablet     No current facility-administered medications for this visit.        Family History:  Family History   Problem Relation Age of Onset     Diabetes Paternal Grandmother      Depression Paternal Grandmother      Thyroid Disease Maternal Grandmother      Skin Cancer Father      Melanoma No family hx of        Social History:  Social History   Substance Use Topics     Smoking status: Never Smoker     Smokeless tobacco: Never Used     Alcohol use No       ROS:  Full review of systems taken with the help of the intake sheet. Otherwise a complete 14 point review of systems was taken and is negative unless stated in the history above.    Physical Exam:   Blood pressure (!) 168/96, pulse 65, height 1.651 m (5' 5\"), weight 82 kg (180 lb 11.2 oz), not currently breastfeeding.  General: well appearing, no acute distress, pleasant and conversant,   Mental Status/neuro: alert and oriented  Skull: loss of hair++, frontal   Face: symmetrical, normal facial color  Eyes: anicteric, PERRL, no proptosis or lid lag  Visual " field: intact  Occular motor: intact  Neck: suppler, no lymphadenopahty  Thyroid: normal size and texture, no nodule palpable, no bruits  Heart: regular rhythm, S1S2, no murmur appreciated  Lung: clear to auscultation bilaterally  Abdomen: soft, NT/ND, no hepatomegaly  Legs: no swelling or edema    Labs : I reviewed data from epic and extract and summarize the pertinent data here.   Lab Results   Component Value Date     10/23/2017      Lab Results   Component Value Date    POTASSIUM 3.9 10/23/2017     Lab Results   Component Value Date    CHLORIDE 105 10/23/2017     Lab Results   Component Value Date    ARI 8.5 10/23/2017     Lab Results   Component Value Date    CO2 24 10/23/2017     Lab Results   Component Value Date    BUN 14 10/23/2017     Lab Results   Component Value Date    CR 1.01 10/23/2017     Lab Results   Component Value Date     10/23/2017     Lab Results   Component Value Date    TSH 0.06 10/23/2017     Lab Results   Component Value Date    T4 1.26 10/23/2017       Lab Results   Component Value Date    FSH  07/22/2016     <0.2  FSH Reference Range   Female: Follicular      2.5-10.2           Mid-cycle       3.4-33.4           Luteal          1.5-9.1           Postmenopausal  23.0-116.3         Lab Results   Component Value Date    PROLACTIN 30 12/06/2016           Again, thank you for allowing me to participate in the care of your patient.      Sincerely,    Leona Arrington MD

## 2018-10-22 NOTE — MR AVS SNAPSHOT
After Visit Summary   10/22/2018    Maria Esther Lowe    MRN: [id number]           Patient Information     Date Of Birth          1955        Visit Information        Provider Department      10/22/2018 10:30 AM Leona Arrington MD [facility] Endocrinology        Today's Diagnoses     Osteopenia, unspecified location    -  1    Secondary hypothyroidism        Secondary adrenal insufficiency (H)           Follow-ups after your visit        Your next 10 appointments already scheduled     Oct 25, 2018  7:45 AM CDT   Return Visit with Tiffany Lew MD   [facility] ([facility])    [facility]  [address]  [address] [facility] [address]   [phone]            Oct 25, 2018 10:00 AM CDT   DX HIP/PELVIS/SPINE with [facility]   [facility]    [address] [address] [place] [address] [phone]           How do I prepare for my exam? (Food and drink instructions) No Food and Drink Restrictions.  How do I prepare for my exam? (Other instructions) Please do not take any of the following 24 hours prior to the day of your exam: vitamins, calcium tablets, antacids.  What should I wear: If possible, please wear clothes without metal (snaps, zippers). A sweat suit works well.  How long does the exam take: The exam takes about 20 minutes.  What should I bring: Bring a list of your current medicines to your exam (including vitamins, minerals and over-the-counter drugs).  Do I need a :  No  is needed.  What should I do after the exam: No restrictions, You may resume normal activities.  How do I prepare for my exam? (Food and drink instructions) A DEXA scan is a bone-density scan. It uses a low level of radiation to check the strength of your bones. As you lie on a padded table, a machine will take X-rays. We most often scan the hips and lower spine.  Who should I call with questions: If  "you have any questions, please call the Imaging Department where you will have your exam. Directions, parking instructions, and other information is available on our website, [email or web address].              Future tests that were ordered for you today     Open Future Orders        Priority Expected Expires Ordered    TSH Routine  10/22/2019 10/22/2018    T4 free Routine 12/3/2018 1/10/2019 10/22/2018    Cortisol Routine  10/22/2019 10/22/2018    Vitamin D Deficiency Routine 10/22/2018 10/22/2019 10/22/2018    DX Hip/Pelvis/Spine Routine  10/22/2019 10/22/2018            Who to contact     Please call your clinic at [phone] to:    Ask questions about your health    Make or cancel appointments    Discuss your medicines    Learn about your test results    Speak to your doctor            Additional Information About Your Visit        The [employer] Information     [employer] gives you secure access to your electronic health record. If you see a primary care provider, you can also send messages to your care team and make appointments. If you have questions, please call your primary care clinic.  If you do not have a primary care provider, please call [phone] and they will assist you.      [employer] is an electronic gateway that provides easy, online access to your medical records. With [employer], you can request a clinic appointment, read your test results, renew a prescription or communicate with your care team.     To access your existing account, please contact your [facility] or call [phone] for assistance.        Care EveryWhere ID     This is your Care EveryWhere ID. This could be used by other organizations to access your [place] medical records  [id number]        Your Vitals Were     Pulse Height BMI (Body Mass Index)             65 1.651 m (5' 5\") 30.07 kg/m2          Blood Pressure from Last 3 Encounters:   10/22/18 (!) 168/96   09/12/18 130/74   08/17/18 152/78    Weight " from Last 3 Encounters:   10/22/18 82 kg (180 lb 11.2 oz)   09/11/18 81.4 kg (179 lb 7.3 oz)   08/17/18 80.3 kg (177 lb)               Primary Care Provider Office Phone # Fax #    Estela Boswell [phone]       [place] [address]        Equal Access to Services     MARIA DE JESUS [place] : Hadii aad ku hadasho Soomaali, waaxda luqadaha, qaybta kaalmada adeegyada, waxay eddiein hayjosrn adesalena christiangennalaly hickman . So [facility] [phone].    ATENCIÓN: Si shelli mcghee, tiene a aden disposición servicios gratuitos de asistencia lingüística. Geraldoame al [phone].    We comply with applicable federal civil rights laws and [place] laws. We do not discriminate on the basis of race, color, national origin, age, disability, sex, sexual orientation, or gender identity.            Thank you!     Thank you for choosing [facility] ENDOCRINOLOGY  for your care. Our goal is always to provide you with excellent care. Hearing back from our patients is one way we can continue to improve our services. Please take a few minutes to complete the written survey that you may receive in the mail after your visit with us. Thank you!             Your Updated Medication List - Protect others around you: Learn how to safely use, store and throw away your medicines at www.disposemymeds.org.          This list is accurate as of 10/22/18 12:02 PM.  Always use your most recent med list.                   Brand Name Dispense Instructions for use Diagnosis    calcium 500 +D 500-400 MG-UNIT Tabs   Generic drug:  Calcium Carb-Cholecalciferol     180 tablet    Take 1 tablet by mouth 2 times daily.        cholecalciferol 5000 units Caps     90 capsule    Take 1 daily    Hyperparathyroidism (H)       conjugated estrogens cream    PREMARIN    30 g    Place 0.5 g vaginally twice a week    Vaginal dryness       cyanocobalamin 1000 MCG tablet    vitamin  B-12     Take by mouth every morning        estradiol 1 MG tablet     ESTRACE    90 tablet    Take 1 tablet (1 mg) by mouth daily    Panhypopituitarism (H), Loss of hair       estradiol 10 MCG Tabs vaginal tablet    VAGIFEM    24 tablet    Place 1 tablet (10 mcg) vaginally twice a week    Atrophic vaginitis       hydrocortisone 10 MG tablet    CORTEF    135 tablet    TAKE 1 TABLET IN THE MORNING AND ONE-HALF (1/2) TABLET MIDDAY    Panhypopituitarism (H)       levothyroxine 200 MCG tablet    SYNTHROID/LEVOTHROID    90 tablet    Take 1 tablet (200 mcg) by mouth daily    Panhypopituitarism (H)       losartan-hydrochlorothiazide 100-25 MG per tablet    HYZAAR    90 tablet    Take 1 tablet by mouth daily    Panhypopituitarism (H)       progesterone 100 MG capsule    PROMETRIUM    90 capsule    TAKE 1 CAPSULE DAILY    Hormone replacement therapy       simvastatin 40 MG tablet    ZOCOR    90 tablet    Take 1 tablet (40 mg) by mouth daily    Hyperlipidemia LDL goal <100       zinc 50 MG Tabs      Take  by mouth.

## 2018-10-23 ENCOUNTER — TELEPHONE (OUTPATIENT)
Dept: OPHTHALMOLOGY | Facility: CLINIC | Age: 63
End: 2018-10-23

## 2018-10-23 NOTE — TELEPHONE ENCOUNTER
Message left for the patient to call back to get surgery scheduled.  Michelle Larson, Surgery Scheduling Coordinator

## 2018-10-24 ENCOUNTER — RADIANT APPOINTMENT (OUTPATIENT)
Dept: BONE DENSITY | Facility: CLINIC | Age: 63
End: 2018-10-24
Attending: INTERNAL MEDICINE
Payer: COMMERCIAL

## 2018-10-24 DIAGNOSIS — M85.80 OSTEOPENIA, UNSPECIFIED LOCATION: ICD-10-CM

## 2018-10-24 PROCEDURE — 77080 DXA BONE DENSITY AXIAL: CPT | Performed by: RADIOLOGY

## 2018-10-24 NOTE — TELEPHONE ENCOUNTER
Date Scheduled: 11-26-18 at 12:30pm  Facility: [facility]  Surgeon: Dr. Payton  Post-op appointment scheduled:    scheduled?: No  Surgery packet/instructions confirmed received?  Yes  Special Considerations:   Michelle Larson, Surgery Scheduling Coordinator

## 2018-10-25 ENCOUNTER — OFFICE VISIT (OUTPATIENT)
Dept: OBGYN | Facility: CLINIC | Age: 63
End: 2018-10-25
Attending: OBSTETRICS & GYNECOLOGY
Payer: COMMERCIAL

## 2018-10-25 VITALS
BODY MASS INDEX: 29.95 KG/M2 | HEART RATE: 75 BPM | DIASTOLIC BLOOD PRESSURE: 96 MMHG | SYSTOLIC BLOOD PRESSURE: 160 MMHG | WEIGHT: 180 LBS

## 2018-10-25 DIAGNOSIS — L65.9 LOSS OF HAIR: ICD-10-CM

## 2018-10-25 DIAGNOSIS — Z90.710 HISTORY OF ROBOT-ASSISTED LAPAROSCOPIC HYSTERECTOMY: Primary | ICD-10-CM

## 2018-10-25 DIAGNOSIS — Z23 NEED FOR VACCINATION: ICD-10-CM

## 2018-10-25 DIAGNOSIS — E23.0 PANHYPOPITUITARISM (H): ICD-10-CM

## 2018-10-25 PROCEDURE — G0463 HOSPITAL OUTPT CLINIC VISIT: HCPCS | Mod: ZF

## 2018-10-25 PROCEDURE — 25000128 H RX IP 250 OP 636: Mod: ZF

## 2018-10-25 PROCEDURE — 90686 IIV4 VACC NO PRSV 0.5 ML IM: CPT | Mod: ZF

## 2018-10-25 PROCEDURE — G0008 ADMIN INFLUENZA VIRUS VAC: HCPCS | Mod: ZF

## 2018-10-25 RX ORDER — ESTRADIOL 1 MG/1
1 TABLET ORAL DAILY
Qty: 90 TABLET | Refills: 4 | Status: SHIPPED | OUTPATIENT
Start: 2018-10-25 | End: 2020-01-25

## 2018-10-25 ASSESSMENT — PAIN SCALES - GENERAL: PAINLEVEL: NO PAIN (0)

## 2018-10-25 NOTE — MR AVS SNAPSHOT
After Visit Summary   10/25/2018    Maria Esther Lowe    MRN: [id number]           Patient Information     Date Of Birth          1955        Visit Information        Provider Department      10/25/2018 7:45 AM Tiffany Lew MD [facility]        Today's Diagnoses     History of robot-assisted laparoscopic hysterectomy    -  1    Panhypopituitarism (H)        Loss of hair        Need for vaccination           Follow-ups after your visit        Your next 10 appointments already scheduled     Nov 26, 2018   [place]   [facility] [place]    [address] [place]  [facility] [address] [phone]              Who to contact     Please call your clinic at [phone] to:    Ask questions about your health    Make or cancel appointments    Discuss your medicines    Learn about your test results    Speak to your doctor            Additional Information About Your Visit        MyChart Information     [employer] gives you secure access to your electronic health record. If you see a primary care provider, you can also send messages to your care team and make appointments. If you have questions, please call your primary care clinic.  If you do not have a primary care provider, please call [phone] and they will assist you.      [employer] is an electronic gateway that provides easy, online access to your medical records. With [employer], you can request a clinic appointment, read your test results, renew a prescription or communicate with your care team.     To access your existing account, please contact your [facility] or call [phone] for assistance.        Care EveryWhere ID     This is your Care EveryWhere ID. This could be used by other organizations to access your [place] medical records  [id number]        Your Vitals Were     Pulse Breastfeeding? BMI (Body Mass Index)             75 No 29.95 kg/m2           Blood Pressure from Last 3 Encounters:   10/25/18 (!) 160/96   10/22/18 (!) 168/96   09/12/18 130/74    Weight from Last 3 Encounters:   10/25/18 81.6 kg (180 lb)   10/22/18 82 kg (180 lb 11.2 oz)   09/11/18 81.4 kg (179 lb 7.3 oz)              We Performed the Following     HC FLU VAC PRESRV FREE QUAD SPLIT VIR 3+YRS IM          Today's Medication Changes          These changes are accurate as of 10/25/18  9:15 AM.  If you have any questions, ask your nurse or doctor.               Stop taking these medicines if you haven't already. Please contact your care team if you have questions.     progesterone 100 MG capsule   Commonly known as:  PROMETRIUM   Stopped by:  Tiffany Lew MD                Where to get your medicines      These medications were sent to [facility] - [address] [address]     Phone:  [phone]     estradiol 1 MG tablet                Primary Care Provider Office Phone # Fax #    Estela DAYRON Boswell [phone]       [place] [address]        Equal Access to Services     MARIA DE JESUS [place] AH: Tameka totho Soyaritza, waaxda luqadaha, qaybta kaalmada adeegyada, larissa irizarry. So [facility] [phone].    ATENCIÓN: Si habla español, tiene a aden disposición servicios gratuitos de asistencia lingüística. Elizabeth al [phone].    We comply with applicable federal civil rights laws and [place] laws. We do not discriminate on the basis of race, color, national origin, age, disability, sex, sexual orientation, or gender identity.            Thank you!     Thank you for choosing [facility]  for your care. Our goal is always to provide you with excellent care. Hearing back from our patients is one way we can continue to improve our services. Please take a few minutes to complete the written survey that you may  receive in the mail after your visit with us. Thank you!             Your Updated Medication List - Protect others around you: Learn how to safely use, store and throw away your medicines at www.disposemymeds.org.          This list is accurate as of 10/25/18  9:15 AM.  Always use your most recent med list.                   Brand Name Dispense Instructions for use Diagnosis    calcium 500 +D 500-400 MG-UNIT Tabs   Generic drug:  Calcium Carb-Cholecalciferol     180 tablet    Take 1 tablet by mouth 2 times daily.        cholecalciferol 5000 units Caps     90 capsule    Take 1 daily    Hyperparathyroidism (H)       conjugated estrogens cream    PREMARIN    30 g    Place 0.5 g vaginally twice a week    Vaginal dryness       cyanocobalamin 1000 MCG tablet    vitamin  B-12     Take by mouth every morning        estradiol 1 MG tablet    ESTRACE    90 tablet    Take 1 tablet (1 mg) by mouth daily    Panhypopituitarism (H), Loss of hair       estradiol 10 MCG Tabs vaginal tablet    VAGIFEM    24 tablet    Place 1 tablet (10 mcg) vaginally twice a week    Atrophic vaginitis       hydrocortisone 10 MG tablet    CORTEF    135 tablet    TAKE 1 TABLET IN THE MORNING AND ONE-HALF (1/2) TABLET MIDDAY    Panhypopituitarism (H)       levothyroxine 200 MCG tablet    SYNTHROID/LEVOTHROID    90 tablet    Take 1 tablet (200 mcg) by mouth daily    Panhypopituitarism (H)       losartan-hydrochlorothiazide 100-25 MG per tablet    HYZAAR    90 tablet    Take 1 tablet by mouth daily    Panhypopituitarism (H)       simvastatin 40 MG tablet    ZOCOR    90 tablet    Take 1 tablet (40 mg) by mouth daily    Hyperlipidemia LDL goal <100       zinc 50 MG Tabs      Take  by mouth.

## 2018-10-25 NOTE — LETTER
10/25/2018       RE: Maria Esther Lowe  [address] [facility] [address]     Dear Colleague,    Thank you for referring your patient, Maria Esther Lowe, to the [facility] at [facility]. Please see a copy of my visit note below.    Maria Esther Lowe is 63 year old yo s/p Davinci assisted total laparoscopic hysterectomy and bilateral salpingo-oophorectomy on 9/11/18 for bilateral ovarian cysts.  She has been doing well. She has a occasional pains in the right lower quadrant.  All bruising has resolved.  She has not had bleeding since, but does notice increased vaginal discharge, similar to prior to surgery.  She denies pain, she no longer requires any pain medication.  Bladder and bowel function have returned to normal.     ROS: 10 point ROS neg other than the symptoms noted above in the HPI.  Past Medical History:   Diagnosis Date     Hyperlipidemia LDL goal < 130      Hypertension      Hypothyroidism      Osteopenia     DEXA: 12/11; T-score of -1.9        Panhypopituitarism (H)        Past Surgical History:   Procedure Laterality Date     DAVINCI HYSTERECTOMY TOTAL, BILATERAL SALPINGO-OOPHORECTOMY, COMBINED Bilateral 9/11/2018    Procedure: COMBINED DAVINCI HYSTERECTOMY TOTAL, SALPINGO-OOPHORECTOMY;  Pelvic Washings, Davinci Assisted Laparoscopic Total Hysterectomy, Bilateral Salpingo Oophorectomy, cystoscopy;  Surgeon: Tiffany Lew MD;  Location: [facility] OR     OPERATIVE HYSTEROSCOPY N/A 1/30/2018    Procedure: OPERATIVE HYSTEROSCOPY;  Operative Hysteroscopy, Resection of Endometrial Polyp, Dilation and Curettage;  Surgeon: Daysi Munoz MD;  Location: [facility] OR     resected prolactin-producing pituitary adenoma.  1975    Postoperative radiation therapy.        BP (!) 160/96  Pulse 75  Wt 81.6 kg (180 lb)  Breastfeeding? No  BMI 29.95 kg/m2   Gen'l: well appearing  Abdomen: soft, NT, ND well healed laparoscopic stab wounds  Vulva: normal,  no lesions  Urethra: normal  Vagina: pink, yellow/white discharge noted, patient unable to tolerate speculum exam for very long, no blood noted, cuff palpates smooth.  Cervix/Uterus: absent  Adnexa: no palpable masses    Pathology:  Bilateral serous cystadenoma, benign uterus and fallopian tubes, endocervical polyp    Assessment/Plan:  6 weeks s/p Davinci assisted total laparoscopic hysterectomy, bilateral salpingo-oophorectomy doing well.    - Reviewed benign pathology  - Reviewed that sutures at vaginal cuff are designed to be delayed absorbable and will continue to note some increase in discharge as that occurs.  Reassured patient that discharge appeared normal and should gradually improve.  - Continue pelvic rest and lifting restrictions for 8 wks.  - Refill of vaginal estrogen and estrace.  Discussed consideration of discontinuation of systemic estrogen.  Patient states that her endocrinologist has recommended continuing.  Recommend this be discussed given age greater than 60.    - Return to clinic for annual exam, sooner if any concerns.    Tiffany Lew MD

## 2018-10-25 NOTE — PROGRESS NOTES
Maria Esther Lowe is 63 year old yo s/p Davinci assisted total laparoscopic hysterectomy and bilateral salpingo-oophorectomy on 9/11/18 for bilateral ovarian cysts.  She has been doing well. She has a occasional pains in the right lower quadrant.  All bruising has resolved.  She has not had bleeding since, but does notice increased vaginal discharge, similar to prior to surgery.  She denies pain, she no longer requires any pain medication.  Bladder and bowel function have returned to normal.     ROS: 10 point ROS neg other than the symptoms noted above in the HPI.  Past Medical History:   Diagnosis Date     Hyperlipidemia LDL goal < 130      Hypertension      Hypothyroidism      Osteopenia     DEXA: 12/11; T-score of -1.9        Panhypopituitarism (H)        Past Surgical History:   Procedure Laterality Date     DAVINCI HYSTERECTOMY TOTAL, BILATERAL SALPINGO-OOPHORECTOMY, COMBINED Bilateral 9/11/2018    Procedure: COMBINED DAVINCI HYSTERECTOMY TOTAL, SALPINGO-OOPHORECTOMY;  Pelvic Washings, Davinci Assisted Laparoscopic Total Hysterectomy, Bilateral Salpingo Oophorectomy, cystoscopy;  Surgeon: Tiffany Lew MD;  [facility]     OPERATIVE HYSTEROSCOPY N/A 1/30/2018    Procedure: OPERATIVE HYSTEROSCOPY;  Operative Hysteroscopy, Resection of Endometrial Polyp, Dilation and Curettage;  Surgeon: Daysi Munoz MD;  [facility]     resected prolactin-producing pituitary adenoma.  1975    Postoperative radiation therapy.        BP (!) 160/96  Pulse 75  Wt 81.6 kg (180 lb)  Breastfeeding? No  BMI 29.95 kg/m2   Gen'l: well appearing  Abdomen: soft, NT, ND well healed laparoscopic stab wounds  Vulva: normal, no lesions  Urethra: normal  Vagina: pink, yellow/white discharge noted, patient unable to tolerate speculum exam for very long, no blood noted, cuff palpates smooth.  Cervix/Uterus: absent  Adnexa: no palpable masses    Pathology:  Bilateral serous cystadenoma, benign uterus and fallopian  tubes, endocervical polyp    Assessment/Plan:  6 weeks s/p Davinci assisted total laparoscopic hysterectomy, bilateral salpingo-oophorectomy doing well.    - Reviewed benign pathology  - Reviewed that sutures at vaginal cuff are designed to be delayed absorbable and will continue to note some increase in discharge as that occurs.  Reassured patient that discharge appeared normal and should gradually improve.  - Continue pelvic rest and lifting restrictions for 8 wks.  - Refill of vaginal estrogen and estrace.  Discussed consideration of discontinuation of systemic estrogen.  Patient states that her endocrinologist has recommended continuing.  Recommend this be discussed given age greater than 60.    - Return to clinic for annual exam, sooner if any concerns.  Tiffany Lew MD

## 2018-10-26 ENCOUNTER — MYC MEDICAL ADVICE (OUTPATIENT)
Dept: ENDOCRINOLOGY | Facility: CLINIC | Age: 63
End: 2018-10-26

## 2018-10-28 NOTE — PROGRESS NOTES
Dear Maria Esther     Here is your results. Still osteopenia but tendency of mild improvement.  Please continue the plan we discussed.  Please call nursing line at [phone] if you have any questions.    Take care  Leona Arrington MD

## 2018-10-29 ENCOUNTER — PRE VISIT (OUTPATIENT)
Dept: OPHTHALMOLOGY | Facility: CLINIC | Age: 63
End: 2018-10-29

## 2018-10-29 NOTE — TELEPHONE ENCOUNTER
----- Message from Leona Arrington MD sent at 10/28/2018  3:26 PM CDT -----  Regarding: add on  Hi  I contacted att on lab free T4 on her 10/22 samсергей, and I talked with tech but still not done, could you talk with them?    Thank you  Leona

## 2018-10-29 NOTE — TELEPHONE ENCOUNTER
Spoke w/ , States: they cannot add-on as the specimen is too old. Pt contacted to please schedule lab draw.

## 2018-11-21 ENCOUNTER — ANESTHESIA EVENT (OUTPATIENT)
Dept: SURGERY | Facility: AMBULATORY SURGERY CENTER | Age: 63
End: 2018-11-21

## 2018-11-26 ENCOUNTER — HOSPITAL ENCOUNTER (OUTPATIENT)
Facility: AMBULATORY SURGERY CENTER | Age: 63
Discharge: HOME | End: 2018-11-26
Attending: OPHTHALMOLOGY | Admitting: OPHTHALMOLOGY
Payer: COMMERCIAL

## 2018-11-26 ENCOUNTER — ANESTHESIA (OUTPATIENT)
Dept: SURGERY | Facility: AMBULATORY SURGERY CENTER | Age: 63
End: 2018-11-26
Payer: COMMERCIAL

## 2018-11-26 ENCOUNTER — SURGERY (OUTPATIENT)
Age: 63
End: 2018-11-26
Payer: COMMERCIAL

## 2018-11-26 VITALS
RESPIRATION RATE: 16 BRPM | OXYGEN SATURATION: 94 % | TEMPERATURE: 97.7 F | DIASTOLIC BLOOD PRESSURE: 66 MMHG | SYSTOLIC BLOOD PRESSURE: 134 MMHG

## 2018-11-26 DIAGNOSIS — Z98.890 POSTOPERATIVE EYE STATE: Primary | ICD-10-CM

## 2018-11-26 PROCEDURE — 68815 PROBE NASOLACRIMAL DUCT: CPT | Mod: 50

## 2018-11-26 PROCEDURE — G8907 PT DOC NO EVENTS ON DISCHARG: HCPCS

## 2018-11-26 PROCEDURE — G8918 PT W/O PREOP ORDER IV AB PRO: HCPCS

## 2018-11-26 PROCEDURE — 68815 PROBE NASOLACRIMAL DUCT: CPT | Mod: 50 | Performed by: OPHTHALMOLOGY

## 2018-11-26 DEVICE — EYE STENT LACRIMAL LRG LIS052: Type: IMPLANTABLE DEVICE | Site: LACRIMAL DUCT | Status: FUNCTIONAL

## 2018-11-26 RX ORDER — PROPOFOL 10 MG/ML
INJECTION, EMULSION INTRAVENOUS PRN
Status: DISCONTINUED | OUTPATIENT
Start: 2018-11-26 | End: 2018-11-26

## 2018-11-26 RX ORDER — ONDANSETRON 4 MG/1
4 TABLET, ORALLY DISINTEGRATING ORAL EVERY 30 MIN PRN
Status: DISCONTINUED | OUTPATIENT
Start: 2018-11-26 | End: 2018-11-27 | Stop reason: HOSPADM

## 2018-11-26 RX ORDER — ERYTHROMYCIN 5 MG/G
OINTMENT OPHTHALMIC PRN
Status: DISCONTINUED | OUTPATIENT
Start: 2018-11-26 | End: 2018-11-26 | Stop reason: HOSPADM

## 2018-11-26 RX ORDER — FENTANYL CITRATE 50 UG/ML
INJECTION, SOLUTION INTRAMUSCULAR; INTRAVENOUS PRN
Status: DISCONTINUED | OUTPATIENT
Start: 2018-11-26 | End: 2018-11-26

## 2018-11-26 RX ORDER — HYDROMORPHONE HYDROCHLORIDE 1 MG/ML
.3-.5 INJECTION, SOLUTION INTRAMUSCULAR; INTRAVENOUS; SUBCUTANEOUS EVERY 10 MIN PRN
Status: DISCONTINUED | OUTPATIENT
Start: 2018-11-26 | End: 2018-11-27 | Stop reason: HOSPADM

## 2018-11-26 RX ORDER — KETOROLAC TROMETHAMINE 30 MG/ML
30 INJECTION, SOLUTION INTRAMUSCULAR; INTRAVENOUS EVERY 6 HOURS PRN
Status: DISCONTINUED | OUTPATIENT
Start: 2018-11-26 | End: 2018-11-27 | Stop reason: HOSPADM

## 2018-11-26 RX ORDER — FENTANYL CITRATE 50 UG/ML
25-50 INJECTION, SOLUTION INTRAMUSCULAR; INTRAVENOUS
Status: DISCONTINUED | OUTPATIENT
Start: 2018-11-26 | End: 2018-11-27 | Stop reason: HOSPADM

## 2018-11-26 RX ORDER — SODIUM CHLORIDE, SODIUM LACTATE, POTASSIUM CHLORIDE, CALCIUM CHLORIDE 600; 310; 30; 20 MG/100ML; MG/100ML; MG/100ML; MG/100ML
INJECTION, SOLUTION INTRAVENOUS CONTINUOUS
Status: DISCONTINUED | OUTPATIENT
Start: 2018-11-26 | End: 2018-11-27 | Stop reason: HOSPADM

## 2018-11-26 RX ORDER — PROPOFOL 10 MG/ML
INJECTION, EMULSION INTRAVENOUS CONTINUOUS PRN
Status: DISCONTINUED | OUTPATIENT
Start: 2018-11-26 | End: 2018-11-26

## 2018-11-26 RX ORDER — OXYCODONE HYDROCHLORIDE 5 MG/1
5-10 TABLET ORAL EVERY 4 HOURS PRN
Status: DISCONTINUED | OUTPATIENT
Start: 2018-11-26 | End: 2018-11-27 | Stop reason: HOSPADM

## 2018-11-26 RX ORDER — LIDOCAINE HYDROCHLORIDE 20 MG/ML
INJECTION, SOLUTION INFILTRATION; PERINEURAL PRN
Status: DISCONTINUED | OUTPATIENT
Start: 2018-11-26 | End: 2018-11-26

## 2018-11-26 RX ORDER — MEPERIDINE HYDROCHLORIDE 25 MG/ML
12.5 INJECTION INTRAMUSCULAR; INTRAVENOUS; SUBCUTANEOUS
Status: DISCONTINUED | OUTPATIENT
Start: 2018-11-26 | End: 2018-11-27 | Stop reason: HOSPADM

## 2018-11-26 RX ORDER — ACETAMINOPHEN 325 MG/1
975 TABLET ORAL ONCE
Status: COMPLETED | OUTPATIENT
Start: 2018-11-26 | End: 2018-11-26

## 2018-11-26 RX ORDER — NALOXONE HYDROCHLORIDE 0.4 MG/ML
.1-.4 INJECTION, SOLUTION INTRAMUSCULAR; INTRAVENOUS; SUBCUTANEOUS
Status: DISCONTINUED | OUTPATIENT
Start: 2018-11-26 | End: 2018-11-27 | Stop reason: HOSPADM

## 2018-11-26 RX ORDER — NEOMYCIN SULFATE, POLYMYXIN B SULFATE AND DEXAMETHASONE 3.5; 10000; 1 MG/ML; [USP'U]/ML; MG/ML
1 SUSPENSION/ DROPS OPHTHALMIC 3 TIMES DAILY
Qty: 1.5 ML | Refills: 0 | Status: SHIPPED | OUTPATIENT
Start: 2018-11-26 | End: 2019-02-06

## 2018-11-26 RX ORDER — ONDANSETRON 2 MG/ML
4 INJECTION INTRAMUSCULAR; INTRAVENOUS EVERY 30 MIN PRN
Status: DISCONTINUED | OUTPATIENT
Start: 2018-11-26 | End: 2018-11-27 | Stop reason: HOSPADM

## 2018-11-26 RX ORDER — DEXAMETHASONE SODIUM PHOSPHATE 4 MG/ML
4 INJECTION, SOLUTION INTRA-ARTICULAR; INTRALESIONAL; INTRAMUSCULAR; INTRAVENOUS; SOFT TISSUE EVERY 10 MIN PRN
Status: DISCONTINUED | OUTPATIENT
Start: 2018-11-26 | End: 2018-11-27 | Stop reason: HOSPADM

## 2018-11-26 RX ORDER — ALBUTEROL SULFATE 0.83 MG/ML
2.5 SOLUTION RESPIRATORY (INHALATION) EVERY 4 HOURS PRN
Status: DISCONTINUED | OUTPATIENT
Start: 2018-11-26 | End: 2018-11-27 | Stop reason: HOSPADM

## 2018-11-26 RX ORDER — TETRACAINE HYDROCHLORIDE 5 MG/ML
SOLUTION OPHTHALMIC PRN
Status: DISCONTINUED | OUTPATIENT
Start: 2018-11-26 | End: 2018-11-26 | Stop reason: HOSPADM

## 2018-11-26 RX ORDER — LIDOCAINE 40 MG/G
CREAM TOPICAL
Status: DISCONTINUED | OUTPATIENT
Start: 2018-11-26 | End: 2018-11-27 | Stop reason: HOSPADM

## 2018-11-26 RX ADMIN — ERYTHROMYCIN 1 G: 5 OINTMENT OPHTHALMIC at 13:50

## 2018-11-26 RX ADMIN — FENTANYL CITRATE 25 MCG: 50 INJECTION, SOLUTION INTRAMUSCULAR; INTRAVENOUS at 13:33

## 2018-11-26 RX ADMIN — ACETAMINOPHEN 975 MG: 325 TABLET ORAL at 12:41

## 2018-11-26 RX ADMIN — PROPOFOL 80 MG: 10 INJECTION, EMULSION INTRAVENOUS at 13:36

## 2018-11-26 RX ADMIN — Medication 5 ML: at 13:37

## 2018-11-26 RX ADMIN — LIDOCAINE HYDROCHLORIDE 40 MG: 20 INJECTION, SOLUTION INFILTRATION; PERINEURAL at 13:36

## 2018-11-26 RX ADMIN — PROPOFOL 20 MG: 10 INJECTION, EMULSION INTRAVENOUS at 13:37

## 2018-11-26 RX ADMIN — FENTANYL CITRATE 25 MCG: 50 INJECTION, SOLUTION INTRAMUSCULAR; INTRAVENOUS at 14:03

## 2018-11-26 RX ADMIN — SODIUM CHLORIDE, SODIUM LACTATE, POTASSIUM CHLORIDE, CALCIUM CHLORIDE: 600; 310; 30; 20 INJECTION, SOLUTION INTRAVENOUS at 13:08

## 2018-11-26 RX ADMIN — TETRACAINE HYDROCHLORIDE 1 DROP: 5 SOLUTION OPHTHALMIC at 13:30

## 2018-11-26 RX ADMIN — PROPOFOL 75 MCG/KG/MIN: 10 INJECTION, EMULSION INTRAVENOUS at 13:37

## 2018-11-26 NOTE — ANESTHESIA CARE TRANSFER NOTE
Patient: Maria Esther Lowe    Procedure(s):  BILATERAL NASOLACRIMAL STENT - LARGE DIAMETER STEN TUBE    Diagnosis: BILATERAL EPIPHORA   Diagnosis Additional Information: No value filed.    Anesthesia Type:   MAC     Note:  Airway :Room Air  Patient transferred to:Phase II  Comments: To Phase II. Report to RN.  VSS Resp status stable.Handoff Report: Identifed the Patient, Identified the Reponsible Provider, Reviewed the pertinent medical history, Discussed the surgical course, Reviewed Intra-OP anesthesia mangement and issues during anesthesia, Set expectations for post-procedure period and Allowed opportunity for questions and acknowledgement of understanding      Vitals: (Last set prior to Anesthesia Care Transfer)    CRNA VITALS  11/26/2018 1346 - 11/26/2018 1416      11/26/2018             Pulse: 101    SpO2: 91 %                Electronically Signed By: LENNIE Topete CRNA  November 26, 2018  2:16 PM

## 2018-11-26 NOTE — IP AVS SNAPSHOT
MRN:[id number]                      After Visit Summary   11/26/2018    Maria Esther Lowe    MRN: [id number]           Thank you!     Thank you for choosing [facility] for your care. Our goal is always to provide you with excellent care. Hearing back from our patients is one way we can continue to improve our services. Please take a few minutes to complete the written survey that you may receive in the mail after you visit with us. Thank you!        Patient Information     Date Of Birth          1955        About your hospital stay     You were admitted on:  November 26, 2018 You last received care in the:  [facility]    You were discharged on:  November 26, 2018       Who to Call     For medical emergencies, please call 911.  For non-urgent questions about your medical care, please call your primary care provider or clinic, [phone]  For questions related to your surgery, please call your surgery clinic        Attending Provider     Provider Davina Boyd MD Ophthalmology       Primary Care Provider Office Phone # Fax #    Estela DAYRON Boswell [phone]      After Care Instructions     Activity       No lifting or bending over.  Avoid sleeping on operative side.            Call Physician       Call physician if active bleeding not stopped with direct compression and cold compress.            Do NOT blow nose       Do NOT blow nose on operative side for 7 days.                  Further instructions from your care team       Post-operative Instructions  Ophthalmic Plastic and Reconstructive Surgery    Davina Payton M.D.    All instructions apply to the operated eye(s) or eyelid(s).  Wound care and personal care    ? Apply ice compresses for 20 minutes every hour while awake for 2 days, then  switch to warm water compresses 4 times a day until seen by your physician. For warm packs you can place a cup of dry uncooked rice in a clean  cotton sock. Then place sock in microwave 30 seconds to one minute. Next place the warm sock into a plastic bag and wrap the bag with clean warm wet washcloth and place over operated eye.    ? You may shower or wash your hair the day after surgery. Do not bathe or go  swimming for 1 week to prevent contamination of your wounds.  ? Do not apply make-up to the eyes or eyelids for 2 weeks after surgery.  ? Expect some swelling, bruising, black eye (even into the lower eyelids and cheeks). Also expect serum caking, crusting and discharge from the eye and/or incisions. A small amount of surface bleeding is normal for the first 48 hours.  ? Your eye(s) and eyelid(s) may be painful and tender. This is normal after surgery.  Use the pain medication as prescribed. If your pain does not improve despite the  medication, contact the office.  ? If you had surgery involving the nose or tear drainage system, the following wound  care instructions also apply to you :  ? Do not blow your nose or drink hot liquids for 1 week after your surgery.  ? Do not pull at the small tube in the nose or corner of the eye.    Contact information and follow-up  ? Return to the Eye Clinic for a follow-up appointment with your physician as  scheduled. If no appointment has been scheduled:   - If you are seen at the [facility], call [phone] for an  appointment with Dr. Payton within 1 to 2 weeks from your date of surgery.   - If you are seen at [facility], you can call [phone] for an appointment with Dr. Payton within 1 to 2 weeks from your date of surgery.     ? For severe pain, bleeding, or loss of vision, during business hours call the [facility] at [phone] or [facility] at [phone].     After hours or on weekends and holidays, call [phone] and ask to speak with the ophthalmologist on call.    An on call person can be reached after hours for concerns.  The on call doctor should not call in medication refill requests after hours or on weekends, so please plan accordingly. An effort has been made to provide adequate pain medications following every surgery, and refills will not be provided in most instances. Narcotic pain medications cannot be called in.   Activity restrictions and driving  ? Avoid heavy lifting, bending, exercise or strenuous activity for 1 week after surgery.  You may resume other activities and return to work as tolerated.  ? You may not resume driving until have you stopped using narcotic pain medications  (such as Vicodin, Percocet, Tylenol #3).    Medications  ? Restart all your regular home medications and eye drops. If you take Plavix or  Aspirin on a regular basis, wait for 72 hours after your surgery before restarting these in order to decrease the risk of bleeding complications.  ? Avoid aspirin and aspirin-like medications (Motrin, Aleve, Ibuprofen, Cathleen-  [place] etc) for 72 hours to reduce the risk of bleeding. You may take Tylenol  (acetaminophen) for pain.  ? In addition to your home medications, take the following post-operative medications as prescribed by your physician.    ? Instill eye drops 3 times a day for 10 days.     [facility]  Same-Day Surgery   Adult Discharge Orders & Instructions   For 24 hours after surgery  1. Get plenty of rest.  A responsible adult must stay with you for at least 24 hours after you leave the hospital.   2. Do not drive or use heavy equipment.  If you have weakness or tingling, don't drive or use heavy equipment until this feeling goes away.  3. Do not drink alcohol.  4. Avoid strenuous or risky activities.  Ask for help when climbing stairs.   5. You may feel lightheaded.  IF so, sit for a few minutes before standing.  Have someone help you get up.   6. If you have nausea (feel sick to your stomach): Drink only clear liquids such as apple juice, ginger ale, broth or 7-Up.   Rest may also help.  Be sure to drink enough fluids.  Move to a regular diet as you feel able.  7. You may have a slight fever. Call the doctor if your fever is over 100 F (37.7 C) (taken under the tongue) or lasts longer than 24 hours.  8. You may have a dry mouth, a sore throat, muscle aches or trouble sleeping.  These should go away after 24 hours.  9. Do not make important or legal decisions.   Call your doctor for any of the followin.  Signs of infection (fever, growing tenderness at the surgery site, a large amount of drainage or bleeding, severe pain, foul-smelling drainage, redness, swelling).    2. It has been over 8 to 10 hours since surgery and you are still not able to urinate (pass water).    3.  Headache for over 24 hours.            Pending Results     No orders found from 2018 to 2018.            Admission Information     Date & Time Provider Department Dept. Phone    2018 Davina Payton MD [facility] [phone]      Your Vitals Were     Blood Pressure Temperature Respirations Pulse Oximetry          139/69 97.6  F (36.4  C) (Tympanic) 16 90%        MyChart Information     [employer] [employer] gives you secure access to your electronic health record. If you see a primary care provider, you can also send messages to your care team and make appointments. If you have questions, please call your primary care clinic.  If you do not have a primary care provider, please call [phone] and they will assist you.        Care EveryWhere ID     This is your Care EveryWhere ID. This could be used by other organizations to access your [place] medical records  [id number]        Equal Access to Services     : Tameka Silverman, fransico samuel, larissa lee. So [facility] [phone].    ATENCIÓN: Si habla español, tiene a aden disposición servicios gratuitos de asistencia lingüística. Llame al  [phone].    We comply with applicable federal civil rights laws and [place] laws. We do not discriminate on the basis of race, color, national origin, age, disability, sex, sexual orientation, or gender identity.               Review of your medicines      START taking        Dose / Directions    neomycin-polymyxin-dexamethasone 3.5-66714-4.1 Susp ophthalmic susp   Commonly known as:  MAXITROL   Used for:  Postoperative eye state        Dose:  1 drop   Place 1 drop into both eyes 3 times daily for 10 days   Quantity:  1.5 mL   Refills:  0         CONTINUE these medicines which have NOT CHANGED        Dose / Directions    calcium 500 +D 500-400 MG-UNIT Tabs   Generic drug:  Calcium Carb-Cholecalciferol        Dose:  1 tablet   Take 1 tablet by mouth 2 times daily.   Quantity:  180 tablet   Refills:  3       cholecalciferol 5000 units Caps   Used for:  Hyperparathyroidism (H)        Take 1 daily   Quantity:  90 capsule   Refills:  3       conjugated estrogens 0.625 MG/GM vaginal cream   Commonly known as:  PREMARIN   Used for:  Vaginal dryness        Dose:  0.5 g   Place 0.5 g vaginally twice a week   Quantity:  30 g   Refills:  12       cyanocobalamin 1000 MCG tablet   Commonly known as:  vitamin  B-12        Take by mouth every morning   Refills:  0       estradiol 1 MG tablet   Commonly known as:  ESTRACE   Used for:  Panhypopituitarism (H), Loss of hair        Dose:  1 mg   Take 1 tablet (1 mg) by mouth daily   Quantity:  90 tablet   Refills:  4       estradiol 10 MCG Tabs vaginal tablet   Commonly known as:  VAGIFEM   Used for:  Atrophic vaginitis        Dose:  10 mcg   Place 1 tablet (10 mcg) vaginally twice a week   Quantity:  24 tablet   Refills:  3       hydrocortisone 10 MG tablet   Commonly known as:  CORTEF   Used for:  Panhypopituitarism (H)        TAKE 1 TABLET IN THE MORNING AND ONE-HALF (1/2) TABLET MIDDAY   Quantity:  135 tablet   Refills:  2       levothyroxine 200 MCG tablet   Commonly known  as:  SYNTHROID/LEVOTHROID   Used for:  Panhypopituitarism (H)        Dose:  200 mcg   Take 1 tablet (200 mcg) by mouth daily   Quantity:  90 tablet   Refills:  3       losartan-hydrochlorothiazide 100-25 MG per tablet   Commonly known as:  HYZAAR   Used for:  Panhypopituitarism (H)        Dose:  1 tablet   Take 1 tablet by mouth daily   Quantity:  90 tablet   Refills:  3       simvastatin 40 MG tablet   Commonly known as:  ZOCOR   Used for:  Hyperlipidemia LDL goal <100        Dose:  40 mg   Take 1 tablet (40 mg) by mouth daily   Quantity:  90 tablet   Refills:  3       zinc 50 MG Tabs        Take  by mouth.   Refills:  0            Where to get your medicines      These medications were sent to [facility] - [place], [place] - [address], [address]  [address], [address], [facility] [address]     Phone:  [phone]     neomycin-polymyxin-dexamethasone 3.5-20547-1.1 Susp ophthalmic susp                Protect others around you: Learn how to safely use, store and throw away your medicines at www.disposemymeds.org.             Medication List: This is a list of all your medications and when to take them. Check marks below indicate your daily home schedule. Keep this list as a reference.      Medications           Morning Afternoon Evening Bedtime As Needed    calcium 500 +D 500-400 MG-UNIT Tabs   Take 1 tablet by mouth 2 times daily.   Generic drug:  Calcium Carb-Cholecalciferol                                cholecalciferol 5000 units Caps   Take 1 daily                                conjugated estrogens 0.625 MG/GM vaginal cream   Commonly known as:  PREMARIN   Place 0.5 g vaginally twice a week                                cyanocobalamin 1000 MCG tablet   Commonly known as:  vitamin  B-12   Take by mouth every morning                                estradiol 1 MG tablet   Commonly known as:  ESTRACE   Take 1 tablet (1 mg) by mouth daily                                estradiol  10 MCG Tabs vaginal tablet   Commonly known as:  VAGIFEM   Place 1 tablet (10 mcg) vaginally twice a week                                hydrocortisone 10 MG tablet   Commonly known as:  CORTEF   TAKE 1 TABLET IN THE MORNING AND ONE-HALF (1/2) TABLET MIDDAY                                levothyroxine 200 MCG tablet   Commonly known as:  SYNTHROID/LEVOTHROID   Take 1 tablet (200 mcg) by mouth daily                                losartan-hydrochlorothiazide 100-25 MG per tablet   Commonly known as:  HYZAAR   Take 1 tablet by mouth daily                                neomycin-polymyxin-dexamethasone 3.5-62794-5.1 Susp ophthalmic susp   Commonly known as:  MAXITROL   Place 1 drop into both eyes 3 times daily for 10 days                                simvastatin 40 MG tablet   Commonly known as:  ZOCOR   Take 1 tablet (40 mg) by mouth daily                                zinc 50 MG Tabs   Take  by mouth.

## 2018-11-26 NOTE — DISCHARGE INSTRUCTIONS
Post-operative Instructions  Ophthalmic Plastic and Reconstructive Surgery    Davina Payton M.D.    All instructions apply to the operated eye(s) or eyelid(s).  Wound care and personal care    ? Apply ice compresses for 20 minutes every hour while awake for 2 days, then  switch to warm water compresses 4 times a day until seen by your physician. For warm packs you can place a cup of dry uncooked rice in a clean cotton sock. Then place sock in microwave 30 seconds to one minute. Next place the warm sock into a plastic bag and wrap the bag with clean warm wet washcloth and place over operated eye.    ? You may shower or wash your hair the day after surgery. Do not bathe or go  swimming for 1 week to prevent contamination of your wounds.  ? Do not apply make-up to the eyes or eyelids for 2 weeks after surgery.  ? Expect some swelling, bruising, black eye (even into the lower eyelids and cheeks). Also expect serum caking, crusting and discharge from the eye and/or incisions. A small amount of surface bleeding is normal for the first 48 hours.  ? Your eye(s) and eyelid(s) may be painful and tender. This is normal after surgery.  Use the pain medication as prescribed. If your pain does not improve despite the  medication, contact the office.  ? If you had surgery involving the nose or tear drainage system, the following wound  care instructions also apply to you :  ? Do not blow your nose or drink hot liquids for 1 week after your surgery.  ? Do not pull at the small tube in the nose or corner of the eye.    Contact information and follow-up  ? Return to the Eye Clinic for a follow-up appointment with your physician as  scheduled. If no appointment has been scheduled:   - If you are seen at the [facility], call [phone] for an  appointment with Dr. Payton within 1 to 2 weeks from your date of surgery.   - If you are seen at [facility], you can call [phone] for an appointment with  Dr. Payton within 1 to 2 weeks from your date of surgery.     ? For severe pain, bleeding, or loss of vision, during business hours call the [facility] at [phone] or [facility] at [phone].     After hours or on weekends and holidays, call [phone] and ask to speak with the ophthalmologist on call.    An on call person can be reached after hours for concerns. The on call doctor should not call in medication refill requests after hours or on weekends, so please plan accordingly. An effort has been made to provide adequate pain medications following every surgery, and refills will not be provided in most instances. Narcotic pain medications cannot be called in.   Activity restrictions and driving  ? Avoid heavy lifting, bending, exercise or strenuous activity for 1 week after surgery.  You may resume other activities and return to work as tolerated.  ? You may not resume driving until have you stopped using narcotic pain medications  (such as Vicodin, Percocet, Tylenol #3).    Medications  ? Restart all your regular home medications and eye drops. If you take Plavix or  Aspirin on a regular basis, wait for 72 hours after your surgery before restarting these in order to decrease the risk of bleeding complications.  ? Avoid aspirin and aspirin-like medications (Motrin, Aleve, Ibuprofen, Cathleen-  [place] etc) for 72 hours to reduce the risk of bleeding. You may take Tylenol  (acetaminophen) for pain.  ? In addition to your home medications, take the following post-operative medications as prescribed by your physician.    ? Instill eye drops 3 times a day for 10 days.     [facility]  Same-Day Surgery   Adult Discharge Orders & Instructions   For 24 hours after surgery  1. Get plenty of rest.  A responsible adult must stay with you for at least 24 hours after you leave the hospital.   2. Do not drive or use heavy equipment.  If you have  weakness or tingling, don't drive or use heavy equipment until this feeling goes away.  3. Do not drink alcohol.  4. Avoid strenuous or risky activities.  Ask for help when climbing stairs.   5. You may feel lightheaded.  IF so, sit for a few minutes before standing.  Have someone help you get up.   6. If you have nausea (feel sick to your stomach): Drink only clear liquids such as apple juice, ginger ale, broth or 7-Up.  Rest may also help.  Be sure to drink enough fluids.  Move to a regular diet as you feel able.  7. You may have a slight fever. Call the doctor if your fever is over 100 F (37.7 C) (taken under the tongue) or lasts longer than 24 hours.  8. You may have a dry mouth, a sore throat, muscle aches or trouble sleeping.  These should go away after 24 hours.  9. Do not make important or legal decisions.   Call your doctor for any of the followin.  Signs of infection (fever, growing tenderness at the surgery site, a large amount of drainage or bleeding, severe pain, foul-smelling drainage, redness, swelling).    2. It has been over 8 to 10 hours since surgery and you are still not able to urinate (pass water).    3.  Headache for over 24 hours.

## 2018-11-26 NOTE — ANESTHESIA PREPROCEDURE EVALUATION
Anesthesia Pre-Procedure Evaluation    Patient: Maria Esther Lowe   MRN:     [id number] Gender:   female   Age:    63 year old :      1955        Preoperative Diagnosis: BILATERAL EPIPHORA    Procedure(s):  BILATERAL NASOLACRIMAL STENT - LARGE DIAMETER STEN TUBE     Past Medical History:   Diagnosis Date     Hyperlipidemia LDL goal < 130      Hypertension      Hypothyroidism      Osteopenia     DEXA: ; T-score of -1.9        Panhypopituitarism (H)       Past Surgical History:   Procedure Laterality Date     DAVINCI HYSTERECTOMY TOTAL, BILATERAL SALPINGO-OOPHORECTOMY, COMBINED Bilateral 2018    Procedure: COMBINED DAVINCI HYSTERECTOMY TOTAL, SALPINGO-OOPHORECTOMY;  Pelvic Washings, Davinci Assisted Laparoscopic Total Hysterectomy, Bilateral Salpingo Oophorectomy, cystoscopy;  Surgeon: Tiffany Lew MD;  Location: [facility] OR     OPERATIVE HYSTEROSCOPY N/A 2018    Procedure: OPERATIVE HYSTEROSCOPY;  Operative Hysteroscopy, Resection of Endometrial Polyp, Dilation and Curettage;  Surgeon: Daysi Munoz MD;  Location: [facility] OR     resected prolactin-producing pituitary adenoma.      Postoperative radiation therapy.           Anesthesia Evaluation     . Pt has had prior anesthetic. Type: General and MAC           ROS/MED HX    ENT/Pulmonary:  - neg pulmonary ROS     Neurologic: Comment: S/P pituitary resection in .  Requires steroids      Cardiovascular:     (+) hypertension----. : . . . :. .       METS/Exercise Tolerance:     Hematologic:  - neg hematologic  ROS       Musculoskeletal:  - neg musculoskeletal ROS       GI/Hepatic:  - neg GI/hepatic ROS       Renal/Genitourinary:  - ROS Renal section negative       Endo:     (+) thyroid problem hypothyroidism, Chronic steroid usage for .      Psychiatric:  - neg psychiatric ROS       Infectious Disease:  - neg infectious disease ROS       Malignancy:      - no malignancy   Other:    - neg other ROS                     PHYSICAL EXAM:  "  Mental Status/Neuro: A/A/O   Airway: Facies: Feasible  Mallampati: I  Mouth/Opening: Full  TM distance: > 6 cm  Neck ROM: Full   Respiratory: Auscultation: CTAB     Resp. Rate: Normal     Resp. Effort: Normal      CV: Rhythm: Regular  Rate: Age appropriate  Heart: Normal Sounds   Comments:      Dental: Normal                  Lab Results   Component Value Date    WBC 15.5 (H) 09/12/2018    HGB 11.8 09/12/2018    HCT 33.8 (L) 09/12/2018     09/12/2018     09/12/2018    POTASSIUM 4.7 09/12/2018    CHLORIDE 106 09/12/2018    CO2 24 09/12/2018    BUN 17 09/12/2018    CR 1.11 (H) 09/12/2018     (H) 09/12/2018    ARI 8.2 (L) 09/12/2018    PHOS 3.0 12/06/2016    ALBUMIN 3.9 04/26/2016    PROTTOTAL 7.8 10/18/2007    ALT 29 12/06/2016    AST 64 (H) 05/31/2011    ALKPHOS 106 08/26/2008    BILITOTAL 0.6 08/26/2008    TSH 0.11 (L) 10/22/2018    T4 0.77 08/13/2018       Preop Vitals  BP Readings from Last 3 Encounters:   10/25/18 (!) 160/96   10/22/18 (!) 168/96   09/12/18 130/74    Pulse Readings from Last 3 Encounters:   10/25/18 75   10/22/18 65   08/17/18 59      Resp Readings from Last 3 Encounters:   09/12/18 16   01/30/18 18   03/16/17 16    SpO2 Readings from Last 3 Encounters:   09/12/18 95%   01/30/18 99%      Temp Readings from Last 1 Encounters:   09/12/18 97.6  F (36.4  C) (Oral)    Ht Readings from Last 1 Encounters:   10/22/18 1.651 m (5' 5\")      Wt Readings from Last 1 Encounters:   10/25/18 81.6 kg (180 lb)    Estimated body mass index is 29.95 kg/(m^2) as calculated from the following:    Height as of 10/22/18: 1.651 m (5' 5\").    Weight as of 10/25/18: 81.6 kg (180 lb).     LDA:            Assessment:   ASA SCORE: 3    Will be NPO appropriate at: 11/26/2018 12:38 PM   Documentation: H&P complete; Preop Testing complete; Consents complete   Proceeding: Proceed without further delay  Tobacco Use:  NO Active use of Tobacco/UNKNOWN Tobacco use status     Plan:   Anes. Type:  MAC "   Pre-Induction: Midazolam IV; Acetaminophen PO; Gabapentin PO; Hydrocortisone IV (Stress)   Induction:  IV (Standard)   Airway: Native Airway   Access/Monitoring: PIV   Maintenance: Propofol; IV   Emergence: Procedure Site   Logistics: Same Day Surgery     Postop Pain/Sedation Strategy:  Standard-Options: Opioids PRN     PONV Management:  Adult Risk Factors: Female, Non-Smoker, Postop Opioids  Prevention: Propofol Infusion; Ondansetron     CONSENT: Direct conversation   Plan and risks discussed with: Patient   Blood Products: Consent Deferred (Minimal Blood Loss)                         Santi Olivera MD

## 2018-11-26 NOTE — BRIEF OP NOTE
[facility] Brief Operative Note    Pre-operative diagnosis: BILATERAL EPIPHORA    Post-operative diagnosis Same   Procedure: Procedure(s):  BILATERAL NASOLACRIMAL STENT - LARGE DIAMETER STEN TUBE   Surgeon: Davina Payton M.D.   Assistants(s): David Quintanilla M.D.   Estimated blood loss: Less than 10 mL   Specimens: None   Findings: As expected

## 2018-11-26 NOTE — ANESTHESIA POSTPROCEDURE EVALUATION
Anesthesia POST Procedure Evaluation    Patient: Maria Esther Lowe   MRN:     [id number] Gender:   female   Age:    63 year old :      1955        Preoperative Diagnosis: BILATERAL EPIPHORA    Procedure(s):  BILATERAL NASOLACRIMAL STENT - LARGE DIAMETER STEN TUBE   Postop Comments: No value filed.       Anesthesia Type:  MAC    Reportable Event: NO     PAIN: Uncomplicated   Sign Out status: Comfortable, Well controlled pain     PONV: No PONV   Sign Out status:  No Nausea or Vomiting     Neuro/Psych: Uneventful perioperative course   Sign Out Status: Preoperative baseline; Age appropriate mentation     Airway/Resp.: Uneventful perioperative course   Sign Out Status: Non labored breathing, age appropriate RR; Resp. Status within EXPECTED Parameters     CV: Uneventful perioperative course   Sign Out status: Appropriate BP and perfusion indices; Appropriate HR/Rhythm     Disposition:   Sign Out in:  Phase II  Disposition:  Home  Recovery Course: Uneventful  Follow-Up: Not required           Last Anesthesia Record Vitals:  CRNA VITALS  2018 1343 - 2018 1433      2018             Pulse: 101    SpO2: 91 %          Last PACU/Preop Vitals:  Vitals:    18 1239 18 1415   BP: 142/72 139/69   Resp: 16 16   Temp: 97.5  F (36.4  C) 97.6  F (36.4  C)   SpO2: 96% 90%         Electronically Signed By: Santi Olivera MD, 2018, 2:33 PM

## 2018-11-26 NOTE — IP AVS SNAPSHOT
[facility] [place]    [address] [place] [address]    Phone:  [phone]                                       After Visit Summary   11/26/2018    Maria Esther Lowe    MRN: [id number]           After Visit Summary Signature Page     I have received my discharge instructions, and my questions have been answered. I have discussed any challenges I see with this plan with the nurse or doctor.    ..........................................................................................................................................  Patient/Patient Representative Signature      ..........................................................................................................................................  Patient Representative Print Name and Relationship to Patient    ..................................................               ................................................  Date                                   Time    ..........................................................................................................................................  Reviewed by Signature/Title    ...................................................              ..............................................  Date                                               Time          22EPIC Rev 08/18

## 2018-11-26 NOTE — OP NOTE
PREOPERATIVE DIAGNOSIS: Bilateral partial nasolacrimal duct obstruction.   POSTOPERATIVE DIAGNOSIS: Bilateral partial nasolacrimal duct obstruction.   PROCEDURE PERFORMED: Bilateral nasolacrimal duct silicone intubation.   SURGEON: Davina Payton MD  ASSISTANT: David Quintanilla MD  ANESTHESIA: MAC with local infiltration of 1% lidocaine with epinephrine and 0.5% Marcaine in 50:50 mixture.  COMPLICATIONS: None.   ESTIMATED BLOOD LOSS: Less than 5 mL.   HISTORY AND INDICATIONS: Maria Esther Lowe  presented with partial nasolacrimal duct obstruction with  tearing. After the risks, benefits and alternatives to the proposed procedure were explained to the patient, informed consent was obtained.   PROCEDURE: Maria Esther Lowe  was brought to the operating room and placed supine on the operating table. Under MAC anesthesia, the upper and lower eyelid nasally and under the inferior meatus were infiltrated with local anesthetic. The inferior meatus on each side was packed with Afrin-soaked neurosurgical cottonoids. The area was prepped and draped in the typical sterile ophthalmic fashion. Attention was directed to the right side. The upper and lower canalicular systems were dilated with the punctal dilator. The Large Diameter StenTube silicone intubation set was used to intubate the upper and lower canalicular system and retrieved in the nose with the Brooks hook. The stents were trimmed and allowed to retract back into the nose. Attention directed to the left side where the same procedure was performed. TobraDex drops were placed in each eye. Maria Esther Lowe  tolerated the procedure well and was taken to recovery room in stable condition.   Davina aPyton MD

## 2018-11-28 ENCOUNTER — OFFICE VISIT (OUTPATIENT)
Dept: OPHTHALMOLOGY | Facility: CLINIC | Age: 63
End: 2018-11-28
Payer: COMMERCIAL

## 2018-11-28 DIAGNOSIS — Z98.890 POSTOPERATIVE EYE STATE: Primary | ICD-10-CM

## 2018-11-28 PROCEDURE — 99024 POSTOP FOLLOW-UP VISIT: CPT | Performed by: OPHTHALMOLOGY

## 2018-11-28 RX ORDER — MOXIFLOXACIN 5 MG/ML
1 SOLUTION/ DROPS OPHTHALMIC 3 TIMES DAILY
Qty: 1 BOTTLE | Refills: 1 | Status: SHIPPED | OUTPATIENT
Start: 2018-11-28 | End: 2019-02-06

## 2018-11-28 NOTE — MR AVS SNAPSHOT
After Visit Summary   11/28/2018    Maria Esther Lowe    MRN: [id number]           Patient Information     Date Of Birth          1955        Visit Information        Provider Department      11/28/2018 1:15 PM Davina Payton MD [facility]        Today's Diagnoses     Postoperative eye state    -  1       Follow-ups after your visit        Your next 10 appointments already scheduled     Nov 28, 2018  1:15 PM CST   Return Visit with Davina Payton MD   [facility]    [address] [place] [id number] [phone]            Dec 12, 2018  9:30 AM CST   Return Visit with Davina Payton MD   [facility]    [address] [place] [address] [id number] [phone]              Who to contact     If you have questions or need follow up information about today's clinic visit or your schedule please contact [facility] directly at [phone].  Normal or non-critical lab and imaging results will be communicated to you by [employer], letter or phone within 4 business days after the clinic has received the results. If you do not hear from us within 7 days, please contact the clinic through [employer] or phone. If you have a critical or abnormal lab result, we will notify you by phone as soon as possible.  Submit refill requests through [employer] or call your pharmacy and they will forward the refill request to us. Please allow 3 business days for your refill to be completed.          Additional Information About Your Visit        [employer] Information     [employer] gives you secure access to your electronic health record. If you see a primary care provider, you can also send messages to your care team and make appointments. If you have questions, please call your primary care clinic.  If you do not have a primary care provider, please call [phone] and they  will assist you.      Go [employer] It On is an electronic gateway that provides easy, online access to your medical records. With Go Try It On, you can request a clinic appointment, read your test results, renew a prescription or communicate with your care team.     To access your existing account, please contact your [facility] or call [phone] for assistance.        Care EveryWhere ID     This is your Care EveryWhere ID. This could be used by other organizations to access your [facility] medical records  [id number]         Blood Pressure from Last 3 Encounters:   11/26/18 134/66   10/25/18 (!) 160/96   10/22/18 (!) 168/96    Weight from Last 3 Encounters:   10/25/18 81.6 kg (180 lb)   10/22/18 82 kg (180 lb 11.2 oz)   09/11/18 81.4 kg (179 lb 7.3 oz)              Today, you had the following     No orders found for display         Today's Medication Changes          These changes are accurate as of 11/28/18 11:50 AM.  If you have any questions, ask your nurse or doctor.               Start taking these medicines.        Dose/Directions    moxifloxacin 0.5 % ophthalmic solution   Commonly known as:  VIGAMOX   Used for:  Postoperative eye state   Started by:  Davina Payton MD        Dose:  1 drop   Place 1 drop into the right eye 3 times daily for 14 days   Quantity:  1 Bottle   Refills:  1            Where to get your medicines      These medications were sent to [facility] - [place], [place] - [address], [address]  [address], [address], [facility] [address]     Phone:  [phone]     moxifloxacin 0.5 % ophthalmic solution                Primary Care Provider Office Phone # Fax #    Estela PADGETT SRUTHI [place] [phone]       [place] [phone]        Equal Access to Services     MARIA DE JESUS WASHINGTON AH: Tameka amador Soyaritza, waaxda luqadaha, qaybta kaalmada adejose de jesus, larissa hickman  ah. So [facility] [phone].    ATENCIÓN: Si shelli mcghee, tiene a aden disposición servicios gratuitos de asistencia lingüística. Elizabeth appiah [phone].    We comply with applicable federal civil rights laws and [place] laws. We do not discriminate on the basis of race, color, national origin, age, disability, sex, sexual orientation, or gender identity.            Thank you!     Thank you for choosing [facility]  for your care. Our goal is always to provide you with excellent care. Hearing back from our patients is one way we can continue to improve our services. Please take a few minutes to complete the written survey that you may receive in the mail after your visit with us. Thank you!             Your Updated Medication List - Protect others around you: Learn how to safely use, store and throw away your medicines at www.disposemymeds.org.          This list is accurate as of 11/28/18 11:50 AM.  Always use your most recent med list.                   Brand Name Dispense Instructions for use Diagnosis    calcium 500 +D 500-400 MG-UNIT Tabs   Generic drug:  Calcium Carb-Cholecalciferol     180 tablet    Take 1 tablet by mouth 2 times daily.        cholecalciferol 5000 units Caps     90 capsule    Take 1 daily    Hyperparathyroidism (H)       conjugated estrogens 0.625 MG/GM vaginal cream    PREMARIN    30 g    Place 0.5 g vaginally twice a week    Vaginal dryness       estradiol 1 MG tablet    ESTRACE    90 tablet    Take 1 tablet (1 mg) by mouth daily    Panhypopituitarism (H), Loss of hair       estradiol 10 MCG Tabs vaginal tablet    VAGIFEM    24 tablet    Place 1 tablet (10 mcg) vaginally twice a week    Atrophic vaginitis       hydrocortisone 10 MG tablet    CORTEF    135 tablet    TAKE 1 TABLET IN THE MORNING AND ONE-HALF (1/2) TABLET MIDDAY    Panhypopituitarism (H)       levothyroxine 200 MCG tablet    SYNTHROID/LEVOTHROID    90 tablet    Take 1 tablet (200 mcg) by mouth daily     Panhypopituitarism (H)       losartan-hydrochlorothiazide 100-25 MG per tablet    HYZAAR    90 tablet    Take 1 tablet by mouth daily    Panhypopituitarism (H)       moxifloxacin 0.5 % ophthalmic solution    VIGAMOX    1 Bottle    Place 1 drop into the right eye 3 times daily for 14 days    Postoperative eye state       neomycin-polymyxin-dexamethasone [id number] Susp ophthalmic susp    MAXITROL    1.5 mL    Place 1 drop into both eyes 3 times daily for 10 days    Postoperative eye state       simvastatin 40 MG tablet    ZOCOR    90 tablet    Take 1 tablet (40 mg) by mouth daily    Hyperlipidemia LDL goal <100       vitamin B-12 1000 MCG tablet    CYANOCOBALAMIN     Take by mouth every morning        zinc 50 MG Tabs      Take  by mouth.

## 2018-11-28 NOTE — NURSING NOTE
Patient presents with:  Post Op (Ophthalmology) Both Eyes: BILATERAL NASOLACRIMAL STENT - LARGE DIAMETER STEN TUBE 11/26/18  Eye Pain Right Eye: started Tues, feels like there is something scratching/scraping against her eye, using maxitrol gtts      Referring Provider:  No referring provider defined for this encounter.    HPI    Affected eye(s):  Right   Symptoms:     Redness   Foreign body sensation   Tearing      Duration:  3 days   Frequency:  Constant       Do you have eye pain now?:  Yes   Location:  OD   Pain Level:  Moderate Pain (5)   Pain Duration:  3 days   Pain Frequency:  Constant   Pain Characteristics:  Stabbing, Sharp/Quick      Comments:     Post Op (Ophthalmology) Both Eyes: BILATERAL NASOLACRIMAL STENT - LARGE DIAMETER STEN TUBE 11/26/18  Eye Pain Right Eye: started Tues, feels like there is something scratching/scraping against her eye, using maxitrol gtts                 Zahra THOMAS COA. OSC

## 2018-11-28 NOTE — PROGRESS NOTES
POD2 bilateral probe and stent  Right eye Foreign body sensation especially with left gaze  Exam:  No epi defect, but punctate epithelial erosions confluent nasal cornea, which rubs against stent with abduction.  Otherwise stent in good position both eyes.    Placed BCTL right eye  Vigamox three times a day right eye  discharge maxitrol right eye    Continue maxitrol left eye      F/u 2 weeks for bctl removal. Can replace if still uncomfortable.    Attending Physician Attestation:  Complete documentation of historical and exam elements from today's encounter can be found in the full encounter summary report (not reduplicated in this progress note).  I personally obtained the chief complaint(s) and history of present illness.  I confirmed and edited as necessary the review of systems, past medical/surgical history, family history, social history, and examination findings as documented by others; and I examined the patient myself.  I personally reviewed the relevant tests, images, and reports as documented above.  I formulated and edited as necessary the assessment and plan and discussed the findings and management plan with the patient and family. - Davina Payton MD

## 2018-12-05 ENCOUNTER — TELEPHONE (OUTPATIENT)
Dept: OPHTHALMOLOGY | Facility: CLINIC | Age: 63
End: 2018-12-05

## 2018-12-05 NOTE — TELEPHONE ENCOUNTER
Pt called stating that the tube that was inserted during her surgery with Dr Ghosh is coming out her nose.  Surgery was 11/26/18 BILATERAL NASOLACRIMAL STENT - LARGE DIAMETER STEN TUBE.  Explained that was ok and that she should tuck it back up or if needed she can trim it a tiny bit so it is not so long but that was not recommended. She did not need to be seen sooner that her appt coming up.  Next 5 appointments (look out 90 days)     Dec 12, 2018  9:30 AM CST   [facility]    [id number] [place] [id number] [phone]              Pt verbalized understanding and agreed with this plan  Zahra STEVENS. OSC

## 2018-12-12 ENCOUNTER — OFFICE VISIT (OUTPATIENT)
Dept: OPHTHALMOLOGY | Facility: CLINIC | Age: 63
End: 2018-12-12
Payer: COMMERCIAL

## 2018-12-12 DIAGNOSIS — H04.563 PUNCTAL STENOSIS, ACQUIRED, BILATERAL: Primary | ICD-10-CM

## 2018-12-12 DIAGNOSIS — H04.223 EPIPHORA DUE TO INSUFFICIENT DRAINAGE OF BOTH SIDES: ICD-10-CM

## 2018-12-12 PROCEDURE — 99213 OFFICE O/P EST LOW 20 MIN: CPT | Mod: GC | Performed by: OPHTHALMOLOGY

## 2018-12-12 ASSESSMENT — SLIT LAMP EXAM - LIDS: COMMENTS: STENT IN PLACE

## 2018-12-12 ASSESSMENT — VISUAL ACUITY
OS_SC: 20/40
METHOD: SNELLEN - LINEAR
OD_SC: 20/25

## 2018-12-12 NOTE — PROGRESS NOTES
Tearing follow up   bilateral probe and stent   Has been using vigamox right eye for bandage contact lens.   Reports she noticed left stent coming out of nares, she cut it short, but next day, noticed it was not in left eye anymore. Notes some crepitus left eye.  Using maxitrol drops frequently for itchy sensation    Encounter Diagnoses   Name Primary?     Punctal stenosis, acquired, bilateral Yes     Epiphora due to insufficient drainage of both sides        PLAN:  bandage contact lens removed right eye   Use artificial tears as needed     Return to clinic 2 month for stent removal right eye can remove sooner if right eye irritated again    Sudhakar Stewart MD  PGY4   Attending Physician Attestation:  Complete documentation of historical and exam elements from today's encounter can be found in the full encounter summary report (not reduplicated in this progress note).  I personally obtained the chief complaint(s) and history of present illness.  I confirmed and edited as necessary the review of systems, past medical/surgical history, family history, social history, and examination findings as documented by others; and I examined the patient myself.  I personally reviewed the relevant tests, images, and reports as documented above.  I formulated and edited as necessary the assessment and plan and discussed the findings and management plan with the patient and family. - Davina Payton MD

## 2018-12-12 NOTE — NURSING NOTE
Patient presents with:  Post Op (Ophthalmology) Both Eyes: Wearing BCL right eye, BILATERAL NASOLACRIMAL STENT - LARGE DIAMETER STEN TUBE 11/26/18  Using Maxitrol in the left  Using vigamox in the right       Referring Provider:  No referring provider defined for this encounter.        Zahra STEVENS. OSC

## 2018-12-19 DIAGNOSIS — E21.3 HYPERPARATHYROIDISM (H): ICD-10-CM

## 2018-12-20 NOTE — TELEPHONE ENCOUNTER
cholecalciferol 5000 units CAPS      Last Written Prescription Date:  1-8-18  Last Fill Quantity: 90,   # refills: 3  Last Office Visit : 10-22-18  Future Office visit:  none    Routing refill request to provider for review/approval because:  Not on protocol

## 2018-12-28 DIAGNOSIS — E23.0 PANHYPOPITUITARISM (H): ICD-10-CM

## 2018-12-31 RX ORDER — HYDROCORTISONE 10 MG/1
TABLET ORAL
Qty: 135 TABLET | Refills: 2 | Status: SHIPPED | OUTPATIENT
Start: 2018-12-31 | End: 2020-09-17

## 2018-12-31 NOTE — TELEPHONE ENCOUNTER
"hydrocortisone (CORTEF) 10 MG tablet  Last Written Prescription Date:  3/26/18  Last Fill Quantity: 135,   # refills: 2  Last Office Visit :10/22/18  Future Office visit:  None    10/22/18  Nury, \" Stable on low dose hydorcoritisone dose 10 mg daily only for many years\"    Routing refill request to provider for review/approval because: please verify dosing. Med list has 1.5 tab (15mg) requested  Same. Which is correct?  "

## 2019-01-08 DIAGNOSIS — E78.5 HYPERLIPIDEMIA LDL GOAL <100: ICD-10-CM

## 2019-01-08 DIAGNOSIS — E23.0 PANHYPOPITUITARISM (H): ICD-10-CM

## 2019-01-08 RX ORDER — SIMVASTATIN 40 MG
40 TABLET ORAL DAILY
Qty: 90 TABLET | Refills: 3 | Status: SHIPPED | OUTPATIENT
Start: 2019-01-08 | End: 2019-01-16

## 2019-01-08 RX ORDER — LOSARTAN POTASSIUM AND HYDROCHLOROTHIAZIDE 25; 100 MG/1; MG/1
1 TABLET ORAL DAILY
Qty: 90 TABLET | Refills: 0 | Status: SHIPPED | OUTPATIENT
Start: 2019-01-08 | End: 2019-01-15

## 2019-01-08 RX ORDER — LEVOTHYROXINE SODIUM 200 UG/1
200 TABLET ORAL DAILY
Qty: 90 TABLET | Refills: 0 | Status: SHIPPED | OUTPATIENT
Start: 2019-01-08 | End: 2019-01-15

## 2019-01-08 NOTE — TELEPHONE ENCOUNTER
levothyroxine (SYNTHROID/LEVOTHROID) 200 MCG tablet      Last Written Prescription Date:  1/3/18  Last Fill Quantity: 90,   # refills: 3  Routing refill request to provider for review/approval because:  Failed protocol: Abnormal lab-TSH    losartan-hydrochlorothiazide (HYZAAR) 100-25 MG per tablet      Last Written Prescription Date:  1/3/18  Last Fill Quantity: 90,   # refills: 3  Routing refill request to provider for review/approval because:  Failed protocol: Abnormal lab-Creatinine.    simvastatin (ZOCOR) 40 MG tablet      Last Written Prescription Date:  1/3/18  Last Fill Quantity: 90,   # refills: 3  Routing refill request to provider for review/approval because:  Lab(s) past due-LDL    Last Office Visit : 10/22/18  Future Office visit:  none

## 2019-01-15 DIAGNOSIS — E78.5 HYPERLIPIDEMIA LDL GOAL <100: ICD-10-CM

## 2019-01-15 DIAGNOSIS — E23.0 PANHYPOPITUITARISM (H): ICD-10-CM

## 2019-01-15 RX ORDER — LEVOTHYROXINE SODIUM 200 UG/1
200 TABLET ORAL DAILY
Qty: 90 TABLET | Refills: 3 | Status: SHIPPED | OUTPATIENT
Start: 2019-01-15 | End: 2020-01-13

## 2019-01-15 RX ORDER — LEVOTHYROXINE SODIUM 200 UG/1
200 TABLET ORAL DAILY
Qty: 90 TABLET | Refills: 0 | Status: CANCELLED | OUTPATIENT
Start: 2019-01-15

## 2019-01-15 RX ORDER — LOSARTAN POTASSIUM AND HYDROCHLOROTHIAZIDE 25; 100 MG/1; MG/1
1 TABLET ORAL DAILY
Qty: 90 TABLET | Refills: 3 | Status: SHIPPED | OUTPATIENT
Start: 2019-01-15 | End: 2020-01-13

## 2019-01-16 RX ORDER — SIMVASTATIN 40 MG
40 TABLET ORAL DAILY
Qty: 90 TABLET | Refills: 3 | Status: SHIPPED | OUTPATIENT
Start: 2019-01-16 | End: 2020-01-13

## 2019-02-06 ENCOUNTER — OFFICE VISIT (OUTPATIENT)
Dept: OPHTHALMOLOGY | Facility: CLINIC | Age: 64
End: 2019-02-06
Payer: COMMERCIAL

## 2019-02-06 DIAGNOSIS — H04.223 EPIPHORA DUE TO INSUFFICIENT DRAINAGE OF BOTH SIDES: Primary | ICD-10-CM

## 2019-02-06 PROCEDURE — 99213 OFFICE O/P EST LOW 20 MIN: CPT | Performed by: OPHTHALMOLOGY

## 2019-02-06 ASSESSMENT — VISUAL ACUITY
OD_SC: 20/30
METHOD: SNELLEN - LINEAR
OS_SC: 20/25
OD_SC+: -1

## 2019-02-06 ASSESSMENT — SLIT LAMP EXAM - LIDS
COMMENTS: STENT IN PLACE
COMMENTS: NORMAL

## 2019-02-06 NOTE — NURSING NOTE
Patient presents with:  Follow Up: S/P BILATERAL NASOLACRIMAL STENT - LARGE DIAMETER STEN TUBEstent removal right eye today. Pt states she is frustrated left eye is tearing and filmy with blurred va using art tears 4-5x daily.      Referring Provider:  No referring provider defined for this encounter.        Mikki Fish, COA

## 2019-02-06 NOTE — PROGRESS NOTES
Chief Complaint(s) and History of Present Illness(es)     Follow Up     Comments: S/P BILATERAL NASOLACRIMAL STENT - LARGE DIAMETER STEN   TUBEstent removal right eye today. Pt states she is frustrated left eye is   tearing and filmy with blurred va using art tears 4-5x daily.           Comments       Note above says left is tearing - that is incorrect, left stent fell out, and no tearing since. Right is still tearing with stent in.           Assessment & Plan     Maria Esther Lowe is a 63 year old female with the following diagnoses:   1. Epiphora due to insufficient drainage of both sides      Stent removed right.    Looks good.   F/u recurrent tearing.          Attending Physician Attestation:  Complete documentation of historical and exam elements from today's encounter can be found in the full encounter summary report (not reduplicated in this progress note).  I personally obtained the chief complaint(s) and history of present illness.  I confirmed and edited as necessary the review of systems, past medical/surgical history, family history, social history, and examination findings as documented by others; and I examined the patient myself.  I personally reviewed the relevant tests, images, and reports as documented above.  I formulated and edited as necessary the assessment and plan and discussed the findings and management plan with the patient and family. - Davina Payton MD

## 2019-02-15 ENCOUNTER — HEALTH MAINTENANCE LETTER (OUTPATIENT)
Age: 64
End: 2019-02-15

## 2019-08-01 ENCOUNTER — OFFICE VISIT (OUTPATIENT)
Dept: OBGYN | Facility: CLINIC | Age: 64
End: 2019-08-01
Attending: OBSTETRICS & GYNECOLOGY
Payer: COMMERCIAL

## 2019-08-01 ENCOUNTER — ANCILLARY PROCEDURE (OUTPATIENT)
Dept: MAMMOGRAPHY | Facility: CLINIC | Age: 64
End: 2019-08-01
Payer: COMMERCIAL

## 2019-08-01 VITALS
WEIGHT: 177 LBS | HEART RATE: 85 BPM | SYSTOLIC BLOOD PRESSURE: 166 MMHG | DIASTOLIC BLOOD PRESSURE: 80 MMHG | BODY MASS INDEX: 29.45 KG/M2

## 2019-08-01 DIAGNOSIS — N89.8 VAGINAL DISCHARGE: Primary | ICD-10-CM

## 2019-08-01 DIAGNOSIS — R10.31 RIGHT LOWER QUADRANT PAIN: ICD-10-CM

## 2019-08-01 DIAGNOSIS — Z12.31 VISIT FOR SCREENING MAMMOGRAM: ICD-10-CM

## 2019-08-01 LAB
CLUE CELLS: NEGATIVE
TRICHOMONAS (WET PREP): NEGATIVE
YEAST (WET PREP): NEGATIVE

## 2019-08-01 PROCEDURE — 77067 SCR MAMMO BI INCL CAD: CPT

## 2019-08-01 PROCEDURE — 87210 SMEAR WET MOUNT SALINE/INK: CPT | Mod: ZF | Performed by: OBSTETRICS & GYNECOLOGY

## 2019-08-01 ASSESSMENT — PATIENT HEALTH QUESTIONNAIRE - PHQ9
SUM OF ALL RESPONSES TO PHQ QUESTIONS 1-9: 4
5. POOR APPETITE OR OVEREATING: NOT AT ALL

## 2019-08-01 ASSESSMENT — PAIN SCALES - GENERAL: PAINLEVEL: MODERATE PAIN (4)

## 2019-08-01 ASSESSMENT — ANXIETY QUESTIONNAIRES
2. NOT BEING ABLE TO STOP OR CONTROL WORRYING: NOT AT ALL
1. FEELING NERVOUS, ANXIOUS, OR ON EDGE: NOT AT ALL
3. WORRYING TOO MUCH ABOUT DIFFERENT THINGS: NOT AT ALL
5. BEING SO RESTLESS THAT IT IS HARD TO SIT STILL: NOT AT ALL
GAD7 TOTAL SCORE: 0
6. BECOMING EASILY ANNOYED OR IRRITABLE: NOT AT ALL
7. FEELING AFRAID AS IF SOMETHING AWFUL MIGHT HAPPEN: NOT AT ALL

## 2019-08-01 NOTE — PROGRESS NOTES
62 yo P0 returns today, nearly 1 year s/p robotic assisted total laparoscopic hysterectomy and BSO for bilateral ovarian cystadenoma.  She is concerned about right lower quadrant pain and vaginal discharge and want to make sure everything is normal.  She has a prescription for vaginal estrogen but she hasn't been using it.  She describes the discharge as yellowish and can be sticky on her undergarments. It has no odor.  She will wear a pad for this although not daily.  She wears undergarments some nights but not all.  She denies vulvar/vaginal itching.  She denies dysuria or frequency, except at night.  She has been needing to get up to void 2 times at night recently.  No vaginal bleeding.    With regard to the RLQ pain it is not constant.  She will notice it if she bumps her abdomen in that area.  She doesn't notice a lump or mass.  She will sometimes provoke it with reaching.  She is sexually active without pain.     ROS: 10 point ROS neg other than the symptoms noted above in the HPI.    Past Medical History:   Diagnosis Date     Hyperlipidemia LDL goal < 130      Hypertension      Hypothyroidism      Osteopenia     DEXA: 12/11; T-score of -1.9        Panhypopituitarism (H)      Past Surgical History:   Procedure Laterality Date     DAVINCI HYSTERECTOMY TOTAL, BILATERAL SALPINGO-OOPHORECTOMY, COMBINED Bilateral 9/11/2018    Procedure: COMBINED DAVINCI HYSTERECTOMY TOTAL, SALPINGO-OOPHORECTOMY;  Pelvic Washings, Davinci Assisted Laparoscopic Total Hysterectomy, Bilateral Salpingo Oophorectomy, cystoscopy;  Surgeon: Tiffany Lew MD;  Location: [facility] OR     OPERATIVE HYSTEROSCOPY N/A 1/30/2018    Procedure: OPERATIVE HYSTEROSCOPY;  Operative Hysteroscopy, Resection of Endometrial Polyp, Dilation and Curettage;  Surgeon: Daysi Munoz MD;  Location: [facility] OR     PROBE LACRIMAL DUCT, INSERT STENT BILATERAL, COMBINED Bilateral 11/26/2018    Procedure: BILATERAL NASOLACRIMAL STENT - LARGE DIAMETER STEN  TUBE;  Surgeon: Davina Payton MD;  Location: [facility] OR     resected prolactin-producing pituitary adenoma.  1975    Postoperative radiation therapy.      Family History   Problem Relation Age of Onset     Diabetes Paternal Grandmother      Depression Paternal Grandmother      Thyroid Disease Maternal Grandmother      Skin Cancer Father      Melanoma No family hx of      Physical exam:  BP (!) 166/80   Pulse 85   Wt 80.3 kg (177 lb)   Breastfeeding? No   BMI 29.45 kg/m    Gen'l: appears well, no acute distress  Abd: soft, tenderness to palpation in RLQ, this is medial and inferior to RLQ incisional scar from laparoscopy.  No mass palpable.  Tenderness increase with abdominal muscle engaged  Vulva: normal, no lesions, atrophic changes  Urethra: normal  Vagina: pink, rugae absent, atrophic change, no discharge noted, wet prep pH 6.1, negative for clue/yeast/trich  Cervix: surgically absent   Uterus: surgically absent   Adnexa: surgically absent, no mass or tenderness in region of right adnexa  Rectum: Anus normal, no rectovaginal exam confirms above, no masses    Diagnoses and associated orders for this visit:  Vaginal discharge  -     Wet Prep POCT    Right lower quadrant pain  -     US Pelvic Complete with Transvaginal; Future    Use the vaginal estrogen tablet (Vagifem) twice weekly  Can also apply vaginal cream (Estrace) to the opening of the vagina twice weekly as well.    Check a pelvic ultrasound to evaluate your right lower quadrant pain.  If the ultrasound is normal we will check a CT scan, you will need to go to the lab before the CT scan to check kidney function (this is a blood draw).    Pain may be in the abdominal muscle and if ultrasound and CT scan don't show an abnormality.  Physical therapy may help with this.    I will call you with the ultrasound results, I may leave a message if I get your voicemail.    Tiffany Lew MD

## 2019-08-01 NOTE — LETTER
8/1/2019    RE: Maria Esther Lowe  [address] [facility] [address]     Dear Colleague,    Thank you for referring your patient, Maria Esther Lowe, to the [facility] at [facility]. Please see a copy of my visit note below.    62 yo P0 returns today, nearly 1 year s/p robotic assisted total laparoscopic hysterectomy and BSO for bilateral ovarian cystadenoma.  She is concerned about right lower quadrant pain and vaginal discharge and want to make sure everything is normal.  She has a prescription for vaginal estrogen but she hasn't been using it.  She describes the discharge as yellowish and can be sticky on her undergarments. It has no odor.  She will wear a pad for this although not daily.  She wears undergarments some nights but not all.  She denies vulvar/vaginal itching.  She denies dysuria or frequency, except at night.  She has been needing to get up to void 2 times at night recently.  No vaginal bleeding.    With regard to the RLQ pain it is not constant.  She will notice it if she bumps her abdomen in that area.  She doesn't notice a lump or mass.  She will sometimes provoke it with reaching.  She is sexually active without pain.     ROS: 10 point ROS neg other than the symptoms noted above in the HPI.    Past Medical History:   Diagnosis Date     Hyperlipidemia LDL goal < 130      Hypertension      Hypothyroidism      Osteopenia     DEXA: 12/11; T-score of -1.9        Panhypopituitarism (H)      Past Surgical History:   Procedure Laterality Date     DAVINCI HYSTERECTOMY TOTAL, BILATERAL SALPINGO-OOPHORECTOMY, COMBINED Bilateral 9/11/2018    Procedure: COMBINED DAVINCI HYSTERECTOMY TOTAL, SALPINGO-OOPHORECTOMY;  Pelvic Washings, Davinci Assisted Laparoscopic Total Hysterectomy, Bilateral Salpingo Oophorectomy, cystoscopy;  Surgeon: Tiffany Lew MD;  [facility]     OPERATIVE HYSTEROSCOPY N/A 1/30/2018    Procedure: OPERATIVE  HYSTEROSCOPY;  Operative Hysteroscopy, Resection of Endometrial Polyp, Dilation and Curettage;  Surgeon: Daysi Munoz MD;  [facility]     PROBE LACRIMAL DUCT, INSERT STENT BILATERAL, COMBINED Bilateral 11/26/2018    Procedure: BILATERAL NASOLACRIMAL STENT - LARGE DIAMETER STEN TUBE;  Surgeon: Davina Payton MD;  Location: [facility]     resected prolactin-producing pituitary adenoma.  1975    Postoperative radiation therapy.      Family History   Problem Relation Age of Onset     Diabetes Paternal Grandmother      Depression Paternal Grandmother      Thyroid Disease Maternal Grandmother      Skin Cancer Father      Melanoma No family hx of      Physical exam:  BP (!) 166/80   Pulse 85   Wt 80.3 kg (177 lb)   Breastfeeding? No   BMI 29.45 kg/m     Gen'l: appears well, no acute distress  Abd: soft, tenderness to palpation in RLQ, this is medial and inferior to RLQ incisional scar from laparoscopy.  No mass palpable.  Tenderness increase with abdominal muscle engaged  Vulva: normal, no lesions, atrophic changes  Urethra: normal  Vagina: pink, rugae absent, atrophic change, no discharge noted, wet prep pH 6.1, negative for clue/yeast/trich  Cervix: surgically absent   Uterus: surgically absent   Adnexa: surgically absent, no mass or tenderness in region of right adnexa  Rectum: Anus normal, no rectovaginal exam confirms above, no masses    Diagnoses and associated orders for this visit:  Vaginal discharge  -     Wet Prep POCT    Right lower quadrant pain  -     US Pelvic Complete with Transvaginal; Future    Use the vaginal estrogen tablet (Vagifem) twice weekly  Can also apply vaginal cream (Estrace) to the opening of the vagina twice weekly as well.    Check a pelvic ultrasound to evaluate your right lower quadrant pain.  If the ultrasound is normal we will check a CT scan, you will need to go to the lab before the CT scan to check kidney function (this is a blood draw).    Pain may be in the  abdominal muscle and if ultrasound and CT scan don't show an abnormality.  Physical therapy may help with this.    I will call you with the ultrasound results, I may leave a message if I get your voicemail.  Tiffany Lew MD

## 2019-08-01 NOTE — PATIENT INSTRUCTIONS
Use the vaginal estrogen tablet (Vagifem) twice weekly  Can also apply vaginal cream (Estrace) to the opening of the vagina twice weekly as well.    Check a pelvic ultrasound to evaluate your right lower quadrant pain.  If the ultrasound is normal we will check a CT scan, you will need to go to the lab before the CT scan to check kidney function (this is a blood draw).    Pain may be in the abdominal muscle and if ultrasound and CT scan don't show an abnormality.  Physical therapy may help with this.    I will call you with the ultrasound results, I may leave a message if I get your voicemail.    Tiffany Lew MD

## 2019-08-02 ASSESSMENT — ANXIETY QUESTIONNAIRES: GAD7 TOTAL SCORE: 0

## 2019-09-09 ENCOUNTER — ANCILLARY PROCEDURE (OUTPATIENT)
Dept: ULTRASOUND IMAGING | Facility: CLINIC | Age: 64
End: 2019-09-09
Attending: OBSTETRICS & GYNECOLOGY
Payer: COMMERCIAL

## 2019-09-09 ENCOUNTER — OFFICE VISIT (OUTPATIENT)
Dept: ENDOCRINOLOGY | Facility: CLINIC | Age: 64
End: 2019-09-09
Payer: COMMERCIAL

## 2019-09-09 VITALS
DIASTOLIC BLOOD PRESSURE: 83 MMHG | BODY MASS INDEX: 29.45 KG/M2 | SYSTOLIC BLOOD PRESSURE: 177 MMHG | HEART RATE: 73 BPM | WEIGHT: 177 LBS

## 2019-09-09 DIAGNOSIS — E56.9 VITAMIN DEFICIENCY: ICD-10-CM

## 2019-09-09 DIAGNOSIS — E55.9 VITAMIN D DEFICIENCY: ICD-10-CM

## 2019-09-09 DIAGNOSIS — E27.49 SECONDARY ADRENAL INSUFFICIENCY (H): ICD-10-CM

## 2019-09-09 DIAGNOSIS — M85.80 OSTEOPENIA, UNSPECIFIED LOCATION: ICD-10-CM

## 2019-09-09 DIAGNOSIS — D49.7 PITUITARY TUMOR: ICD-10-CM

## 2019-09-09 DIAGNOSIS — E27.40 ADRENAL INSUFFICIENCY (H): Primary | ICD-10-CM

## 2019-09-09 DIAGNOSIS — R10.31 RIGHT LOWER QUADRANT PAIN: ICD-10-CM

## 2019-09-09 DIAGNOSIS — R79.89 ELEVATED PROLACTIN LEVEL: ICD-10-CM

## 2019-09-09 DIAGNOSIS — E03.8 SECONDARY HYPOTHYROIDISM: ICD-10-CM

## 2019-09-09 LAB
PROLACTIN SERPL-MCNC: 46 UG/L (ref 3–27)
T4 FREE SERPL-MCNC: 0.97 NG/DL (ref 0.76–1.46)
TSH SERPL DL<=0.005 MIU/L-ACNC: 0.14 MU/L (ref 0.4–4)
VIT B12 SERPL-MCNC: 1002 PG/ML (ref 193–986)

## 2019-09-09 PROCEDURE — 76830 TRANSVAGINAL US NON-OB: CPT

## 2019-09-09 RX ORDER — HYDROCORTISONE 5 MG/1
5 TABLET ORAL DAILY
Qty: 90 TABLET | Refills: 3 | Status: SHIPPED | OUTPATIENT
Start: 2019-09-09 | End: 2020-08-19

## 2019-09-09 ASSESSMENT — PAIN SCALES - GENERAL: PAINLEVEL: NO PAIN (0)

## 2019-09-09 NOTE — PROGRESS NOTES
- Endocrinology Follow up-    Reason for visit/consult:     Prolactinoma (H)  Panhypopituitarism (H)  Hypothyroidism, unspecified type    Primary care provider: No primary care provider on file.    Assessment and Plan  64 year old female with following conditions    # Panhypopituitarism-   Stable on low dose hydorcoritisone dose 10 mg daily only for many years.  She still had dizziness and fatigue chronically. I discussed with patient, we will try hydrocortsone 10 mg am and add on 5 mg 2-3 pm.      # Remote history of a prolactin-producing pituitary adenoma-   Post surgery followed by RT    - PRL to check, has been only slight elevation but stable    #Osteopenia.   Last bone density 10/2018 T -1.9,   - switch Caltrate to citracal 3 tabs (945 mg elemental Ca and VitD 750IU)  Repeat bone density in 10/2020.     # Vitamin D take  She is currently taking extra 5000 international unit(s) vitamin D and concerning overdose, we will check today and if need we will reduce the dose    # Hair loss- on Estradiol 1 mg, no significant change , but still taking   No hot flush    # Hysterectomy  Post in 9/2018    # secondary hypothyroidism  Check TSH, free T4, and meanwhile continue current levothyroxine 200 mcg.     - RTC with me in 1 year        I spent 40 minutes with this patient face to face and explained the conditions and plans (more than 50% of time was counseling/coordination of care, follow up plan for her hair loss) . The patient understood and is satisfied with today's visit. Return to clinic with me in 1 year.         Leona Arrington MD  Staff Physician  Endocrinology and Metabolism  License: [id number]    Interval History as of 9/9/2019 : Patient has been doing well. Stable on low dose hydorcoritisone dose 10 mg daily only for many years.  She still had dizziness and fatigue chronically. Also complainted poor quality of sleep at night. No hot flush,    Interval History as of 10/2018: 1/2018 dizziness went to ED in [facility], was fine.   BW stable, HTN, hysterectomy 9/2018. Large cyst, uterus fluid. Will be seen surgeon soon. Yellow fluid still. 3-4 pads daily.   HPI: A 63 yo female previous Dr. JAQUELINE Perdomo's patient, here for follow up. She has remote history of prolactinoma in her 20's, she underwent surgical removal in 1975 followed by RT. 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   Currently taking hydrocotisone 10 mg am only for at least several years, she has been doing well, she requires afternoon nap 1 pm for 20 minutes.   She has been taking LT4 was on 175 mcg and last year increased to 200 mcg with compliance.    Regarding hypogonadism, she has been taking premarin (conjugated estrogen) 0.625 mg. Progesterone (Prometrium) 100 mg daily. Her main concern today is about hair loss. After pituitary surgery, she lost lots hair, but after that with HRT, her hair started to grow. However past several months, she started to lose hair again and wishing to have more hair come back.     Body weight stable,   ENDO VITALS-UMP 10/23/2017 10/23/2017 5/11/2017   Weight 78.019 kg 78.019 kg 81.647 kg     Appetite: good, she is doing two jobs, She is  lives with her , adopted one child long time ago who is age 31 now.  No HA.       Past Medical/Surgical History:  Past Medical History:   Diagnosis Date     Hyperlipidemia LDL goal < 130      Hypertension      Hypothyroidism      Osteopenia     DEXA: 12/11; T-score of -1.9        Panhypopituitarism (H)      Past Surgical History:   Procedure Laterality Date     DAVINCI HYSTERECTOMY TOTAL, BILATERAL SALPINGO-OOPHORECTOMY, COMBINED Bilateral 9/11/2018    Procedure: COMBINED DAVINCI HYSTERECTOMY TOTAL, SALPINGO-OOPHORECTOMY;  Pelvic Washings, Davinci Assisted Laparoscopic Total Hysterectomy, Bilateral Salpingo Oophorectomy, cystoscopy;  Surgeon: Tiffany Lew MD;  Location:  OR      OPERATIVE HYSTEROSCOPY N/A 1/30/2018    Procedure: OPERATIVE HYSTEROSCOPY;  Operative Hysteroscopy, Resection of Endometrial Polyp, Dilation and Curettage;  Surgeon: Daysi Munoz MD;  [facility]     PROBE LACRIMAL DUCT, INSERT STENT BILATERAL, COMBINED Bilateral 11/26/2018    Procedure: BILATERAL NASOLACRIMAL STENT - LARGE DIAMETER STEN TUBE;  Surgeon: Davina Payton MD;  [facility]     resected prolactin-producing pituitary adenoma.  1975    Postoperative radiation therapy.        Allergies:  Allergies   Allergen Reactions     Ceftriaxone Other (See Comments)     Unsure of reaction.       Current Medications   Current Outpatient Medications   Medication     CALCIUM 500 +D 500-400 MG-UNIT TABS     cholecalciferol 5000 units CAPS     conjugated estrogens (PREMARIN) cream     cyanocobalamin (VITAMIN  B-12) 1000 MCG tablet     estradiol (ESTRACE) 1 MG tablet     estradiol (VAGIFEM) 10 MCG TABS vaginal tablet     hydrocortisone (CORTEF) 10 MG tablet     hydrocortisone (CORTEF) 5 MG tablet     levothyroxine (SYNTHROID/LEVOTHROID) 200 MCG tablet     losartan-hydrochlorothiazide (HYZAAR) 100-25 MG tablet     simvastatin (ZOCOR) 40 MG tablet     zinc 50 MG TABS     No current facility-administered medications for this visit.        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     ROS:  Full review of systems taken with the help of the intake sheet. Otherwise a complete 14 point review of systems was taken and is negative unless stated in the history above.    Physical Exam:     BP (!)  177/83   Pulse 73   Wt 80.3 kg (177 lb)   BMI 29.45 kg/m      General: well appearing, no acute distress, pleasant and conversant,   Mental Status/neuro: alert and oriented  Skull: loss of hair++, frontal   Face: symmetrical, normal facial color  Eyes: anicteric, PERRL, no proptosis or lid lag  Visual field: intact  Occular motor: intact  Neck: suppler, no lymphadenopahty  Thyroid: normal size and texture, no nodule palpable, no bruits  Heart: regular rhythm, S1S2, no murmur appreciated  Lung: clear to auscultation bilaterally  Abdomen: soft, NT/ND, no hepatomegaly  Legs: no swelling or edema    Labs : I reviewed data from epic and extract and summarize the pertinent data here.        1/29/2015 11:34 12/3/2015 09:23 7/22/2016 10:04 12/6/2016 18:49 10/23/2017 11:15   Prolactin 23 21 32 (H) 30 (H) 32 (H)     Lab Results   Component Value Date     10/23/2017      Lab Results   Component Value Date    POTASSIUM 3.9 10/23/2017     Lab Results   Component Value Date    CHLORIDE 105 10/23/2017     Lab Results   Component Value Date    ARI 8.5 10/23/2017     Lab Results   Component Value Date    CO2 24 10/23/2017     Lab Results   Component Value Date    BUN 14 10/23/2017     Lab Results   Component Value Date    CR 1.01 10/23/2017     Lab Results   Component Value Date     10/23/2017     Lab Results   Component Value Date    TSH 0.06 10/23/2017     Lab Results   Component Value Date    T4 1.26 10/23/2017       Lab Results   Component Value Date    FSH  07/22/2016     <0.2  FSH Reference Range   Female: Follicular      2.5-10.2           Mid-cycle       3.4-33.4           Luteal          1.5-9.1           Postmenopausal  23.0-116.3         Lab Results   Component Value Date    PROLACTIN 30 12/06/2016     Bone density : 10/24/2018   HISTORY: 64 yo female osteopenia, hysterectomy, panhypopituitarism;  Osteopenia, unspecified location     COMPARISON:   10/3/2015     Age: 63  years.  Height: 65 inches  Weight:  181 pounds  Sex: Female  Ethnicity: White     Image quality: Adequate     Lumbar spine T-score in region of L1-L4 = -1.9   L1-4 percent change: 0.6%      HIPS:  Mean total hip T-score: -1.2  Mean total hip percent change: 1.4%      Left femoral neck T-score = -1.6  Right femoral neck T-score= -1.2      COMPARISON TO PRIOR:  Percent change in the spine and hips is NOT significant (or considered  invalid) accounting for the precision errors for this facility.      FRAX:  10 year probability of major osteoporotic fracture: 8.6%  10 year probability of hip fracture: 0.8%  The 10 year probability of fracture may be lower than reported if the  patient has received treatment. FRAX data should be disregarded in  patient's taking bisphosphonates.     World Health Organization definition of osteoporosis and osteopenia  for  women:   Normal: T-score at or above -1.0  Low Bone Mass (Osteopenia): T-score between -1.0 and -2.5.   Osteoporosis: T-score at or below -2.5   T-scores are reported for postmenopausal women and men over 50 years  of age.                                                                      IMPRESSION:  Osteopenia

## 2019-09-09 NOTE — LETTER
9/9/2019       RE: Maria Esther Lowe  [address] [facility] [address]     Dear Colleague,    Thank you for referring your patient, Maria Esther Lowe, to the [facility] ENDOCRINOLOGY at [facility]. Please see a copy of my visit note below.                                                                               - Endocrinology Follow up-    Reason for visit/consult:     Prolactinoma (H)  Panhypopituitarism (H)  Hypothyroidism, unspecified type    Primary care provider: No primary care provider on file.    Assessment and Plan  64 year old female with following conditions    # Panhypopituitarism-   Stable on low dose hydorcoritisone dose 10 mg daily only for many years.  She still had dizziness and fatigue chronically. I discussed with patient, we will try hydrocortsone 10 mg am and add on 5 mg 2-3 pm.      # Remote history of a prolactin-producing pituitary adenoma-   Post surgery followed by RT    - PRL to check, has been only slight elevation but stable    #Osteopenia.   Last bone density 10/2018 T -1.9,   - switch Caltrate to citracal 3 tabs (945 mg elemental Ca and VitD 750IU)  Repeat bone density in 10/2020.     # Vitamin D take  She is currently taking extra 5000 international unit(s) vitamin D and concerning overdose, we will check today and if need we will reduce the dose    # Hair loss- on Estradiol 1 mg, no significant change , but still taking   No hot flush    # Hysterectomy  Post in 9/2018    # secondary hypothyroidism  Check TSH, free T4, and meanwhile continue current levothyroxine 200 mcg.     - RTC with me in 1 year        I spent 40 minutes with this patient face to face and explained the conditions and plans (more than 50% of time was counseling/coordination of care, follow up plan for her hair loss) . The patient understood and is satisfied with today's visit. Return to clinic with me in 1 year.         Leona Arrington MD  Staff Physician  Endocrinology  and Metabolism  License: [id number]    Interval History as of 9/9/2019 : Patient has been doing well. Stable on low dose hydorcoritisone dose 10 mg daily only for many years.  She still had dizziness and fatigue chronically. Also complainted poor quality of sleep at night. No hot flush,   Interval History as of 10/2018: 1/2018 dizziness went to ED in [facility], was fine.   BW stable, HTN, hysterectomy 9/2018. Large cyst, uterus fluid. Will be seen surgeon soon. Yellow fluid still. 3-4 pads daily.   HPI: A 61 yo female previous Dr. JAQUELINE Perdomo's patient, here for follow up. She has remote history of prolactinoma in her 20's, she underwent surgical removal in 1975 followed by RT. 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   Currently taking hydrocotisone 10 mg am only for at least several years, she has been doing well, she requires afternoon nap 1 pm for 20 minutes.   She has been taking LT4 was on 175 mcg and last year increased to 200 mcg with compliance.    Regarding hypogonadism, she has been taking premarin (conjugated estrogen) 0.625 mg. Progesterone (Prometrium) 100 mg daily. Her main concern today is about hair loss. After pituitary surgery, she lost lots hair, but after that with HRT, her hair started to grow. However past several months, she started to lose hair again and wishing to have more hair come back.     Body weight stable,   ENDO VITALS-UMP 10/23/2017 10/23/2017 5/11/2017   Weight 78.019 kg 78.019 kg 81.647 kg     Appetite: good, she is doing two jobs, She is  lives with her , adopted one child long time ago who is age 31 now.  No HA.       Past Medical/Surgical History:  Past Medical History:   Diagnosis Date     Hyperlipidemia LDL goal < 130      Hypertension      Hypothyroidism      Osteopenia     DEXA: 12/11; T-score of -1.9        Panhypopituitarism (H)      Past Surgical History:   Procedure Laterality Date     DAVINCI HYSTERECTOMY TOTAL, BILATERAL  SALPINGO-OOPHORECTOMY, COMBINED Bilateral 9/11/2018    Procedure: COMBINED DAVINCI HYSTERECTOMY TOTAL, SALPINGO-OOPHORECTOMY;  Pelvic Washings, Davinci Assisted Laparoscopic Total Hysterectomy, Bilateral Salpingo Oophorectomy, cystoscopy;  Surgeon: Tiffany Lew MD;  [facility]     OPERATIVE HYSTEROSCOPY N/A 1/30/2018    Procedure: OPERATIVE HYSTEROSCOPY;  Operative Hysteroscopy, Resection of Endometrial Polyp, Dilation and Curettage;  Surgeon: Daysi Munoz MD;  [facility]     PROBE LACRIMAL DUCT, INSERT STENT BILATERAL, COMBINED Bilateral 11/26/2018    Procedure: BILATERAL NASOLACRIMAL STENT - LARGE DIAMETER STEN TUBE;  Surgeon: Davina Payton MD;  Location[facility]     resected prolactin-producing pituitary adenoma.  1975    Postoperative radiation therapy.        Allergies:  Allergies   Allergen Reactions     Ceftriaxone Other (See Comments)     Unsure of reaction.       Current Medications   Current Outpatient Medications   Medication     CALCIUM 500 +D 500-400 MG-UNIT TABS     cholecalciferol 5000 units CAPS     conjugated estrogens (PREMARIN) cream     cyanocobalamin (VITAMIN  B-12) 1000 MCG tablet     estradiol (ESTRACE) 1 MG tablet     estradiol (VAGIFEM) 10 MCG TABS vaginal tablet     hydrocortisone (CORTEF) 10 MG tablet     hydrocortisone (CORTEF) 5 MG tablet     levothyroxine (SYNTHROID/LEVOTHROID) 200 MCG tablet     losartan-hydrochlorothiazide (HYZAAR) 100-25 MG tablet     simvastatin (ZOCOR) 40 MG tablet     zinc 50 MG TABS     No current facility-administered medications for this visit.        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     ROS:  Full review of systems taken with the help of the intake sheet. Otherwise a complete 14 point review of systems was taken and is negative unless stated in the history above.    Physical Exam:     BP (!) 177/83   Pulse 73   Wt 80.3 kg (177 lb)   BMI 29.45 kg/m       General: well appearing, no acute distress, pleasant and conversant,   Mental Status/neuro: alert and oriented  Skull: loss of hair++, frontal   Face: symmetrical, normal facial color  Eyes: anicteric, PERRL, no proptosis or lid lag  Visual field: intact  Occular motor: intact  Neck: suppler, no lymphadenopahty  Thyroid: normal size and texture, no nodule palpable, no bruits  Heart: regular rhythm, S1S2, no murmur appreciated  Lung: clear to auscultation bilaterally  Abdomen: soft, NT/ND, no hepatomegaly  Legs: no swelling or edema    Labs : I reviewed data from epic and extract and summarize the pertinent data here.        1/29/2015 11:34 12/3/2015 09:23 7/22/2016 10:04 12/6/2016 18:49 10/23/2017 11:15   Prolactin 23 21 32 (H) 30 (H) 32 (H)     Lab Results   Component Value Date     10/23/2017      Lab Results   Component Value Date    POTASSIUM 3.9 10/23/2017     Lab Results   Component Value Date    CHLORIDE 105 10/23/2017     Lab Results   Component Value Date    ARI 8.5 10/23/2017     Lab Results   Component Value Date    CO2 24 10/23/2017     Lab Results   Component Value Date    BUN 14 10/23/2017     Lab Results   Component Value Date    CR 1.01 10/23/2017     Lab Results   Component Value Date     10/23/2017     Lab Results   Component Value Date    TSH 0.06 10/23/2017     Lab Results   Component Value Date    T4 1.26 10/23/2017       Lab Results   Component Value Date    FSH  07/22/2016     <0.2  FSH Reference Range   Female: Follicular      2.5-10.2           Mid-cycle       3.4-33.4           Luteal          1.5-9.1            Postmenopausal  23.0-116.3         Lab Results   Component Value Date    PROLACTIN 30 12/06/2016     Bone density : 10/24/2018   HISTORY: 64 yo female osteopenia, hysterectomy, panhypopituitarism;  Osteopenia, unspecified location     COMPARISON:   10/3/2015     Age: 63  years.  Height: 65 inches  Weight: 181 pounds  Sex: Female  Ethnicity: White     Image quality: Adequate     Lumbar spine T-score in region of L1-L4 = -1.9   L1-4 percent change: 0.6%      HIPS:  Mean total hip T-score: -1.2  Mean total hip percent change: 1.4%      Left femoral neck T-score = -1.6  Right femoral neck T-score= -1.2      COMPARISON TO PRIOR:  Percent change in the spine and hips is NOT significant (or considered  invalid) accounting for the precision errors for this facility.      FRAX:  10 year probability of major osteoporotic fracture: 8.6%  10 year probability of hip fracture: 0.8%  The 10 year probability of fracture may be lower than reported if the  patient has received treatment. FRAX data should be disregarded in  patient's taking bisphosphonates.     World Health Organization definition of osteoporosis and osteopenia  for  women:   Normal: T-score at or above -1.0  Low Bone Mass (Osteopenia): T-score between -1.0 and -2.5.   Osteoporosis: T-score at or below -2.5   T-scores are reported for postmenopausal women and men over 50 years  of age.                                                                      IMPRESSION:  Osteopenia      Again, thank you for allowing me to participate in the care of your patient.      Sincerely,  Leona Arrington MD

## 2019-09-10 LAB — DEPRECATED CALCIDIOL+CALCIFEROL SERPL-MC: 80 UG/L (ref 20–75)

## 2019-09-11 ENCOUNTER — TELEPHONE (OUTPATIENT)
Dept: ENDOCRINOLOGY | Facility: CLINIC | Age: 64
End: 2019-09-11

## 2019-09-11 LAB — VIT B6 SERPL-MCNC: 65.1 NMOL/L (ref 20–125)

## 2019-09-11 NOTE — TELEPHONE ENCOUNTER
I left message to the patient as follows.     I reviewed lab. Vitamin B and D some are bit higher side but it is OK, not the level you need to adjust the dose.     Prolatin bit higher side, I am wondering prolactin may cause your dizziness, but not sure, that is my guess.   If you are interested in, we can try very small dose of medication called cabergoine once a week half tablet to see any change in your symptmos.     That is the message I left. Could you discuss with her?    Leona Arrington MD

## 2019-09-12 ENCOUNTER — TELEPHONE (OUTPATIENT)
Dept: ENDOCRINOLOGY | Facility: CLINIC | Age: 64
End: 2019-09-12

## 2019-09-12 NOTE — TELEPHONE ENCOUNTER
She would like to start the Cabergoline  to see if it helps the  dizziness. She also reports breast discharge or crustiness  on the nipple .   She questions the TSH result  any concerns  As per the range  It is not normal  Do you want her suppressed?   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 this year  ? Last one was  a year ago. Currently hydrocortisone dose  is 10 mg AM .   I see that you wrote the script for B/P meds  1/2019  but she said you told her she should see another provider for the B/P. Her PCP is a NP  but she doesn't understand  why you  prescribed a B/P  med if you don't want to follow it. Did you suggest  That she speak to her NP for future refills on B/P  Meds? Mirna Patricio RN on 9/12/2019 at 3:58 PM

## 2019-09-12 NOTE — TELEPHONE ENCOUNTER
----- Message from Leona Arrington MD sent at 9/11/2019  1:59 PM CDT -----  Regarding: left message  Hi    I left message.     I reviewed lab. Vitamin B and D some are bit higher side but it is OK, not the level you need to adjust the dose.     Prolatin bit higher side, I am wondering prolactin may cause your dizziness, but not sure, that is my guess.   If you are interested in, we can try very small dose of medication called cabergoine once a week half tablet to see any change in your symptmos.     That is the message I left. Could you discuss with her?    Leona

## 2019-10-04 ENCOUNTER — HEALTH MAINTENANCE LETTER (OUTPATIENT)
Age: 64
End: 2019-10-04

## 2019-12-15 DIAGNOSIS — E21.3 HYPERPARATHYROIDISM (H): ICD-10-CM

## 2019-12-17 NOTE — TELEPHONE ENCOUNTER
cholecalciferol 5000 units CAPS      Last Written Prescription Date:  12/20/18  Last Fill Quantity: 90,   # refills: 3  Last Office Visit : 9/9/19  Future Office visit: none    Routing refill request to provider for review/approval because:  Drug not on the [facility], P or [facility] refill protocol or controlled substance

## 2019-12-18 RX ORDER — CHOLECALCIFEROL (VITAMIN D3) 125 MCG
CAPSULE ORAL
Qty: 90 CAPSULE | Refills: 4 | Status: SHIPPED | OUTPATIENT
Start: 2019-12-18 | End: 2020-09-18

## 2020-01-10 DIAGNOSIS — E23.0 PANHYPOPITUITARISM (H): ICD-10-CM

## 2020-01-10 DIAGNOSIS — E78.5 HYPERLIPIDEMIA LDL GOAL <100: ICD-10-CM

## 2020-01-13 RX ORDER — SIMVASTATIN 40 MG
40 TABLET ORAL DAILY
Qty: 90 TABLET | Refills: 4 | Status: SHIPPED | OUTPATIENT
Start: 2020-01-13 | End: 2020-09-21

## 2020-01-13 RX ORDER — LEVOTHYROXINE SODIUM 200 UG/1
200 TABLET ORAL DAILY
Qty: 90 TABLET | Refills: 4 | Status: SHIPPED | OUTPATIENT
Start: 2020-01-13 | End: 2020-09-17

## 2020-01-13 RX ORDER — LOSARTAN POTASSIUM AND HYDROCHLOROTHIAZIDE 25; 100 MG/1; MG/1
1 TABLET ORAL DAILY
Qty: 90 TABLET | Refills: 4 | Status: SHIPPED | OUTPATIENT
Start: 2020-01-13 | End: 2020-09-17

## 2020-01-13 NOTE — TELEPHONE ENCOUNTER
L-THYROXINE (SYNTHROID) TABS 200MCG,     Last Written Prescription Date:  1/15/19  Last Fill Quantity: 90,   # refills: 3  Last Office Visit : 9/9/19 with recommended 1 year follow up  Future Office visit:  None scheduled    Routing refill request to provider for review/approval because:  TSH abnormal, lab note, phone encounters and my chart messages appear to be indicating no dose change  Lab Test 09/09/19  1224   TSH 0.14*         SIMVASTATIN TABS 40MG      Last Written Prescription Date:  1/16/19  Last Fill Quantity: 90,   # refills: 3    Routing refill request to provider for review/approval because:  Over due for LDL, no results in media or care everywhere    Lab Test 12/06/16  1849   LDL 71     LOSARTAN/HCTZ TABS 100/25      Last Written Prescription Date:  1/15/19  Last Fill Quantity: 90,   # refills: 3      Routing refill request to provider for review/approval because:  Blood pressure out of range   BP Readings from Last 3 Encounters:   09/09/19 (!) 177/83   08/01/19 (!) 166/80   11/26/18 134/66       Overdue for potassium and creatinine, no results in media or care everywhere    Lab Test 09/12/18  0729   POTASSIUM 4.7     Lab Test 09/12/18  0729   CR 1.11*     Notes in chart indicated possibly her NP, who is PCP taking over BP med refills?

## 2020-01-25 ENCOUNTER — TELEPHONE (OUTPATIENT)
Dept: ENDOCRINOLOGY | Facility: CLINIC | Age: 65
End: 2020-01-25

## 2020-01-25 DIAGNOSIS — N89.8 VAGINAL DRYNESS: ICD-10-CM

## 2020-01-25 DIAGNOSIS — L65.9 LOSS OF HAIR: ICD-10-CM

## 2020-01-25 DIAGNOSIS — E23.0 PANHYPOPITUITARISM (H): ICD-10-CM

## 2020-01-25 DIAGNOSIS — N95.2 ATROPHIC VAGINITIS: ICD-10-CM

## 2020-01-25 RX ORDER — ESTRADIOL 10 UG/1
10 INSERT VAGINAL
Qty: 24 TABLET | Refills: 3 | Status: SHIPPED | OUTPATIENT
Start: 2020-01-27 | End: 2020-09-17

## 2020-01-25 RX ORDER — ESTRADIOL 1 MG/1
1 TABLET ORAL DAILY
Qty: 90 TABLET | Refills: 4 | Status: SHIPPED | OUTPATIENT
Start: 2020-01-25 | End: 2020-09-23

## 2020-01-25 NOTE — TELEPHONE ENCOUNTER
Dr. Isabel Nguyen   [facility]       Left msg on Pts vm to please check [facility] MyChart for recommendation from Dr Arrington.   Suri Quintanilla RN on 1/25/2020 at 9:15 AM

## 2020-01-25 NOTE — TELEPHONE ENCOUNTER
Received refill request for estradiol tablets. Patient was recently seen and plan was to continue. Rx sent.

## 2020-02-18 ENCOUNTER — DOCUMENTATION ONLY (OUTPATIENT)
Dept: CARE COORDINATION | Facility: CLINIC | Age: 65
End: 2020-02-18

## 2020-02-19 NOTE — TELEPHONE ENCOUNTER
FUTURE VISIT INFORMATION      FUTURE VISIT INFORMATION:    Date: 3.18.20    Time: 10:30    Location:  Derm  REFERRAL INFORMATION:    Referring provider:  Dr. Leona Arrington    Referring providers clinic:  Endo    Reason for visit/diagnosis  Alopecia referral request to be added to Dr. Nguyen wait list after see by Dermatologist.    RECORDS REQUESTED FROM:       Clinic name Comments Records Status Imaging Status   Endo 12.26.19, 9.9.19  Dr. Arrington [facility] N/A

## 2020-03-18 ENCOUNTER — OFFICE VISIT (OUTPATIENT)
Dept: DERMATOLOGY | Facility: CLINIC | Age: 65
End: 2020-03-18
Payer: COMMERCIAL

## 2020-03-18 ENCOUNTER — PRE VISIT (OUTPATIENT)
Dept: DERMATOLOGY | Facility: CLINIC | Age: 65
End: 2020-03-18

## 2020-03-18 DIAGNOSIS — L64.9 ANDROGENETIC ALOPECIA: Primary | ICD-10-CM

## 2020-03-18 DIAGNOSIS — L64.9 ANDROGENETIC ALOPECIA: ICD-10-CM

## 2020-03-18 DIAGNOSIS — Z51.81 MEDICATION MONITORING ENCOUNTER: ICD-10-CM

## 2020-03-18 DIAGNOSIS — L65.9 NON-SCARRING ALOPECIA: ICD-10-CM

## 2020-03-18 LAB
BASOPHILS # BLD AUTO: 0.1 10E9/L (ref 0–0.2)
BASOPHILS NFR BLD AUTO: 0.9 %
DIFFERENTIAL METHOD BLD: NORMAL
EOSINOPHIL # BLD AUTO: 0.3 10E9/L (ref 0–0.7)
EOSINOPHIL NFR BLD AUTO: 3.8 %
ERYTHROCYTE [DISTWIDTH] IN BLOOD BY AUTOMATED COUNT: 12.7 % (ref 10–15)
FERRITIN SERPL-MCNC: 105 NG/ML (ref 8–252)
HCT VFR BLD AUTO: 43.6 % (ref 35–47)
HGB BLD-MCNC: 14.7 G/DL (ref 11.7–15.7)
IMM GRANULOCYTES # BLD: 0 10E9/L (ref 0–0.4)
IMM GRANULOCYTES NFR BLD: 0.3 %
IRON SATN MFR SERPL: 35 % (ref 15–46)
IRON SERPL-MCNC: 102 UG/DL (ref 35–180)
LYMPHOCYTES # BLD AUTO: 2.8 10E9/L (ref 0.8–5.3)
LYMPHOCYTES NFR BLD AUTO: 30.7 %
MCH RBC QN AUTO: 30.2 PG (ref 26.5–33)
MCHC RBC AUTO-ENTMCNC: 33.7 G/DL (ref 31.5–36.5)
MCV RBC AUTO: 90 FL (ref 78–100)
MONOCYTES # BLD AUTO: 0.6 10E9/L (ref 0–1.3)
MONOCYTES NFR BLD AUTO: 7 %
NEUTROPHILS # BLD AUTO: 5.1 10E9/L (ref 1.6–8.3)
NEUTROPHILS NFR BLD AUTO: 57.3 %
NRBC # BLD AUTO: 0 10*3/UL
NRBC BLD AUTO-RTO: 0 /100
PLATELET # BLD AUTO: 265 10E9/L (ref 150–450)
POTASSIUM SERPL-SCNC: 3.4 MMOL/L (ref 3.4–5.3)
RBC # BLD AUTO: 4.86 10E12/L (ref 3.8–5.2)
TIBC SERPL-MCNC: 295 UG/DL (ref 240–430)
WBC # BLD AUTO: 9 10E9/L (ref 4–11)

## 2020-03-18 ASSESSMENT — PAIN SCALES - GENERAL: PAINLEVEL: NO PAIN (0)

## 2020-03-18 NOTE — LETTER
3/18/2020       RE: Maria Esther Lowe  [address] [facility] [address]     Dear Colleague,    Thank you for referring your patient, Maria Esther Lowe, to the [facility] DERMATOLOGY at [facility]. Please see a copy of my visit note below.    [facility] Dermatology Note      Dermatology Problem List:  1. Non-scarring hair loss, favor androgenetic alopecia +/- contribution from underlying thyroid disease.  - TSH 0.14 in 9/2019, free T4 wnl  - Vitamin D 80 in 9/2019  - Repeat CBC, iron studies pending 3/18/2020   - Biopsy in 2016 consistent with alopecia areata, but not clinically consistent; had outside ILK without improvement per report  - She is on estradiol per endocrinology  2. Pituitary prolactinoma s/p removal in 1975 followed by radiation with subsequent development of panhypopituitarism     Encounter Date: Mar 18, 2020    CC:   Chief Complaint   Patient presents with     Hair Loss     Hair loss on frontal scalp - no itch, burn or pain.       History of Present Illness:  Ms. Maria Esther Lowe is a 64 year old female with prolactinoma s/p removal in 1975 followed by radiation with subsequent development of panhypopituitarism who presents as a referral from Dr. Leona Arrington for evaluation of hair loss. She reports gradually worsening hair loss for about 15 years. It is most noticeable on the top of the scalp. She notes increased hair shedding. She denies defined round areas of complete hair loss. She denies scalp itch, burning, pain, or tingling. She reports decrease in eyebrow and eyelash hair. Has never had much body hair. She uses Aveda and another OTC shampoo. She washes her hair 2-3 times per week. She tried Women's Rogaine for about 6-8 months on the scalp without improvement and stopped. She uses estradiol per endocrinology and biotin regularly. She also has a laser hair comb (per description). She was seen here in 2016 and had a biopsy with  alopecia areata, and she then had outside injections without improvement. She has otherwise not tried any treatments for her hair loss. She denies fevers, chills, weight loss, or excessive fatigue. She otherwise denies skin concerns.    Her mom and dad have thinning hair in their late 80s but no significant history of female hair loss.    Past Medical History:   Patient Active Problem List   Diagnosis     Prolactinoma (H)     Panhypopituitarism (H)     Hormone replacement therapy (postmenopausal)     Carpal tunnel syndrome     History of robot-assisted laparoscopic hysterectomy     Past Medical History:   Diagnosis Date     Hyperlipidemia LDL goal < 130      Hypertension      Hypothyroidism      Osteopenia     DEXA: 12/11; T-score of -1.9        Panhypopituitarism (H)      Past Surgical History:   Procedure Laterality Date     DAVINCI HYSTERECTOMY TOTAL, BILATERAL SALPINGO-OOPHORECTOMY, COMBINED Bilateral 9/11/2018    Procedure: COMBINED DAVINCI HYSTERECTOMY TOTAL, SALPINGO-OOPHORECTOMY;  Pelvic Washings, Davinci Assisted Laparoscopic Total Hysterectomy, Bilateral Salpingo Oophorectomy, cystoscopy;  Surgeon: Tiffany Lew MD;  [facility]     OPERATIVE HYSTEROSCOPY N/A 1/30/2018    Procedure: OPERATIVE HYSTEROSCOPY;  Operative Hysteroscopy, Resection of Endometrial Polyp, Dilation and Curettage;  Surgeon: Daysi Munoz MD;  [facility] OR     PROBE LACRIMAL DUCT, INSERT STENT BILATERAL, COMBINED Bilateral 11/26/2018    Procedure: BILATERAL NASOLACRIMAL STENT - LARGE DIAMETER STEN TUBE;  Surgeon: Davina Payton MD;  Location: [facility] OR     resected prolactin-producing pituitary adenoma.  1975    Postoperative radiation therapy.        Social History:  Patient reports that she has never smoked. She has never used smokeless tobacco. She reports that she does not drink alcohol or use drugs.   Works 2 jobs, .    Family History:  Family History   Problem Relation Age of Onset     Diabetes  Paternal Grandmother      Depression Paternal Grandmother      Thyroid Disease Maternal Grandmother      Skin Cancer Father      Melanoma No family hx of        Medications:  Current Outpatient Medications   Medication Sig Dispense Refill     CALCIUM 500 +D 500-400 MG-UNIT TABS Take 1 tablet by mouth 2 times daily. 180 tablet 3     Cholecalciferol (VITAMIN D) 125 MCG (5000 UT) CAPS TAKE 1 CAPSULE DAILY 90 capsule 4     conjugated estrogens (PREMARIN) cream Place 0.5 g vaginally twice a week 30 g 12     cyanocobalamin (VITAMIN  B-12) 1000 MCG tablet Take by mouth every morning       estradiol (VAGIFEM) 10 MCG TABS vaginal tablet Place 1 tablet (10 mcg) vaginally twice a week 24 tablet 3     hydrocortisone (CORTEF) 10 MG tablet TAKE 1 TABLET IN THE MORNING AND ONE-HALF (1/2) TABLET MIDDAY 135 tablet 2     levothyroxine (SYNTHROID/LEVOTHROID) 200 MCG tablet Take 1 tablet (200 mcg) by mouth daily Separate oral administration of iron- or calcium-containing products and levothyroxine by at least 4 hours. 90 tablet 4     losartan-hydrochlorothiazide (HYZAAR) 100-25 MG tablet Take 1 tablet by mouth daily 90 tablet 4     simvastatin (ZOCOR) 40 MG tablet Take 1 tablet (40 mg) by mouth daily 90 tablet 4     zinc 50 MG TABS Take  by mouth.       estradiol (ESTRACE) 1 MG tablet Take 1 tablet (1 mg) by mouth daily (Patient not taking: Reported on 3/18/2020) 90 tablet 4     hydrocortisone (CORTEF) 5 MG tablet Take 1 tablet (5 mg) by mouth daily (Patient not taking: Reported on 3/18/2020) 90 tablet 3        Allergies   Allergen Reactions     Ceftriaxone Other (See Comments)     Unsure of reaction.         Review of Systems:  -Const: Denies fevers, chills or changes in weight.   -Constitutional: Otherwise feeling well today, in usual state of health.  -Skin: As above in HPI. No additional skin concerns.    Physical exam:  GEN: Well-developed, well-nourished, no acute distress, pleasant mood.  SKIN: Focused examination of the scalp  and face and hands was performed.  -Dubon skin type: II  -Global thinning of the vertex and temporal scalp with retention of the frontal hairline  -No perifollicular erythema or scale  -Short fibers present on the eyebrows  -Nails no pitting or dystrophy.  -No other lesions of concern on areas examined.     Impression/Plan:  1. Non-scarring hair loss, favor androgenetic alopecia +/- contribution from underlying thyroid disease. Note prior bx in 2016 with alopecia areata and she does have decreased eyebrow density but clinically exam much more consistent with androgenetic alopecia. Additionally she also failed prior treatment for alopecia areata per her report. Offered repeat bx versus empiric treatment of androgenetic alopecia as below followed by bx if no improvement. She would prefer the latter. Will also check labs and touch base with endocrinologist regarding use of anti-androgens such as spironolactone and finasteride.  - Labs today - CBC, iron studies  - Start Rogaine 5% foam BID. Expectations for use discussed.  - As already has laser hair comb, said okay to use with Rogaine; not sure if she will get double benefit but likely does not hurt  - Consider bx if no improvement  - TSH 0.14 in 9/2019, free T4 wnl   - Vitamin D 80 in 9/2019  - She is on estradiol per endocrinology; will send message to discuss use of anti-androgens  - Discussed that biotin can affect thyroid lab results, recommended discussing this with her endocrinologist    CC Leona Arrington MD  [address] [place] [address] on close of this encounter.    Follow-up in 3-6 months, earlier for new or changing lesions.     Dr. Nguyen staffed the patient.    Staff Involved:  Resident(Flakita)/Staff    Staff Physician Comments:   I saw and evaluated the patient with the resident and I agree with the assessment and plan.  I was present for the examination.    Jennifer Nguyen MD    Department of Dermatology  [facility]: Phone: [phone], Fax: [phone]  3/18/2020     Addendum: heard back from endocrinology that would be okay to start spironolactone if desired. Will add-on potassium and offered spironolactone to patient over mychart, otherwise can wait until follow-up to consider if not improving.    Again, thank you for allowing me to participate in the care of your patient.      Sincerely,    Jennifer Nguyen MD

## 2020-03-18 NOTE — NURSING NOTE
Dermatology Rooming Note    Maria Esther Lowe's goals for this visit include:   Chief Complaint   Patient presents with     Hair Loss     Hair loss on frontal scalp - no itch, burn or pain.     Jyoti Samuels, CMA

## 2020-03-18 NOTE — PROGRESS NOTES
[facility] Dermatology Note      Dermatology Problem List:  1. Non-scarring hair loss, favor androgenetic alopecia +/- contribution from underlying thyroid disease.  - TSH 0.14 in 9/2019, free T4 wnl  - Vitamin D 80 in 9/2019  - Repeat CBC, iron studies pending 3/18/2020   - Biopsy in 2016 consistent with alopecia areata, but not clinically consistent; had outside ILK without improvement per report  - She is on estradiol per endocrinology  2. Pituitary prolactinoma s/p removal in 1975 followed by radiation with subsequent development of panhypopituitarism     Encounter Date: Mar 18, 2020    CC:   Chief Complaint   Patient presents with     Hair Loss     Hair loss on frontal scalp - no itch, burn or pain.       History of Present Illness:  Ms. Maria Esther Lowe is a 64 year old female with prolactinoma s/p removal in 1975 followed by radiation with subsequent development of panhypopituitarism who presents as a referral from Dr. Leona Arrington for evaluation of hair loss. She reports gradually worsening hair loss for about 15 years. It is most noticeable on the top of the scalp. She notes increased hair shedding. She denies defined round areas of complete hair loss. She denies scalp itch, burning, pain, or tingling. She reports decrease in eyebrow and eyelash hair. Has never had much body hair. She uses Aveda and another OTC shampoo. She washes her hair 2-3 times per week. She tried Women's Rogaine for about 6-8 months on the scalp without improvement and stopped. She uses estradiol per endocrinology and biotin regularly. She also has a laser hair comb (per description). She was seen here in 2016 and had a biopsy with alopecia areata, and she then had outside injections without improvement. She has otherwise not tried any treatments for her hair loss. She denies fevers, chills, weight loss, or excessive fatigue. She otherwise denies skin concerns.    Her mom and dad have thinning hair in their late  80s but no significant history of female hair loss.    Past Medical History:   Patient Active Problem List   Diagnosis     Prolactinoma (H)     Panhypopituitarism (H)     Hormone replacement therapy (postmenopausal)     Carpal tunnel syndrome     History of robot-assisted laparoscopic hysterectomy     Past Medical History:   Diagnosis Date     Hyperlipidemia LDL goal < 130      Hypertension      Hypothyroidism      Osteopenia     DEXA: 12/11; T-score of -1.9        Panhypopituitarism (H)      Past Surgical History:   Procedure Laterality Date     DAVINCI HYSTERECTOMY TOTAL, BILATERAL SALPINGO-OOPHORECTOMY, COMBINED Bilateral 9/11/2018    Procedure: COMBINED DAVINCI HYSTERECTOMY TOTAL, SALPINGO-OOPHORECTOMY;  Pelvic Washings, Davinci Assisted Laparoscopic Total Hysterectomy, Bilateral Salpingo Oophorectomy, cystoscopy;  Surgeon: Tiffany Lew MD;  [facility]     OPERATIVE HYSTEROSCOPY N/A 1/30/2018    Procedure: OPERATIVE HYSTEROSCOPY;  Operative Hysteroscopy, Resection of Endometrial Polyp, Dilation and Curettage;  Surgeon: Daysi Munoz MD;  [facility]     PROBE LACRIMAL DUCT, INSERT STENT BILATERAL, COMBINED Bilateral 11/26/2018    Procedure: BILATERAL NASOLACRIMAL STENT - LARGE DIAMETER STEN TUBE;  Surgeon: Davina Payton MD;  Location: [facility] OR     resected prolactin-producing pituitary adenoma.  1975    Postoperative radiation therapy.        Social History:  Patient reports that she has never smoked. She has never used smokeless tobacco. She reports that she does not drink alcohol or use drugs.   Works 2 jobs, .    Family History:  Family History   Problem Relation Age of Onset     Diabetes Paternal Grandmother      Depression Paternal Grandmother      Thyroid Disease Maternal Grandmother      Skin Cancer Father      Melanoma No family hx of        Medications:  Current Outpatient Medications   Medication Sig Dispense Refill     CALCIUM 500 +D 500-400 MG-UNIT TABS Take 1  tablet by mouth 2 times daily. 180 tablet 3     Cholecalciferol (VITAMIN D) 125 MCG (5000 UT) CAPS TAKE 1 CAPSULE DAILY 90 capsule 4     conjugated estrogens (PREMARIN) cream Place 0.5 g vaginally twice a week 30 g 12     cyanocobalamin (VITAMIN  B-12) 1000 MCG tablet Take by mouth every morning       estradiol (VAGIFEM) 10 MCG TABS vaginal tablet Place 1 tablet (10 mcg) vaginally twice a week 24 tablet 3     hydrocortisone (CORTEF) 10 MG tablet TAKE 1 TABLET IN THE MORNING AND ONE-HALF (1/2) TABLET MIDDAY 135 tablet 2     levothyroxine (SYNTHROID/LEVOTHROID) 200 MCG tablet Take 1 tablet (200 mcg) by mouth daily Separate oral administration of iron- or calcium-containing products and levothyroxine by at least 4 hours. 90 tablet 4     losartan-hydrochlorothiazide (HYZAAR) 100-25 MG tablet Take 1 tablet by mouth daily 90 tablet 4     simvastatin (ZOCOR) 40 MG tablet Take 1 tablet (40 mg) by mouth daily 90 tablet 4     zinc 50 MG TABS Take  by mouth.       estradiol (ESTRACE) 1 MG tablet Take 1 tablet (1 mg) by mouth daily (Patient not taking: Reported on 3/18/2020) 90 tablet 4     hydrocortisone (CORTEF) 5 MG tablet Take 1 tablet (5 mg) by mouth daily (Patient not taking: Reported on 3/18/2020) 90 tablet 3        Allergies   Allergen Reactions     Ceftriaxone Other (See Comments)     Unsure of reaction.         Review of Systems:  -Const: Denies fevers, chills or changes in weight.   -Constitutional: Otherwise feeling well today, in usual state of health.  -Skin: As above in HPI. No additional skin concerns.    Physical exam:  GEN: Well-developed, well-nourished, no acute distress, pleasant mood.  SKIN: Focused examination of the scalp and face and hands was performed.  -Dubon skin type: II  -Global thinning of the vertex and temporal scalp with retention of the frontal hairline  -No perifollicular erythema or scale  -Short fibers present on the eyebrows  -Nails no pitting or dystrophy.  -No other lesions of  concern on areas examined.     Impression/Plan:  1. Non-scarring hair loss, favor androgenetic alopecia +/- contribution from underlying thyroid disease. Note prior bx in 2016 with alopecia areata and she does have decreased eyebrow density but clinically exam much more consistent with androgenetic alopecia. Additionally she also failed prior treatment for alopecia areata per her report. Offered repeat bx versus empiric treatment of androgenetic alopecia as below followed by bx if no improvement. She would prefer the latter. Will also check labs and touch base with endocrinologist regarding use of anti-androgens such as spironolactone and finasteride.  - Labs today - CBC, iron studies  - Start Rogaine 5% foam BID. Expectations for use discussed.  - As already has laser hair comb, said okay to use with Rogaine; not sure if she will get double benefit but likely does not hurt  - Consider bx if no improvement  - TSH 0.14 in 9/2019, free T4 wnl   - Vitamin D 80 in 9/2019  - She is on estradiol per endocrinology; will send message to discuss use of anti-androgens  - Discussed that biotin can affect thyroid lab results, recommended discussing this with her endocrinologist    CC Leona Arrington MD  [address] [place], [place] [id number] on close of this encounter.    Follow-up in 3-6 months, earlier for new or changing lesions.     Dr. Nguyen staffed the patient.    Staff Involved:  Resident(Flakita)/Staff    Staff Physician Comments:   I saw and evaluated the patient with the resident and I agree with the assessment and plan.  I was present for the examination.    Jennifer Nguyen MD    Department of Dermatology  [facility]: Phone: [phone], Fax: [phone]  3/18/2020     Addendum: heard back from endocrinology that would be okay to start spironolactone if desired. Will add-on potassium and offered spironolactone to patient  over mychart, otherwise can wait until follow-up to consider if not improving.

## 2020-03-18 NOTE — PATIENT INSTRUCTIONS
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  It could be worse because of your thyroid and hormone issues.  Please start Rogaine (minoxidil) 5% foam twice daily.  Can use laser hair comb if you would like 3 times weekly  See more info below.    We will check labs today.    I will send a message to Dr. Arrington.  Biotin can mess up your thyroid testing.    If not improving, we can consider a scalp biopsy since you previously had one that showed an autoimmune condition called alopecia areata.    Return in 3-6 months, sooner if concerns.      Topical Rogaine (Minoxidil) for Pattern Hair Loss      Minoxidil is an FDA approved over the counter topical for the treatment of hair loss and thinning hair in men and women.     Initially a 2% solution was available however this required application twice daily. A 5% solution is also now approved, only requiring application once per day.     Available Products:     Rogaine 5% solution: Packaged for men however can be used by men or women. Use dropper and apply directly to scalp at bedtime. This product can cause an allergy because of presence of propylene glycol. Stop this product if you develop a rash or itching and contact your physician.     Rogaine 5% foam: Packaged for men and women: Apply foam directly to the scalp once daily. This is less greasy compared to the solution. This formula is preferred for those who had a reaction to the solution product. If you develop rash or itching, stop the product and contact your physician.     What if I stop minoxidil topical?     After stopping minoxidil the hair will return to the usual pattern of thinning. Using the product 3-4 times per week is better than not using product at all.       Can I use generic minoxidil?     Yes, look for 5% minoxidil.     What are the side effects?    The most common side effect is rash or itching of the scalp. This can occur if a contact allergy develops with propylene glycol. A small  group of patients noticed the appearance of facial hair if the product runs onto the face or with prolonged use.       Last updated: 6/26/2016    Photobiomodulation (Low Level Laser (Light) Therapy)      What is Photobiomodulation?     Photobiomodulation, also referred to as low level laser therapy,  is an emerging treatment for hair thinning in men and women, also known as pattern hair loss. The devices use light to stimulate the hair follicle.  The exact mechanism is still unknown but there is evidence for improvement with hair growth and density.      What types of devices are available?    Each patient has many different options devices depending on preference for shape, frequency of use and price point.      There is no study comparing these devices. However, most research available is on the Hairmax devices.    Below is a sample of some devices currently available. All are FDA cleared for androgenetic alopecia.    In general, the more laser lights the more expensive the device. However, this does not necessarily mean the device works better.      [employer] Lasercomb and Band   Shape Comb or Band   Strickland Comb ($199-$399), Band ($499-$799)   Contact Information www.[employer]  [phone]  [phone]              iContact Pro Capillus 82 iGrow Theradome   Shape Hat Baseball Cap Helmet Helmet   Strickland $3,000 $799 $549 $895   Contact Information [email or web address]  [phone]  [email or web address]  [phone]  [email or web address]  [phone]  [email or web address]   [phone]         If you plan to buy a hair max device, please consider using the following coupon code as this will give you a discount and also result in a donation from [employer] to our medical students.

## 2020-05-19 ENCOUNTER — VIRTUAL VISIT (OUTPATIENT)
Dept: DERMATOLOGY | Facility: CLINIC | Age: 65
End: 2020-05-19
Payer: COMMERCIAL

## 2020-05-19 DIAGNOSIS — L65.9 NON-SCARRING ALOPECIA: Primary | ICD-10-CM

## 2020-05-19 ASSESSMENT — PAIN SCALES - GENERAL: PAINLEVEL: NO PAIN (0)

## 2020-05-19 NOTE — PATIENT INSTRUCTIONS
[facility] Teledermatology Visit    Thank you for allowing us to participate in your care. Your findings, instructions and follow-up plan are as follows:    I'm so sorry you have had the rapid hair loss.  Rogaine can cause increased shedding but I would not expect it to occur to this degree.  See you tomorrow at 9:30 for a biopsy!  Apologies again for the scheduling issues you experienced.       When should I call my doctor?    If you are worsening or not improving, please, contact us or seek urgent care as noted below.     Who should I call with questions (adults)?    [facility] (adult and pediatric): [phone]     [facility] (adult): [phone]    For urgent needs outside of business hours call the [facility] at [phone] and ask for the dermatology resident on call    If this is a medical emergency and you are unable to reach an ER, Call [phone]      Who should I call with questions (pediatric)?  [facility]- Pediatric Dermatology  Dr. Malathi Hendricks, Dr. Virginia Gunn, Dr. Ileana Rosario, Toma Jordan, PA  Dr. Veronica Nacne, Dr. Isabel Nguyen & Dr. Santi Barraza  Non Urgent  Nurse Triage Line; [phone]- Laura and Ira DEY Care Coordinatorrip Shepherd (/Complex ) [phone]    If you need a prescription refill, please contact your pharmacy. Refills are approved or denied by our Physicians during normal business hours, Monday through Fridays  Per office policy, refills will not be granted if you have not been seen within the past year (or sooner depending on your child's condition)    Scheduling Information:  Pediatric Appointment Scheduling and Call Center [phone]  Radiology Scheduling- [phone]  Sedation Unit Scheduling- [phone]  [place] Scheduling- General [phone]; Pediatric Dermatology [phone]  Main  Services:  [phone]  Luxembourgish: [phone]  Monegasque: [phone]  Hmong/Tamazight/Estonian: [phone]  Preadmission Nursing Department Fax Number: [phone] (Fax all pre-operative paperwork to this number)    For urgent matters arising during evenings, weekends, or holidays that cannot wait for normal business hours please call [phone] and ask for the Dermatology Resident On-Call to be paged.

## 2020-05-19 NOTE — PROGRESS NOTES
[facility] Record:  Store and Forward and Telephone [phone]      Impression and Recommendations (Patient Counseled on the Following):  1. Hair loss, rapidly progressive since starting Rogaine. This seems out of proportion for what I would expect from Rogaine (some shedding expected with initial use but this is a substantial loss). We have suspected the etiology of her hair loss to be androgenetic alopecia +/- contribution from underlying thyroid disease but there has also been a question of alopecia areata given prior bx in 2016 that demonstrated this. We will plan to have her seen in-person tomorrow for repeat biopsy to ensure we have correct treatment plan.  -Plan for in-person bx tomorrow in clinic      Follow-up:   Follow-up with dermatology tomorrow. Earlier for new or changing lesions or rash.      Staff only:    Jennifer Nguyen MD    Department of Dermatology  [facility] [place]: Phone: [phone], Fax: [phone]  5/19/2020    _____________________________________________________________________________    Dermatology Problem List:  1. Non-scarring hair loss, favor androgenetic alopecia +/- contribution from underlying thyroid disease.  - TSH 0.14 in 9/2019, free T4 wnl  - Vitamin D 80 in 9/2019  - Repeat CBC, iron studies wnl 3/18/2020  - Biopsy in 2016 consistent with alopecia areata, but not clinically consistent; had outside ILK without improvement per report  - She is on estradiol per endocrinology  2. Pituitary prolactinoma s/p removal in 1975 followed by radiation with subsequent development of panhypopituitarism     Encounter Date: May 19, 2020    CC:   Chief Complaint   Patient presents with     Hair Loss     Maria Esther is doing a hair loss follow up .lost some hair from rogaine        History of Present Illness:  I have reviewed the teledermatology information and the nursing intake  corresponding to this issue. Maria Esther Lowe is a 64 year old female who presents via teledermatology for follow-up hair loss.    Last visit 3/18/2020.  Started Rogaine 3/20/2020.  Losing hair rapidly about 3 weeks ago (this was about 6 weeks after starting Rogaine).  She stopped using the Rogaine a few days ago.  She is understandably very distraught at this loss.    ROS: Patient is generally feeling well today.    Physical Examination:  General: Mood pleasant  Skin: Focused examination within the teledermatology photograph(s) including scalp was performed.   -significant decreased hair density along vertex scalp with retention of frontal hairline, much worse from prior.          Labs:  Reviewed, see updated problem list.    Past Medical History:   Patient Active Problem List   Diagnosis     Prolactinoma (H)     Panhypopituitarism (H)     Hormone replacement therapy (postmenopausal)     Carpal tunnel syndrome     History of robot-assisted laparoscopic hysterectomy     Past Medical History:   Diagnosis Date     Hyperlipidemia LDL goal < 130      Hypertension      Hypothyroidism      Osteopenia     DEXA: 12/11; T-score of -1.9        Panhypopituitarism (H)      Past Surgical History:   Procedure Laterality Date     DAVINCI HYSTERECTOMY TOTAL, BILATERAL SALPINGO-OOPHORECTOMY, COMBINED Bilateral 9/11/2018    Procedure: COMBINED DAVINCI HYSTERECTOMY TOTAL, SALPINGO-OOPHORECTOMY;  Pelvic Washings, Davinci Assisted Laparoscopic Total Hysterectomy, Bilateral Salpingo Oophorectomy, cystoscopy;  Surgeon: Tiffany Lew MD;  Location: [facility] OR     OPERATIVE HYSTEROSCOPY N/A 1/30/2018    Procedure: OPERATIVE HYSTEROSCOPY;  Operative Hysteroscopy, Resection of Endometrial Polyp, Dilation and Curettage;  Surgeon: Daysi Munoz MD;  Location: [facility] OR     PROBE LACRIMAL DUCT, INSERT STENT BILATERAL, COMBINED Bilateral 11/26/2018    Procedure: BILATERAL NASOLACRIMAL STENT - LARGE DIAMETER STEN TUBE;  Surgeon:  Davina Payton MD;  Location: [facility] OR     resected prolactin-producing pituitary adenoma.  1975    Postoperative radiation therapy.        Social History:  Patient reports that she has never smoked. She has never used smokeless tobacco. She reports that she does not drink alcohol or use drugs.    Family History:  Family History   Problem Relation Age of Onset     Diabetes Paternal Grandmother      Depression Paternal Grandmother      Thyroid Disease Maternal Grandmother      Skin Cancer Father      Melanoma No family hx of        Medications:  Current Outpatient Medications   Medication     CALCIUM 500 +D 500-400 MG-UNIT TABS     Cholecalciferol (VITAMIN D) 125 MCG (5000 UT) CAPS     conjugated estrogens (PREMARIN) cream     cyanocobalamin (VITAMIN  B-12) 1000 MCG tablet     estradiol (ESTRACE) 1 MG tablet     estradiol (VAGIFEM) 10 MCG TABS vaginal tablet     hydrocortisone (CORTEF) 10 MG tablet     hydrocortisone (CORTEF) 5 MG tablet     levothyroxine (SYNTHROID/LEVOTHROID) 200 MCG tablet     losartan-hydrochlorothiazide (HYZAAR) 100-25 MG tablet     simvastatin (ZOCOR) 40 MG tablet     zinc 50 MG TABS     No current facility-administered medications for this visit.           Allergies   Allergen Reactions     Ceftriaxone Other (See Comments)     Unsure of reaction.         _____________________________________________________________________________    Teledermatology information:  - Location of patient: Home  - Patient presented as: return  - Location of teledermatologist:  ([facility] DERMATOLOG[facility] )  - Reason teledermatology is appropriate:  of National Emergency Regarding Coronavirus disease (COVID 19) Outbreak  - Image quality and interpretability: acceptable  - Physician has received verbal consent for a Video/Photos Visit from the patient? Yes  - In-person dermatology visit recommendation: yes - for biopsy  - Date of images: 5/19/2020   - Service start time: 2:55 PM   - Service end time: 3:09 PM   - Date of  report: 5/19/2020

## 2020-05-19 NOTE — NURSING NOTE
Dermatology Rooming Note    Maria Esther Lowe's goals for this visit include:   Chief Complaint   Patient presents with     Hair Loss     Maria Esther is doing a hair loss follow up .lost some hair from AICHA Booth

## 2020-05-20 ENCOUNTER — OFFICE VISIT (OUTPATIENT)
Dept: DERMATOLOGY | Facility: CLINIC | Age: 65
End: 2020-05-20
Payer: COMMERCIAL

## 2020-05-20 DIAGNOSIS — L65.9 NON-SCARRING ALOPECIA: Primary | ICD-10-CM

## 2020-05-20 DIAGNOSIS — L21.9 SEBORRHEIC DERMATITIS: ICD-10-CM

## 2020-05-20 DIAGNOSIS — L65.9 NON-SCARRING ALOPECIA: ICD-10-CM

## 2020-05-20 LAB
T4 FREE SERPL-MCNC: 0.83 NG/DL (ref 0.76–1.46)
TSH SERPL DL<=0.005 MIU/L-ACNC: 0.12 MU/L (ref 0.4–4)

## 2020-05-20 RX ORDER — KETOCONAZOLE 20 MG/ML
SHAMPOO TOPICAL
Qty: 120 ML | Refills: 11 | Status: SHIPPED | OUTPATIENT
Start: 2020-05-20 | End: 2021-03-03

## 2020-05-20 RX ORDER — CLOBETASOL PROPIONATE 0.5 MG/ML
SOLUTION TOPICAL 2 TIMES DAILY
Qty: 50 ML | Refills: 3 | Status: SHIPPED | OUTPATIENT
Start: 2020-05-20 | End: 2022-05-24

## 2020-05-20 ASSESSMENT — PAIN SCALES - GENERAL
PAINLEVEL: NO PAIN (0)
PAINLEVEL: NO PAIN (0)

## 2020-05-20 NOTE — PROGRESS NOTES
[facility] Dermatology Note      Dermatology Problem List:  1. Non-scarring hair loss, favor androgenetic alopecia +/- contribution from underlying thyroid disease but had prior bx with AA. Repeat bx 5/20/2020 in setting of rapid progression.  - Repeat bx 5/20/2020 pending   - Biopsy in 2016 consistent with alopecia areata, but not clinically consistent; had outside ILK without improvement per report  - TSH 0.14 in 9/2019, free T4 wnl  - Vitamin D 80 in 9/2019  - Repeat CBC, iron studies wnl 3/18/2020  - She is on estradiol per endocrinology  2. Pituitary prolactinoma s/p removal in 1975 followed by radiation with subsequent development of panhypopituitarism     CC:   Chief Complaint   Patient presents with     Hair Loss     Maria Esther is here for a hair loss follow up and bx.          Encounter Date: May 20, 2020    History of Present Illness:  Ms. Maria Esther Lowe is a 64 year old female who presents as a follow-up for hair loss. The patient was last seen virtually yesterday for rapid progression of hair loss. She returns today for biopsy. Hair loss started about 6 weeks after starting Rogaine. She has since stopped Rogaine. No recent illness or new medications. She has mild stress a/w COVID but not really anything significant. Otherwise feeling well, no additional skin concerns.      Past Medical History:   Patient Active Problem List   Diagnosis     Prolactinoma (H)     Panhypopituitarism (H)     Hormone replacement therapy (postmenopausal)     Carpal tunnel syndrome     History of robot-assisted laparoscopic hysterectomy     Past Medical History:   Diagnosis Date     Hyperlipidemia LDL goal < 130      Hypertension      Hypothyroidism      Osteopenia     DEXA: 12/11; T-score of -1.9        Panhypopituitarism (H)      Past Surgical History:   Procedure Laterality Date     DAVINCI HYSTERECTOMY TOTAL, BILATERAL SALPINGO-OOPHORECTOMY, COMBINED Bilateral 9/11/2018    Procedure: COMBINED DAVINCI  HYSTERECTOMY TOTAL, SALPINGO-OOPHORECTOMY;  Pelvic Washings, Davinci Assisted Laparoscopic Total Hysterectomy, Bilateral Salpingo Oophorectomy, cystoscopy;  Surgeon: Tiffany Lew MD;  [facility]     OPERATIVE HYSTEROSCOPY N/A 1/30/2018    Procedure: OPERATIVE HYSTEROSCOPY;  Operative Hysteroscopy, Resection of Endometrial Polyp, Dilation and Curettage;  Surgeon: Daysi Munoz MD;  [facility]     PROBE LACRIMAL DUCT, INSERT STENT BILATERAL, COMBINED Bilateral 11/26/2018    Procedure: BILATERAL NASOLACRIMAL STENT - LARGE DIAMETER STEN TUBE;  Surgeon: Davina Payton MD;  Location: [facility] OR     resected prolactin-producing pituitary adenoma.  1975    Postoperative radiation therapy.        Social History:  Patient reports that she has never smoked. She has never used smokeless tobacco. She reports that she does not drink alcohol or use drugs.    Family History:  Family History   Problem Relation Age of Onset     Diabetes Paternal Grandmother      Depression Paternal Grandmother      Thyroid Disease Maternal Grandmother      Skin Cancer Father      Melanoma No family hx of        Medications:  Current Outpatient Medications   Medication Sig Dispense Refill     CALCIUM 500 +D 500-400 MG-UNIT TABS Take 1 tablet by mouth 2 times daily. 180 tablet 3     Cholecalciferol (VITAMIN D) 125 MCG (5000 UT) CAPS TAKE 1 CAPSULE DAILY 90 capsule 4     conjugated estrogens (PREMARIN) cream Place 0.5 g vaginally twice a week 30 g 12     cyanocobalamin (VITAMIN  B-12) 1000 MCG tablet Take by mouth every morning       estradiol (ESTRACE) 1 MG tablet Take 1 tablet (1 mg) by mouth daily 90 tablet 4     estradiol (VAGIFEM) 10 MCG TABS vaginal tablet Place 1 tablet (10 mcg) vaginally twice a week 24 tablet 3     hydrocortisone (CORTEF) 10 MG tablet TAKE 1 TABLET IN THE MORNING AND ONE-HALF (1/2) TABLET MIDDAY 135 tablet 2     hydrocortisone (CORTEF) 5 MG tablet Take 1 tablet (5 mg) by mouth daily 90 tablet 3      levothyroxine (SYNTHROID/LEVOTHROID) 200 MCG tablet Take 1 tablet (200 mcg) by mouth daily Separate oral administration of iron- or calcium-containing products and levothyroxine by at least 4 hours. 90 tablet 4     losartan-hydrochlorothiazide (HYZAAR) 100-25 MG tablet Take 1 tablet by mouth daily 90 tablet 4     simvastatin (ZOCOR) 40 MG tablet Take 1 tablet (40 mg) by mouth daily 90 tablet 4     zinc 50 MG TABS Take  by mouth.       Allergies   Allergen Reactions     Ceftriaxone Other (See Comments)     Unsure of reaction.         Review of Systems:  -Constitutional: Otherwise feeling well today, in usual state of health.  -Skin: As above in HPI. No additional skin concerns.    Physical exam:  GEN: This is a well developed, well-nourished female in no acute distress, in a pleasant mood.    SKIN: Focused examination of the scalp and face was performed.  -Dubon skin type: II  -Diffuse thinning on vertex and lateral scalp with retention of frontal hairline, worse from prior.  -Mild diffuse scaling on scalp.  -Eyebrows tattooed.  -Eyelashes normal density.  -No other lesions of concern on areas examined.     Impression/Plan:  1. Hair loss, rapidly progressive since starting Rogaine. This seems out of proportion for what I would expect from Rogaine (some shedding expected with initial use but this is a substantial loss). We have suspected the etiology of her hair loss to be androgenetic alopecia +/- contribution from underlying thyroid disease but there has also been a question of alopecia areata given prior bx in 2016 that demonstrated this. She also has loss of eyebrows which goes along with AA. Clinically her exam is patterned loss with possible recent telogen effluvium +/- some shedding from Rogaine use. We will repeat biopsy to ensure we have correct treatment plan.  - Punch biopsy (x2):  After discussion of benefits and risks including but not limited to bleeding/bruising, pain/swelling, infection, scar,  incomplete removal, nerve damage/numbness, recurrence, and non-diagnostic biopsy, written consent, verbal consent and photographs were obtained. Time-out was performed. The area was cleaned with isopropyl alcohol. 0.5mL of 1% lidocaine with 1:100,000 epinephrine was injected to obtain adequate anesthesia of the lesion on the left frontal scalp x2.  A 4 mm punch biopsy was performed. 4-0 prolene sutures were utilized to approximate the epidermal edges. White petroleum jelly/Vaseline and a bandage was applied to the wound. Explicit verbal and written wound care instructions were provided. The patient left the Dermatology Clinic in good condition. The patient was counseled to follow up for suture removal in approximately 14 days.  - Empirically trial clobetasol solution BID pending results.  - She understandably does not want to resume Rogaine.  - Continue to manage thyroid disease per endocrinology.  - See prior labs in problem list.    2. Seborrheic dermatitis.  - Start ketoconazole shampoo 3 times per week. Counseled to massage into wet scalp, let sit 5 min, then rinse.      CC Estela Boswell, [place] [address] on close of this encounter.    Follow-up in 2 weeks to discuss results, earlier for new or changing lesions.       Staff Involved:  Staff Only    Jennifer Nguyen MD    Department of Dermatology  [facility] [place]: Phone: [phone], Fax: [phone]  5/20/2020

## 2020-05-20 NOTE — NURSING NOTE
Lidocaine-epinephrine 1-1:001209 % injection   3mL once for one use, starting 5/20/2020 ending 5/20/2020,  2mL disp, R-0, injection  Injected by AICHA Hayden

## 2020-05-20 NOTE — PATIENT INSTRUCTIONS
We will check biopsy today to see if we can figure out your cause of hair loss.  Possibilities are:  - Alopecia areata  - Female pattern hair loss  - Telogen effluvium (shift to shedding phase)    Depending on results, this may help us determine treatment.    In meantime, please start:  - Clobetasol solution twice daily to scalp   If you get to the pharmacy and it's too expensive, please ask the pharmacy to contact us so I can send you one your insurance might like more. You can also check [employer].  - Start ketoconazole shampoo 3 times weekly or alternating with a shampoo containing pyrithione zinc such as head and shoulders. Massage into wet scalp, let sit 3-5 min, then rinse.    Let's discuss results and plan in 2 weeks.    Wound Care After a Biopsy    What is a skin biopsy?  A skin biopsy allows the doctor to examine a very small piece of tissue under the microscope to determine the diagnosis and the best treatment for the skin condition. A local anesthetic (numbing medicine)  is injected with a very small needle into the skin area to be tested. A small piece of skin is taken from the area. Sometimes a suture (stitch) is used.     What are the risks of a skin biopsy?  I will experience scar, bleeding, swelling, pain, crusting and redness. I may experience incomplete removal or recurrence. Risks of this procedure are excessive bleeding, bruising, infection, nerve damage, numbness, thick (hypertrophic or keloidal) scar and non-diagnostic biopsy.    How should I care for my wound for the first 24 hours?    Keep the wound dry and covered for 24 hours    If it bleeds, hold direct pressure on the area for 15 minutes. If bleeding does not stop then go to the emergency room    Avoid strenuous exercise the first 1-2 days or as your doctor instructs you    How should I care for the wound after 24 hours?    After 24 hours, remove the bandage    You may bathe or shower as normal    If you had a scalp biopsy, you can  shampoo as usual and can use shower water to clean the biopsy site daily    Clean the wound twice a day with gentle soap and water    Do not scrub, be gentle    Apply white petroleum/Vaseline after cleaning the wound with a cotton swab or a clean finger, and keep the site covered with a Bandaid /bandage. Bandages are not necessary with a scalp biopsy    If you are unable to cover the site with a Bandaid /bandage, re-apply ointment 2-3 times a day to keep the site moist. Moisture will help with healing    Avoid strenuous activity for first 1-2 days    Avoid lakes, rivers, pools, and oceans until the stitches are removed or the site is healed    How do I clean my wound?    Wash hands thoroughly with soap or use hand  before all wound care    Clean the wound with gentle soap and water    Apply white petroleum/Vaseline  to wound after it is clean    Replace the Bandaid /bandage to keep the wound covered for the first few days or as instructed by your doctor    If you had a scalp biopsy, warm shower water to the area on a daily basis should suffice    What should I use to clean my wound?     Cotton-tipped applicators (Qtips )    White petroleum jelly (Vaseline ). Use a clean new container and use Q-tips to apply.    Bandaids   as needed    Gentle soap     How should I care for my wound long term?    Do not get your wound dirty    Keep up with wound care for one week or until the area is healed.    A small scab will form and fall off by itself when the area is completely healed. The area will be red and will become pink in color as it heals. Sun protection is very important for how your scar will turn out. Sunscreen with an SPF 30 or greater is recommended once the area is healed.    If you have stitches, stitches need to be removed in 14 days. You may return to our clinic for this or you may have it done locally at your doctor s office.    You should have some soreness but it should be mild and slowly go away  over several days. Talk to your doctor about using tylenol for pain,    When should I call my doctor?  If you have increased:     Pain or swelling    Pus or drainage (clear or slightly yellow drainage is ok)    Temperature over 100F    Spreading redness or warmth around wound    When will I hear about my results?  The biopsy results can take 2-3 weeks to come back. The clinic will call you with the results, send you a [employer] message, or have you schedule a follow-up clinic or phone time to discuss the results. Contact our clinics if you do not hear from us in 3 weeks.     Who should I call with questions?    [facility]: [phone]     [facility]: [phone]    For urgent needs outside of business hours call the [facility] at [phone] and ask for the dermatology resident on call

## 2020-05-20 NOTE — NURSING NOTE
Dermatology Rooming Note    Maria Esther Lowe's goals for this visit include:   Chief Complaint   Patient presents with     Hair Loss     Maria Esther is here for a hair loss follow up and bx.      AICHA Hayden

## 2020-05-20 NOTE — LETTER
5/20/2020       RE: Maria Esther Lowe  [address] [facility] [address]     Dear Colleague,    Thank you for referring your patient, Maria Esther Lowe, to the [facility] DERMATOLOGY at [facility]. Please see a copy of my visit note below.    [facility] Dermatology Note      Dermatology Problem List:  1. Non-scarring hair loss, favor androgenetic alopecia +/- contribution from underlying thyroid disease but had prior bx with AA. Repeat bx 5/20/2020 in setting of rapid progression.  - Repeat bx 5/20/2020 pending   - Biopsy in 2016 consistent with alopecia areata, but not clinically consistent; had outside ILK without improvement per report  - TSH 0.14 in 9/2019, free T4 wnl  - Vitamin D 80 in 9/2019  - Repeat CBC, iron studies wnl 3/18/2020  - She is on estradiol per endocrinology  2. Pituitary prolactinoma s/p removal in 1975 followed by radiation with subsequent development of panhypopituitarism     CC:   Chief Complaint   Patient presents with     Hair Loss     Maria Esther is here for a hair loss follow up and bx.          Encounter Date: May 20, 2020    History of Present Illness:  Ms. Maria Esther Lowe is a 64 year old female who presents as a follow-up for hair loss. The patient was last seen virtually yesterday for rapid progression of hair loss. She returns today for biopsy. Hair loss started about 6 weeks after starting Rogaine. She has since stopped Rogaine. No recent illness or new medications. She has mild stress a/w COVID but not really anything significant. Otherwise feeling well, no additional skin concerns.      Past Medical History:   Patient Active Problem List   Diagnosis     Prolactinoma (H)     Panhypopituitarism (H)     Hormone replacement therapy (postmenopausal)     Carpal tunnel syndrome     History of robot-assisted laparoscopic hysterectomy     Past Medical History:   Diagnosis Date     Hyperlipidemia LDL goal < 130      Hypertension       Hypothyroidism      Osteopenia     DEXA: 12/11; T-score of -1.9        Panhypopituitarism (H)      Past Surgical History:   Procedure Laterality Date     DAVINCI HYSTERECTOMY TOTAL, BILATERAL SALPINGO-OOPHORECTOMY, COMBINED Bilateral 9/11/2018    Procedure: COMBINED DAVINCI HYSTERECTOMY TOTAL, SALPINGO-OOPHORECTOMY;  Pelvic Washings, Davinci Assisted Laparoscopic Total Hysterectomy, Bilateral Salpingo Oophorectomy, cystoscopy;  Surgeon: Tiffany Lew MD;  Location: [facility] OR     OPERATIVE HYSTEROSCOPY N/A 1/30/2018    Procedure: OPERATIVE HYSTEROSCOPY;  Operative Hysteroscopy, Resection of Endometrial Polyp, Dilation and Curettage;  Surgeon: Daysi Munoz MD;  Location: [facility] OR     PROBE LACRIMAL DUCT, INSERT STENT BILATERAL, COMBINED Bilateral 11/26/2018    Procedure: BILATERAL NASOLACRIMAL STENT - LARGE DIAMETER STEN TUBE;  Surgeon: Davina Payton MD;  Location: [facility] OR     resected prolactin-producing pituitary adenoma.  1975    Postoperative radiation therapy.        Social History:  Patient reports that she has never smoked. She has never used smokeless tobacco. She reports that she does not drink alcohol or use drugs.    Family History:  Family History   Problem Relation Age of Onset     Diabetes Paternal Grandmother      Depression Paternal Grandmother      Thyroid Disease Maternal Grandmother      Skin Cancer Father      Melanoma No family hx of        Medications:  Current Outpatient Medications   Medication Sig Dispense Refill     CALCIUM 500 +D 500-400 MG-UNIT TABS Take 1 tablet by mouth 2 times daily. 180 tablet 3     Cholecalciferol (VITAMIN D) 125 MCG (5000 UT) CAPS TAKE 1 CAPSULE DAILY 90 capsule 4     conjugated estrogens (PREMARIN) cream Place 0.5 g vaginally twice a week 30 g 12     cyanocobalamin (VITAMIN  B-12) 1000 MCG tablet Take by mouth every morning       estradiol (ESTRACE) 1 MG tablet Take 1 tablet (1 mg) by mouth daily 90 tablet 4     estradiol (VAGIFEM) 10 MCG TABS  vaginal tablet Place 1 tablet (10 mcg) vaginally twice a week 24 tablet 3     hydrocortisone (CORTEF) 10 MG tablet TAKE 1 TABLET IN THE MORNING AND ONE-HALF (1/2) TABLET MIDDAY 135 tablet 2     hydrocortisone (CORTEF) 5 MG tablet Take 1 tablet (5 mg) by mouth daily 90 tablet 3     levothyroxine (SYNTHROID/LEVOTHROID) 200 MCG tablet Take 1 tablet (200 mcg) by mouth daily Separate oral administration of iron- or calcium-containing products and levothyroxine by at least 4 hours. 90 tablet 4     losartan-hydrochlorothiazide (HYZAAR) 100-25 MG tablet Take 1 tablet by mouth daily 90 tablet 4     simvastatin (ZOCOR) 40 MG tablet Take 1 tablet (40 mg) by mouth daily 90 tablet 4     zinc 50 MG TABS Take  by mouth.       Allergies   Allergen Reactions     Ceftriaxone Other (See Comments)     Unsure of reaction.         Review of Systems:  -Constitutional: Otherwise feeling well today, in usual state of health.  -Skin: As above in HPI. No additional skin concerns.    Physical exam:  GEN: This is a well developed, well-nourished female in no acute distress, in a pleasant mood.    SKIN: Focused examination of the scalp and face was performed.  -Dubon skin type: II  -Diffuse thinning on vertex and lateral scalp with retention of frontal hairline, worse from prior.  -Mild diffuse scaling on scalp.  -Eyebrows tattooed.  -Eyelashes normal density.  -No other lesions of concern on areas examined.     Impression/Plan:  1. Hair loss, rapidly progressive since starting Rogaine. This seems out of proportion for what I would expect from Rogaine (some shedding expected with initial use but this is a substantial loss). We have suspected the etiology of her hair loss to be androgenetic alopecia +/- contribution from underlying thyroid disease but there has also been a question of alopecia areata given prior bx in 2016 that demonstrated this. She also has loss of eyebrows which goes along with AA. Clinically her exam is patterned loss  with possible recent telogen effluvium +/- some shedding from Rogaine use. We will repeat biopsy to ensure we have correct treatment plan.  - Punch biopsy (x2):  After discussion of benefits and risks including but not limited to bleeding/bruising, pain/swelling, infection, scar, incomplete removal, nerve damage/numbness, recurrence, and non-diagnostic biopsy, written consent, verbal consent and photographs were obtained. Time-out was performed. The area was cleaned with isopropyl alcohol. 0.5mL of 1% lidocaine with 1:100,000 epinephrine was injected to obtain adequate anesthesia of the lesion on the left frontal scalp x2.  A 4 mm punch biopsy was performed. 4-0 prolene sutures were utilized to approximate the epidermal edges. White petroleum jelly/Vaseline and a bandage was applied to the wound. Explicit verbal and written wound care instructions were provided. The patient left the Dermatology Clinic in good condition. The patient was counseled to follow up for suture removal in approximately 14 days.  - Empirically trial clobetasol solution BID pending results.  - She understandably does not want to resume Rogaine.  - Continue to manage thyroid disease per endocrinology.  - See prior labs in problem list.    2. Seborrheic dermatitis.  - Start ketoconazole shampoo 3 times per week. Counseled to massage into wet scalp, let sit 5 min, then rinse.      CC Estela Boswell, [place] [address] on close of this encounter.    Follow-up in 2 weeks to discuss results, earlier for new or changing lesions.       Staff Involved:  Staff Only    Jennifer Nguyen MD    Department of Dermatology  [facility]: Phone: [phone], Fax: [phone]  5/20/2020    Again, thank you for allowing me to participate in the care of your patient.      Sincerely,    Jennifer Nguyen MD

## 2020-06-03 LAB — COPATH REPORT: NORMAL

## 2020-06-08 ENCOUNTER — TELEPHONE (OUTPATIENT)
Dept: DERMATOLOGY | Facility: CLINIC | Age: 65
End: 2020-06-08

## 2020-06-08 NOTE — TELEPHONE ENCOUNTER
I called and spoke with the patient and requested a photo of her scalp and biopsy site. Patient refused to send in photo, because there is no change. I advised that we are required to have photos for our telephone visits. Patient asked why and I replied that even if there is no change we need to see the area and also for biopsy site to address that they are healing properly. Patient states the biopsy site is healed. I read patient the message that Dr. Nguyen wrote on her pathology. Patient said if Dr. Nguyen wants a photo she can discuss that with her at the visit.     Elina HOWELL CMA

## 2020-06-10 ENCOUNTER — VIRTUAL VISIT (OUTPATIENT)
Dept: DERMATOLOGY | Facility: CLINIC | Age: 65
End: 2020-06-10
Payer: COMMERCIAL

## 2020-06-10 ENCOUNTER — TELEPHONE (OUTPATIENT)
Dept: DERMATOLOGY | Facility: CLINIC | Age: 65
End: 2020-06-10

## 2020-06-10 DIAGNOSIS — L65.9 NON-SCARRING ALOPECIA: Primary | ICD-10-CM

## 2020-06-10 RX ORDER — FINASTERIDE 5 MG/1
5 TABLET, FILM COATED ORAL DAILY
Qty: 30 TABLET | Refills: 11 | Status: SHIPPED | OUTPATIENT
Start: 2020-06-10 | End: 2020-09-17

## 2020-06-10 ASSESSMENT — PAIN SCALES - GENERAL: PAINLEVEL: NO PAIN (0)

## 2020-06-10 NOTE — NURSING NOTE
Dermatology Rooming Note    Maria Esther Lowe's goals for this visit include:   Chief Complaint   Patient presents with     Results     Maria Esther is having a follow up today and to discuss biosy results      AICHA Aguilar

## 2020-06-10 NOTE — LETTER
6/10/2020       RE: Maria Esther Lowe  [address] [facility] [address]     Dear Colleague,    Thank you for referring your patient, Maria Esther Lowe, to the [facility] DERMATOLOGY at [facility]. Please see a copy of my visit note below.    Dermatology Phone Visit: Phone Number:    Dermatology Problem List:  1. Non-scarring hair loss, favor androgenetic alopecia +/- contribution from underlying thyroid disease but had prior bx with AA. Repeat bx 5/20/2020 most consistent with androgenetic alopecia.  - Repeat bx 5/20/2020 with nonscarring alopecia with marked miniaturization, favoring androgenetic alopecia   - Biopsy in 2016 consistent with alopecia areata, but not clinically consistent; had outside ILK without improvement per report  - TSH 0.14 in 9/2019, free T4 wnl  - Vitamin D 80 in 9/2019  - Repeat CBC, iron studies wnl 3/18/2020  - She is on estradiol per endocrinology  2. Pituitary prolactinoma s/p removal in 1975 followed by radiation with subsequent development of panhypopituitarism     Patient opted to conduct today's return visit via telephone vs an in person visit to the clinic.    Encounter Date: Stan 10, 2020    Chief complaint:   Chief Complaint   Patient presents with     Results     Maria Esther is having a follow up today and to discuss biosy results        I spoke with: Maria Esther Lowe    The reason for the telephone visit was:   Follow-up hair loss and review bx results    Pertinent history and review of systems:  Patient with hair loss for years, please see prior notes.  Recently started Rogaine and noted significant loss.  Performed bx 5/20/2020 which returned as nonscarring alopecia with marked miniaturization most consistent with advanced androgenetic alopecia.    No changes since last visit.  Didn't try the clobetasol yet.  Anxious to get some treatment started.  She has no eyebrows or other body hair, that's been since her pituitary  surgery.    Assessment/Advice/instructions given to patient/guardian including prescriptions, follow up appointment or orders for diagnostic testin. Non-scarring hair loss, most recent biopsy consistent with advanced androgenetic alopecia which fits the best clinically as well although we do note lack of eyebrows/body hair (she reports body hair always sparse, then lost hair after surgery in ) and her history of prolactinoma s/p surgery and radiation with subsequent panhypopituitarism on hormone replacement. She previously had ILK without improvement. Discussed treatment options today including Rogaine versus laser hair comb, spironolactone, and finasteride. Also discussed PRP. She does not want to resume Rogaine given prior shedding which is reasonable. She already has a laser hair comb at home so will start using. She is interested in finasteride but I will message Dr. Arrington to ensure no issues with her history (she is post-menopausal). She is also on estradiol for post-menopausal symptoms. Lastly, will try to discuss case with Dr. Nguyen.  - Start laser hair comb 3x/week  - Will discuss finasteride with Dr. Arrington - if okay, will start 2.5 mg daily  - Will discuss case with Dr. Nguyen    RTC in 3 months, I will message her sooner with updates.    Phone call contact time:  Call Started at: 8:55 AM   Call Ended at: 9:20 AM     Staff only:    Jennifer Nguyen MD    Department of Dermatology  [facility]: Phone: [phone], Fax: [phone]  6/10/2020    Again, thank you for allowing me to participate in the care of your patient.      Sincerely,    Jennifer Nguyen MD

## 2020-06-10 NOTE — PATIENT INSTRUCTIONS
[facility] Teledermatology Visit    Thank you for allowing us to participate in your care. 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  Treatments for this include:  - Rogaine (which we will not use again given the significant shedding you noticed)  - Laser hair comb - please start 3x per week  - Spironolactone - a pill with anti-male hormone effects we use for pre-menopausal women  - Finasteride - a pill with anti-male hormone effects we use for post-menopausal women    Hopefully one of these will work for you.  I will ask Dr. Arrington on the finasteride to make sure it's okay. If it is, I'll let you know and you can start 2.5 mg daily (1/2 a pill). Side effects are decreased sex drive.    I will also discuss your case with Dr. Nguyen and let you know if any updates. We will also put you on the waiting list for a treatment called PRP. I'm not sure if you quality for the study but if you do, we'll put you on the waiting list.    Return in 3 months, sooner if concerns.        When should I call my doctor?    If you are worsening or not improving, please, contact us or seek urgent care as noted below.     Who should I call with questions (adults)?    [facility] (adult and pediatric): [phone]     [facility] (adult): [phone]    For urgent needs outside of business hours call the [facility] at [phone] and ask for the dermatology resident on call    If this is a medical emergency and you are unable to reach an ER, Call [phone]      Who should I call with questions (pediatric)?  [facility]- Pediatric Dermatology  Dr. Malathi Hendricks, Dr. Virginia Gunn, Dr. Ileana Rosario, Toma Jordan, SCOTT Nance, Dr. Isabel Nguyen & Dr. Santi Barraza  Non Urgent  Nurse Triage Line; [phone]- [facility]  Coordinators   [place] (/Complex ) [phone]    If you need a prescription refill, please contact your pharmacy. Refills are approved or denied by our Physicians during normal business hours, Monday through Fridays  Per office policy, refills will not be granted if you have not been seen within the past year (or sooner depending on your child's condition)    Scheduling Information:  Pediatric Appointment Scheduling and Call Center [phone]  Radiology Scheduling- [phone]  Sedation Unit Scheduling- [phone]  [place] Scheduling- [facility] [phone]; Pediatric Dermatology [phone]  Main  Services: [phone]  Japanese: [phone]  Vincentian: [phone]  Hmong/Lithuanian/Guatemalan: [phone]  Preadmission Nursing Department Fax Number: [phone] (Fax all pre-operative paperwork to this number)    For urgent matters arising during evenings, weekends, or holidays that cannot wait for normal business hours please call [phone] and ask for the Dermatology Resident On-Call to be paged.

## 2020-06-10 NOTE — PROGRESS NOTES
Dermatology Phone Visit: Phone Number:    Dermatology Problem List:  1. Non-scarring hair loss, favor androgenetic alopecia +/- contribution from underlying thyroid disease but had prior bx with AA. Repeat bx 2020 most consistent with androgenetic alopecia.  - Repeat bx 2020 with nonscarring alopecia with marked miniaturization, favoring androgenetic alopecia   - Biopsy in 2016 consistent with alopecia areata, but not clinically consistent; had outside ILK without improvement per report  - TSH 0.14 in 2019, free T4 wnl  - Vitamin D 80 in 2019  - Repeat CBC, iron studies wnl 3/18/2020  - She is on estradiol per endocrinology  2. Pituitary prolactinoma s/p removal in  followed by radiation with subsequent development of panhypopituitarism     Patient opted to conduct today's return visit via telephone vs an in person visit to the clinic.    Encounter Date: Stan 10, 2020    Chief complaint:   Chief Complaint   Patient presents with     Results     Maria Esther is having a follow up today and to discuss biosy results        I spoke with: Maria Esther Lowe    The reason for the telephone visit was:   Follow-up hair loss and review bx results    Pertinent history and review of systems:  Patient with hair loss for years, please see prior notes.  Recently started Rogaine and noted significant loss.  Performed bx 2020 which returned as nonscarring alopecia with marked miniaturization most consistent with advanced androgenetic alopecia.    No changes since last visit.  Didn't try the clobetasol yet.  Anxious to get some treatment started.  She has no eyebrows or other body hair, that's been since her pituitary surgery.    Assessment/Advice/instructions given to patient/guardian including prescriptions, follow up appointment or orders for diagnostic testin. Non-scarring hair loss, most recent biopsy consistent with advanced androgenetic alopecia which fits the best clinically as well although we do note  lack of eyebrows/body hair (she reports body hair always sparse, then lost hair after surgery in 1975) and her history of prolactinoma s/p surgery and radiation with subsequent panhypopituitarism on hormone replacement. She previously had ILK without improvement. Discussed treatment options today including Rogaine versus laser hair comb, spironolactone, and finasteride. Also discussed PRP. She does not want to resume Rogaine given prior shedding which is reasonable. She already has a laser hair comb at home so will start using. She is interested in finasteride but I will message Dr. Arrington to ensure no issues with her history (she is post-menopausal). She is also on estradiol for post-menopausal symptoms. Lastly, will try to discuss case with Dr. Nguyen.  - Start laser hair comb 3x/week  - Will discuss finasteride with Dr. Arrington - if okay, will start 2.5 mg daily  - Will discuss case with Dr. Nguyen    RTC in 3 months, I will message her sooner with updates.    Phone call contact time:  Call Started at: 8:55 AM   Call Ended at: 9:20 AM     Staff only:    Jennifer Nguyen MD    Department of Dermatology  [facility]: Phone: [phone], Fax: [phone]  6/10/2020

## 2020-06-10 NOTE — TELEPHONE ENCOUNTER
I called and left a vm asking wilder to give us a call back to get scheduled for a 3 month follow up.    AICHA Aguilar

## 2020-06-11 ENCOUNTER — TELEPHONE (OUTPATIENT)
Dept: DERMATOLOGY | Facility: CLINIC | Age: 65
End: 2020-06-11

## 2020-06-11 NOTE — TELEPHONE ENCOUNTER
Can we let pt know that Dr. Arrington was okay with plan for finasteride?  I sent this to her pharmacy.  Thanks for your help!

## 2020-06-11 NOTE — TELEPHONE ENCOUNTER
Patient notified of medication. Verbal understanding with no further questions.    Alvaro Carter, [facility]

## 2020-06-12 NOTE — TELEPHONE ENCOUNTER
Can we let patient know that I discussed her care with Dr. Nguyen and here is the summary of the discussion:  - She agrees with the diagnosis of female pattern hair loss. We don't know what exactly causes this but in general, it occurs when there's an imbalance of male/female hormones and we know that there is some genetic component. Additionally, given the history of the pituitary surgery, this may also have disrupted the hormones and made her more likely to develop.  - She agrees with plan for laser hair comb and the finasteride. If you tolerate a 1/2 tab of finasteride, you can increase to a full tab.  - She would also add a topical steroid as there was some inflammation on the biopsy that the steroid would help with. Did patient  the clobetasol solution yet? If not, I will send her a shampoo that might be easier to use. It can sometimes be expensive so she could check [employer] or not  if too expensive. Otherwise if she did  the solution, she could start using that every other day.    Please let me know if any questions or set up time to talk on phone if needed. You can send this over flipClass as well, but she may not read so calling best.    Thank you!

## 2020-06-15 NOTE — TELEPHONE ENCOUNTER
Voicemail left asking patient to call the clinic back in regards to the information below.    Alvaro Carter, CMA

## 2020-08-16 DIAGNOSIS — E27.40 ADRENAL INSUFFICIENCY (H): ICD-10-CM

## 2020-08-19 RX ORDER — HYDROCORTISONE 5 MG/1
TABLET ORAL
Qty: 90 TABLET | Refills: 3 | Status: SHIPPED | OUTPATIENT
Start: 2020-08-19 | End: 2020-09-17

## 2020-08-19 NOTE — TELEPHONE ENCOUNTER
hydrocortisone (CORTEF)  5MG  Last Written Prescription Date:  9/19/19  Last Fill Quantity: 90,   # refills: 3  Last Office Visit : 0/19/19  Future Office visit:  NONE  Routing refill request to provider for review/approval because:  Drug not on the refill protocol

## 2020-09-08 ENCOUNTER — VIRTUAL VISIT (OUTPATIENT)
Dept: DERMATOLOGY | Facility: CLINIC | Age: 65
End: 2020-09-08
Payer: COMMERCIAL

## 2020-09-08 DIAGNOSIS — L64.9 ANDROGENETIC ALOPECIA: ICD-10-CM

## 2020-09-08 DIAGNOSIS — L65.9 NON-SCARRING ALOPECIA: Primary | ICD-10-CM

## 2020-09-08 DIAGNOSIS — L21.9 SEBORRHEIC DERMATITIS: ICD-10-CM

## 2020-09-08 ASSESSMENT — PAIN SCALES - GENERAL: PAINLEVEL: NO PAIN (0)

## 2020-09-08 NOTE — NURSING NOTE
"Dermatology Rooming Note    Maria Esther Lowe's goals for this visit include:   Chief Complaint   Patient presents with     Hair Loss     Maria Esther is following up on hair loss- Maria Esther states \"I've noticed some improvment\".      Lili Avalos, AICHA     "

## 2020-09-08 NOTE — PROGRESS NOTES
"Dermatology Phone Visit: Phone Number: [phone]    Dermatology Problem List:  1. Non-scarring hair loss, favor androgenetic alopecia +/- contribution from underlying thyroid disease but had prior bx with AA. Repeat bx 5/20/2020 most consistent with androgenetic alopecia (which makes sense in setting of #2 as well).  - Current rx: laser hair comb, finasteride 5 mg daily (started 6/2020), clobetasol solution, ketoconazole shampoo  - Repeat bx 5/20/2020 with nonscarring alopecia with marked miniaturization, favoring androgenetic alopecia. Also mild perifollicular fibrosis.  - Biopsy in 2016 consistent with alopecia areata, but not clinically consistent; had outside ILK without improvement per report  - TSH 0.14 in 9/2019, free T4 wnl  - Vitamin D 80 in 9/2019  - Repeat CBC, iron studies wnl 3/18/2020  - She is on estradiol per endocrinology  2. Pituitary prolactinoma s/p removal in 1975 followed by radiation with subsequent development of panhypopituitarism     Patient opted to conduct today's return visit via telephone vs an in person visit to the clinic.    Encounter Date: Sep 8, 2020    Chief complaint:   Chief Complaint   Patient presents with     Hair Loss     Maria Esther is following up on hair loss- Maria Esther states \"I've noticed some improvment\".        I spoke with: Maria Esther Lowe    The reason for the telephone visit was:   Follow-up hair loss    Pertinent history and review of systems:  Last talked in June 2020 at which time we planned to start laser hair comb and finasteride.  Since last visit, shedding has returned to normal.  She is also noticing some hair growth.  She is using the laser hair comb 3 times weekly.  She takes finasteride 5 mg daily. No side effects that she notices.  She is using clobetasol solution twice daily to scalp but sometimes only gets it on once.  She is also using ketoconazole shampoo 2-3x weekly.    No scalp pain, burning, or itching.  She will notice every once in a while she " will feel a little bump in the scalp but it's not too bad.    Otherwise feeling well, no additional skin concerns.     Assessment/Advice/instructions given to patient/guardian including prescriptions, follow up appointment or orders for diagnostic testin. Non-scarring hair loss, most recent biopsy consistent with advanced androgenetic alopecia which fits the best clinically as well although we do note lack of eyebrows/body hair (she reports body hair always sparse, then lost hair after surgery in ) and her history of prolactinoma s/p surgery and radiation with subsequent panhypopituitarism on hormone replacement. She previously had ILK without improvement. Her biopsy also showed mild perifollicular fibrosis. At her last visit, we started laser hair comb, finasteride, and clobetasol solution and she is already noticing some improvement. Discussed we will continue plan without changes today and reevaluate in person in 3 months to ensure no long-term steroid side effects.  - Continue laser hair comb 3x/week  - Continue finasteride 5 mg daily  - Continue clobetasol solution daily (using given bx with mild perifollicular fibrosis suggesting mild inflammation)  - Continue ketoconazole shampoo 3 times weekly    RTC in 3 months, sooner if concerns.    Phone call contact time:  Call Started at: 12:51 PM   Call Ended at: 1:06 PM     Staff only:    Jennifer Nguyen MD    Department of Dermatology  [facility]: Phone: [phone], Fax: [phone]  2020

## 2020-09-08 NOTE — LETTER
"9/8/2020       RE: Maria Esther Lowe  [address] [facility] [address]     Dear Colleague,    Thank you for referring your patient, Maria Esther Lowe, to the [facility] DERMATOLOGY at [facility]. Please see a copy of my visit note below.    Dermatology Phone Visit: Phone Number: [phone]    Dermatology Problem List:  1. Non-scarring hair loss, favor androgenetic alopecia +/- contribution from underlying thyroid disease but had prior bx with AA. Repeat bx 5/20/2020 most consistent with androgenetic alopecia (which makes sense in setting of #2 as well).  - Current rx: laser hair comb, finasteride 5 mg daily (started 6/2020), clobetasol solution, ketoconazole shampoo  - Repeat bx 5/20/2020 with nonscarring alopecia with marked miniaturization, favoring androgenetic alopecia. Also mild perifollicular fibrosis.  - Biopsy in 2016 consistent with alopecia areata, but not clinically consistent; had outside ILK without improvement per report  - TSH 0.14 in 9/2019, free T4 wnl  - Vitamin D 80 in 9/2019  - Repeat CBC, iron studies wnl 3/18/2020  - She is on estradiol per endocrinology  2. Pituitary prolactinoma s/p removal in 1975 followed by radiation with subsequent development of panhypopituitarism     Patient opted to conduct today's return visit via telephone vs an in person visit to the clinic.    Encounter Date: Sep 8, 2020    Chief complaint:   Chief Complaint   Patient presents with     Hair Loss     Maria Esther is following up on hair loss- Maria Esther states \"I've noticed some improvment\".        I spoke with: Maria Esther Bassluis fernando    The reason for the telephone visit was:   Follow-up hair loss    Pertinent history and review of systems:  Last talked in June 2020 at which time we planned to start laser hair comb and finasteride.  Since last visit, shedding has returned to normal.  She is also noticing some hair growth.  She is using the laser hair comb 3 times weekly.  She takes " finasteride 5 mg daily. No side effects that she notices.  She is using clobetasol solution twice daily to scalp but sometimes only gets it on once.  She is also using ketoconazole shampoo 2-3x weekly.    No scalp pain, burning, or itching.  She will notice every once in a while she will feel a little bump in the scalp but it's not too bad.    Otherwise feeling well, no additional skin concerns.     Assessment/Advice/instructions given to patient/guardian including prescriptions, follow up appointment or orders for diagnostic testin. Non-scarring hair loss, most recent biopsy consistent with advanced androgenetic alopecia which fits the best clinically as well although we do note lack of eyebrows/body hair (she reports body hair always sparse, then lost hair after surgery in ) and her history of prolactinoma s/p surgery and radiation with subsequent panhypopituitarism on hormone replacement. She previously had ILK without improvement. Her biopsy also showed mild perifollicular fibrosis. At her last visit, we started laser hair comb, finasteride, and clobetasol solution and she is already noticing some improvement. Discussed we will continue plan without changes today and reevaluate in person in 3 months to ensure no long-term steroid side effects.  - Continue laser hair comb 3x/week  - Continue finasteride 5 mg daily  - Continue clobetasol solution daily (using given bx with mild perifollicular fibrosis suggesting mild inflammation)  - Continue ketoconazole shampoo 3 times weekly    RTC in 3 months, sooner if concerns.    Phone call contact time:  Call Started at: 12:51 PM   Call Ended at: 1:06 PM     Staff only:    Jennifer Nguyen MD    Department of Dermatology  [facility]: Phone: [phone], Fax: [phone]  2020

## 2020-09-08 NOTE — PATIENT INSTRUCTIONS
[facility] Dermatology Visit    Thank you for allowing us to participate in your care. Your findings, instructions and follow-up plan are as follows:    For your hair loss:  - I'm glad you've had some improvement already!  - Continue plan without changes.    Return in 3 months and then I will see you again in 6 months, sooner if concerns.      When should I call my doctor?    If you are worsening or not improving, please, contact us or seek urgent care as noted below.     Who should I call with questions (adults)?    [facility] (adult and pediatric): [phone]     [facility] (adult): [phone]    For urgent needs outside of business hours call the [facility] at [phone] and ask for the dermatology resident on call    If this is a medical emergency and you are unable to reach an ER, Call [phone]      Who should I call with questions (pediatric)?  [facility]- Pediatric Dermatology  Dr. Malathi Hendricks, Dr. Virginia Gunn, Dr. Ileana Rosario, Toma Jordan, [place]  Dr. Veronica Nance, Dr. Isabel Nguyen & Dr. Santi Barraza  Non Urgent  Nurse Triage Line; [phone]- Laura and Ira DEY Care Coordinators   Cora (/Complex ) [phone]    If you need a prescription refill, please contact your pharmacy. Refills are approved or denied by our Physicians during normal business hours, Monday through Fridays  Per office policy, refills will not be granted if you have not been seen within the past year (or sooner depending on your child's condition)    Scheduling Information:  Pediatric Appointment Scheduling and Call Center [phone]  Radiology Scheduling- [phone]  Sedation Unit Scheduling- [phone]  [place] Scheduling- General [phone]; Pediatric Dermatology [phone]  Main  Services: [phone]  Slovenian: [phone]  Tru:  [phone]  Anay/Dario/Tongan: [phone]  Preadmission Nursing Department Fax Number: [phone] (Fax all pre-operative paperwork to this number)    For urgent matters arising during evenings, weekends, or holidays that cannot wait for normal business hours please call [phone] and ask for the Dermatology Resident On-Call to be paged.

## 2020-09-16 DIAGNOSIS — E23.0 PANHYPOPITUITARISM (H): ICD-10-CM

## 2020-09-16 DIAGNOSIS — E27.40 ADRENAL INSUFFICIENCY (H): ICD-10-CM

## 2020-09-16 DIAGNOSIS — L65.9 NON-SCARRING ALOPECIA: ICD-10-CM

## 2020-09-17 ENCOUNTER — TELEPHONE (OUTPATIENT)
Dept: OBGYN | Facility: CLINIC | Age: 65
End: 2020-09-17

## 2020-09-17 DIAGNOSIS — E23.0 PANHYPOPITUITARISM (H): ICD-10-CM

## 2020-09-17 DIAGNOSIS — N95.2 ATROPHIC VAGINITIS: ICD-10-CM

## 2020-09-17 RX ORDER — HYDROCORTISONE 5 MG/1
TABLET ORAL
Qty: 90 TABLET | Refills: 3 | Status: SHIPPED | OUTPATIENT
Start: 2020-09-17 | End: 2022-03-06

## 2020-09-17 RX ORDER — HYDROCORTISONE 10 MG/1
TABLET ORAL
Qty: 135 TABLET | Refills: 2 | Status: CANCELLED | OUTPATIENT
Start: 2020-09-17

## 2020-09-17 RX ORDER — HYDROCORTISONE 5 MG/1
TABLET ORAL
Qty: 90 TABLET | Refills: 3 | OUTPATIENT
Start: 2020-09-17

## 2020-09-17 RX ORDER — ESTRADIOL 10 UG/1
10 INSERT VAGINAL
Qty: 24 TABLET | Refills: 0 | Status: SHIPPED | OUTPATIENT
Start: 2020-09-17 | End: 2023-06-22

## 2020-09-17 RX ORDER — LEVOTHYROXINE SODIUM 200 UG/1
200 TABLET ORAL DAILY
Qty: 90 TABLET | Refills: 3 | Status: SHIPPED | OUTPATIENT
Start: 2020-09-17 | End: 2022-04-25

## 2020-09-17 RX ORDER — FINASTERIDE 5 MG/1
5 TABLET, FILM COATED ORAL DAILY
Qty: 30 TABLET | Refills: 11 | Status: SHIPPED | OUTPATIENT
Start: 2020-09-17 | End: 2021-09-15

## 2020-09-17 RX ORDER — LOSARTAN POTASSIUM AND HYDROCHLOROTHIAZIDE 25; 100 MG/1; MG/1
1 TABLET ORAL DAILY
Qty: 90 TABLET | Refills: 3 | Status: SHIPPED | OUTPATIENT
Start: 2020-09-17 | End: 2021-09-29

## 2020-09-17 RX ORDER — FINASTERIDE 5 MG/1
5 TABLET, FILM COATED ORAL DAILY
Qty: 30 TABLET | Refills: 11 | Status: CANCELLED | OUTPATIENT
Start: 2020-09-17

## 2020-09-17 RX ORDER — HYDROCORTISONE 10 MG/1
TABLET ORAL
Qty: 135 TABLET | Refills: 2 | Status: SHIPPED | OUTPATIENT
Start: 2020-09-17 | End: 2021-04-23

## 2020-09-17 NOTE — TELEPHONE ENCOUNTER
Received refill request for Estradiol.  Last in clinic August 2019. Patient due for visit.    Short-term supply sent. Nuzzel message sent.

## 2020-09-17 NOTE — TELEPHONE ENCOUNTER
----- Message from Michelle Zamora sent at 9/17/2020  4:50 PM CDT -----  Regarding: med refill  Contact: [phone]  [facility] is calling to get a refill on estradiol (ESTRACE) 1 MG tablet , their phone is [phone] and fax is [phone] thanks!

## 2020-09-18 DIAGNOSIS — E21.3 HYPERPARATHYROIDISM (H): ICD-10-CM

## 2020-09-18 DIAGNOSIS — E78.5 HYPERLIPIDEMIA LDL GOAL <100: ICD-10-CM

## 2020-09-21 RX ORDER — CHOLECALCIFEROL (VITAMIN D3) 125 MCG
1 CAPSULE ORAL DAILY
Qty: 90 CAPSULE | Refills: 0 | Status: SHIPPED | OUTPATIENT
Start: 2020-09-21 | End: 2020-11-30

## 2020-09-21 RX ORDER — SIMVASTATIN 40 MG
40 TABLET ORAL DAILY
Qty: 90 TABLET | Refills: 3 | Status: SHIPPED | OUTPATIENT
Start: 2020-09-21 | End: 2021-09-29

## 2020-09-21 NOTE — TELEPHONE ENCOUNTER
simvastatin (ZOCOR) 40 MG tablet       Last Written Prescription Date:  1/13/20  Last Fill Quantity: 90,   # refills: 4  Last Office Visit : 9/9/19 with 1 year follow up recommended  Future Office visit:  None scheduled    Routing refill request to provider for review/approval because:  Overdue for LDL  Lab Test 12/06/16  2814   LDL 71     Nothing more current in care everywhere nor media

## 2020-09-23 DIAGNOSIS — E23.0 PANHYPOPITUITARISM (H): ICD-10-CM

## 2020-09-23 DIAGNOSIS — L65.9 LOSS OF HAIR: ICD-10-CM

## 2020-09-23 RX ORDER — ESTRADIOL 1 MG/1
1 TABLET ORAL DAILY
Qty: 90 TABLET | Refills: 0 | Status: SHIPPED | OUTPATIENT
Start: 2020-09-23 | End: 2020-12-02

## 2020-10-22 ENCOUNTER — ANCILLARY PROCEDURE (OUTPATIENT)
Dept: MAMMOGRAPHY | Facility: CLINIC | Age: 65
End: 2020-10-22
Attending: NURSE PRACTITIONER
Payer: COMMERCIAL

## 2020-10-22 ENCOUNTER — OFFICE VISIT (OUTPATIENT)
Dept: OBGYN | Facility: CLINIC | Age: 65
End: 2020-10-22
Attending: OBSTETRICS & GYNECOLOGY
Payer: COMMERCIAL

## 2020-10-22 VITALS
HEIGHT: 65 IN | SYSTOLIC BLOOD PRESSURE: 134 MMHG | WEIGHT: 180 LBS | BODY MASS INDEX: 29.99 KG/M2 | DIASTOLIC BLOOD PRESSURE: 84 MMHG | HEART RATE: 66 BPM

## 2020-10-22 DIAGNOSIS — Z13.29 SCREENING FOR THYROID DISORDER: ICD-10-CM

## 2020-10-22 DIAGNOSIS — Z12.31 VISIT FOR SCREENING MAMMOGRAM: ICD-10-CM

## 2020-10-22 DIAGNOSIS — L30.4 INTERTRIGO: ICD-10-CM

## 2020-10-22 DIAGNOSIS — Z13.1 SCREENING FOR DIABETES MELLITUS: ICD-10-CM

## 2020-10-22 DIAGNOSIS — Z01.419 ENCOUNTER FOR GYNECOLOGICAL EXAMINATION WITHOUT ABNORMAL FINDING: Primary | ICD-10-CM

## 2020-10-22 DIAGNOSIS — Z13.21 ENCOUNTER FOR VITAMIN DEFICIENCY SCREENING: ICD-10-CM

## 2020-10-22 DIAGNOSIS — Z13.220 LIPID SCREENING: ICD-10-CM

## 2020-10-22 PROCEDURE — 90750 HZV VACC RECOMBINANT IM: CPT

## 2020-10-22 PROCEDURE — G0101 CA SCREEN;PELVIC/BREAST EXAM: HCPCS | Performed by: OBSTETRICS & GYNECOLOGY

## 2020-10-22 PROCEDURE — 90472 IMMUNIZATION ADMIN EACH ADD: CPT

## 2020-10-22 PROCEDURE — 77067 SCR MAMMO BI INCL CAD: CPT | Mod: 26 | Performed by: RADIOLOGY

## 2020-10-22 PROCEDURE — G0009 ADMIN PNEUMOCOCCAL VACCINE: HCPCS

## 2020-10-22 PROCEDURE — 90732 PPSV23 VACC 2 YRS+ SUBQ/IM: CPT

## 2020-10-22 PROCEDURE — G0463 HOSPITAL OUTPT CLINIC VISIT: HCPCS

## 2020-10-22 PROCEDURE — 250N000011 HC RX IP 250 OP 636

## 2020-10-22 PROCEDURE — 77067 SCR MAMMO BI INCL CAD: CPT

## 2020-10-22 PROCEDURE — 250N000021 HC RX MED A9270 GY (STAT IND- M) 250

## 2020-10-22 RX ORDER — MICONAZOLE NITRATE 20 MG/G
CREAM TOPICAL 2 TIMES DAILY
Qty: 30 G | Refills: 1 | Status: SHIPPED | OUTPATIENT
Start: 2020-10-22 | End: 2020-10-29

## 2020-10-22 ASSESSMENT — PAIN SCALES - GENERAL: PAINLEVEL: NO PAIN (0)

## 2020-10-22 ASSESSMENT — ANXIETY QUESTIONNAIRES
3. WORRYING TOO MUCH ABOUT DIFFERENT THINGS: NOT AT ALL
5. BEING SO RESTLESS THAT IT IS HARD TO SIT STILL: NOT AT ALL
GAD7 TOTAL SCORE: 0
7. FEELING AFRAID AS IF SOMETHING AWFUL MIGHT HAPPEN: NOT AT ALL
2. NOT BEING ABLE TO STOP OR CONTROL WORRYING: NOT AT ALL
1. FEELING NERVOUS, ANXIOUS, OR ON EDGE: NOT AT ALL
6. BECOMING EASILY ANNOYED OR IRRITABLE: NOT AT ALL

## 2020-10-22 ASSESSMENT — MIFFLIN-ST. JEOR: SCORE: 1362.35

## 2020-10-22 ASSESSMENT — PATIENT HEALTH QUESTIONNAIRE - PHQ9
5. POOR APPETITE OR OVEREATING: NOT AT ALL
SUM OF ALL RESPONSES TO PHQ QUESTIONS 1-9: 3

## 2020-10-22 NOTE — LETTER
10/22/2020       RE: Maria Esther Lowe  [address] [facility] [address]     Dear Colleague,    Thank you for referring your patient, Maria Esther Lowe, to the [facility] [place] at [facility]. Please see a copy of my visit note below.      Progress Note    SUBJECTIVE:  Maria Esther Lowe is an 65 year old  , who requests a breast and pelvic exam.    Patient is followed by Estela Boswell NP for primary care.    Concerns today include: The right lower quadrant pain she had last visit is no longer an issue. She continues on estradiol and she feels like this is helpful. She desires to continue.  She has not used the vaginal estrogen cream regularly but feels even occasional use is helpful.    Menstrual History:  No flowsheet data found.    Last    Lab Results   Component Value Date    PAP NIL 2015     History of abnormal Pap smear: Status post benign hysterectomy. Health Maintenance and Surgical History updated.    Last   Lab Results   Component Value Date    HPV16 Negative 2015     Last   Lab Results   Component Value Date    HPV18 Negative 2015     Last   Lab Results   Component Value Date    HRHPV Negative 2015       Mammogram current: yes    HISTORY:  Prescription Medications as of 10/22/2020       Rx Number Disp Refills Start End Last Dispensed Date Next Fill Date Owning Pharmacy    CALCIUM 500 +D 500-400 MG-UNIT TABS  180 tablet 3 2011    [id number] - [place], [place] - [address]    Sig: Take 1 tablet by mouth 2 times daily.    Class: Historical    Route: Oral    Cholecalciferol (VITAMIN D) 125 MCG (5000 UT) CAPS  90 capsule 0 2020    [facility] - [facility] [address]    Sig: Take 1 capsule (5,000 Units) by mouth daily    Class: E-Prescribe    Notes to Pharmacy: For additional refills, please schedule a follow-up appointment at [phone]    Route: Oral     clobetasol (TEMOVATE) 0.05 % external solution  50 mL 3 5/20/2020    [facility] [address] [place] [address] [address]    Sig: Apply topically 2 times daily To scalp as needed    Class: E-Prescribe    Route: Topical    conjugated estrogens (PREMARIN) cream  30 g 12 3/16/2017    [facility] - [address]    Sig: Place 0.5 g vaginally twice a week    Class: E-Prescribe    Notes to Pharmacy: 0.5g cream (as measured with package applicator) = 0.3125mg conj. estrogens    Route: Vaginal    cyanocobalamin (VITAMIN  B-12) 1000 MCG tablet            Sig: Take by mouth every morning    Class: Historical    Route: Oral    estradiol (ESTRACE) 1 MG tablet  90 tablet 0 9/23/2020    [facility] [address]    Sig: Take 1 tablet (1 mg) by mouth daily    Class: E-Prescribe    Route: Oral    estradiol (VAGIFEM) 10 MCG TABS vaginal tablet  24 tablet 0 9/17/2020    [facility] [address]    Sig: Place 1 tablet (10 mcg) vaginally twice a week    Class: E-Prescribe    Route: Vaginal    finasteride (PROSCAR) 5 MG tablet  30 tablet 11 9/17/2020    [address]    Sig: Take 1 tablet (5 mg) by mouth daily    Class: E-Prescribe    Route: Oral    hydrocortisone (CORTEF) 10 MG tablet  135 tablet 2 9/17/2020    [facility] [address]    Sig: TAKE 1 TABLET IN THE MORNING AND ONE-HALF (1/2) TABLET MIDDAY    Class: E-Prescribe    hydrocortisone (CORTEF) 5 MG tablet  90 tablet 3 9/17/2020    [facility] [address]    Sig: TAKE 1 TABLET DAILY AROUND 2 TO 3 P.M.    Class: E-Prescribe    ketoconazole (NIZORAL) 2 % external shampoo  120 mL 11 5/20/2020    [facility] [address] [place] [address] - [address]    Sig: Massage into wet scalp, let sit 3-5 min, then rinse. Do this 3x  weekly and can alternate with head and shoulders.    Class: E-Prescribe    levothyroxine (SYNTHROID/LEVOTHROID) 200 MCG tablet  90 tablet 3 2020    [facility] [address]    Sig: Take 1 tablet (200 mcg) by mouth daily Separate oral administration of iron- or calcium-containing products and levothyroxine by at least 4 hours.    Class: E-Prescribe    Route: Oral    losartan-hydrochlorothiazide (HYZAAR) 100-25 MG tablet  90 tablet 3 2020    [facility] - [address]    Sig: Take 1 tablet by mouth daily    Class: E-Prescribe    Route: Oral    miconazole (MICATIN) 2 % external cream  30 g 1 10/22/2020 10/29/2020   [id number] - [place], [place] - [address]    Sig: Apply topically 2 times daily for 7 days    Class: E-Prescribe    Route: Topical    simvastatin (ZOCOR) 40 MG tablet  90 tablet 3 2020    [facility] - [address]    Sig: Take 1 tablet (40 mg) by mouth daily For additional refills, please schedule a follow-up appointment at [phone]    Class: E-Prescribe    Route: Oral    zinc 50 MG TABS            Sig: Take  by mouth.    Class: Historical    Route: Oral        Allergies   Allergen Reactions     Ceftriaxone Other (See Comments)     Unsure of reaction.     Immunization History   Administered Date(s) Administered     Influenza (IIV3) PF 2013, 2017     Influenza Vaccine IM > 6 months Valent IIV4 2015, 10/25/2018     Influenza Vaccine, 6+MO IM (QUADRIVALENT W/PRESERVATIVES) 2016     Pneumococcal 23 valent 10/22/2020     TDAP Vaccine (Boostrix) 2013     Zoster vaccine recombinant adjuvanted (SHINGRIX) 10/22/2020       OB History    Para Term  AB Living   0 0 0 0 0 0   SAB TAB Ectopic Multiple Live Births   0 0 0 0 0     Past Medical History:   Diagnosis Date     Hyperlipidemia LDL goal < 130      Hypertension       Hypothyroidism      Osteopenia     DEXA: 12/11; T-score of -1.9        Panhypopituitarism (H)      Past Surgical History:   Procedure Laterality Date     DAVINCI HYSTERECTOMY TOTAL, BILATERAL SALPINGO-OOPHORECTOMY, COMBINED Bilateral 9/11/2018    Procedure: COMBINED DAVINCI HYSTERECTOMY TOTAL, SALPINGO-OOPHORECTOMY;  Pelvic Washings, Davinci Assisted Laparoscopic Total Hysterectomy, Bilateral Salpingo Oophorectomy, cystoscopy;  Surgeon: Tiffany Lew MD;  Location: [facility] OR     OPERATIVE HYSTEROSCOPY N/A 1/30/2018    Procedure: OPERATIVE HYSTEROSCOPY;  Operative Hysteroscopy, Resection of Endometrial Polyp, Dilation and Curettage;  Surgeon: Daysi Munoz MD;  Location: [facility] OR     PROBE LACRIMAL DUCT, INSERT STENT BILATERAL, COMBINED Bilateral 11/26/2018    Procedure: BILATERAL NASOLACRIMAL STENT - LARGE DIAMETER STEN TUBE;  Surgeon: Davina Payton MD;  Location: [facility] OR     resected prolactin-producing pituitary adenoma.  1975    Postoperative radiation therapy.      Family History   Problem Relation Age of Onset     Diabetes Paternal Grandmother      Depression Paternal Grandmother      Thyroid Disease Maternal Grandmother      Skin Cancer Father      Melanoma No family hx of      Social History     Socioeconomic History     Marital status:      Spouse name: None     Number of children: None     Years of education: None     Highest education level: None   Occupational History     None   Social Needs     Financial resource strain: None     Food insecurity     Worry: None     Inability: None     Transportation needs     Medical: None     Non-medical: None   Tobacco Use     Smoking status: Never Smoker     Smokeless tobacco: Never Used   Substance and Sexual Activity     Alcohol use: No     Drug use: No     Sexual activity: Yes     Partners: Male     Birth control/protection: Post-menopausal   Lifestyle     Physical activity     Days per week: None     Minutes per session: None     Stress: None  "  Relationships     Social connections     Talks on phone: None     Gets together: None     Attends Episcopal service: None     Active member of club or organization: None     Attends meetings of clubs or organizations: None     Relationship status: None     Intimate partner violence     Fear of current or ex partner: None     Emotionally abused: None     Physically abused: None     Forced sexual activity: None   Other Topics Concern     Parent/sibling w/ CABG, MI or angioplasty before 65F 55M? Not Asked   Social History Narrative    How much exercise per week? Daily    How much calcium per day?         How much caffeine per day?      How much vitamin D per day?      Do you/your family wear seatbelts?  Yes    Do you/your family use safety helmets? No    Do you/your family use sunscreen? Yes    Do you/your family keep firearms in the home? Yes    Do you/your family have a smoke detector(s)? Yes        Do you feel safe in your home? Yes    Has anyone ever touched you in an unwanted manner? No                   ROS    EXAM:  Blood pressure 134/84, pulse 66, height 1.651 m (5' 5\"), weight 81.6 kg (180 lb), not currently breastfeeding. Body mass index is 29.95 kg/m .  General appearance: Pleasant female in no acute distress.     BREAST EXAM:  Breast: Without visible skin changes. No dimpling or lesions seen.   Breasts supple, non-tender with palpation, no dominant mass, nodularity, or nipple discharge noted bilaterally. Axillary nodes negative.      PELVIC EXAM:  EG/BUS: right labia is agglutinated to the right labium majus.  The left labia minora appears normal, clitoral natarajan retracts and clitoris appears normal.  Introitus appears atrophic, no erythema or abnormal secretions Bartholin's, Urethra, Teasdale's normal.  In the left groin there is a patch of red scaly skin c/w intertrigo  Urethral meatus: normal   Urethra: no masses, tenderness, or scarring   Bladder: no masses or tenderness   Vagina: atrophic, thin, dry with " physiologic discharge    Cervix: surgically absent  Uterus: surgically absent  Adnexa: Surgically absent  Rectum:anus normal       ASSESSMENT:  Encounter Diagnoses   Name Primary?     Encounter for gynecological examination without abnormal finding Yes     Intertrigo      Screening for diabetes mellitus      Encounter for vitamin deficiency screening      Screening for thyroid disorder      Lipid screening       65 year old Female Pelvic and Breast Exam  HRT Surveillance  Intertrigo  Health maintenance screening and vaccination    PLAN:   Orders Placed This Encounter   Procedures     Pelvic and Breast Exam Procedure []     ZOSTER VACCINE RECOMBINANT ADJUVANTED IM NJX     Pneumococcal vaccine 23 valent PPSV23  (Pneumovax) [16049]     25- [place]-Vitamin D     Hemoglobin A1c     TSH with free T4 reflex     Glucose     Lipid Profile   Labs as above, pneumovax and Shingrex vaccine today.    Hormone replacement therapy risk and benefits were discussed in view of [facility] data and subsequent studies.  She has been using it primarily for quality of life issues - hot flashes and night sweats and sexual function that were disruptive to general wellbeing.     Return in one year/PRN for preventive care or problems/concerns.     Verbalized understanding and agreement with visit plan.    Tiffany Lew MD

## 2020-10-22 NOTE — PROGRESS NOTES
Progress Note    SUBJECTIVE:  Maria Esther Lowe is an 65 year old  , who requests a breast and pelvic exam.    Patient is followed by Estela Boswell NP for primary care.    Concerns today include: The right lower quadrant pain she had last visit is no longer an issue. She continues on estradiol and she feels like this is helpful. She desires to continue.  She has not used the vaginal estrogen cream regularly but feels even occasional use is helpful.    Menstrual History:  No flowsheet data found.    Last    Lab Results   Component Value Date    PAP NIL 2015     History of abnormal Pap smear: Status post benign hysterectomy. Health Maintenance and Surgical History updated.    Last   Lab Results   Component Value Date    HPV16 Negative 2015     Last   Lab Results   Component Value Date    HPV18 Negative 2015     Last   Lab Results   Component Value Date    HRHPV Negative 2015       Mammogram current: yes    HISTORY:  Prescription Medications as of 10/22/2020       Rx Number Disp Refills Start End Last Dispensed Date Next Fill Date Owning Pharmacy    CALCIUM 500 +D 500-400 MG-UNIT TABS  180 tablet 3 2011    [facility] [address] [place] [address] [address]    Sig: Take 1 tablet by mouth 2 times daily.    Class: Historical    Route: Oral    Cholecalciferol (VITAMIN D) 125 MCG (5000 UT) CAPS  90 capsule 0 2020    [facility] - [address]    Sig: Take 1 capsule (5,000 Units) by mouth daily    Class: E-Prescribe    Notes to Pharmacy: For additional refills, please schedule a follow-up appointment at [phone]    Route: Oral    clobetasol (TEMOVATE) 0.05 % external solution  50 mL 3 2020    [facility] [address] [place] [address] - [address]    Sig: Apply topically 2 times daily To scalp as needed    Class: E-Prescribe    Route: Topical    conjugated estrogens (PREMARIN) cream  30 g 12 3/16/2017    [facility] -  [place], [place] - [address] [place] [address]    Sig: Place 0.5 g vaginally twice a week    Class: E-Prescribe    Notes to Pharmacy: 0.5g cream (as measured with package applicator) = 0.3125mg conj. estrogens    Route: Vaginal    cyanocobalamin (VITAMIN  B-12) 1000 MCG tablet            Sig: Take by mouth every morning    Class: Historical    Route: Oral    estradiol (ESTRACE) 1 MG tablet  90 tablet 0 9/23/2020    [facility] [place] [address]    Sig: Take 1 tablet (1 mg) by mouth daily    Class: E-Prescribe    Route: Oral    estradiol (VAGIFEM) 10 MCG TABS vaginal tablet  24 tablet 0 9/17/2020    [facility] [place] [address]    Sig: Place 1 tablet (10 mcg) vaginally twice a week    Class: E-Prescribe    Route: Vaginal    finasteride (PROSCAR) 5 MG tablet  30 tablet 11 9/17/2020    [facility] - [place] [address]    Sig: Take 1 tablet (5 mg) by mouth daily    Class: E-Prescribe    Route: Oral    hydrocortisone (CORTEF) 10 MG tablet  135 tablet 2 9/17/2020    [facility] - [place] [address]    Sig: TAKE 1 TABLET IN THE MORNING AND ONE-HALF (1/2) TABLET MIDDAY    Class: E-Prescribe    hydrocortisone (CORTEF) 5 MG tablet  90 tablet 3 9/17/2020    [facility] [place] [address]    Sig: TAKE 1 TABLET DAILY AROUND 2 TO 3 P.M.    Class: E-Prescribe    ketoconazole (NIZORAL) 2 % external shampoo  120 mL 11 5/20/2020    [facility] #[facility] [address]    Sig: Massage into wet scalp, let sit 3-5 min, then rinse. Do this 3x weekly and can alternate with head and shoulders.    Class: E-Prescribe    levothyroxine (SYNTHROID/LEVOTHROID) 200 MCG tablet  90 tablet 3 9/17/2020    [facility] [place] [address]    Sig: Take 1 tablet (200 mcg) by mouth daily Separate oral administration of iron- or  calcium-containing products and levothyroxine by at least 4 hours.    Class: E-Prescribe    Route: Oral    losartan-hydrochlorothiazide (HYZAAR) 100-25 MG tablet  90 tablet 3 2020    [facility] - [facility] [address]    Sig: Take 1 tablet by mouth daily    Class: E-Prescribe    Route: Oral    miconazole (MICATIN) 2 % external cream  30 g 1 10/22/2020 10/29/2020   [facility] [address] [place] [address] [address]    Sig: Apply topically 2 times daily for 7 days    Class: E-Prescribe    Route: Topical    simvastatin (ZOCOR) 40 MG tablet  90 tablet 3 2020    [facility] - [facility] [address]    Sig: Take 1 tablet (40 mg) by mouth daily For additional refills, please schedule a follow-up appointment at [phone]    Class: E-Prescribe    Route: Oral    zinc 50 MG TABS            Sig: Take  by mouth.    Class: Historical    Route: Oral        Allergies   Allergen Reactions     Ceftriaxone Other (See Comments)     Unsure of reaction.     Immunization History   Administered Date(s) Administered     Influenza (IIV3) PF 2013, 2017     Influenza Vaccine IM > 6 months Valent IIV4 2015, 10/25/2018     Influenza Vaccine, 6+MO IM (QUADRIVALENT W/PRESERVATIVES) 2016     Pneumococcal 23 valent 10/22/2020     TDAP Vaccine (Boostrix) 2013     Zoster vaccine recombinant adjuvanted (SHINGRIX) 10/22/2020       OB History    Para Term  AB Living   0 0 0 0 0 0   SAB TAB Ectopic Multiple Live Births   0 0 0 0 0     Past Medical History:   Diagnosis Date     Hyperlipidemia LDL goal < 130      Hypertension      Hypothyroidism      Osteopenia     DEXA: ; T-score of -1.9        Panhypopituitarism (H)      Past Surgical History:   Procedure Laterality Date     DAVINCI HYSTERECTOMY TOTAL, BILATERAL SALPINGO-OOPHORECTOMY, COMBINED Bilateral 2018    Procedure: COMBINED DAVINCI HYSTERECTOMY TOTAL,  SALPINGO-OOPHORECTOMY;  Pelvic Washings, Davinci Assisted Laparoscopic Total Hysterectomy, Bilateral Salpingo Oophorectomy, cystoscopy;  Surgeon: Tiffany Lew MD;  [facility]     OPERATIVE HYSTEROSCOPY N/A 1/30/2018    Procedure: OPERATIVE HYSTEROSCOPY;  Operative Hysteroscopy, Resection of Endometrial Polyp, Dilation and Curettage;  Surgeon: Daysi Munoz MD;  [facility] OR     PROBE LACRIMAL DUCT, INSERT STENT BILATERAL, COMBINED Bilateral 11/26/2018    Procedure: BILATERAL NASOLACRIMAL STENT - LARGE DIAMETER STEN TUBE;  Surgeon: Davina Payton MD;  Location: [facility] OR     resected prolactin-producing pituitary adenoma.  1975    Postoperative radiation therapy.      Family History   Problem Relation Age of Onset     Diabetes Paternal Grandmother      Depression Paternal Grandmother      Thyroid Disease Maternal Grandmother      Skin Cancer Father      Melanoma No family hx of      Social History     Socioeconomic History     Marital status:      Spouse name: None     Number of children: None     Years of education: None     Highest education level: None   Occupational History     None   Social Needs     Financial resource strain: None     Food insecurity     Worry: None     Inability: None     Transportation needs     Medical: None     Non-medical: None   Tobacco Use     Smoking status: Never Smoker     Smokeless tobacco: Never Used   Substance and Sexual Activity     Alcohol use: No     Drug use: No     Sexual activity: Yes     Partners: Male     Birth control/protection: Post-menopausal   Lifestyle     Physical activity     Days per week: None     Minutes per session: None     Stress: None   Relationships     Social connections     Talks on phone: None     Gets together: None     Attends Adventism service: None     Active member of club or organization: None     Attends meetings of clubs or organizations: None     Relationship status: None     Intimate partner violence     Fear  "of current or ex partner: None     Emotionally abused: None     Physically abused: None     Forced sexual activity: None   Other Topics Concern     Parent/sibling w/ CABG, MI or angioplasty before 65F 55M? Not Asked   Social History Narrative    How much exercise per week? Daily    How much calcium per day?         How much caffeine per day?      How much vitamin D per day?      Do you/your family wear seatbelts?  Yes    Do you/your family use safety helmets? No    Do you/your family use sunscreen? Yes    Do you/your family keep firearms in the home? Yes    Do you/your family have a smoke detector(s)? Yes        Do you feel safe in your home? Yes    Has anyone ever touched you in an unwanted manner? No                   ROS    EXAM:  Blood pressure 134/84, pulse 66, height 1.651 m (5' 5\"), weight 81.6 kg (180 lb), not currently breastfeeding. Body mass index is 29.95 kg/m .  General appearance: Pleasant female in no acute distress.     BREAST EXAM:  Breast: Without visible skin changes. No dimpling or lesions seen.   Breasts supple, non-tender with palpation, no dominant mass, nodularity, or nipple discharge noted bilaterally. Axillary nodes negative.      PELVIC EXAM:  EG/BUS: right labia is agglutinated to the right labium majus.  The left labia minora appears normal, clitoral natarajan retracts and clitoris appears normal.  Introitus appears atrophic, no erythema or abnormal secretions Bartholin's, Urethra, Basile's normal.  In the left groin there is a patch of red scaly skin c/w intertrigo  Urethral meatus: normal   Urethra: no masses, tenderness, or scarring   Bladder: no masses or tenderness   Vagina: atrophic, thin, dry with physiologic discharge    Cervix: surgically absent  Uterus: surgically absent  Adnexa: Surgically absent  Rectum:anus normal       ASSESSMENT:  Encounter Diagnoses   Name Primary?     Encounter for gynecological examination without abnormal finding Yes     Intertrigo      Screening for " diabetes mellitus      Encounter for vitamin deficiency screening      Screening for thyroid disorder      Lipid screening       65 year old Female Pelvic and Breast Exam  HRT Surveillance  Intertrigo  Health maintenance screening and vaccination    PLAN:   Orders Placed This Encounter   Procedures     Pelvic and Breast Exam Procedure []     ZOSTER VACCINE RECOMBINANT ADJUVANTED IM NJX     Pneumococcal vaccine 23 valent PPSV23  (Pneumovax) [67446]     25- [place]-Vitamin D     Hemoglobin A1c     TSH with free T4 reflex     Glucose     Lipid Profile   Labs as above, pneumovax and Shingrex vaccine today.    Hormone replacement therapy risk and benefits were discussed in view of WHI data and subsequent studies.  She has been using it primarily for quality of life issues - hot flashes and night sweats and sexual function that were disruptive to general wellbeing.     Return in one year/PRN for preventive care or problems/concerns.     Verbalized understanding and agreement with visit plan.    Tiffany Lew MD

## 2020-10-23 ENCOUNTER — TELEPHONE (OUTPATIENT)
Dept: OBGYN | Facility: CLINIC | Age: 65
End: 2020-10-23

## 2020-10-23 ASSESSMENT — ANXIETY QUESTIONNAIRES: GAD7 TOTAL SCORE: 0

## 2020-10-23 NOTE — TELEPHONE ENCOUNTER
Pt called to report symptoms of injection site soreness, fatigue, body aches, and slightly elevated temp (99.5) one day after pneumovax and shingles injections.    Nurse advised this is normal and can last 2-3 days. If no improvement or if starts to develop any additional symptoms should call back.    Pt expressed understanding and agrees with plan

## 2020-11-08 ENCOUNTER — HEALTH MAINTENANCE LETTER (OUTPATIENT)
Age: 65
End: 2020-11-08

## 2020-11-25 DIAGNOSIS — E21.3 HYPERPARATHYROIDISM (H): ICD-10-CM

## 2020-11-30 NOTE — TELEPHONE ENCOUNTER
VITAMIN D3 125 MCG (5000 UT) Capsule  Last Written Prescription Date:  9/21/2020  Last Fill Quantity: 90,   # refills: 0  Last Office Visit : 9/9/2019  Future Office visit:  None  Routing refill request to provider for review/approval because:  Second request      Over due office visit     30 day pended  Refer to Provider for review    Kari Christopher RN  Central Triage Red Flags/Med Refills

## 2020-12-02 ENCOUNTER — VIRTUAL VISIT (OUTPATIENT)
Dept: DERMATOLOGY | Facility: CLINIC | Age: 65
End: 2020-12-02
Payer: COMMERCIAL

## 2020-12-02 DIAGNOSIS — L65.9 NON-SCARRING ALOPECIA: ICD-10-CM

## 2020-12-02 DIAGNOSIS — E23.0 PANHYPOPITUITARISM (H): ICD-10-CM

## 2020-12-02 DIAGNOSIS — L65.9 LOSS OF HAIR: ICD-10-CM

## 2020-12-02 DIAGNOSIS — L21.9 DERMATITIS, SEBORRHEIC: Primary | ICD-10-CM

## 2020-12-02 PROCEDURE — 99213 OFFICE O/P EST LOW 20 MIN: CPT | Mod: GQ | Performed by: DERMATOLOGY

## 2020-12-02 RX ORDER — FLUOCINOLONE ACETONIDE 0.11 MG/ML
OIL TOPICAL
Qty: 118 ML | Refills: 11 | Status: SHIPPED | OUTPATIENT
Start: 2020-12-02 | End: 2022-04-25

## 2020-12-02 NOTE — PROGRESS NOTES
[facility] Dermatology Record:  Store and Forward and Telephone [phone]      Dermatology Problem List:  1. Non-scarring hair loss, favor androgenetic alopecia +/- contribution from underlying thyroid disease but had prior bx with AA. Repeat bx 5/20/2020 most consistent with androgenetic alopecia (which makes sense in setting of #2 as well).  - Current rx: laser hair comb, finasteride 5 mg daily (started 6/2020), clobetasol solution, ketoconazole shampoo  - Repeat bx 5/20/2020 with nonscarring alopecia with marked miniaturization, favoring androgenetic alopecia. Also mild perifollicular fibrosis.  - Biopsy in 2016 consistent with alopecia areata, but not clinically consistent; had outside ILK without improvement per report  - TSH 0.14 in 9/2019, free T4 wnl  - Vitamin D 80 in 9/2019  - Repeat CBC, iron studies wnl 3/18/2020  - She is on estradiol per endocrinology  2. Pituitary prolactinoma s/p removal in 1975 followed by radiation with subsequent development of panhypopituitarism     Encounter Date: Dec 2, 2020    CC:   Chief Complaint   Patient presents with     Hair Loss     Non-scarring hair loss. Kathy notes very slight improvement.       History of Present Illness:  Maria Esther Lowe is a 65 year old female who presents as follow-up for androgenetic alopecia. Last seen by Dr. Nguyen on 9/8/2020 when continued on laser hair comb 3x weekly, finasteride 5 mg PO qdaily, and clobetasol 0.05% solution daily and ketoconazole shampoo 3x weekly.     Today, she reports her hair loss is overall stable from prior. She has been using the laser hair comb three times per week, taking finasteride 5 mg PO qdaily, ketoconazole 2% shampoo once or twice weekly, and clobetasol 0.05% solution at least once daily. She states did start minoxidil 5% foam for about 3 weeks after last visit, but this led to shedding and she discontinued.    She reports no pruritus or scale on scalp. Has noticed some acne on the forehead area.  Tolerating finasteride well with no significant side effects. Health otherwise stable. No other skin concerns.     ROS: Patient is generally feeling well today.     Physical Examination:  General: Well-appearing female, appropriately-developed individual.  Skin: Focused examination including scalp was performed.   - Dubon Type II  - Global decreased hair density over vertex scalp in female pattern distribution; appears stable from prior. No significant scalp erythema, scale, perifollicular erythema, or perifollicular scale appreciated on limited photo exam.          Labs:  None.     Past Medical History:   Patient Active Problem List   Diagnosis     Prolactinoma (H)     Panhypopituitarism (H)     Hormone replacement therapy (postmenopausal)     Carpal tunnel syndrome     History of robot-assisted laparoscopic hysterectomy     Past Medical History:   Diagnosis Date     Hyperlipidemia LDL goal < 130      Hypertension      Hypothyroidism      Osteopenia     DEXA: 12/11; T-score of -1.9        Panhypopituitarism (H)      Past Surgical History:   Procedure Laterality Date     DAVINCI HYSTERECTOMY TOTAL, BILATERAL SALPINGO-OOPHORECTOMY, COMBINED Bilateral 9/11/2018    Procedure: COMBINED DAVINCI HYSTERECTOMY TOTAL, SALPINGO-OOPHORECTOMY;  Pelvic Washings, Davinci Assisted Laparoscopic Total Hysterectomy, Bilateral Salpingo Oophorectomy, cystoscopy;  Surgeon: Tiffany Lew MD;  [facility]     OPERATIVE HYSTEROSCOPY N/A 1/30/2018    Procedure: OPERATIVE HYSTEROSCOPY;  Operative Hysteroscopy, Resection of Endometrial Polyp, Dilation and Curettage;  Surgeon: Daysi Munoz MD;  [facility]     PROBE LACRIMAL DUCT, INSERT STENT BILATERAL, COMBINED Bilateral 11/26/2018    Procedure: BILATERAL NASOLACRIMAL STENT - LARGE DIAMETER STEN TUBE;  Surgeon: Davina Payton MD;  [facility]     resected prolactin-producing pituitary adenoma.  1975    Postoperative radiation therapy.        Social  History:  Patient reports that she has never smoked. She has never used smokeless tobacco. She reports that she does not drink alcohol or use drugs.    Family History:  Family History   Problem Relation Age of Onset     Diabetes Paternal Grandmother      Depression Paternal Grandmother      Thyroid Disease Maternal Grandmother      Skin Cancer Father      Melanoma No family hx of        Medications:  Current Outpatient Medications   Medication     CALCIUM 500 +D 500-400 MG-UNIT TABS     cholecalciferol (VITAMIN D3) 125 mcg (5000 units) capsule     clobetasol (TEMOVATE) 0.05 % external solution     conjugated estrogens (PREMARIN) cream     cyanocobalamin (VITAMIN  B-12) 1000 MCG tablet     estradiol (ESTRACE) 1 MG tablet     estradiol (VAGIFEM) 10 MCG TABS vaginal tablet     finasteride (PROSCAR) 5 MG tablet     hydrocortisone (CORTEF) 10 MG tablet     hydrocortisone (CORTEF) 5 MG tablet     ketoconazole (NIZORAL) 2 % external shampoo     levothyroxine (SYNTHROID/LEVOTHROID) 200 MCG tablet     losartan-hydrochlorothiazide (HYZAAR) 100-25 MG tablet     simvastatin (ZOCOR) 40 MG tablet     zinc 50 MG TABS     No current facility-administered medications for this visit.           Allergies   Allergen Reactions     Ceftriaxone Other (See Comments)     Unsure of reaction.       Impression and Recommendations (Patient Counseled on the Following):    1. Non-scarring alopecia c/w androgenetic alopecia. Had lengthy discussion about etiology and goals of treatment, including general goal to maintain, rather than regrow, hair. Recommended decreasing frequency and potency of topical steroid given no significant scalp erythema or pruritus, and suspected steroid-induced acne on the forehead. We briefly discussed restarting topical minoxidil, reiterating that transient shedding is common during the first few weeks of application and typically improves over time, though patient deferred.  - Continue laser hair comb three times per  week  - Continue finasteride 5 mg PO qdaily  - Continue ketoconazole 2% shampoo three times per week  - Stop clobetasol 0.05% solution  - Switch to fluocinolone 0.01% oil TIW    Follow-up:   Follow-up with dermatology in approximately 6 months. Earlier for new or changing lesions or rash.      Staff only    Sd Cole MD  Pronouns: he/him/his    Department of Dermatology  [facility]: Phone: [phone], Fax:[phone]  [facility]: Phone: [phone] Fax: [phone]    _____________________________________________________________________________    Teledermatology information:  - Location of patient: [place]  - Patient presented as: return  - Location of teledermatologist:  ([facility] [place] )  - Image quality and interpretability: acceptable  - Physician has received verbal consent for a Video/Photos Visit from the patient? YES  - In-person dermatology visit recommendation: no  - Date of images: 12/2/2020  - Service start time: 1: 15 PM  - Service end time: 1:33 PM  - Date of report: 12/2/2020

## 2020-12-02 NOTE — PATIENT INSTRUCTIONS
[facility] Dermatology Visit    Thank you for allowing us to participate in your care. Your findings, instructions and follow-up plan are as follows:    Hair loss / androgenetic alopecia.   1. Continue using the laser hair comb three times weekly.   2. Continue finasteride 5 mg tablet once daily.   3. Continue ketoconazole shampoo at least once or twice weekly.  4. Stop clobetasol 0.05% solution.   5. Switch to fluocinolone oil 2-3 times weekly to scalp at nighttime before bed.   6. Follow-up in 6 months.       When should I call my doctor?    If you are worsening or not improving, please, contact us or seek urgent care as noted below.     Who should I call with questions (adults)?    [facility] (adult and pediatric): [phone]     [facility] (adult): [phone]    For urgent needs outside of business hours call the [facility] at [phone] and ask for the dermatology resident on call    If this is a medical emergency and you are unable to reach an ER, Call [phone]      Who should I call with questions (pediatric)?  [facility]- Pediatric Dermatology  Dr. Malathi Hendricks, Dr. Virginia Gunn, Dr. Ileana Rosario, Toma Jordan, SCOTT Nance, Dr. Isabel Nguyen & Dr. Santi Barraza  Non Urgent  Nurse Triage Line; [phone]- Laura and Ira DEY Care Coordinators   Cora (/Complex ) [phone]    If you need a prescription refill, please contact your pharmacy. Refills are approved or denied by our Physicians during normal business hours, Monday through Fridays  Per office policy, refills will not be granted if you have not been seen within the past year (or sooner depending on your child's condition)    Scheduling Information:  Pediatric Appointment Scheduling and Call Center [phone]  Radiology Scheduling- [phone]  Sedation Unit Scheduling-  [phone]  [facility] [phone]; Pediatric Dermatology [phone]  Main  Services: [phone]  Turkish: [phone]  Kuwaiti: [phone]  Hmong/Libyan/Japanese: [phone]  Preadmission Nursing Department Fax Number: [phone] (Fax all pre-operative paperwork to this number)    For urgent matters arising during evenings, weekends, or holidays that cannot wait for normal business hours please call [phone] and ask for the Dermatology Resident On-Call to be paged.

## 2020-12-02 NOTE — NURSING NOTE
Dermatology Rooming Note    Maria Esther Lowe's goals for this visit include:   Chief Complaint   Patient presents with     Hair Loss     Non-scarring hair loss. Kathy notes very slight improvement.     Jyoti Samuels, CMA

## 2020-12-02 NOTE — LETTER
12/2/2020       RE: Maria Esther Lowe  [address] [facility] [place] [address]     Dear Colleague,    Thank you for referring your patient, Maria Esther Lowe, to the [facility] [place] at [facility]. Please see a copy of my visit note below.    [facility] Dermatology Record:  Store and Forward and Telephone [phone]      Dermatology Problem List:  1. Non-scarring hair loss, favor androgenetic alopecia +/- contribution from underlying thyroid disease but had prior bx with AA. Repeat bx 5/20/2020 most consistent with androgenetic alopecia (which makes sense in setting of #2 as well).  - Current rx: laser hair comb, finasteride 5 mg daily (started 6/2020), clobetasol solution, ketoconazole shampoo  - Repeat bx 5/20/2020 with nonscarring alopecia with marked miniaturization, favoring androgenetic alopecia. Also mild perifollicular fibrosis.  - Biopsy in 2016 consistent with alopecia areata, but not clinically consistent; had outside ILK without improvement per report  - TSH 0.14 in 9/2019, free T4 wnl  - Vitamin D 80 in 9/2019  - Repeat CBC, iron studies wnl 3/18/2020  - She is on estradiol per endocrinology  2. Pituitary prolactinoma s/p removal in 1975 followed by radiation with subsequent development of panhypopituitarism     Encounter Date: Dec 2, 2020    CC:   Chief Complaint   Patient presents with     Hair Loss     Non-scarring hair loss. Kathy notes very slight improvement.       History of Present Illness:  Maria Esther Lowe is a 65 year old female who presents as follow-up for androgenetic alopecia. Last seen by Dr. Nguyen on 9/8/2020 when continued on laser hair comb 3x weekly, finasteride 5 mg PO qdaily, and clobetasol 0.05% solution daily and ketoconazole shampoo 3x weekly.     Today, she reports her hair loss is overall stable from prior. She has been using the laser hair comb three times per week, taking finasteride 5 mg PO qdaily,  ketoconazole 2% shampoo once or twice weekly, and clobetasol 0.05% solution at least once daily. She states did start minoxidil 5% foam for about 3 weeks after last visit, but this led to shedding and she discontinued.    She reports no pruritus or scale on scalp. Has noticed some acne on the forehead area. Tolerating finasteride well with no significant side effects. Health otherwise stable. No other skin concerns.     ROS: Patient is generally feeling well today.     Physical Examination:  General: Well-appearing female, appropriately-developed individual.  Skin: Focused examination including scalp was performed.   - Dubon Type II  - Global decreased hair density over vertex scalp in female pattern distribution; appears stable from prior. No significant scalp erythema, scale, perifollicular erythema, or perifollicular scale appreciated on limited photo exam.          Labs:  None.     Past Medical History:   Patient Active Problem List   Diagnosis     Prolactinoma (H)     Panhypopituitarism (H)     Hormone replacement therapy (postmenopausal)     Carpal tunnel syndrome     History of robot-assisted laparoscopic hysterectomy     Past Medical History:   Diagnosis Date     Hyperlipidemia LDL goal < 130      Hypertension      Hypothyroidism      Osteopenia     DEXA: 12/11; T-score of -1.9        Panhypopituitarism (H)      Past Surgical History:   Procedure Laterality Date     DAVINCI HYSTERECTOMY TOTAL, BILATERAL SALPINGO-OOPHORECTOMY, COMBINED Bilateral 9/11/2018    Procedure: COMBINED DAVINCI HYSTERECTOMY TOTAL, SALPINGO-OOPHORECTOMY;  Pelvic Washings, Davinci Assisted Laparoscopic Total Hysterectomy, Bilateral Salpingo Oophorectomy, cystoscopy;  Surgeon: Tiffany Lew MD;  Location: [facility] OR     OPERATIVE HYSTEROSCOPY N/A 1/30/2018    Procedure: OPERATIVE HYSTEROSCOPY;  Operative Hysteroscopy, Resection of Endometrial Polyp, Dilation and Curettage;  Surgeon: Daysi Munoz MD;  Location: [facility] OR      PROBE LACRIMAL DUCT, INSERT STENT BILATERAL, COMBINED Bilateral 11/26/2018    Procedure: BILATERAL NASOLACRIMAL STENT - LARGE DIAMETER STEN TUBE;  Surgeon: Davina Payton MD;  Location: [facility] OR     resected prolactin-producing pituitary adenoma.  1975    Postoperative radiation therapy.        Social History:  Patient reports that she has never smoked. She has never used smokeless tobacco. She reports that she does not drink alcohol or use drugs.    Family History:  Family History   Problem Relation Age of Onset     Diabetes Paternal Grandmother      Depression Paternal Grandmother      Thyroid Disease Maternal Grandmother      Skin Cancer Father      Melanoma No family hx of        Medications:  Current Outpatient Medications   Medication     CALCIUM 500 +D 500-400 MG-UNIT TABS     cholecalciferol (VITAMIN D3) 125 mcg (5000 units) capsule     clobetasol (TEMOVATE) 0.05 % external solution     conjugated estrogens (PREMARIN) cream     cyanocobalamin (VITAMIN  B-12) 1000 MCG tablet     estradiol (ESTRACE) 1 MG tablet     estradiol (VAGIFEM) 10 MCG TABS vaginal tablet     finasteride (PROSCAR) 5 MG tablet     hydrocortisone (CORTEF) 10 MG tablet     hydrocortisone (CORTEF) 5 MG tablet     ketoconazole (NIZORAL) 2 % external shampoo     levothyroxine (SYNTHROID/LEVOTHROID) 200 MCG tablet     losartan-hydrochlorothiazide (HYZAAR) 100-25 MG tablet     simvastatin (ZOCOR) 40 MG tablet     zinc 50 MG TABS     No current facility-administered medications for this visit.           Allergies   Allergen Reactions     Ceftriaxone Other (See Comments)     Unsure of reaction.       Impression and Recommendations (Patient Counseled on the Following):    1. Non-scarring alopecia c/w androgenetic alopecia. Had lengthy discussion about etiology and goals of treatment, including general goal to maintain, rather than regrow, hair. Recommended decreasing frequency and potency of topical steroid given no significant scalp erythema  or pruritus, and suspected steroid-induced acne on the forehead. We briefly discussed restarting topical minoxidil, reiterating that transient shedding is common during the first few weeks of application and typically improves over time, though patient deferred.  - Continue laser hair comb three times per week  - Continue finasteride 5 mg PO qdaily  - Continue ketoconazole 2% shampoo three times per week  - Stop clobetasol 0.05% solution  - Switch to fluocinolone 0.01% oil TIW    Follow-up:   Follow-up with dermatology in approximately 6 months. Earlier for new or changing lesions or rash.      Staff only    Sd Cole MD  Pronouns: he/him/his    Department of Dermatology  [facility]: Phone: [phone], Fax:[phone]  [facility]: Phone: [phone] Fax: [phone]    _____________________________________________________________________________    Teledermatology information:  - Location of patient: [place]  - Patient presented as: return  - Location of teledermatologist:  ([facility] [place] )  - Image quality and interpretability: acceptable  - Physician has received verbal consent for a Video/Photos Visit from the patient? YES  - In-person dermatology visit recommendation: no  - Date of images: 12/2/2020  - Service start time: 1: 15 PM  - Service end time: 1:33 PM  - Date of report: 12/2/2020

## 2020-12-04 RX ORDER — ESTRADIOL 1 MG/1
1 TABLET ORAL DAILY
Qty: 90 TABLET | Refills: 2 | Status: SHIPPED | OUTPATIENT
Start: 2020-12-04 | End: 2021-10-07

## 2021-03-03 ENCOUNTER — OFFICE VISIT (OUTPATIENT)
Dept: DERMATOLOGY | Facility: CLINIC | Age: 66
End: 2021-03-03
Payer: COMMERCIAL

## 2021-03-03 DIAGNOSIS — Z13.21 ENCOUNTER FOR VITAMIN DEFICIENCY SCREENING: ICD-10-CM

## 2021-03-03 DIAGNOSIS — Z13.29 SCREENING FOR THYROID DISORDER: ICD-10-CM

## 2021-03-03 DIAGNOSIS — Z13.220 LIPID SCREENING: ICD-10-CM

## 2021-03-03 DIAGNOSIS — L21.9 SEBORRHEIC DERMATITIS: ICD-10-CM

## 2021-03-03 DIAGNOSIS — Z13.1 SCREENING FOR DIABETES MELLITUS: ICD-10-CM

## 2021-03-03 LAB
CHOLEST SERPL-MCNC: 146 MG/DL
DEPRECATED CALCIDIOL+CALCIFEROL SERPL-MC: 81 UG/L (ref 20–75)
GLUCOSE SERPL-MCNC: 108 MG/DL (ref 70–99)
HBA1C MFR BLD: 5.5 % (ref 0–5.6)
HDLC SERPL-MCNC: 48 MG/DL
LDLC SERPL CALC-MCNC: 70 MG/DL
NONHDLC SERPL-MCNC: 98 MG/DL
T4 FREE SERPL-MCNC: 1.62 NG/DL (ref 0.76–1.46)
TRIGL SERPL-MCNC: 140 MG/DL
TSH SERPL DL<=0.005 MIU/L-ACNC: <0.01 MU/L (ref 0.4–4)

## 2021-03-03 PROCEDURE — 36415 COLL VENOUS BLD VENIPUNCTURE: CPT | Performed by: PATHOLOGY

## 2021-03-03 PROCEDURE — 99214 OFFICE O/P EST MOD 30 MIN: CPT | Performed by: DERMATOLOGY

## 2021-03-03 PROCEDURE — 84439 ASSAY OF FREE THYROXINE: CPT | Performed by: PATHOLOGY

## 2021-03-03 PROCEDURE — 83036 HEMOGLOBIN GLYCOSYLATED A1C: CPT | Performed by: PATHOLOGY

## 2021-03-03 PROCEDURE — 82947 ASSAY GLUCOSE BLOOD QUANT: CPT | Performed by: PATHOLOGY

## 2021-03-03 PROCEDURE — 82306 VITAMIN D 25 HYDROXY: CPT | Performed by: PATHOLOGY

## 2021-03-03 PROCEDURE — 84443 ASSAY THYROID STIM HORMONE: CPT | Performed by: PATHOLOGY

## 2021-03-03 PROCEDURE — 80061 LIPID PANEL: CPT | Performed by: PATHOLOGY

## 2021-03-03 RX ORDER — KETOCONAZOLE 20 MG/ML
SHAMPOO TOPICAL
Qty: 120 ML | Refills: 11 | Status: SHIPPED | OUTPATIENT
Start: 2021-03-03 | End: 2022-04-25

## 2021-03-03 ASSESSMENT — PAIN SCALES - GENERAL: PAINLEVEL: NO PAIN (0)

## 2021-03-03 NOTE — PROGRESS NOTES
[facility] Dermatology Note  Encounter Date: Mar 3, 2021  Office Visit     Dermatology Problem List:  1. Non-scarring hair loss, favor androgenetic alopecia +/- contribution from underlying thyroid disease but had prior bx with AA. Repeat bx 5/20/2020 most consistent with androgenetic alopecia (which makes sense in setting of #2 as well).  - Current rx: laser hair comb, finasteride 5 mg daily (started 6/2020), ketoconazole shampoo, may add 5% Rogaine daily  - Repeat bx 5/20/2020 with nonscarring alopecia with marked miniaturization, favoring androgenetic alopecia. Also mild perifollicular fibrosis.  - Biopsy in 2016 consistent with alopecia areata, but not clinically consistent; had outside ILK without improvement per report  - TSH 0.14 in 9/2019, free T4 wnl  - Vitamin D 80 in 9/2019  - Repeat CBC, iron studies wnl 3/18/2020  - She is on estradiol per endocrinology  2. Pituitary prolactinoma s/p removal in 1975 followed by radiation with subsequent development of panhypopituitarism     ____________________________________________    Assessment & Plan:     # Non scarring hair loss   - Patient is very frustrated by disease and would like to do everything to try to regrow hair   - She is due for labs from her [facility] visit (thyroid and vitamin D) and I encouraged her to get the labs.  -Continue laser hair comb  -Continue Ketoconazole shampoo  -Hold on any topical steroids at this time since no evidence of inflammation  -Continue finasteride 5 mg daily ( reviewed that I think the acne she experienced earlier was most likely related to the mask she has to wear)  -Discussed PRP and will refer to Dr Serrano to review and for possible treatment.   -Discussed restart of 5% Rogaine daily. We did discuss the shed period that people experience at the beginning of treatment.  I reviewed it may take 6 months to see benefit. Patient to consider if she wants to restart.        Follow-up: Dr Serrano  consult    Staff:     Ana Lilia Leslie MD  ____________________________________________    CC: Hair Loss (Kathy, is here for a hairloss follow up appt, pt states no growth more falling out. Pt states she started getting pimples)    HPI:  Ms. Maria Esther Lowe is a(n) 65 year old female who presents today as a return patient for hair loss.  She is very frustrated by her disease and wants to do everything possible to try to regrow the hair.  She did try Rogaine but had some shed initially which scared her and she stopped.  She is using her laser hair comb, ketoconazole shampoo and Finasteride.  She had concerns that the finasteride was causing acne and stopped it.  The acne has resolved and she is back on her normal dose of Finasteride and tolerating it well.  She did stop the clobetasol solution as it was causing drying of the skin.    Patient is otherwise feeling well, without additional skin concerns.     Labs Reviewed:  TSH, T4 and Vitamin D lab orders placed by [facility] in October 2020 but she hasn't had them drawn yet.    Physical Exam:  Vitals: There were no vitals taken for this visit.  SKIN: Focused examination of face and scalp was performed.  - No active acne  - Diffuse thinning of the vertex scalp with retention of frontal hair line.  No scale or erythema.  No pustules.    - No other lesions of concern on areas examined.     Medications:  Current Outpatient Medications   Medication     CALCIUM 500 +D 500-400 MG-UNIT TABS     cholecalciferol (VITAMIN D3) 125 mcg (5000 units) capsule     clobetasol (TEMOVATE) 0.05 % external solution     conjugated estrogens (PREMARIN) cream     cyanocobalamin (VITAMIN  B-12) 1000 MCG tablet     estradiol (ESTRACE) 1 MG tablet     estradiol (VAGIFEM) 10 MCG TABS vaginal tablet     finasteride (PROSCAR) 5 MG tablet     Fluocinolone Acetonide Scalp 0.01 % OIL oil     hydrocortisone (CORTEF) 10 MG tablet     ketoconazole (NIZORAL) 2 % external shampoo     levothyroxine  (SYNTHROID/LEVOTHROID) 200 MCG tablet     losartan-hydrochlorothiazide (HYZAAR) 100-25 MG tablet     simvastatin (ZOCOR) 40 MG tablet     zinc 50 MG TABS     hydrocortisone (CORTEF) 5 MG tablet     No current facility-administered medications for this visit.       Past Medical History:   Patient Active Problem List   Diagnosis     Prolactinoma (H)     Panhypopituitarism (H)     Hormone replacement therapy (postmenopausal)     Carpal tunnel syndrome     History of robot-assisted laparoscopic hysterectomy     Past Medical History:   Diagnosis Date     Hyperlipidemia LDL goal < 130      Hypertension      Hypothyroidism      Osteopenia     DEXA: 12/11; T-score of -1.9        Panhypopituitarism (H)        CC Esetla Boswell, NP  [address] [place] [address] on close of this encounter.

## 2021-03-03 NOTE — NURSING NOTE
Chief Complaint   Patient presents with     Hair Loss     Kathy, is here for a hairloss follow up appt, pt states no growth more falling out.    Dayday Fernandez EMT

## 2021-03-03 NOTE — LETTER
3/3/2021       RE: Maria Esther Lowe  [address] [facility] [address]     Dear Colleague,    Thank you for referring your patient, Maria Esther Lowe, to the [facility] [place] at [facility]. Please see a copy of my visit note below.    [facility] Dermatology Note  Encounter Date: Mar 3, 2021  Office Visit     Dermatology Problem List:  1. Non-scarring hair loss, favor androgenetic alopecia +/- contribution from underlying thyroid disease but had prior bx with AA. Repeat bx 5/20/2020 most consistent with androgenetic alopecia (which makes sense in setting of #2 as well).  - Current rx: laser hair comb, finasteride 5 mg daily (started 6/2020), ketoconazole shampoo, may add 5% Rogaine daily  - Repeat bx 5/20/2020 with nonscarring alopecia with marked miniaturization, favoring androgenetic alopecia. Also mild perifollicular fibrosis.  - Biopsy in 2016 consistent with alopecia areata, but not clinically consistent; had outside ILK without improvement per report  - TSH 0.14 in 9/2019, free T4 wnl  - Vitamin D 80 in 9/2019  - Repeat CBC, iron studies wnl 3/18/2020  - She is on estradiol per endocrinology  2. Pituitary prolactinoma s/p removal in 1975 followed by radiation with subsequent development of panhypopituitarism     ____________________________________________    Assessment & Plan:     # Non scarring hair loss   - Patient is very frustrated by disease and would like to do everything to try to regrow hair   - She is due for labs from her [facility] visit (thyroid and vitamin D) and I encouraged her to get the labs.  -Continue laser hair comb  -Continue Ketoconazole shampoo  -Hold on any topical steroids at this time since no evidence of inflammation  -Continue finasteride 5 mg daily ( reviewed that I think the acne she experienced earlier was most likely related to the mask she has to wear)  -Discussed PRP and  will refer to Dr Serrano to review and for possible treatment.   -Discussed restart of 5% Rogaine daily. We did discuss the shed period that people experience at the beginning of treatment.  I reviewed it may take 6 months to see benefit. Patient to consider if she wants to restart.        Follow-up: Dr Serrano consult    Staff:     Ana Lilia Leslie MD  ____________________________________________    CC: Hair Loss (Kathy, is here for a hairloss follow up appt, pt states no growth more falling out. Pt states she started getting pimples)    HPI:  Ms. Maria Esther Lowe is a(n) 65 year old female who presents today as a return patient for hair loss.  She is very frustrated by her disease and wants to do everything possible to try to regrow the hair.  She did try Rogaine but had some shed initially which scared her and she stopped.  She is using her laser hair comb, ketoconazole shampoo and Finasteride.  She had concerns that the finasteride was causing acne and stopped it.  The acne has resolved and she is back on her normal dose of Finasteride and tolerating it well.  She did stop the clobetasol solution as it was causing drying of the skin.    Patient is otherwise feeling well, without additional skin concerns.     Labs Reviewed:  TSH, T4 and Vitamin D lab orders placed by [facility] in October 2020 but she hasn't had them drawn yet.    Physical Exam:  Vitals: There were no vitals taken for this visit.  SKIN: Focused examination of face and scalp was performed.  - No active acne  - Diffuse thinning of the vertex scalp with retention of frontal hair line.  No scale or erythema.  No pustules.    - No other lesions of concern on areas examined.     Medications:  Current Outpatient Medications   Medication     CALCIUM 500 +D 500-400 MG-UNIT TABS     cholecalciferol (VITAMIN D3) 125 mcg (5000 units) capsule     clobetasol (TEMOVATE) 0.05 % external solution     conjugated estrogens (PREMARIN) cream     cyanocobalamin  (VITAMIN  B-12) 1000 MCG tablet     estradiol (ESTRACE) 1 MG tablet     estradiol (VAGIFEM) 10 MCG TABS vaginal tablet     finasteride (PROSCAR) 5 MG tablet     Fluocinolone Acetonide Scalp 0.01 % OIL oil     hydrocortisone (CORTEF) 10 MG tablet     ketoconazole (NIZORAL) 2 % external shampoo     levothyroxine (SYNTHROID/LEVOTHROID) 200 MCG tablet     losartan-hydrochlorothiazide (HYZAAR) 100-25 MG tablet     simvastatin (ZOCOR) 40 MG tablet     zinc 50 MG TABS     hydrocortisone (CORTEF) 5 MG tablet     No current facility-administered medications for this visit.       Past Medical History:   Patient Active Problem List   Diagnosis     Prolactinoma (H)     Panhypopituitarism (H)     Hormone replacement therapy (postmenopausal)     Carpal tunnel syndrome     History of robot-assisted laparoscopic hysterectomy     Past Medical History:   Diagnosis Date     Hyperlipidemia LDL goal < 130      Hypertension      Hypothyroidism      Osteopenia     DEXA: 12/11; T-score of -1.9        Panhypopituitarism (H)        CC Estela Boswell, NP  [address] [place] [address] on close of this encounter.

## 2021-03-17 ENCOUNTER — OFFICE VISIT (OUTPATIENT)
Dept: DERMATOLOGY | Facility: CLINIC | Age: 66
End: 2021-03-17
Payer: COMMERCIAL

## 2021-03-17 VITALS — HEART RATE: 72 BPM | SYSTOLIC BLOOD PRESSURE: 144 MMHG | DIASTOLIC BLOOD PRESSURE: 78 MMHG

## 2021-03-17 DIAGNOSIS — L64.9 ANDROGENETIC ALOPECIA: Primary | ICD-10-CM

## 2021-03-17 PROCEDURE — 99213 OFFICE O/P EST LOW 20 MIN: CPT | Performed by: DERMATOLOGY

## 2021-03-17 ASSESSMENT — PAIN SCALES - GENERAL: PAINLEVEL: NO PAIN (0)

## 2021-03-17 NOTE — LETTER
3/17/2021       RE: Maria Esther Lowe  [address] [facility] [address]     Dear Colleague,    Thank you for referring your patient, Maria Esther Lowe, to the [facility] [place] at [facility]. Please see a copy of my visit note below.    [facility] Dermatology Note  Encounter Date: Mar 17, 2021  Office Visit     Dermatology Problem List:  1. Non-scarring hair loss, favor androgenetic alopecia +/- contribution from underlying thyroid disease but had prior bx with AA. Repeat bx 5/20/2020 most consistent with androgenetic alopecia (which makes sense in setting of #2 as well).  - Current rx: laser hair comb (started years ago), finasteride 5 mg daily (started 6/2020), ketoconazole shampoo   - Repeat bx 5/20/2020 with nonscarring alopecia with marked miniaturization, favoring androgenetic alopecia. Also mild perifollicular fibrosis.  - Biopsy in 2016 consistent with alopecia areata, but not clinically consistent; had outside ILK without improvement per report  - TSH <0.01 in 3/2021, free T4 wnl  - Vitamin D 80 in 9/2019  - Repeat CBC, iron studies wnl 3/18/2020  - She is on estradiol per endocrinology  2. Pituitary prolactinoma s/p removal in 1975 followed by radiation with subsequent development of panhypopituitarism     ____________________________________________    Assessment & Plan:    1. Non-scarring hair loss, favor androgenetic alopecia +/- contribution from underlying thyroid disease but had prior bx with AA.   - Reassured that Rogaine can have shedding period as hair shifts from shedding to growth phase. Can try starting again, and would continue indefinitely.(needs at least a 3 month trial). Reviewed there is a risk of hair shedding and she understands.    - Discussed PRP treatments, answering patient's questions regarding side effects and efficacy. Reviewed this is trending and is an option   - Can also  consider spironolactone or oral Minoxidil, would need to check with endocrinologist first. She would like topicals first   - Hair Metrix today.    Follow-up: 4 week(s) virtually (telephone with photos), or earlier for new or changing lesions. Return in 3 months in person to repeat Hair Metrix.    Staff and Medical Student:     Dez Barbosa, MS4    Staff Physician:  I was present with the medical student who participated in the service and in the documentation of the note. I have verified the history and personally performed the physical exam and medical decision making. I agree with the assessment and plan of care as documented in the note.        Carley Serrano MD    Department of Dermatology  [facility]: Phone: [phone], Fax:[phone]  [facility]: Phone: [phone], Fax: [phone]  3/20/2021      ____________________________________________    CC: Derm Problem (here for consult for PRP)    HPI:  Ms. Maria Esther Lowe is a(n) 65 year old female who presents today as a new patient for PRP consultation. She has been seeing Dr. Leslie who advised she meet with Dr. Serrano to discuss this treatment.    She hasn't noticed any changes since her last visit with Dr. Leslie. Shedding most on top of scalp and temples. Hasn't started rogaine, used it 6-8 months ago but felt like hse had lots of hair loss when using so stopped. Using Head and Shoulders infrequently. Uses ketoconazole shampoo and laser hair comb 3x/week and taking finasteride. Was taking a hair & nail supplement but stopped after concern from Dr. Leslie over too high of biotin content.    - Last seen in clinic on 3/3/2021 with Dr. Leslie  - Current tx: laser hair comb, finasteride 5 mg daily (started 6/2020), ketoconazole shampoo, Head and Shoulders  - Scalp pain: no  - Scalp burning: no  - Scalp itching: no  -  Eyebrow or eyelash changes: years ago  - Nail changes: no  - Overall course: Continued shedding and thinning on top of scalp and temples.    Patient is otherwise feeling well, without additional skin concerns.    Labs:  Recent CBC, K, Hgb, Iron, vitamin D, TSH, T4 reviewed.     CBC RESULTS:   Recent Labs   Lab Test 03/18/20  1131   WBC 9.0   RBC 4.86   HGB 14.7   HCT 43.6   MCV 90   MCH 30.2   MCHC 33.7   RDW 12.7            Physical Exam:  Vitals: There were no vitals taken for this visit.  SKIN: Focused examination of hair and scalp was performed.  - Significant reduced density over vertex and bitemporally  -severe thinning(minda >3)  - No other lesions of concern on areas examined.                     Medications:  Current Outpatient Medications   Medication     CALCIUM 500 +D 500-400 MG-UNIT TABS     cholecalciferol (VITAMIN D3) 125 mcg (5000 units) capsule     clobetasol (TEMOVATE) 0.05 % external solution     conjugated estrogens (PREMARIN) cream     cyanocobalamin (VITAMIN  B-12) 1000 MCG tablet     estradiol (ESTRACE) 1 MG tablet     estradiol (VAGIFEM) 10 MCG TABS vaginal tablet     finasteride (PROSCAR) 5 MG tablet     Fluocinolone Acetonide Scalp 0.01 % OIL oil     hydrocortisone (CORTEF) 10 MG tablet     ketoconazole (NIZORAL) 2 % external shampoo     levothyroxine (SYNTHROID/LEVOTHROID) 200 MCG tablet     losartan-hydrochlorothiazide (HYZAAR) 100-25 MG tablet     simvastatin (ZOCOR) 40 MG tablet     zinc 50 MG TABS     hydrocortisone (CORTEF) 5 MG tablet     No current facility-administered medications for this visit.       Past Medical History:   Patient Active Problem List   Diagnosis     Prolactinoma (H)     Panhypopituitarism (H)     Hormone replacement therapy (postmenopausal)     Carpal tunnel syndrome     History of robot-assisted laparoscopic hysterectomy     Past Medical History:   Diagnosis Date     Hyperlipidemia LDL goal < 130      Hypertension      Hypothyroidism      Osteopenia      DEXA: 12/11; T-score of -1.9        Panhypopituitarism (H)         CC Ana Lilia Leslie MD  [address] [facility] [address]  [place] [address] on close of this encounter.

## 2021-03-17 NOTE — PATIENT INSTRUCTIONS
Platelet rich plasma (PRP) for treatment of alopecia     Platelet rich plasma or  PRP  treatment involves taking the patients blood and concentrating the platelets as platelets are known to contain valuable growth factors and possibly stimulate hair growth.      PRP is not an FDA approved treatment for alopecia. However, some studies have shown improvement in patients with thinning hair.     PRP is not typically covered by insurance.     Typical treatment regimens are every 1-2 months for 3-4 months. Then, an assessment is usually performed to determine future treatment times.     Risks of this procedure include but are not limited to swelling, redness, pain, headache, bleeding, bruising, scarring, nerve injury, numbness, and infection.      Blood testing is needed prior to treatment      There is a possibility of no improvement, slight improvement or worsening with this treatment.        You should not have this procedure if you are pregnant, breast feeding, or if you have a blood/clotting disorders.       You will need to block 3 hours of your day for this appointment    Who should I call with questions?    [facility]: [phone]     [facility]: [phone]    For urgent needs outside of business hours call the [facility] at [phone] and ask for the dermatology resident on call                       [facility] Dermatology Visit Scalp Photography    Thank you for allowing us to participate in your care. Your findings, instructions and follow-up plan are as follows:  Today you had scalp and hair images taken using cameras and  artificial intelligence that gives us quantitative hair data to track your progress. This data describes your scalp/hair condition such as the number of fibers in a given area, the fiber s density, the distance of the fibers from one another, and more information. It can also give us information  about scalp health visually. We use them as a clinical tool to objectively track progress.     When should I call my doctor?    If you are worsening or not improving, please, contact us or seek urgent care as noted below.     Who should I call with questions (adults)?    [facility] (adult and pediatric): [phone]     [facility] (adult): [phone]    For urgent needs outside of business hours call the [facility] at [phone] and ask for the dermatology resident on call    If this is a medical emergency and you are unable to reach an ER, Call [phone]      Who should I call with questions (pediatric)?  [facility]- Pediatric Dermatology  Dr. Malathi Hendricks, Dr. Virginia Gunn, Dr. Ileana Rosario, Toma Jordan, SCOTT Nance, Dr. Isabel Nguyen & Dr. Santi Barraza  Non Urgent  Nurse Triage Line; [phone]- Laura and Ira DEY Care Coordinatorrip Shepherd (/Complex ) [phone]    If you need a prescription refill, please contact your pharmacy. Refills are approved or denied by our Physicians during normal business hours, Monday through Fridays  Per office policy, refills will not be granted if you have not been seen within the past year (or sooner depending on your child's condition)    Scheduling Information:  Pediatric Appointment Scheduling and Call Center [phone]  Radiology Scheduling- [phone]  Sedation Unit Scheduling- [phone]  [place] Scheduling- General [phone]; Pediatric Dermatology [phone]  Main  Services: [phone]  Italian: [phone]  Turkmen: [phone]  Hmong/Dario/Vietnamese: [phone]  Preadmission Nursing Department Fax Number: [phone] (Fax all pre-operative paperwork to this number)    For urgent matters arising during evenings, weekends, or holidays that cannot wait for normal business hours please call [phone]  [phone] and ask for the Dermatology Resident On-Call to be paged.        Topical Rogaine (Minoxidil) for Pattern Hair Loss      Minoxidil is an FDA approved over the counter topical for the treatment of hair loss and thinning hair in men and women.     Initially a 2% solution was available however this required application twice daily. A 5% solution is also now approved, only requiring application once per day.     Available Products:     Rogaine 5% solution: Packaged for men however can be used by men or women. Use dropper and apply directly to scalp at bedtime. This product can cause an allergy because of presence of propylene glycol. Stop this product if you develop a rash or itching and contact your physician.     Rogaine 5% foam: Packaged for men and women: Apply foam directly to the scalp once daily. This is less greasy compared to the solution. This formula is preferred for those who had a reaction to the solution product. If you develop rash or itching, stop the product and contact your physician.     What if I stop minoxidil topical?     After stopping minoxidil the hair will return to the usual pattern of thinning. Using the product 3-4 times per week is better than not using product at all.       Can I use generic minoxidil?     Yes, look for 5% minoxidil.     What are the side effects?    The most common side effect is rash or itching of the scalp. This can occur if a contact allergy develops with propylene glycol. A small group of patients noticed the appearance of facial hair if the product runs onto the face or with prolonged use.       Last updated: 6/26/2016    Finasteride: Patient drug information  Access [facility] Online here.  Copyright 6934-5394 [employer] All rights reserved.  (For additional information see \"Finasteride: Drug information\")    Brand Names: US  Propecia; Proscar  Brand Names: Adelina  ACH-Finasteride; ACT Finasteride [DSC]; AG-Finasteride; APO-Finasteride; Auro-Finasteride; " DOM-Finasteride; JAMP-Finasteride; MINT-Finasteride; MYLAN-Finasteride HG [DSC]; MYLAN-Finasteride [DSC]; PMS-Finasteride; Propecia; Proscar; RAN-Finasteride; RATIO-Finasteride [DSC]; EWA-Finasteride; SANDOZ Finasteride; SANDOZ Finasteride A; TEVA-Finasteride; VAN-Finasteride [DSC]  What is this drug used for?    It is used to help hair growth in male pattern baldness. It may take 3 months to see the full effect.    It is used to treat the signs of an enlarged prostate. It may take a few months to see the full effect.    It may be given to you for other reasons. Talk with the doctor.  What do I need to tell my doctor BEFORE I take this drug?    If you have an allergy to finasteride or any other part of this drug.    If you are allergic to this drug; any part of this drug; or any other drugs, foods, or substances. Tell your doctor about the allergy and what signs you had.    If you are a woman. This drug is not approved for use in women. If you are a woman using this drug, talk with your doctor if you are pregnant, plan on getting pregnant, or are breast-feeding.  This drug may interact with other drugs or health problems.  Tell your doctor and pharmacist about all of your drugs (prescription or OTC, natural products, vitamins) and health problems. You must check to make sure that it is safe for you to take this drug with all of your drugs and health problems. Do not start, stop, or change the dose of any drug without checking with your doctor.  What are some things I need to know or do while I take this drug?  For all uses of this drug:    Tell all of your health care providers that you take this drug. This includes your doctors, nurses, pharmacists, and dentists.    Avoid driving and doing other tasks or actions that call for you to be alert until you see how this drug affects you.    To lower the chance of feeling dizzy or passing out, rise slowly if you have been sitting or lying down. Be careful going up and  down stairs.    This drug may affect certain lab tests. Tell all of your health care providers and lab workers that you take this drug.    Do not donate blood while using this drug and for 1 month after stopping.    Some doses of this drug may raise the chance of a type of prostate cancer. Talk with your doctor.    Rarely, male breast cancer has happened with this drug. Talk with your doctor.    This drug may lower sperm counts in men. This may affect being able to father a child. Talk with the doctor.    Pregnant women must not handle crushed or broken tablets. Talk with the doctor.  For enlarged prostate:    Have a rectal exam (to check prostate gland) and blood work (PSA test). Talk with your doctor.  What are some side effects that I need to call my doctor about right away?  WARNING/CAUTION: Even though it may be rare, some people may have very bad and sometimes deadly side effects when taking a drug. Tell your doctor or get medical help right away if you have any of the following signs or symptoms that may be related to a very bad side effect:    Signs of an allergic reaction, like rash; hives; itching; red, swollen, blistered, or peeling skin with or without fever; wheezing; tightness in the chest or throat; trouble breathing, swallowing, or talking; unusual hoarseness; or swelling of the mouth, face, lips, tongue, or throat.    A lump in the breast, breast pain or soreness, or nipple discharge.    Enlarged breasts.    Very bad dizziness or passing out.    Low mood (depression).    For males, testicle pain.  What are some other side effects of this drug?  All drugs may cause side effects. However, many people have no side effects or only have minor side effects. Call your doctor or get medical help if any of these side effects or any other side effects bother you or do not go away:    Feeling dizzy, tired, or weak.    This drug may cause a change in sex ability in men or lowered interest in sex. This could go  on after you stop this drug. Talk with your doctor if these effects go on or bother you.  These are not all of the side effects that may occur. If you have questions about side effects, call your doctor. Call your doctor for medical advice about side effects.  You may report side effects to your national health agency.  How is this drug best taken?  Use this drug as ordered by your doctor. Read all information given to you. Follow all instructions closely.    Take with or without food.    Take this drug at the same time of day.    Keep taking this drug as you have been told by your doctor or other health care provider, even if you feel well.  What do I do if I miss a dose?    Skip the missed dose and go back to your normal time.    Do not take 2 doses at the same time or extra doses.  How do I store and/or throw out this drug?    Store in the original container at room temperature.    Protect from light.    Keep lid tightly closed.    Store in a dry place. Do not store in a bathroom.    Keep all drugs in a safe place. Keep all drugs out of the reach of children and pets.    Throw away unused or  drugs. Do not flush down a toilet or pour down a drain unless you are told to do so. Check with your pharmacist if you have questions about the best way to throw out drugs. There may be drug take-back programs in your area.  General drug facts    If your symptoms or health problems do not get better or if they become worse, call your doctor.    Do not share your drugs with others and do not take anyone else's drugs.    Some drugs may have another patient information leaflet. If you have any questions about this drug, please talk with your doctor, nurse, pharmacist, or other health care provider.    If you think there has been an overdose, call your poison control center or get medical care right away. Be ready to tell or show what was taken, how much, and when it happened.  Use of [employer] is subject to the  Subscription and License Agreement.  Topic 83132 Version 136.0

## 2021-03-17 NOTE — NURSING NOTE
Chief Complaint   Patient presents with     Derm Problem     here for consult for PRP     Caitlin Hernandez, EMT

## 2021-03-17 NOTE — PROGRESS NOTES
[facility] Dermatology Note  Encounter Date: Mar 17, 2021  Office Visit     Dermatology Problem List:  1. Non-scarring hair loss, favor androgenetic alopecia +/- contribution from underlying thyroid disease but had prior bx with AA. Repeat bx 5/20/2020 most consistent with androgenetic alopecia (which makes sense in setting of #2 as well).  - Current rx: laser hair comb (started years ago), finasteride 5 mg daily (started 6/2020), ketoconazole shampoo   - Repeat bx 5/20/2020 with nonscarring alopecia with marked miniaturization, favoring androgenetic alopecia. Also mild perifollicular fibrosis.  - Biopsy in 2016 consistent with alopecia areata, but not clinically consistent; had outside ILK without improvement per report  - TSH <0.01 in 3/2021, free T4 wnl  - Vitamin D 80 in 9/2019  - Repeat CBC, iron studies wnl 3/18/2020  - She is on estradiol per endocrinology  2. Pituitary prolactinoma s/p removal in 1975 followed by radiation with subsequent development of panhypopituitarism     ____________________________________________    Assessment & Plan:    1. Non-scarring hair loss, favor androgenetic alopecia +/- contribution from underlying thyroid disease but had prior bx with AA.   - Reassured that Rogaine can have shedding period as hair shifts from shedding to growth phase. Can try starting again, and would continue indefinitely.(needs at least a 3 month trial). Reviewed there is a risk of hair shedding and she understands.    - Discussed PRP treatments, answering patient's questions regarding side effects and efficacy. Reviewed this is trending and is an option   - Can also consider spironolactone or oral Minoxidil, would need to check with endocrinologist first. She would like topicals first   - Hair Metrix today.    Follow-up: 4 week(s) virtually (telephone with photos), or earlier for new or changing lesions. Return in 3 months in person to repeat Hair Metrix.    Staff and Medical Student:      Dez Barbosa, MS4    Staff Physician:  I was present with the medical student who participated in the service and in the documentation of the note. I have verified the history and personally performed the physical exam and medical decision making. I agree with the assessment and plan of care as documented in the note.        Carley Serrano MD    Department of Dermatology  [facility]: Phone: [phone], Fax:[phone]  [facility]: Phone: [phone], Fax: [phone]  3/20/2021      ____________________________________________    CC: Derm Problem (here for consult for PRP)    HPI:  Ms. Maria Esther Lowe is a(n) 65 year old female who presents today as a new patient for PRP consultation. She has been seeing Dr. Leslie who advised she meet with Dr. Serrano to discuss this treatment.    She hasn't noticed any changes since her last visit with Dr. Leslie. Shedding most on top of scalp and temples. Hasn't started rogaine, used it 6-8 months ago but felt like hse had lots of hair loss when using so stopped. Using Head and Shoulders infrequently. Uses ketoconazole shampoo and laser hair comb 3x/week and taking finasteride. Was taking a hair & nail supplement but stopped after concern from Dr. Leslie over too high of biotin content.    - Last seen in clinic on 3/3/2021 with Dr. Leslie  - Current tx: laser hair comb, finasteride 5 mg daily (started 6/2020), ketoconazole shampoo, Head and Shoulders  - Scalp pain: no  - Scalp burning: no  - Scalp itching: no  - Eyebrow or eyelash changes: years ago  - Nail changes: no  - Overall course: Continued shedding and thinning on top of scalp and temples.    Patient is otherwise feeling well, without additional skin concerns.    Labs:  Recent CBC, K, Hgb, Iron, vitamin D, TSH, T4 reviewed.     CBC RESULTS:   Recent Labs   Lab Test  03/18/20  1131   WBC 9.0   RBC 4.86   HGB 14.7   HCT 43.6   MCV 90   MCH 30.2   MCHC 33.7   RDW 12.7            Physical Exam:  Vitals: There were no vitals taken for this visit.  SKIN: Focused examination of hair and scalp was performed.  - Significant reduced density over vertex and bitemporally  -severe thinning(minda >3)  - No other lesions of concern on areas examined.                     Medications:  Current Outpatient Medications   Medication     CALCIUM 500 +D 500-400 MG-UNIT TABS     cholecalciferol (VITAMIN D3) 125 mcg (5000 units) capsule     clobetasol (TEMOVATE) 0.05 % external solution     conjugated estrogens (PREMARIN) cream     cyanocobalamin (VITAMIN  B-12) 1000 MCG tablet     estradiol (ESTRACE) 1 MG tablet     estradiol (VAGIFEM) 10 MCG TABS vaginal tablet     finasteride (PROSCAR) 5 MG tablet     Fluocinolone Acetonide Scalp 0.01 % OIL oil     hydrocortisone (CORTEF) 10 MG tablet     ketoconazole (NIZORAL) 2 % external shampoo     levothyroxine (SYNTHROID/LEVOTHROID) 200 MCG tablet     losartan-hydrochlorothiazide (HYZAAR) 100-25 MG tablet     simvastatin (ZOCOR) 40 MG tablet     zinc 50 MG TABS     hydrocortisone (CORTEF) 5 MG tablet     No current facility-administered medications for this visit.       Past Medical History:   Patient Active Problem List   Diagnosis     Prolactinoma (H)     Panhypopituitarism (H)     Hormone replacement therapy (postmenopausal)     Carpal tunnel syndrome     History of robot-assisted laparoscopic hysterectomy     Past Medical History:   Diagnosis Date     Hyperlipidemia LDL goal < 130      Hypertension      Hypothyroidism      Osteopenia     DEXA: 12/11; T-score of -1.9        Panhypopituitarism (H)         CC Ana Lilia Leslie MD  [address]  [place] [address] on close of this encounter.

## 2021-03-27 ENCOUNTER — MYC MEDICAL ADVICE (OUTPATIENT)
Dept: OBGYN | Facility: CLINIC | Age: 66
End: 2021-03-27

## 2021-04-15 ENCOUNTER — VIRTUAL VISIT (OUTPATIENT)
Dept: DERMATOLOGY | Facility: CLINIC | Age: 66
End: 2021-04-15
Payer: COMMERCIAL

## 2021-04-15 DIAGNOSIS — L64.9 ANDROGENETIC ALOPECIA: Primary | ICD-10-CM

## 2021-04-15 PROCEDURE — 99213 OFFICE O/P EST LOW 20 MIN: CPT | Mod: 95 | Performed by: DERMATOLOGY

## 2021-04-15 NOTE — PROGRESS NOTES
Maria Esther Lowe's goals for this visit include:   Chief Complaint   Patient presents with     Derm Problem     Hair loss Pt using Rogaine Ketoconazole shampoo and the laser comb. Pt reports that she feels that maybe she is experiencing some improvement but the good thing is she doesnt think that she is losing hair .       She requests these members of her care team be copied on today's visit information: no    PCP: Estela Boswell    Referring Provider:  No referring provider defined for this encounter.    There were no vitals taken for this visit.     Do you need any medication refills at today's visit? Yes Finasteride    Teledermatology Nurse Call Patients:     Are you  in the [facility] at the time of the encounter? yes    Today's visit will be billed to you and your insurance.    A teledermatology visit is not as thorough as an in-person visit and the quality of the photograph sent may not be of the same quality as that taken by the dermatology clinic.      [facility] Dermatology Note  Encounter Date: Apr 15, 2021  [employer] and Telephone ([phone]  ). Location of teledermatologist: [facility].  Start time: 11:05. End time: 11:18am.    Dermatology Problem List:  1. Non-scarring hair loss, favor androgenetic alopecia +/- contribution from underlying thyroid disease but had prior bx with AA. Repeat bx 5/20/2020 most consistent with androgenetic alopecia (which makes sense in setting of #2 as well).  - Current rx: laser hair comb (started years ago), finasteride 5 mg daily (started 6/2020), ketoconazole shampoo   - Repeat bx 5/20/2020 with nonscarring alopecia with marked miniaturization, favoring androgenetic alopecia. Also mild perifollicular fibrosis.  - Biopsy in 2016 consistent with alopecia areata, but not clinically consistent; had outside ILK without improvement per report  - TSH <0.01 in 3/2021, free T4 wnl  - She is on estradiol per  endocrinology  continue laser comb 3 times weekly  2. Pituitary prolactinoma s/p removal in 1975 followed by radiation with subsequent development of panhypopituitarism      ____________________________________________     Assessment & Plan:     1. Non-scarring hair loss, favor androgenetic alopecia +/- contribution from underlying thyroid disease but had prior bx with AA. Also has thyroid disease.    - Minoxidil once daily  fficacy. Reviewed this is trending and is an option   - Consider spironolactone or oral Minoxidil or PRP, would need to check with endocrinologist first.   - Hair Metrix June  -finesteride since 9/2020.Mood denied. Start propecia 1mg daily. Denies orthostatic hypotension, dizziness, decreased flow urine,  depression, diminished urinary flow,   malignant neoplasm of the breast (cousneled on risk)     2.Hair labs   Hypervitaminosis vitamin D, she has decreased it and is seeing PCP. Recommend she hold  Ferritin 3/2020 105        Procedures Performed:    None    Follow-up: in June for hair loss/hair metrix. Reviewed she may also see Dr. Nguyen.     Staff:     Carley Serrano MD    Department of Dermatology  [facility]: Phone: [phone], Fax:[phone]  [facility]: Phone: [phone], Fax: [phone]      ____________________________________________    CC: Derm Problem (Hair loss Pt using Rogaine Ketoconazole shampoo and the laser comb. Pt reports that she feels that maybe she is experiencing some improvement but the good thing is she doesnt think that she is losing hair .)    HPI:  Ms. Maria Esther Lowe is a(n) 65 year old female who presents today as a return patient for hair loss. Hair is not falling out. NO shedding. She is using the hair max laser comb. IS using ketoconazole shampoo. Is happy    Patient is otherwise feeling well, without additional skin  concerns.    Labs Reviewed:  NA    Physical Exam:  Vitals: There were no vitals taken for this visit.  SKIN: Teledermatology photos were reviewed; image quality and interpretability: acceptable. Image date: images taken today.  -thinning on the crown with definitive regrowth  - No other lesions of concern on areas examined.     Medications:  Current Outpatient Medications   Medication     CALCIUM 500 +D 500-400 MG-UNIT TABS     cholecalciferol (VITAMIN D3) 125 mcg (5000 units) capsule     conjugated estrogens (PREMARIN) cream     estradiol (ESTRACE) 1 MG tablet     estradiol (VAGIFEM) 10 MCG TABS vaginal tablet     finasteride (PROSCAR) 5 MG tablet     hydrocortisone (CORTEF) 10 MG tablet     ketoconazole (NIZORAL) 2 % external shampoo     levothyroxine (SYNTHROID/LEVOTHROID) 200 MCG tablet     losartan-hydrochlorothiazide (HYZAAR) 100-25 MG tablet     simvastatin (ZOCOR) 40 MG tablet     zinc 50 MG TABS     clobetasol (TEMOVATE) 0.05 % external solution     cyanocobalamin (VITAMIN  B-12) 1000 MCG tablet     Fluocinolone Acetonide Scalp 0.01 % OIL oil     hydrocortisone (CORTEF) 5 MG tablet     No current facility-administered medications for this visit.       Past Medical/Surgical History:   Patient Active Problem List   Diagnosis     Prolactinoma (H)     Panhypopituitarism (H)     Hormone replacement therapy (postmenopausal)     Carpal tunnel syndrome     History of robot-assisted laparoscopic hysterectomy     Past Medical History:   Diagnosis Date     Hyperlipidemia LDL goal < 130      Hypertension      Hypothyroidism      Osteopenia     DEXA: 12/11; T-score of -1.9        Panhypopituitarism (H)        CC No referring provider defined for this encounter. on close of this encounter.

## 2021-04-15 NOTE — PATIENT INSTRUCTIONS
[facility] Dermatology Visit    Thank you for allowing us to participate in your care. Your findings, instructions and follow-up plan are as follows:         When should I call my doctor?    If you are worsening or not improving, please, contact us or seek urgent care as noted below.     Who should I call with questions (adults)?    [facility] (adult and pediatric): [phone]     [facility] (adult): [phone]    For urgent needs outside of business hours call the [facility] at [phone] and ask for the dermatology resident on call    If this is a medical emergency and you are unable to reach an ER, Call 911      Who should I call with questions (pediatric)?  [facility]- Pediatric Dermatology  Dr. Malathi Hendricks, Dr. Virginia Gunn, Dr. Ileana Rosario, Toma Jordan, PA  Dr. Veronica Nance, Dr. Isabel Nguyen & Dr. Santi Barraza  Non Urgent  Nurse Triage Line; [phone]- Laura and Ira RN Care Coordinators   Cora (/Complex ) [phone]    If you need a prescription refill, please contact your pharmacy. Refills are approved or denied by our Physicians during normal business hours, Monday through Fridays  Per office policy, refills will not be granted if you have not been seen within the past year (or sooner depending on your child's condition)    Scheduling Information:  Pediatric Appointment Scheduling and Call Center [phone]  Radiology Scheduling- [phone]  Sedation Unit Scheduling- [phone]  [place] Scheduling- General [phone]; Pediatric Dermatology [phone]  Main  Services: [phone]  [place]: [phone]  [place]: [phone]  Hmong/[place]/Kazakh: [phone]  Preadmission Nursing Department Fax Number: [phone] (Fax all pre-operative paperwork to this number)    For urgent matters arising during evenings,  weekends, or holidays that cannot wait for normal business hours please call [phone] and ask for the Dermatology Resident On-Call to be paged.

## 2021-04-15 NOTE — LETTER
4/15/2021         RE: Maria Esther Lowe  [address] [facility] [address]        Dear Colleague,    Thank you for referring your patient, Maria Esther Lowe, to the [facility]. Please see a copy of my visit note below.    Maria Esther Lowe's goals for this visit include:   Chief Complaint   Patient presents with     Derm Problem     Hair loss Pt using Rogaine Ketoconazole shampoo and the laser comb. Pt reports that she feels that maybe she is experiencing some improvement but the good thing is she doesnt think that she is losing hair .       She requests these members of her care team be copied on today's visit information: no    PCP: Estela Boswell    Referring Provider:  No referring provider defined for this encounter.    There were no vitals taken for this visit.     Do you need any medication refills at today's visit? Yes Finasteride    Teledermatology Nurse Call Patients:     Are you  in the [facility] at the time of the encounter? yes    Today's visit will be billed to you and your insurance.    A teledermatology visit is not as thorough as an in-person visit and the quality of the photograph sent may not be of the same quality as that taken by the dermatology clinic.      [facility] Dermatology Note  Encounter Date: Apr 15, 2021  [employer]-and[employer] and Telephone ([phone]  ). Location of teledermatologist: [facility].  Start time: 11:05. End time: 11:18am.    Dermatology Problem List:  1. Non-scarring hair loss, favor androgenetic alopecia +/- contribution from underlying thyroid disease but had prior bx with AA. Repeat bx 5/20/2020 most consistent with androgenetic alopecia (which makes sense in setting of #2 as well).  - Current rx: laser hair comb (started years ago), finasteride 5 mg daily (started 6/2020), ketoconazole shampoo   - Repeat bx 5/20/2020 with nonscarring alopecia with marked miniaturization,  favoring androgenetic alopecia. Also mild perifollicular fibrosis.  - Biopsy in 2016 consistent with alopecia areata, but not clinically consistent; had outside ILK without improvement per report  - TSH <0.01 in 3/2021, free T4 wnl  - She is on estradiol per endocrinology  continue laser comb 3 times weekly  2. Pituitary prolactinoma s/p removal in 1975 followed by radiation with subsequent development of panhypopituitarism      ____________________________________________     Assessment & Plan:     1. Non-scarring hair loss, favor androgenetic alopecia +/- contribution from underlying thyroid disease but had prior bx with AA. Also has thyroid disease.    - Minoxidil once daily  fficacy. Reviewed this is trending and is an option   - Consider spironolactone or oral Minoxidil or PRP, would need to check with endocrinologist first.   - Hair Metrix June  -finesteride since 9/2020.Mood denied. Start propecia 1mg daily. Denies orthostatic hypotension, dizziness, decreased flow urine,  depression, diminished urinary flow,   malignant neoplasm of the breast (cousneled on risk)     2.Hair labs   Hypervitaminosis vitamin D, she has decreased it and is seeing PCP. Recommend she hold  Ferritin 3/2020 105        Procedures Performed:    None    Follow-up: in June for hair loss/hair metrix. Reviewed she may also see Dr. Nguyen.     Staff:     Carley Serrano MD    Department of Dermatology  [facility]: Phone: [phone], Fax:[phone]  [facility]: Phone: [phone], Fax: [phone]      ____________________________________________    CC: Derm Problem (Hair loss Pt using Rogaine Ketoconazole shampoo and the laser comb. Pt reports that she feels that maybe she is experiencing some improvement but the good thing is she doesnt think that she is losing hair .)    HPI:  Ms. Maria Esther Lowe is a(n)  65 year old female who presents today as a return patient for hair loss. Hair is not falling out. NO shedding. She is using the hair max laser comb. IS using ketoconazole shampoo. Is happy    Patient is otherwise feeling well, without additional skin concerns.    Labs Reviewed:  NA    Physical Exam:  Vitals: There were no vitals taken for this visit.  SKIN: Teledermatology photos were reviewed; image quality and interpretability: acceptable. Image date: images taken today.  -thinning on the crown with definitive regrowth  - No other lesions of concern on areas examined.     Medications:  Current Outpatient Medications   Medication     CALCIUM 500 +D 500-400 MG-UNIT TABS     cholecalciferol (VITAMIN D3) 125 mcg (5000 units) capsule     conjugated estrogens (PREMARIN) cream     estradiol (ESTRACE) 1 MG tablet     estradiol (VAGIFEM) 10 MCG TABS vaginal tablet     finasteride (PROSCAR) 5 MG tablet     hydrocortisone (CORTEF) 10 MG tablet     ketoconazole (NIZORAL) 2 % external shampoo     levothyroxine (SYNTHROID/LEVOTHROID) 200 MCG tablet     losartan-hydrochlorothiazide (HYZAAR) 100-25 MG tablet     simvastatin (ZOCOR) 40 MG tablet     zinc 50 MG TABS     clobetasol (TEMOVATE) 0.05 % external solution     cyanocobalamin (VITAMIN  B-12) 1000 MCG tablet     Fluocinolone Acetonide Scalp 0.01 % OIL oil     hydrocortisone (CORTEF) 5 MG tablet     No current facility-administered medications for this visit.       Past Medical/Surgical History:   Patient Active Problem List   Diagnosis     Prolactinoma (H)     Panhypopituitarism (H)     Hormone replacement therapy (postmenopausal)     Carpal tunnel syndrome     History of robot-assisted laparoscopic hysterectomy     Past Medical History:   Diagnosis Date     Hyperlipidemia LDL goal < 130      Hypertension      Hypothyroidism      Osteopenia     DEXA: 12/11; T-score of -1.9        Panhypopituitarism (H)        CC No referring provider defined for this encounter. on close of  this encounter.                                                                                                                                                                                                                                        Again, thank you for allowing me to participate in the care of your patient.        Sincerely,        Carley Serrano MD

## 2021-04-21 DIAGNOSIS — E21.3 HYPERPARATHYROIDISM (H): ICD-10-CM

## 2021-04-21 DIAGNOSIS — E23.0 PANHYPOPITUITARISM (H): ICD-10-CM

## 2021-04-23 RX ORDER — HYDROCORTISONE 10 MG/1
TABLET ORAL
Qty: 135 TABLET | Refills: 0 | Status: SHIPPED | OUTPATIENT
Start: 2021-04-23 | End: 2021-07-30

## 2021-04-23 NOTE — TELEPHONE ENCOUNTER
VITAMIN D3 125 MCG (5000 UT) Capsule      Last Written Prescription Date:  12/1/20  Last Fill Quantity: 30,   # refills: 5  Last Office Visit : 9/19/20 recommended 1 year follow up  Future Office visit:  None scheduled    Routing refill request to provider for review/approval because:  Drug not on endocrinology refill protocol

## 2021-04-26 NOTE — TELEPHONE ENCOUNTER
Patient has a very high Vitamin D level.   Refill not appropriate by on call.   Forwarded to prescribing provider.

## 2021-05-25 DIAGNOSIS — R39.15 URINARY URGENCY: Primary | ICD-10-CM

## 2021-05-26 NOTE — TELEPHONE ENCOUNTER
losartan-hydrochlorothiazide (HYZAAR) 100-25 MG tablet   Last Written Prescription Date:  1/13/2020  Last Fill Quantity: 90,   # refills: 4  Last Office Visit : 9/9/2019  Future Office visit:  None  Routing refill request to provider for review/approval because:  Please send new orders to updated pharmacy for Pt care.         levothyroxine (SYNTHROID/LEVOTHROID) 200 MCG tablet   Last Written Prescription Date:  1/13/2020  Last Fill Quantity: 90,   # refills: 4  Last Office Visit : 9/9/2019  Future Office visit:  None  Routing refill request to provider for review/approval because:  Please send new order.   Pharmacy needs an updated one.        finasteride (PROSCAR) 5 MG tablet   Last Written Prescription Date:  6/10/2020  Last Fill Quantity: 30,   # refills: 11  Last Office Visit : 9/9/2019  Future Office visit:  None  Routing refill request to provider for review/approval because:  Please send new order.   Pharmacy needs an updated one.      hydrocortisone (CORTEF) 5 MG tablet & hydrocortisone (CORTEF) 10 MG tablet   Which dose of medication is Pt taking?     5 Mg Tabs or 10 Mg Tabs.    Please advise       Kari Christopher RN  Central Triage Red Flags/Med Refills         Detail Level: Simple Additional Notes: Per Dr Steward follow up with Karma TRACY for EDC on sites C,E AND F \\nPt will be referral to radiation for the nasal infra-tip . Render Risk Assessment In Note?: no

## 2021-06-23 DIAGNOSIS — E21.3 HYPERPARATHYROIDISM (H): ICD-10-CM

## 2021-06-26 NOTE — TELEPHONE ENCOUNTER
" cholecalciferol (VITAMIN D3) 125 mcg (5000 units) capsule  Last Written Prescription Date: 4/26/21  Last Fill Quantity: 90,   # refills: 0  Last Office Visit : 9/9/19  [facility]  Future Office visit: none    \"  - RTC with me in 1 year\"    Scheduling has been notified to contact the pt for appointment.      Routing refill request to provider for review/approval because: not on protocol    "

## 2021-06-29 ENCOUNTER — TELEPHONE (OUTPATIENT)
Dept: ENDOCRINOLOGY | Facility: CLINIC | Age: 66
End: 2021-06-29

## 2021-06-29 DIAGNOSIS — D35.2 PROLACTINOMA (H): Primary | ICD-10-CM

## 2021-06-29 NOTE — TELEPHONE ENCOUNTER
Patient was last seen in 2019 with other provider ordering labs 3/2021. Prolactin ordered as this hasn't been check recently. Dr Arrington may add labs to do once seen . Mirna Patricio RN on 6/29/2021 at 1:45 PM

## 2021-06-29 NOTE — PROGRESS NOTES
[facility] Dermatology Note  Encounter Date: Jun 30, 2021  Office Visit     Dermatology Problem List:  1. Non-scarring hair loss, favor androgenetic alopecia +/- contribution from underlying thyroid disease but had prior bx with AA. Repeat bx 5/20/2020 most consistent with androgenetic alopecia (which makes sense in setting of #2 as well) - Improving  - Current rx: laser hair comb (started years ago), finasteride 5 mg daily (started 6/2020), ketoconazole shampoo   - Repeat bx 5/20/2020 with nonscarring alopecia with marked miniaturization, favoring androgenetic alopecia. Also mild perifollicular fibrosis.  - Biopsy in 2016 consistent with alopecia areata, but not clinically consistent; had outside ILK without improvement per report  - TSH <0.01 in 3/2021, free T4 wnl  - She is on estradiol per endocrinology  continue laser comb 3 times weekly  2. Pituitary prolactinoma s/p removal in 1975 followed by radiation with subsequent development of panhypopituitarism      ____________________________________________     Assessment & Plan:     1. Non-scarring hair loss, favor androgenetic alopecia +/- contribution from underlying thyroid disease but had prior bx with AA. Also has thyroid disease.   - Continue Minoxidil once daily  - Continue finesteride Once daily since 9/2020.Mood issues denied.   - Start using dermasmooth oil once weekly - leave on for a minimum of 4 hours, then wash out with shampoo  - Use Kerry fine hair conditioner  - Increase shampooing frequency to every other day  -Future: spironolactone or oral Minoxidil or PRP, would need to check with endocrinologist first.     2.Hair labs   Hypervitaminosis vitamin D, she has decreased it and is seeing PCP. Recommend she hold  Iron as her Ferritin 3/2020 105    3. Zinc- Patient self taking, recommend check for supplementation     4. Urinary frequency-  - Follow-up with primary provider about increased urinary frequency  Procedures Performed:    None    Follow-up: 3-4 month(s) in-person, or earlier for new or changing lesions. Hair metrix for next visit    Staff and Fellow:     Scribe Disclosure  I, Carolann Kings, am serving as a scribe to document services personally performed by Dr. Carley Serrano MD, based on data collection and the provider's statements to me.     Provider Disclosure:   The documentation recorded by the scribe accurately reflects the services I personally performed and the decisions made by me.    Carley Serrano MD    Department of Dermatology  [facility]: Phone: [phone], Fax:[phone]  [facility] [place]: Phone: [phone], Fax: [phone]      ____________________________________________    CC: No chief complaint on file.    HPI:  Ms. Maria Esther Lowe is a(n) 65 year old female who presents today as a return patient for non-scarring hair loss. Ms. Lowe was last seen virtually on 04/15/21 when she denied hair shedding and was using the Hair Max laser comb and ketoconazole shampoo.    Today, Ms. Lowe reports that she has been in good health since her previous visit. She washes her hair 2-3 times per week. She uses ketoconazole shampoo 2-3 times per week, laser hair comb 2-3x/week, Rogaine once daily (3/2020), finasteride 5 mg daily (started 6/2020), and estradiol daily per endocrinology. She is on finasteride and denies mood or dizziness issues. She has been experiencing the need to pee more frequently and it disturbs her sleep. She says it wakes her up twice nightly. She reported stopping a biotin supplement in March and says that her nails have worsened since then. Overall, she thinks her hair loss is improving. She denies scalp itching, burning, pain, tingling, and tenderness.     Patient is otherwise feeling well, without additional skin concerns.     Labs Reviewed:  N/A    Physical Exam:  Vitals:  There were no vitals taken for this visit.  SKIN: Focused examination of scalp was performed.  - Carlos part: 2-3  - Regrowth layers: 1 and 2 cm  - Perifollicular scale  - No other lesions of concern on areas examined.     Medications:  Current Outpatient Medications   Medication     CALCIUM 500 +D 500-400 MG-UNIT TABS     cholecalciferol (VITAMIN D3) 125 mcg (5000 units) capsule     clobetasol (TEMOVATE) 0.05 % external solution     conjugated estrogens (PREMARIN) cream     cyanocobalamin (VITAMIN  B-12) 1000 MCG tablet     estradiol (ESTRACE) 1 MG tablet     estradiol (VAGIFEM) 10 MCG TABS vaginal tablet     finasteride (PROSCAR) 5 MG tablet     Fluocinolone Acetonide Scalp 0.01 % OIL oil     hydrocortisone (CORTEF) 10 MG tablet     hydrocortisone (CORTEF) 5 MG tablet     ketoconazole (NIZORAL) 2 % external shampoo     levothyroxine (SYNTHROID/LEVOTHROID) 200 MCG tablet     losartan-hydrochlorothiazide (HYZAAR) 100-25 MG tablet     simvastatin (ZOCOR) 40 MG tablet     zinc 50 MG TABS     No current facility-administered medications for this visit.       Past Medical History:   Patient Active Problem List   Diagnosis     Prolactinoma (H)     Panhypopituitarism (H)     Hormone replacement therapy (postmenopausal)     Carpal tunnel syndrome     History of robot-assisted laparoscopic hysterectomy     Past Medical History:   Diagnosis Date     Hyperlipidemia LDL goal < 130      Hypertension      Hypothyroidism      Osteopenia     DEXA: 12/11; T-score of -1.9        Panhypopituitarism (H)        CC Ana Lilia Leslie MD  [address] [facility] [address]  [place] [address] on close of this encounter.

## 2021-06-29 NOTE — TELEPHONE ENCOUNTER
----- Message from Michelle Hernandez sent at 6/29/2021  1:07 PM CDT -----  I got this patient scheduled for an appointment on 7/26. She asked to set up a lab appointment prior to the visit as she'll be in the building tomorrow - please enter labs as necessary. She did say she could fast if necessary (even though the appointment is at 1:30pm).  ----- Message -----  From: So Ramos RN  Sent: 6/26/2021   6:56 AM CDT  To: [id number]    Pt  LESLEE QUEZADA [[id number]]    Please call to schedule. Leona Christian MD  lv  9/19.  Thanks    I do not need any follow up on the scheduling of this appointment unless the patient will no longer be receiving care in our clinic.

## 2021-06-30 ENCOUNTER — OFFICE VISIT (OUTPATIENT)
Dept: DERMATOLOGY | Facility: CLINIC | Age: 66
End: 2021-06-30
Payer: COMMERCIAL

## 2021-06-30 ENCOUNTER — TELEPHONE (OUTPATIENT)
Dept: ENDOCRINOLOGY | Facility: CLINIC | Age: 66
End: 2021-06-30

## 2021-06-30 DIAGNOSIS — E03.9 HYPOTHYROIDISM, UNSPECIFIED TYPE: Primary | ICD-10-CM

## 2021-06-30 DIAGNOSIS — E55.9 VITAMIN D DEFICIENCY: ICD-10-CM

## 2021-06-30 DIAGNOSIS — E03.9 HYPOTHYROIDISM, UNSPECIFIED TYPE: ICD-10-CM

## 2021-06-30 DIAGNOSIS — D35.2 PROLACTINOMA (H): ICD-10-CM

## 2021-06-30 DIAGNOSIS — L65.8 FEMALE PATTERN ALOPECIA: Primary | ICD-10-CM

## 2021-06-30 LAB
DEPRECATED CALCIDIOL+CALCIFEROL SERPL-MC: 84 UG/L (ref 20–75)
PROLACTIN SERPL-MCNC: 50 UG/L (ref 3–27)
T4 FREE SERPL-MCNC: 1.86 NG/DL (ref 0.76–1.46)
TSH SERPL DL<=0.005 MIU/L-ACNC: <0.01 MU/L (ref 0.4–4)

## 2021-06-30 PROCEDURE — 84146 ASSAY OF PROLACTIN: CPT | Performed by: PATHOLOGY

## 2021-06-30 PROCEDURE — 84439 ASSAY OF FREE THYROXINE: CPT | Performed by: PATHOLOGY

## 2021-06-30 PROCEDURE — 36415 COLL VENOUS BLD VENIPUNCTURE: CPT | Performed by: PATHOLOGY

## 2021-06-30 PROCEDURE — 84443 ASSAY THYROID STIM HORMONE: CPT | Performed by: PATHOLOGY

## 2021-06-30 PROCEDURE — 99214 OFFICE O/P EST MOD 30 MIN: CPT | Performed by: DERMATOLOGY

## 2021-06-30 PROCEDURE — 82306 VITAMIN D 25 HYDROXY: CPT | Performed by: PATHOLOGY

## 2021-06-30 RX ORDER — FLUOCINOLONE ACETONIDE 0.11 MG/ML
OIL TOPICAL
Qty: 60 ML | Refills: 1 | Status: SHIPPED | OUTPATIENT
Start: 2021-06-30 | End: 2022-05-24

## 2021-06-30 ASSESSMENT — PAIN SCALES - GENERAL: PAINLEVEL: NO PAIN (0)

## 2021-06-30 NOTE — TELEPHONE ENCOUNTER
----- Message from Leona Arrington MD sent at 6/30/2021  1:02 PM CDT -----  Regarding: RE: labs  TSH, free T4 vitmain D level ordered.   ----- Message -----  From: Mirna Patricio RN  Sent: 6/30/2021  12:40 PM CDT  To: Leona Arrington MD  Subject: labs                                             She was seen 2 years ago with a follow up . Labs have been ordered by other providers for endocrine  in March . She is asking for more labs to be done besides the prolactin. I told her she needs to be seen first since we havn't seen her for 2 years . You can decide once seen and looked at recent labs if anything is needed . What to you think ? Appt is  one month from now Mirna DEY

## 2021-06-30 NOTE — NURSING NOTE
Dermatology Rooming Note    Maria Esther Lowe's goals for this visit include:   Chief Complaint   Patient presents with     Hair Loss     Kathy is being seen today for a hair loss follow up and hair metrix      AICHA Aguilar

## 2021-06-30 NOTE — LETTER
6/30/2021       RE: Maria Esther Lowe  [address] [facility] [address]     Dear Colleague,    Thank you for referring your patient, Maria Esther Lowe, to the [facility] [place] at [facility]. Please see a copy of my visit note below.    [facility] Dermatology Note  Encounter Date: Jun 30, 2021  Office Visit     Dermatology Problem List:  1. Non-scarring hair loss, favor androgenetic alopecia +/- contribution from underlying thyroid disease but had prior bx with AA. Repeat bx 5/20/2020 most consistent with androgenetic alopecia (which makes sense in setting of #2 as well) - Improving  - Current rx: laser hair comb (started years ago), finasteride 5 mg daily (started 6/2020), ketoconazole shampoo   - Repeat bx 5/20/2020 with nonscarring alopecia with marked miniaturization, favoring androgenetic alopecia. Also mild perifollicular fibrosis.  - Biopsy in 2016 consistent with alopecia areata, but not clinically consistent; had outside ILK without improvement per report  - TSH <0.01 in 3/2021, free T4 wnl  - She is on estradiol per endocrinology  continue laser comb 3 times weekly  2. Pituitary prolactinoma s/p removal in 1975 followed by radiation with subsequent development of panhypopituitarism      ____________________________________________     Assessment & Plan:     1. Non-scarring hair loss, favor androgenetic alopecia +/- contribution from underlying thyroid disease but had prior bx with AA. Also has thyroid disease.   - Continue Minoxidil once daily  - Continue finesteride Once daily since 9/2020.Mood issues denied.   - Start using dermasmooth oil once weekly - leave on for a minimum of 4 hours, then wash out with shampoo  - Use Kerry fine hair conditioner  - Increase shampooing frequency to every other day  -Future: spironolactone or oral Minoxidil or PRP, would need to check with endocrinologist first.      2.Hair labs   Hypervitaminosis vitamin D, she has decreased it and is seeing PCP. Recommend she hold  Iron as her Ferritin 3/2020 105    3. Zinc- Patient self taking, recommend check for supplementation     4. Urinary frequency-  - Follow-up with primary provider about increased urinary frequency  Procedures Performed:   None    Follow-up: 3-4 month(s) in-person, or earlier for new or changing lesions. Hair metrix for next visit    Staff and Fellow:     Scribe Disclosure  I, Carolann Ku, am serving as a scribe to document services personally performed by Dr. Carley Serrano MD, based on data collection and the provider's statements to me.     Provider Disclosure:   The documentation recorded by the scribe accurately reflects the services I personally performed and the decisions made by me.    Carley Serrano MD    Department of Dermatology  [facility]: Phone: [phone], Fax:[phone]  [facility]: Phone: [phone], Fax: [phone]      ____________________________________________    CC: No chief complaint on file.    HPI:  Ms. Maria Esther Lowe is a(n) 65 year old female who presents today as a return patient for non-scarring hair loss. Ms. Lowe was last seen virtually on 04/15/21 when she denied hair shedding and was using the Hair Max laser comb and ketoconazole shampoo.    Today, Ms. Lowe reports that she has been in good health since her previous visit. She washes her hair 2-3 times per week. She uses ketoconazole shampoo 2-3 times per week, laser hair comb 2-3x/week, Rogaine once daily (3/2020), finasteride 5 mg daily (started 6/2020), and estradiol daily per endocrinology. She is on finasteride and denies mood or dizziness issues. She has been experiencing the need to pee more frequently and it disturbs her sleep. She says it wakes her up twice nightly. She reported stopping  a biotin supplement in March and says that her nails have worsened since then. Overall, she thinks her hair loss is improving. She denies scalp itching, burning, pain, tingling, and tenderness.     Patient is otherwise feeling well, without additional skin concerns.     Labs Reviewed:  N/A    Physical Exam:  Vitals: There were no vitals taken for this visit.  SKIN: Focused examination of scalp was performed.  - Carlos part: 2-3  - Regrowth layers: 1 and 2 cm  - Perifollicular scale  - No other lesions of concern on areas examined.     Medications:  Current Outpatient Medications   Medication     CALCIUM 500 +D 500-400 MG-UNIT TABS     cholecalciferol (VITAMIN D3) 125 mcg (5000 units) capsule     clobetasol (TEMOVATE) 0.05 % external solution     conjugated estrogens (PREMARIN) cream     cyanocobalamin (VITAMIN  B-12) 1000 MCG tablet     estradiol (ESTRACE) 1 MG tablet     estradiol (VAGIFEM) 10 MCG TABS vaginal tablet     finasteride (PROSCAR) 5 MG tablet     Fluocinolone Acetonide Scalp 0.01 % OIL oil     hydrocortisone (CORTEF) 10 MG tablet     hydrocortisone (CORTEF) 5 MG tablet     ketoconazole (NIZORAL) 2 % external shampoo     levothyroxine (SYNTHROID/LEVOTHROID) 200 MCG tablet     losartan-hydrochlorothiazide (HYZAAR) 100-25 MG tablet     simvastatin (ZOCOR) 40 MG tablet     zinc 50 MG TABS     No current facility-administered medications for this visit.       Past Medical History:   Patient Active Problem List   Diagnosis     Prolactinoma (H)     Panhypopituitarism (H)     Hormone replacement therapy (postmenopausal)     Carpal tunnel syndrome     History of robot-assisted laparoscopic hysterectomy     Past Medical History:   Diagnosis Date     Hyperlipidemia LDL goal < 130      Hypertension      Hypothyroidism      Osteopenia     DEXA: 12/11; T-score of -1.9        Panhypopituitarism (H)        CC Ana Lilia Leslie MD  [address]  [place] [address] on close of this encounter.

## 2021-06-30 NOTE — PATIENT INSTRUCTIONS
[facility] Dermatology Visit    Thank you for allowing us to participate in your care. Your findings, instructions and follow-up plan are as follows:         When should I call my doctor?    If you are worsening or not improving, please, contact us or seek urgent care as noted below.     Who should I call with questions (adults)?    [facility] (adult and pediatric): [phone]     [facility] (adult): [phone]    For urgent needs outside of business hours call the [facility] at [phone] and ask for the dermatology resident on call    If this is a medical emergency and you are unable to reach an ER, Call 911      Who should I call with questions (pediatric)?  [facility]- Pediatric Dermatology  Dr. Malathi Hendricks, Dr. Virginia Gunn, Dr. Ileana Rosraio, Toma Jordan, PA  Dr. Veronica Nance, Dr. Isabel Nguyen & Dr. Santi Barraza  Non Urgent  Nurse Triage Line; [phone]- Laura and Ira RN Care Coordinators   [place]ora (/Complex ) [phone]    If you need a prescription refill, please contact your pharmacy. Refills are approved or denied by our Physicians during normal business hours, Monday through Fridays  Per office policy, refills will not be granted if you have not been seen within the past year (or sooner depending on your child's condition)    Scheduling Information:  Pediatric Appointment Scheduling and Call Center [phone]  Radiology Scheduling- [phone]  Sedation Unit Scheduling- [phone]  [place] Scheduling- General [phone]; Pediatric Dermatology [phone]  Main  Services: [phone]  Kenyan: [phone]  Ecuadorean: [phone]  Hmong/Dario/Vietnamese: [phone]  Preadmission Nursing Department Fax Number: [phone] (Fax all pre-operative paperwork to this number)    For urgent matters arising during  evenings, weekends, or holidays that cannot wait for normal business hours please call [phone] and ask for the Dermatology Resident On-Call to be paged.          [facility] Dermatology Visit Scalp Photography    Thank you for allowing us to participate in your care. Your findings, instructions and follow-up plan are as follows:  Today you had scalp and hair images taken using cameras and  artificial intelligence that gives us quantitative hair data to track your progress. This data describes your scalp/hair condition such as the number of fibers in a given area, the fiber s density, the distance of the fibers from one another, and more information. It can also give us information about scalp health visually. We use them as a clinical tool to objectively track progress.     When should I call my doctor?    If you are worsening or not improving, please, contact us or seek urgent care as noted below.     Who should I call with questions (adults)?    [facility] (adult and pediatric): [phone]     [facility] (adult): [phone]    For urgent needs outside of business hours call the [facility] at [phone] and ask for the dermatology resident on call    If this is a medical emergency and you are unable to reach an ER, Call [phone]      Who should I call with questions (pediatric)?  [facility]- Pediatric Dermatology  Dr. Malathi Hendricks, Dr. Virginia Gunn, Dr. Ileana Rosario, Toma Jordan, PA  Dr. Veronica Nance, Dr. Isabel Nguyen & Dr. Santi Barraza  Non Urgent  Nurse Triage Line; [phone]- Laura and Ira DEY Care Coordinatorrip Shepherd (/Complex ) [phone]    If you need a prescription refill, please contact your pharmacy. Refills are approved or denied by our Physicians during normal business hours, Monday through Fridays  Per office policy, refills will  not be granted if you have not been seen within the past year (or sooner depending on your child's condition)    Scheduling Information:  Pediatric Appointment Scheduling and Call Center [phone]  Radiology Scheduling- [phone]  Sedation Unit Scheduling- [phone]  [place] Scheduling- General [phone]; Pediatric Dermatology [phone]  Main  Services: [phone]  Congolese: [phone]  Hungarian: [phone]  Hmong/Armenian/Mongolian: [phone]  Preadmission Nursing Department Fax Number: [phone] (Fax all pre-operative paperwork to this number)    For urgent matters arising during evenings, weekends, or holidays that cannot wait for normal business hours please call [phone] and ask for the Dermatology Resident On-Call to be paged.

## 2021-06-30 NOTE — TELEPHONE ENCOUNTER
TSH, FT4 and Vitamin D added for labs. My chart message left for patient to redraw and also left VM on cell . Mirna Patricio RN on 6/30/2021 at 1:27 PM

## 2021-07-18 ENCOUNTER — MYC MEDICAL ADVICE (OUTPATIENT)
Dept: DERMATOLOGY | Facility: CLINIC | Age: 66
End: 2021-07-18

## 2021-07-26 ENCOUNTER — VIRTUAL VISIT (OUTPATIENT)
Dept: ENDOCRINOLOGY | Facility: CLINIC | Age: 66
End: 2021-07-26
Payer: COMMERCIAL

## 2021-07-26 DIAGNOSIS — D35.2 PROLACTINOMA (H): ICD-10-CM

## 2021-07-26 DIAGNOSIS — E03.8 SECONDARY HYPOTHYROIDISM: ICD-10-CM

## 2021-07-26 DIAGNOSIS — M81.0 OSTEOPOROSIS, UNSPECIFIED OSTEOPOROSIS TYPE, UNSPECIFIED PATHOLOGICAL FRACTURE PRESENCE: ICD-10-CM

## 2021-07-26 DIAGNOSIS — E23.0 HYPOPITUITARISM (H): Primary | ICD-10-CM

## 2021-07-26 PROCEDURE — 99215 OFFICE O/P EST HI 40 MIN: CPT | Mod: 95 | Performed by: INTERNAL MEDICINE

## 2021-07-26 RX ORDER — LEVOTHYROXINE SODIUM 175 UG/1
175 TABLET ORAL DAILY
Qty: 90 TABLET | Refills: 3 | Status: SHIPPED | OUTPATIENT
Start: 2021-07-26 | End: 2022-02-15

## 2021-07-26 NOTE — PROGRESS NOTES
Phone start:  1:40 pm  End: 2:05 pm                                                                              - Endocrinology Follow up-    Reason for visit/consult:     Prolactinoma (H)  Panhypopituitarism (H)  Hypothyroidism, unspecified type    Primary care provider: No primary care provider on file.    Assessment and Plan  65 year old female with following conditions    # Panhypopituitarism-   Stable on low dose hydorcoritisone dose 10 mg daily only for many years.  Continue current hydrocortisone 10/5 mg, no fainting episode    # Remote history of a prolactin-producing pituitary adenoma-   Post surgery followed by RT    - PRL level gradually increase, last MRI 2005, repeat MRI this year.     - pituitary MRI ordered.       #Osteopenia.   Last bone density 10/2018 T -1.9,   - switch Caltrate to citracal 3 tabs (945 mg elemental Ca and VitD 750IU)    - next bone density 2021 this year., ordred.      # Vitamin D take  She is currently taking extra 5000 international unit(s) vitamin D and level 2021 was vitamin D 84,     - reduce vitamin D 5000 international unit(s) from daily to Mon/Wed/Fri only    - recheck level in 3 month     # Hair loss- on Estradiol 1 mg, estradiol cream, finadteride  Patient described improving.     No hot flush    # Hysterectomy  Post in 9/2018    # secondary hypothyroidism     free T4 high 1.86 on 6/30/2021 with LT4 200 mcg    - reduce down to LT4 175 mcg, prescribed [employer]    - RTC with me in 4 month       Total 45 minutes spent on the date of the encounter doing chart review, history and exam, documentation and further activities as noted above.    Leona Arrington MD  Staff Physician  Endocrinology and Metabolism  [facility]  License: [id number]  Pager: [phone]    Interval History as of 7/26/2021 : Patient has been doing well. Last seen . Medication compliance   . New event includes  .  Interval History as of 9/9/2019 : Patient has been doing well. Stable on low  dose hydorcoritisone dose 10 mg daily only for many years.  She still had dizziness and fatigue chronically. Also complainted poor quality of sleep at night. No hot flush,   Interval History as of 10/2018: 1/2018 dizziness went to ED in [facility], was fine.   BW stable, HTN, hysterectomy 9/2018. Large cyst, uterus fluid. Will be seen surgeon soon. Yellow fluid still. 3-4 pads daily.   HPI: A 63 yo female previous Dr. JAQUELINE Perdomo's patient, here for follow up. She has remote history of prolactinoma in her 20's, she underwent surgical removal in 1975 followed by RT. 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   Currently taking hydrocotisone 10 mg am only for at least several years, she has been doing well, she requires afternoon nap 1 pm for 20 minutes.   She has been taking LT4 was on 175 mcg and last year increased to 200 mcg with compliance.    Regarding hypogonadism, she has been taking premarin (conjugated estrogen) 0.625 mg. Progesterone (Prometrium) 100 mg daily. Her main concern today is about hair loss. After pituitary surgery, she lost lots hair, but after that with HRT, her hair started to grow. However past several months, she started to lose hair again and wishing to have more hair come back.     Body weight stable,   ENDO VITALS-P 10/23/2017 10/23/2017 5/11/2017   Weight 78.019 kg 78.019 kg 81.647 kg     Appetite: good, she is doing two jobs, She is  lives with her , adopted one child long time ago who is age 31 now.  No HA.       Past Medical/Surgical History:  Past Medical History:   Diagnosis Date     Hyperlipidemia LDL goal < 130      Hypertension      Hypothyroidism      Osteopenia     DEXA: 12/11; T-score of -1.9        Panhypopituitarism (H)      Past Surgical History:   Procedure Laterality Date     DAVINCI HYSTERECTOMY TOTAL, BILATERAL SALPINGO-OOPHORECTOMY, COMBINED Bilateral 9/11/2018    Procedure: COMBINED DAVINCI HYSTERECTOMY TOTAL,  SALPINGO-OOPHORECTOMY;  Pelvic Washings, Davinci Assisted Laparoscopic Total Hysterectomy, Bilateral Salpingo Oophorectomy, cystoscopy;  Surgeon: Tiffany Lew MD;  [facility]     OPERATIVE HYSTEROSCOPY N/A 1/30/2018    Procedure: OPERATIVE HYSTEROSCOPY;  Operative Hysteroscopy, Resection of Endometrial Polyp, Dilation and Curettage;  Surgeon: Daysi Munoz MD;  [facility]: [facility] OR     PROBE LACRIMAL DUCT, INSERT STENT BILATERAL, COMBINED Bilateral 11/26/2018    Procedure: BILATERAL NASOLACRIMAL STENT - LARGE DIAMETER STEN TUBE;  Surgeon: Davina Payton MD;  Location: [facility] OR     resected prolactin-producing pituitary adenoma.  1975    Postoperative radiation therapy.        Allergies:  Allergies   Allergen Reactions     Ceftriaxone Other (See Comments)     Unsure of reaction.       Current Medications   Current Outpatient Medications   Medication     CALCIUM 500 +D 500-400 MG-UNIT TABS     cholecalciferol (VITAMIN D3) 125 mcg (5000 units) capsule     clobetasol (TEMOVATE) 0.05 % external solution     conjugated estrogens (PREMARIN) cream     cyanocobalamin (VITAMIN  B-12) 1000 MCG tablet     estradiol (ESTRACE) 1 MG tablet     estradiol (VAGIFEM) 10 MCG TABS vaginal tablet     finasteride (PROSCAR) 5 MG tablet     Fluocinolone Acetonide Scalp (DERMA-SMOOTHE/FS SCALP) 0.01 % OIL oil     Fluocinolone Acetonide Scalp 0.01 % OIL oil     hydrocortisone (CORTEF) 10 MG tablet     hydrocortisone (CORTEF) 5 MG tablet     ketoconazole (NIZORAL) 2 % external shampoo     levothyroxine (SYNTHROID/LEVOTHROID) 200 MCG tablet     losartan-hydrochlorothiazide (HYZAAR) 100-25 MG tablet     simvastatin (ZOCOR) 40 MG tablet     zinc 50 MG TABS     No current facility-administered medications for this visit.       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     ROS:  Full review of systems taken with the help of the intake sheet. Otherwise a complete 14 point review of systems was taken and is negative unless stated in the history above.    Physical Exam:     There were no vitals taken for this visit.    General: well appearing, no acute distress, pleasant and conversant,   Mental Status/neuro: alert and oriented  Resp: no acute distress    Labs : I reviewed data from epic and extract and summarize the pertinent data here.        1/29/2015 11:34 12/3/2015 09:23 7/22/2016 10:04 12/6/2016 18:49 10/23/2017 11:15   Prolactin 23 21 32 (H) 30 (H) 32 (H)     Lab Results   Component Value Date     10/23/2017      Lab Results   Component Value Date    POTASSIUM 3.9 10/23/2017     Lab Results   Component Value Date    CHLORIDE 105 10/23/2017     Lab Results   Component Value Date    ARI 8.5 10/23/2017     Lab Results   Component Value Date    CO2 24 10/23/2017     Lab Results   Component Value Date    BUN 14 10/23/2017     Lab Results   Component Value Date    CR 1.01 10/23/2017     Lab Results   Component Value Date     10/23/2017     Lab Results   Component Value Date    TSH 0.06 10/23/2017     Lab Results   Component Value Date    T4 1.26 10/23/2017       Lab Results   Component Value Date    FSH  07/22/2016     <0.2  FSH Reference Range   Female: Follicular      2.5-10.2           Mid-cycle       3.4-33.4           Luteal          1.5-9.1           Postmenopausal  23.0-116.3         Lab Results   Component Value Date    PROLACTIN 30 12/06/2016     Bone density : 10/24/2018   HISTORY: 64 yo female osteopenia, hysterectomy, panhypopituitarism;  Osteopenia, unspecified location     COMPARISON:    10/3/2015     Age: 63  years.  Height: 65 inches  Weight: 181 pounds  Sex: Female  Ethnicity: White     Image quality: Adequate     Lumbar spine T-score in region of L1-L4 = -1.9   L1-4 percent change: 0.6%      HIPS:  Mean total hip T-score: -1.2  Mean total hip percent change: 1.4%      Left femoral neck T-score = -1.6  Right femoral neck T-score= -1.2      COMPARISON TO PRIOR:  Percent change in the spine and hips is NOT significant (or considered  invalid) accounting for the precision errors for this facility.      FRAX:  10 year probability of major osteoporotic fracture: 8.6%  10 year probability of hip fracture: 0.8%  The 10 year probability of fracture may be lower than reported if the  patient has received treatment. FRAX data should be disregarded in  patient's taking bisphosphonates.     World Health Organization definition of osteoporosis and osteopenia  for  women:   Normal: T-score at or above -1.0  Low Bone Mass (Osteopenia): T-score between -1.0 and -2.5.   Osteoporosis: T-score at or below -2.5   T-scores are reported for postmenopausal women and men over 50 years  of age.                                                                      IMPRESSION:  Osteopenia

## 2021-07-26 NOTE — PROGRESS NOTES
Kathy is a 65 year old who is being evaluated via a billable telephone visit.      What phone number would you like to be contacted at? [phone]  How would you like to obtain your AVS? [facility]  Adult Endocrinology  [facility]

## 2021-07-26 NOTE — LETTER
7/26/2021       RE: Maria Esther Lowe  [address] [facility] [address]     Dear Colleague,    Thank you for referring your patient, Maria Esther Lowe, to the [facility] ENDOCRINOLOG[facility] [place] [facility]. Please see a copy of my visit note below.    Kathy is a 65 year old who is being evaluated via a billable telephone visit.      What phone number would you like to be contacted at? [phone]  How would you like to obtain your AVS? [facility]phonso [facility]  Adult Endocrinology  [facility]        Phone start:  1:40 pm  End: 2:05 pm                                                                              - Endocrinology Follow up-    Reason for visit/consult:     Prolactinoma (H)  Panhypopituitarism (H)  Hypothyroidism, unspecified type    Primary care provider: No primary care provider on file.    Assessment and Plan  65 year old female with following conditions    # Panhypopituitarism-   Stable on low dose hydorcoritisone dose 10 mg daily only for many years.  Continue current hydrocortisone 10/5 mg, no fainting episode    # Remote history of a prolactin-producing pituitary adenoma-   Post surgery followed by RT    - PRL level gradually increase, last MRI 2005, repeat MRI this year.     - pituitary MRI ordered.       #Osteopenia.   Last bone density 10/2018 T -1.9,   - switch Caltrate to citracal 3 tabs (945 mg elemental Ca and VitD 750IU)    - next bone density 2021 this year., ordred.      # Vitamin D take  She is currently taking extra 5000 international unit(s) vitamin D and level 2021 was vitamin D 84,     - reduce vitamin D 5000 international unit(s) from daily to Mon/Wed/Fri only    - recheck level in 3 month     # Hair loss- on Estradiol 1 mg, estradiol cream, finadteride  Patient described improving.     No hot flush    # Hysterectomy  Post in 9/2018    # secondary hypothyroidism     free T4 high  1.86 on 6/30/2021 with LT4 200 mcg    - reduce down to LT4 175 mcg, prescribed [employer]    - RTC with me in 4 month       Total 45 minutes spent on the date of the encounter doing chart review, history and exam, documentation and further activities as noted above.    Leona Arrington MD  Staff Physician  Endocrinology and Metabolism  [facility]  License: [id number]  Pager: [phone]    Interval History as of 7/26/2021 : Patient has been doing well. Last seen . Medication compliance   . New event includes  .  Interval History as of 9/9/2019 : Patient has been doing well. Stable on low dose hydorcoritisone dose 10 mg daily only for many years.  She still had dizziness and fatigue chronically. Also complainted poor quality of sleep at night. No hot flush,   Interval History as of 10/2018: 1/2018 dizziness went to ED in [facility], was fine.   BW stable, HTN, hysterectomy 9/2018. Large cyst, uterus fluid. Will be seen surgeon soon. Yellow fluid still. 3-4 pads daily.   HPI: A 63 yo female previous Dr. JAQUELINE Perdomo's patient, here for follow up. She has remote history of prolactinoma in her 20's, she underwent surgical removal in 1975 followed by RT. 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   Currently taking hydrocotisone 10 mg am only for at least several years, she has been doing well, she requires afternoon nap 1 pm for 20 minutes.   She has been taking LT4 was on 175 mcg and last year increased to 200 mcg with compliance.    Regarding hypogonadism, she has been taking premarin (conjugated estrogen) 0.625 mg. Progesterone (Prometrium) 100 mg daily. Her main concern today is about hair loss. After pituitary surgery, she lost lots hair, but after that with HRT, her hair started to grow. However past several months, she started to lose hair again and wishing to have more hair come back.     Body weight stable,   ENDO VITALS-UMP 10/23/2017 10/23/2017 5/11/2017   Weight 78.019 kg  78.019 kg 81.647 kg     Appetite: good, she is doing two jobs, She is  lives with her , adopted one child long time ago who is age 31 now.  No HA.       Past Medical/Surgical History:  Past Medical History:   Diagnosis Date     Hyperlipidemia LDL goal < 130      Hypertension      Hypothyroidism      Osteopenia     DEXA: 12/11; T-score of -1.9        Panhypopituitarism (H)      Past Surgical History:   Procedure Laterality Date     DAVINCI HYSTERECTOMY TOTAL, BILATERAL SALPINGO-OOPHORECTOMY, COMBINED Bilateral 9/11/2018    Procedure: COMBINED DAVINCI HYSTERECTOMY TOTAL, SALPINGO-OOPHORECTOMY;  Pelvic Washings, Davinci Assisted Laparoscopic Total Hysterectomy, Bilateral Salpingo Oophorectomy, cystoscopy;  Surgeon: Tiffany Lew MD;  [facility]     OPERATIVE HYSTEROSCOPY N/A 1/30/2018    Procedure: OPERATIVE HYSTEROSCOPY;  Operative Hysteroscopy, Resection of Endometrial Polyp, Dilation and Curettage;  Surgeon: Daysi Munoz MD;  [facility]     PROBE LACRIMAL DUCT, INSERT STENT BILATERAL, COMBINED Bilateral 11/26/2018    Procedure: BILATERAL NASOLACRIMAL STENT - LARGE DIAMETER STEN TUBE;  Surgeon: Davina Payton MD;  Location: [facility] OR     resected prolactin-producing pituitary adenoma.  1975    Postoperative radiation therapy.        Allergies:  Allergies   Allergen Reactions     Ceftriaxone Other (See Comments)     Unsure of reaction.       Current Medications   Current Outpatient Medications   Medication     CALCIUM 500 +D 500-400 MG-UNIT TABS     cholecalciferol (VITAMIN D3) 125 mcg (5000 units) capsule     clobetasol (TEMOVATE) 0.05 % external solution     conjugated estrogens (PREMARIN) cream     cyanocobalamin (VITAMIN  B-12) 1000 MCG tablet     estradiol (ESTRACE) 1 MG tablet     estradiol (VAGIFEM) 10 MCG TABS vaginal tablet     finasteride (PROSCAR) 5 MG tablet     Fluocinolone Acetonide Scalp (DERMA-SMOOTHE/FS SCALP) 0.01 % OIL oil     Fluocinolone Acetonide Scalp  0.01 % OIL oil     hydrocortisone (CORTEF) 10 MG tablet     hydrocortisone (CORTEF) 5 MG tablet     ketoconazole (NIZORAL) 2 % external shampoo     levothyroxine (SYNTHROID/LEVOTHROID) 200 MCG tablet     losartan-hydrochlorothiazide (HYZAAR) 100-25 MG tablet     simvastatin (ZOCOR) 40 MG tablet     zinc 50 MG TABS     No current facility-administered medications for this visit.       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     ROS:  Full review of systems taken with the help of the intake sheet. Otherwise a complete 14 point review of systems was taken and is negative unless stated in the history above.    Physical Exam:     There were no vitals taken for this visit.    General: well appearing, no acute distress, pleasant and conversant,   Mental Status/neuro: alert and oriented  Resp: no acute distress    Labs : I reviewed data from epic and extract and summarize the pertinent data here.        1/29/2015 11:34 12/3/2015 09:23 7/22/2016 10:04 12/6/2016 18:49 10/23/2017 11:15   Prolactin 23 21 32 (H) 30 (H) 32 (H)     Lab Results   Component Value Date     10/23/2017      Lab Results   Component Value Date    POTASSIUM 3.9 10/23/2017     Lab Results   Component Value Date    CHLORIDE 105 10/23/2017     Lab Results   Component Value Date    ARI 8.5 10/23/2017     Lab Results   Component Value Date    CO2 24 10/23/2017     Lab Results   Component Value Date    BUN 14 10/23/2017     Lab Results   Component Value Date    CR 1.01 10/23/2017     Lab Results   Component Value Date     10/23/2017      Lab Results   Component Value Date    TSH 0.06 10/23/2017     Lab Results   Component Value Date    T4 1.26 10/23/2017       Lab Results   Component Value Date    FSH  07/22/2016     <0.2  FSH Reference Range   Female: Follicular      2.5-10.2           Mid-cycle       3.4-33.4           Luteal          1.5-9.1           Postmenopausal  23.0-116.3         Lab Results   Component Value Date    PROLACTIN 30 12/06/2016     Bone density : 10/24/2018   HISTORY: 64 yo female osteopenia, hysterectomy, panhypopituitarism;  Osteopenia, unspecified location     COMPARISON:   10/3/2015     Age: 63  years.  Height: 65 inches  Weight: 181 pounds  Sex: Female  Ethnicity: White     Image quality: Adequate     Lumbar spine T-score in region of L1-L4 = -1.9   L1-4 percent change: 0.6%      HIPS:  Mean total hip T-score: -1.2  Mean total hip percent change: 1.4%      Left femoral neck T-score = -1.6  Right femoral neck T-score= -1.2      COMPARISON TO PRIOR:  Percent change in the spine and hips is NOT significant (or considered  invalid) accounting for the precision errors for this facility.      FRAX:  10 year probability of major osteoporotic fracture: 8.6%  10 year probability of hip fracture: 0.8%  The 10 year probability of fracture may be lower than reported if the  patient has received treatment. FRAX data should be disregarded in  patient's taking bisphosphonates.     World Health Organization definition of osteoporosis and osteopenia  for  women:   Normal: T-score at or above -1.0  Low Bone Mass (Osteopenia): T-score between -1.0 and -2.5.   Osteoporosis: T-score at or below -2.5   T-scores are reported for postmenopausal women and men over 50 years  of age.                                                                      IMPRESSION:  Osteopenia

## 2021-07-28 DIAGNOSIS — E23.0 PANHYPOPITUITARISM (H): ICD-10-CM

## 2021-07-30 NOTE — TELEPHONE ENCOUNTER
HYDROCORTISONE 10 MG Tablet    Last Written Prescription Date:  4/23/2021  Last Fill Quantity: 135,   # refills: 0  Last Office Visit : 7/26/2021  Future Office visit:  None    Routing refill request to provider for review/approval because:  Drug not on the G, P or [facility] refill protocol or controlled substance      Kari Christopher RN  Central Triage Red Flags/Med Refills

## 2021-07-31 RX ORDER — HYDROCORTISONE 10 MG/1
TABLET ORAL
Qty: 135 TABLET | Refills: 3 | Status: SHIPPED | OUTPATIENT
Start: 2021-07-31 | End: 2022-02-15

## 2021-09-11 ENCOUNTER — HEALTH MAINTENANCE LETTER (OUTPATIENT)
Age: 66
End: 2021-09-11

## 2021-09-15 ENCOUNTER — OFFICE VISIT (OUTPATIENT)
Dept: DERMATOLOGY | Facility: CLINIC | Age: 66
End: 2021-09-15
Payer: COMMERCIAL

## 2021-09-15 ENCOUNTER — LAB (OUTPATIENT)
Dept: LAB | Facility: CLINIC | Age: 66
End: 2021-09-15

## 2021-09-15 DIAGNOSIS — L65.9 NON-SCARRING ALOPECIA: ICD-10-CM

## 2021-09-15 PROCEDURE — 36415 COLL VENOUS BLD VENIPUNCTURE: CPT | Mod: GA | Performed by: PATHOLOGY

## 2021-09-15 PROCEDURE — 84630 ASSAY OF ZINC: CPT | Mod: 90 | Performed by: PATHOLOGY

## 2021-09-15 PROCEDURE — 99214 OFFICE O/P EST MOD 30 MIN: CPT | Performed by: DERMATOLOGY

## 2021-09-15 RX ORDER — FINASTERIDE 5 MG/1
5 TABLET, FILM COATED ORAL DAILY
Qty: 30 TABLET | Refills: 11 | Status: SHIPPED | OUTPATIENT
Start: 2021-09-15 | End: 2022-05-25

## 2021-09-15 NOTE — LETTER
9/15/2021       RE: Maria Esther Lowe  [address] [facility] [address]     Dear Colleague,    Thank you for referring your patient, Maria Esther Lowe, to the [facility] [place] at [facility]. Please see a copy of my visit note below.    [facility] Dermatology Note  Encounter Date: Sep 15, 2021  Office Visit     Dermatology Problem List:  1. Non-scarring hair loss, favor androgenetic alopecia +/- contribution from underlying thyroid disease but had prior bx with AA. Repeat bx 5/20/2020 most consistent with androgenetic alopecia (which makes sense in setting of #2 as well)   - Biopsy in 2016 consistent with alopecia areata, but not clinically consistent; had outside ILK without improvement per report  - Repeat bx 5/20/2020 with nonscarring alopecia with marked miniaturization, favoring androgenetic alopecia. Also mild perifollicular fibrosis.  - Current tx: LLLT 3x weekly (started years ago), finasteride 5 mg daily (started 6/2020), ketoconazole shampoo, Minoxidil 2x daily, dermasmooth oil 2x/week  - TSH <0.01 in 6/30/2021  - She is on estradiol per endocrinology  2. Pituitary prolactinoma s/p removal in 1975 followed by radiation with subsequent development of panhypopituitarism     ____________________________________________    Assessment & Plan:    1. Non-scarring hair loss, favor androgenetic alopecia +/- contribution from underlying thyroid disease but had prior bx with AA. Also has thyroid disease. Still could use more hair growth  - Increase Minoxidil to twice daily at mid scalp  - Increase Hair Max laser comb to 3x/week  - Continue finesteride once daily since 9/2020. Mood issues denied.   - Continue using dermasmooth oil twice weekly - leave on for a minimum of 4 hours, then wash out with shampoo  - Continue Kerry fine hair conditioner  - Increase shampooing frequency to every other day  -Future:  spironolactone or oral Minoxidil or PRP, would need to check with endocrinologist first.     2. Hair labs   Hypervitaminosis vitamin D, she has decreased it and is seeing PCP.   Her Vitamin D on 6/30/21 was 84, and recommended she hold off on supplementation until talking with her endocrinologist.     3. Zinc - Patient self taking, recommend check for supplementation   - Repeat zinc lab ordered.     4. Thyroid disease as above     Procedures Performed:   None      Follow-up: 3 month(s) in-person, or earlier for new or changing lesions    Staff and Scribe:     Scribe Disclosure  I, Franchesca Mccall and Criss Pedro, am serving as a scribe to document services personally performed by Dr. Carley Serrano MD, based on data collection and the provider's statements to me.     I, Tanesha Jin, am serving as a scribe to document services personally performed by Craley Serrano MD based on data collection and the provider's statements to me.     ____________________________________________    CC: Derm Problem (Kathy is here today for hair metrix)    HPI:  Ms. Maria Esther Lowe is a(n) 66 year old female who presents today as a return patient for hair loss. The patient was last seen on 6/30/21.    Today, the patient reports increased shedding about one week ago, but that has since resolved. Overall patient feels hair loss is stable to improved. Patient is most concerned about the frontal and the left frontal scalp. The left side is the side she sleeps on and wonders if this is playing role. She is using ketoconazole and head and shoulders shampoo 3-4 times a week. Patient is using Kerry conditioner 3-4 times per week.  Fluocinolone oil about 2-3 timer per a month to the entire scalp. She is using minoxidil 5% once a day. LLLT comb twice a week for about 15-20 minutes. Patient is taking Finasteride daily. She denies any dizziness or lightheadedness and mood changes. Patient denies scalp symptoms including itching,  burning and pain. No changes in eyelashes, eyebrows, or other body hair. No changes in her nails. Patient is considering starting a daily multivitamin. She is currently taking Vitamin D (600 international units once a day and another dose 3 times a week but patient is unsure of this dose), calcium, Zinc. The vitamin D is being managed by the endocrinologist. Patient is also on estrodial by endocrindolost. She denies mood changes, headaches, dizziness, or urinary symptoms on the finasteride.     Patient is otherwise feeling well, without additional skin concerns.    Labs Reviewed:  6/30/21  TSH: <0.01   T4:  1.86  Vitamin D: 84    Physical Exam:  Vitals: There were no vitals taken for this visit.  SKIN: Focused examination of the scalp, face and nails was performed.  - Carlos part width 3 cm  - Hair growth on frontal hair line of >2 cm  - No other lesions of concern on areas examined.     Hair Metrix:   - Frontal Scalp: Increase in counts. Stable   - Mid-Scalp: Stable counts. Improved scalp health.    - Vertex Scalp: Stable counts. Scale.    - Occipital Scalp: Decrease in counts, but thicker hair overall.    - Right Temporal Scalp: Increased counts. Improved scalp health.    - Left Temporal Scalp: Decrease counts. Improved scalp health.     Medications:  Current Outpatient Medications   Medication     CALCIUM 500 +D 500-400 MG-UNIT TABS     cholecalciferol (VITAMIN D3) 125 mcg (5000 units) capsule     clobetasol (TEMOVATE) 0.05 % external solution     conjugated estrogens (PREMARIN) cream     cyanocobalamin (VITAMIN  B-12) 1000 MCG tablet     estradiol (ESTRACE) 1 MG tablet     estradiol (VAGIFEM) 10 MCG TABS vaginal tablet     finasteride (PROSCAR) 5 MG tablet     Fluocinolone Acetonide Scalp (DERMA-SMOOTHE/FS SCALP) 0.01 % OIL oil     Fluocinolone Acetonide Scalp 0.01 % OIL oil     hydrocortisone (CORTEF) 10 MG tablet     ketoconazole (NIZORAL) 2 % external shampoo     levothyroxine (SYNTHROID/LEVOTHROID) 175 MCG  tablet     levothyroxine (SYNTHROID/LEVOTHROID) 200 MCG tablet     losartan-hydrochlorothiazide (HYZAAR) 100-25 MG tablet     simvastatin (ZOCOR) 40 MG tablet     zinc 50 MG TABS     hydrocortisone (CORTEF) 5 MG tablet     No current facility-administered medications for this visit.      Past Medical History:   Patient Active Problem List   Diagnosis     Prolactinoma (H)     Panhypopituitarism (H)     Hormone replacement therapy (postmenopausal)     Carpal tunnel syndrome     History of robot-assisted laparoscopic hysterectomy     Past Medical History:   Diagnosis Date     Hyperlipidemia LDL goal < 130      Hypertension      Hypothyroidism      Osteopenia     DEXA: 12/11; T-score of -1.9        Panhypopituitarism (H)         CC Ana Lilia Leslie MD  [address] [facility] [address]  [place] [address] on close of this encounter.

## 2021-09-15 NOTE — PROGRESS NOTES
[facility] Dermatology Note  Encounter Date: Sep 15, 2021  Office Visit     Dermatology Problem List:  1. Non-scarring hair loss, favor androgenetic alopecia +/- contribution from underlying thyroid disease but had prior bx with AA. Repeat bx 5/20/2020 most consistent with androgenetic alopecia (which makes sense in setting of #2 as well)   - Biopsy in 2016 consistent with alopecia areata, but not clinically consistent; had outside ILK without improvement per report  - Repeat bx 5/20/2020 with nonscarring alopecia with marked miniaturization, favoring androgenetic alopecia. Also mild perifollicular fibrosis.  - Current tx: LLLT 3x weekly (started years ago), finasteride 5 mg daily (started 6/2020), ketoconazole shampoo, Minoxidil 2x daily, dermasmooth oil 2x/week  - TSH <0.01 in 6/30/2021  - She is on estradiol per endocrinology  2. Pituitary prolactinoma s/p removal in 1975 followed by radiation with subsequent development of panhypopituitarism     ____________________________________________    Assessment & Plan:    1. Non-scarring hair loss, favor androgenetic alopecia +/- contribution from underlying thyroid disease but had prior bx with AA. Also has thyroid disease. Still could use more hair growth  - Increase Minoxidil to twice daily at mid scalp  - Increase Hair Max laser comb to 3x/week  - Continue finesteride once daily since 9/2020. Mood issues denied.   - Continue using dermasmooth oil twice weekly - leave on for a minimum of 4 hours, then wash out with shampoo  - Continue Kerry fine hair conditioner  - Increase shampooing frequency to every other day  -Future: spironolactone or oral Minoxidil or PRP, would need to check with endocrinologist first.     2. Hair labs   Hypervitaminosis vitamin D, she has decreased it and is seeing PCP.   Her Vitamin D on 6/30/21 was 84, and recommended she hold off on supplementation until talking with her endocrinologist.     3. Zinc - Patient self  taking, recommend check for supplementation   - Repeat zinc lab ordered.     4. Thyroid disease as above     Procedures Performed:   None      Follow-up: 3 month(s) in-person, or earlier for new or changing lesions    Staff and Scribe:     Scribe Disclosure  I, Franchesca Mccall and Criss Pedro, am serving as a scribe to document services personally performed by Dr. Carley Serrano MD, based on data collection and the provider's statements to me.     I, Tanesha Jin, am serving as a scribe to document services personally performed by Carley Serrano MD based on data collection and the provider's statements to me.     ____________________________________________    CC: Derm Problem (Kathy is here today for hair metrix)    HPI:  Ms. Maria Esther Lowe is a(n) 66 year old female who presents today as a return patient for hair loss. The patient was last seen on 6/30/21.    Today, the patient reports increased shedding about one week ago, but that has since resolved. Overall patient feels hair loss is stable to improved. Patient is most concerned about the frontal and the left frontal scalp. The left side is the side she sleeps on and wonders if this is playing role. She is using ketoconazole and head and shoulders shampoo 3-4 times a week. Patient is using Kerry conditioner 3-4 times per week.  Fluocinolone oil about 2-3 timer per a month to the entire scalp. She is using minoxidil 5% once a day. LLLT comb twice a week for about 15-20 minutes. Patient is taking Finasteride daily. She denies any dizziness or lightheadedness and mood changes. Patient denies scalp symptoms including itching, burning and pain. No changes in eyelashes, eyebrows, or other body hair. No changes in her nails. Patient is considering starting a daily multivitamin. She is currently taking Vitamin D (600 international units once a day and another dose 3 times a week but patient is unsure of this dose), calcium, Zinc. The vitamin D is being  managed by the endocrinologist. Patient is also on estrodial by endocrindolost. She denies mood changes, headaches, dizziness, or urinary symptoms on the finasteride.     Patient is otherwise feeling well, without additional skin concerns.    Labs Reviewed:  6/30/21  TSH: <0.01   T4:  1.86  Vitamin D: 84    Physical Exam:  Vitals: There were no vitals taken for this visit.  SKIN: Focused examination of the scalp, face and nails was performed.  - Carlos part width 3 cm  - Hair growth on frontal hair line of >2 cm  - No other lesions of concern on areas examined.     Hair Metrix:   - Frontal Scalp: Increase in counts. Stable   - Mid-Scalp: Stable counts. Improved scalp health.    - Vertex Scalp: Stable counts. Scale.    - Occipital Scalp: Decrease in counts, but thicker hair overall.    - Right Temporal Scalp: Increased counts. Improved scalp health.    - Left Temporal Scalp: Decrease counts. Improved scalp health.     Medications:  Current Outpatient Medications   Medication     CALCIUM 500 +D 500-400 MG-UNIT TABS     cholecalciferol (VITAMIN D3) 125 mcg (5000 units) capsule     clobetasol (TEMOVATE) 0.05 % external solution     conjugated estrogens (PREMARIN) cream     cyanocobalamin (VITAMIN  B-12) 1000 MCG tablet     estradiol (ESTRACE) 1 MG tablet     estradiol (VAGIFEM) 10 MCG TABS vaginal tablet     finasteride (PROSCAR) 5 MG tablet     Fluocinolone Acetonide Scalp (DERMA-SMOOTHE/FS SCALP) 0.01 % OIL oil     Fluocinolone Acetonide Scalp 0.01 % OIL oil     hydrocortisone (CORTEF) 10 MG tablet     ketoconazole (NIZORAL) 2 % external shampoo     levothyroxine (SYNTHROID/LEVOTHROID) 175 MCG tablet     levothyroxine (SYNTHROID/LEVOTHROID) 200 MCG tablet     losartan-hydrochlorothiazide (HYZAAR) 100-25 MG tablet     simvastatin (ZOCOR) 40 MG tablet     zinc 50 MG TABS     hydrocortisone (CORTEF) 5 MG tablet     No current facility-administered medications for this visit.      Past Medical History:   Patient Active  Problem List   Diagnosis     Prolactinoma (H)     Panhypopituitarism (H)     Hormone replacement therapy (postmenopausal)     Carpal tunnel syndrome     History of robot-assisted laparoscopic hysterectomy     Past Medical History:   Diagnosis Date     Hyperlipidemia LDL goal < 130      Hypertension      Hypothyroidism      Osteopenia     DEXA: 12/11; T-score of -1.9        Panhypopituitarism (H)         CC Ana Lilia Leslie MD  [address] [facility] [address]  [place] [address] on close of this encounter.

## 2021-09-17 LAB — ZINC SERPL-MCNC: 72 UG/DL

## 2021-09-20 ENCOUNTER — MYC MEDICAL ADVICE (OUTPATIENT)
Dept: DERMATOLOGY | Facility: CLINIC | Age: 66
End: 2021-09-20

## 2021-09-20 NOTE — TELEPHONE ENCOUNTER
Question from patient below--    I had the blood test for my Zinc level which showed a normal level.   What effects does Zinc have on hair growth and is it good or bad to take Zinc supplements?            Estella Díaz on 9/20/2021 at 9:57 AM

## 2021-09-21 NOTE — TELEPHONE ENCOUNTER
IF zinc testing is normal I would not recommend taking additional zinc. There would be no evidence it would help with hair loss

## 2021-09-27 NOTE — TELEPHONE ENCOUNTER
Please review message from pt re: her taking zinc.    I was not taking Zinc to help with hair loss.  I have been taking Zinc for several years that is why I have a normal level.  I feel I should continue taking Zinc since it does not have any effect on hair growth or loss.  I have a low immune system so I have been taking it to help boost my system.  What is your thought?    Estela Chandler, CMA on 9/27/2021 at 8:52 AM

## 2021-09-28 DIAGNOSIS — E78.5 HYPERLIPIDEMIA LDL GOAL <100: ICD-10-CM

## 2021-09-28 DIAGNOSIS — E23.0 PANHYPOPITUITARISM (H): ICD-10-CM

## 2021-09-29 RX ORDER — SIMVASTATIN 40 MG
40 TABLET ORAL DAILY
Qty: 90 TABLET | Refills: 3 | Status: SHIPPED | OUTPATIENT
Start: 2021-09-29 | End: 2022-04-08

## 2021-09-29 NOTE — TELEPHONE ENCOUNTER
LOSARTAN POTASSIUM/HYDROCHLOROTHIAZIDE 100-25 MG Tablet  Last Written Prescription Date:  9/17/2020  Last Fill Quantity: 90,   # refills: 3  Last Office Visit : 7/26/2021  Future Office visit:  None    Routing refill request to provider for review/approval because:  Blood pressure out of range   Change med?   Change dose?   Add med?   Follow up B/P check?  Also Over due Creatinine and Potassium Labs  Refer to Provider for review      BP Readings from Last 3 Encounters:   03/17/21 (!) 144/78   10/22/20 134/84   09/09/19 (!) 177/83             Normal serum creatinine on file in past 12 months        Recent Labs   Lab Test 09/12/18  0729   CR 1.11*      Ok to refill medication if creatinine is low      Normal serum potassium on file in past 12 months        Recent Labs   Lab Test 03/18/20  1131   POTASSIUM 3.4       Kari Christopher RN  Central Triage Red Flags/Med Refills

## 2021-09-30 RX ORDER — LOSARTAN POTASSIUM AND HYDROCHLOROTHIAZIDE 25; 100 MG/1; MG/1
1 TABLET ORAL DAILY
Qty: 90 TABLET | Refills: 3 | Status: SHIPPED | OUTPATIENT
Start: 2021-09-30 | End: 2022-02-15

## 2021-10-07 DIAGNOSIS — N95.2 ATROPHIC VAGINITIS: ICD-10-CM

## 2021-10-07 DIAGNOSIS — E23.0 PANHYPOPITUITARISM (H): ICD-10-CM

## 2021-10-07 DIAGNOSIS — L65.9 LOSS OF HAIR: ICD-10-CM

## 2021-10-07 RX ORDER — ESTRADIOL 1 MG/1
1 TABLET ORAL DAILY
Qty: 90 TABLET | Refills: 0 | Status: SHIPPED | OUTPATIENT
Start: 2021-10-07 | End: 2021-12-14

## 2021-10-07 NOTE — TELEPHONE ENCOUNTER
Refill request received for estradiol tablets. Patient last seen 10/2020 due for annual. Sending short supply per protocol and sending reminder message to patient to schedule.

## 2021-10-25 NOTE — TELEPHONE ENCOUNTER
Hysterectomy w/ Dr Munoz around the middle of July   Concerns are: low immune system, should she be concerned - should Pt be staying on medication that Dr Arrington has ordered during the surgical process   Possible stress dose r/t adrenal insufficient     
147

## 2021-12-13 ENCOUNTER — MYC MEDICAL ADVICE (OUTPATIENT)
Dept: OBGYN | Facility: CLINIC | Age: 66
End: 2021-12-13
Payer: COMMERCIAL

## 2021-12-13 DIAGNOSIS — L65.9 LOSS OF HAIR: ICD-10-CM

## 2021-12-13 DIAGNOSIS — E23.0 PANHYPOPITUITARISM (H): ICD-10-CM

## 2021-12-14 RX ORDER — ESTRADIOL 1 MG/1
1 TABLET ORAL DAILY
Qty: 90 TABLET | Refills: 1 | Status: SHIPPED | OUTPATIENT
Start: 2021-12-14 | End: 2022-03-17

## 2021-12-14 NOTE — TELEPHONE ENCOUNTER
Refill request received for estradiol tablets. Patient due for annual exam, has one scheduled upcoming in February. Sending short supply to get to appointment per Protocol.

## 2021-12-28 ENCOUNTER — TELEPHONE (OUTPATIENT)
Dept: DERMATOLOGY | Facility: CLINIC | Age: 66
End: 2021-12-28
Payer: COMMERCIAL

## 2022-01-01 ENCOUNTER — HEALTH MAINTENANCE LETTER (OUTPATIENT)
Age: 67
End: 2022-01-01

## 2022-02-10 ENCOUNTER — MYC MEDICAL ADVICE (OUTPATIENT)
Dept: DERMATOLOGY | Facility: CLINIC | Age: 67
End: 2022-02-10
Payer: COMMERCIAL

## 2022-02-10 ENCOUNTER — MYC MEDICAL ADVICE (OUTPATIENT)
Dept: ENDOCRINOLOGY | Facility: CLINIC | Age: 67
End: 2022-02-10
Payer: COMMERCIAL

## 2022-02-10 DIAGNOSIS — E03.8 SECONDARY HYPOTHYROIDISM: ICD-10-CM

## 2022-02-10 DIAGNOSIS — E21.3 HYPERPARATHYROIDISM (H): ICD-10-CM

## 2022-02-10 DIAGNOSIS — E23.0 HYPOPITUITARISM (H): ICD-10-CM

## 2022-02-10 DIAGNOSIS — E23.0 PANHYPOPITUITARISM (H): ICD-10-CM

## 2022-02-11 NOTE — TELEPHONE ENCOUNTER
*-*-*This message has not been handled.*-*-*    [facility]. would like you to contact them and give them my prescription information .    Member ID #[id number]  Tel. #[phone]

## 2022-02-15 RX ORDER — HYDROCORTISONE 10 MG/1
TABLET ORAL
Qty: 135 TABLET | Refills: 1 | Status: SHIPPED | OUTPATIENT
Start: 2022-02-15 | End: 2023-09-26

## 2022-02-15 RX ORDER — LOSARTAN POTASSIUM AND HYDROCHLOROTHIAZIDE 25; 100 MG/1; MG/1
1 TABLET ORAL DAILY
Qty: 90 TABLET | Refills: 2 | Status: SHIPPED | OUTPATIENT
Start: 2022-02-15 | End: 2022-12-29

## 2022-02-15 RX ORDER — LEVOTHYROXINE SODIUM 175 UG/1
175 TABLET ORAL DAILY
Qty: 90 TABLET | Refills: 1 | Status: SHIPPED | OUTPATIENT
Start: 2022-02-15 | End: 2022-04-08

## 2022-02-15 NOTE — TELEPHONE ENCOUNTER
Needed refills per patients request - orders to be sent to [facility].  1.  Hydrocortizone 10mg  2.  [place]/Hctz  100-25mg  3. Levothyroxine 175 mcg  4. Simvastation 40 mg  5. Vit. D3 125 mcg      Remaining refills sent to requested pharmacy.

## 2022-02-23 ENCOUNTER — OFFICE VISIT (OUTPATIENT)
Dept: DERMATOLOGY | Facility: CLINIC | Age: 67
End: 2022-02-23
Payer: COMMERCIAL

## 2022-02-23 DIAGNOSIS — L65.8 FEMALE PATTERN ALOPECIA: Primary | ICD-10-CM

## 2022-02-23 PROCEDURE — 99214 OFFICE O/P EST MOD 30 MIN: CPT | Performed by: DERMATOLOGY

## 2022-02-23 NOTE — PROGRESS NOTES
[facility] Dermatology Note  Encounter Date: Feb 23, 2022  Office Visit     Dermatology Problem List:  1. 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 Repeat bx 5/20/2020 most consistent with androgenetic alopecia (which makes sense in setting of #2 as well)   - Biopsy in 2016 consistent with alopecia areata, but not clinically consistent; had outside ILK without improvement per report  - Repeat bx 5/20/2020 with nonscarring alopecia with marked miniaturization, favoring androgenetic alopecia. Also mild perifollicular fibrosis.  - Current tx: LLLT 3x weekly (started years ago), finasteride 5 mg daily (started 6/2020), ketoconazole shampoo, Minoxidil 2x daily, dermasmooth oil 2x/week  - TSH <0.01 in 6/30/2021  - She is on estradiol per endocrinology  2. Pituitary prolactinoma s/p removal in 1975 followed by radiation with subsequent development of panhypopituitarism     ____________________________________________    Assessment & Plan:    # Female pattern hair loss, favor androgenetic alopecia +/- contribution from underlying thyroid disease but had prior bx with AA. Also has thyroid disease. Decrease to stable hair counts on hairmetrix today likely due to recent stressor and lack of compliance, would recommend continuing/restarting with current treatment plan as she was not consistent. Will switch from finasteride to oral minoxidil or add PRP if not improved in 3 months.  - Continue Minoxidil to twice daily at mid scalp  - Continue Hair Max laser comb 3x/week  - Continue finesteride once daily since 9/2020. Mood issues denied.   - Continue using dermasmooth oil twice weekly. Recommend increase leave on time to a minimum of 4 hours, then wash out with shampoo.   - Increase shampooing frequency to every other day  - Future: spironolactone or oral Minoxidil or PRP, would need to check with endocrinologist first.  -  Recommended micropigmentation at [facility] and gentle hair dye.     # Hair labs   Hypervitaminosis vitamin D, she has decreased it. Her Vitamin D on 6/30/21 was 84, following with her endocrinologist.      # Zinc - Patient self taking, repeat zinc lab 9/15/21 was wnl.    - Hold Zinc     # Thyroid disease as above    # Onychoschizia  - Recommended wearing gloves when doing wet work as well as limiting nail polishing to 1x/week and keeping nails as short as possible.Do not remove polish more than once weekly. Vaseline once daily to the nail. Avoid nail cosmetic such as gels.     Procedures Performed:   Hair metrix    Follow-up: 3 month(s) in-person, or earlier for new or changing lesions    Staff and Scribe:     Scribe Disclosure:  I, Shaw Huff, am serving as a scribe to document services personally performed by Carley Serrano MD based on data collection and the provider's statements to me.     I, Tanesha Jin, am serving as a scribe to document services personally performed by Carley Serrano MD based on data collection and the provider's statements to me.       Provider Disclosure:   The documentation recorded by the scribe accurately reflects the services I personally performed and the decisions made by me.    Carley Serrano MD    Department of Dermatology  [facility]: Phone: [phone], Fax:[phone]  [facility]: Phone: [phone], Fax: [phone]    ADDENDUM/6/2022  Pt not taking her hydrocortisone. Will recommend she touch based with endo      ____________________________________________    CC: Hair Loss    HPI:  Ms. Maria Esther Lowe is a(n) 66 year old female who presents today as a return patient for follow-up. Last seen by me on 9/15/2021, at which time patient was increased on minoxidil to BID at mid scalp and increased to hair max laser comb 3x/week for  treatment of non-scarring hair loss. Patient feels her hair loss is stable. No changes in mood. Increased weight (patient is on oral steroids). Increased stress due to caring for her father in [place] who has been ill with pulmonary fibrosis.   - Shedding or thinning, or both: thinning   - Current tx: rogaine foam, laser comb (but stopped for about a month in January), finasteride, derasmooth oil uses once weekly for 1-2 hours (but again stopped for about a month in January)   - If using Rogaine, 1 cannister lasts how long: not sure, 3-4 weeks   - Scalp or hair care habits/products: ketoconazole 3x week, aveda conditioner, and some aveda mouse after. She doesn't use curling iron only every day and doesn't dye her hair.   - Patient is wanting to take a women multivitamin (energy alive brand). It contains 30 mcg of biotin.   other body hair.   She is currently taking Vitamin D  (unsure dose) 3 times a week, calcium, Zinc. The vitamin D is being managed by the endocrinologist. Zinc was checked at last visit and was wnl.   No Any new medications, supplements, or products? (please list below)     No Scalp pain   No Scalp burning   No Scalp itching    No Eyebrow changes    No Eyelash changes   No Beard changes    No Other body hair changes    No- for the last year increased breakage since stopping her hair and nail supplement Nail changes    No Additional symptoms? (please list below)     - Overall course: stable   - COVID status: No COVID, boosted in Nov 2021    Patient is otherwise feeling well, in usual state of health, and has no additional skin concerns today.     Labs:  zinc lab 9/15/21 wnl    Physical Exam:  Vitals: There were no vitals taken for this visit.  GEN: Well developed, well-nourished, in no acute distress, in a pleasant mood.    SKIN: Focused examination of scalp, face, and nails was performed.  - Dubon type: II   - Carlos part width of 3   - Increased perifollicular scale at the frontal scalp  -  Follicular prominence on the crown  - 1-4 cm hair regrowth along frontal hairline.  - mil macular erythema   - no perifollicular erythema  - no perifollicular scale   - moderate scaling of the scalp   - 1 out of 2 positive hair pull test   -  normal eyelash density  - decreased eyebrow density  - no nail pitting or dystrophy   - no scalp folliculitis/pustules    - No other lesions of concern on areas examined.       Medications:  Current Outpatient Medications   Medication     CALCIUM 500 +D 500-400 MG-UNIT TABS     cholecalciferol (VITAMIN D3) 125 mcg (5000 units) capsule     clobetasol (TEMOVATE) 0.05 % external solution     conjugated estrogens (PREMARIN) cream     cyanocobalamin (VITAMIN  B-12) 1000 MCG tablet     estradiol (ESTRACE) 1 MG tablet     estradiol (VAGIFEM) 10 MCG TABS vaginal tablet     finasteride (PROSCAR) 5 MG tablet     Fluocinolone Acetonide Scalp (DERMA-SMOOTHE/FS SCALP) 0.01 % OIL oil     Fluocinolone Acetonide Scalp 0.01 % OIL oil     hydrocortisone (CORTEF) 10 MG tablet     hydrocortisone (CORTEF) 5 MG tablet     ketoconazole (NIZORAL) 2 % external shampoo     levothyroxine (SYNTHROID/LEVOTHROID) 175 MCG tablet     levothyroxine (SYNTHROID/LEVOTHROID) 200 MCG tablet     losartan-hydrochlorothiazide (HYZAAR) 100-25 MG tablet     simvastatin (ZOCOR) 40 MG tablet     zinc 50 MG TABS     No current facility-administered medications for this visit.      Past Medical History:   Patient Active Problem List   Diagnosis     Prolactinoma (H)     Panhypopituitarism (H)     Hormone replacement therapy (postmenopausal)     Carpal tunnel syndrome     History of robot-assisted laparoscopic hysterectomy     Past Medical History:   Diagnosis Date     Hyperlipidemia LDL goal < 130      Hypertension      Hypothyroidism      Osteopenia     DEXA: 12/11; T-score of -1.9        Panhypopituitarism (H)         CC Ana Lilia Leslie MD  [address] [facility] [address]  [place] [address] on clo2se of this  encounter.

## 2022-02-23 NOTE — PATIENT INSTRUCTIONS
[facility]  1. Hair Transplantation  [email or web address]  Phone: [phone] [address],  [place] [address]    2. Micropigmentation  -Done by Yuli Shahid      Topical Rogaine (Minoxidil) for Pattern Hair Loss      Minoxidil is an FDA approved over the counter topical for the treatment of hair loss and thinning hair in men and women.     Initially a 2% solution was available however this required application twice daily. A 5% solution is also now approved, only requiring application once per day.     Available Products:     Rogaine 5% solution: Packaged for men however can be used by men or women. Use dropper and apply directly to scalp at bedtime. This product can cause an allergy because of presence of propylene glycol. Stop this product if you develop a rash or itching and contact your physician.     Rogaine 5% foam: Packaged for men and women: Apply foam directly to the scalp once daily. This is less greasy compared to the solution. This formula is preferred for those who had a reaction to the solution product. If you develop rash or itching, stop the product and contact your physician.     What if I stop minoxidil topical?     After stopping minoxidil the hair will return to the usual pattern of thinning. Using the product 3-4 times per week is better than not using product at all.       Can I use generic minoxidil?     Yes, look for 5% minoxidil.     What are the side effects?    The most common side effect is rash or itching of the scalp. This can occur if a contact allergy develops with propylene glycol. A small group of patients noticed the appearance of facial hair if the product runs onto the face or with prolonged use. Keep it away from the face.    This can cause temporary shedding. Please, call us if this happens.    This can irritated the scalp. Please, call us if this happens.    Stop this product if you become pregnant or are  breastfeeding      Last updated: 11/2/2021    Photobiomodulation (Low Level Laser (Light) Therapy)      What is Photobiomodulation?     Photobiomodulation, also referred to as low level laser therapy,  is an emerging treatment for hair thinning in men and women, also known as pattern hair loss. The devices use light to stimulate the hair follicle.  The exact mechanism is still unknown but there is evidence for improvement with hair growth and density.      What types of devices are available?    Each patient has many different options devices depending on preference for shape, frequency of use and price point.      There is no study comparing these devices. However, most research available is on the Hairmax devices.    Below is a sample of some devices currently available. All are FDA cleared for androgenetic alopecia.    In general, the more laser lights the more expensive the device. However, this does not necessarily mean the device works better.      HairMax Lasercomb and Band   Shape Comb or Band   Strickland Comb ($199-$399), Band ($499-$799)   Contact Information [email or web address]  [phone]  [phone]              LaserCap Pro Capillus 82 iGrow Theradome   Shape Hat Baseball Cap Helmet Helmet   Strickland $3,000 $799 $549 $895   Contact Information [employer]  [phone]  [email or web address]  [phone]  [email or web address]  [phone]  [email or web address]   [phone]         If you plan to buy a hair max device, please consider using the following coupon code as this will give you a discount and also result in a donation from hair [email or web address] to our medical students.     e      Last update:4/2/2018

## 2022-02-23 NOTE — LETTER
2/23/2022       RE: Maria Esther Lowe  [address] [facility] [place] [address]     Dear Colleague,    Thank you for referring your patient, Maria Esther Lowe, to the [facility] [place] at [facility]. Please see a copy of my visit note below.    [facility] Dermatology Note  Encounter Date: Feb 23, 2022  Office Visit     Dermatology Problem List:  1. 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 Repeat bx 5/20/2020 most consistent with androgenetic alopecia (which makes sense in setting of #2 as well)   - Biopsy in 2016 consistent with alopecia areata, but not clinically consistent; had outside ILK without improvement per report  - Repeat bx 5/20/2020 with nonscarring alopecia with marked miniaturization, favoring androgenetic alopecia. Also mild perifollicular fibrosis.  - Current tx: LLLT 3x weekly (started years ago), finasteride 5 mg daily (started 6/2020), ketoconazole shampoo, Minoxidil 2x daily, dermasmooth oil 2x/week  - TSH <0.01 in 6/30/2021  - She is on estradiol per endocrinology  2. Pituitary prolactinoma s/p removal in 1975 followed by radiation with subsequent development of panhypopituitarism     ____________________________________________    Assessment & Plan:    # Female pattern hair loss, favor androgenetic alopecia +/- contribution from underlying thyroid disease but had prior bx with AA. Also has thyroid disease. Decrease to stable hair counts on hairmetrix today likely due to recent stressor and lack of compliance, would recommend continuing/restarting with current treatment plan as she was not consistent. Will switch from finasteride to oral minoxidil or add PRP if not improved in 3 months.  - Continue Minoxidil to twice daily at mid scalp  - Continue Hair Max laser comb 3x/week  - Continue finesteride once daily since  9/2020. Mood issues denied.   - Continue using dermasmooth oil twice weekly. Recommend increase leave on time to a minimum of 4 hours, then wash out with shampoo.   - Increase shampooing frequency to every other day  - Future: spironolactone or oral Minoxidil or PRP, would need to check with endocrinologist first.  - Recommended micropigmentation at [facility] and gentle hair dye.     # Hair labs   Hypervitaminosis vitamin D, she has decreased it. Her Vitamin D on 6/30/21 was 84, following with her endocrinologist.      # Zinc - Patient self taking, repeat zinc lab 9/15/21 was wnl.    - Hold Zinc     # Thyroid disease as above    # Onychoschizia  - Recommended wearing gloves when doing wet work as well as limiting nail polishing to 1x/week and keeping nails as short as possible.Do not remove polish more than once weekly. Vaseline once daily to the nail. Avoid nail cosmetic such as gels.     Procedures Performed:   Hair metrix    Follow-up: 3 month(s) in-person, or earlier for new or changing lesions    Staff and Scribe:     Scribe Disclosure:  IShaw, am serving as a scribe to document services personally performed by Carley Serrano MD based on data collection and the provider's statements to me.     I, Tanesha Jin, am serving as a scribe to document services personally performed by Carley Serrano MD based on data collection and the provider's statements to me.       Provider Disclosure:   The documentation recorded by the scribe accurately reflects the services I personally performed and the decisions made by me.    Carley Serrano MD    Department of Dermatology  [facility]: Phone: [phone], Fax:[phone]  [facility]: Phone: [phone], Fax: [phone]    ADDENDUM/6/2022  Pt not taking her hydrocortisone. Will recommend she touch based with  endo      ____________________________________________    CC: Hair Loss    HPI:  Ms. Maria Esther Lowe is a(n) 66 year old female who presents today as a return patient for follow-up. Last seen by me on 9/15/2021, at which time patient was increased on minoxidil to BID at mid scalp and increased to hair max laser comb 3x/week for treatment of non-scarring hair loss. Patient feels her hair loss is stable. No changes in mood. Increased weight (patient is on oral steroids). Increased stress due to caring for her father in [place] who has been ill with pulmonary fibrosis.   - Shedding or thinning, or both: thinning   - Current tx: rogaine foam, laser comb (but stopped for about a month in January), finasteride, derasmooth oil uses once weekly for 1-2 hours (but again stopped for about a month in January)   - If using Rogaine, 1 cannister lasts how long: not sure, 3-4 weeks   - Scalp or hair care habits/products: ketoconazole 3x week, aveda conditioner, and some aveda mouse after. She doesn't use curling iron only every day and doesn't dye her hair.   - Patient is wanting to take a women multivitamin (energy alive brand). It contains 30 mcg of biotin.   other body hair.   She is currently taking Vitamin D  (unsure dose) 3 times a week, calcium, Zinc. The vitamin D is being managed by the endocrinologist. Zinc was checked at last visit and was wnl.   No Any new medications, supplements, or products? (please list below)     No Scalp pain   No Scalp burning   No Scalp itching    No Eyebrow changes    No Eyelash changes   No Beard changes    No Other body hair changes    No- for the last year increased breakage since stopping her hair and nail supplement Nail changes    No Additional symptoms? (please list below)     - Overall course: stable   - COVID status: No COVID, boosted in Nov 2021    Patient is otherwise feeling well, in usual state of health, and has no additional skin concerns today.     Labs:  zinc lab 9/15/21  wnl    Physical Exam:  Vitals: There were no vitals taken for this visit.  GEN: Well developed, well-nourished, in no acute distress, in a pleasant mood.    SKIN: Focused examination of scalp, face, and nails was performed.  - Dubon type: II   - Carlos part width of 3   - Increased perifollicular scale at the frontal scalp  - Follicular prominence on the crown  - 1-4 cm hair regrowth along frontal hairline.  - mil macular erythema   - no perifollicular erythema  - no perifollicular scale   - moderate scaling of the scalp   - 1 out of 2 positive hair pull test   -  normal eyelash density  - decreased eyebrow density  - no nail pitting or dystrophy   - no scalp folliculitis/pustules    - No other lesions of concern on areas examined.       Medications:  Current Outpatient Medications   Medication     CALCIUM 500 +D 500-400 MG-UNIT TABS     cholecalciferol (VITAMIN D3) 125 mcg (5000 units) capsule     clobetasol (TEMOVATE) 0.05 % external solution     conjugated estrogens (PREMARIN) cream     cyanocobalamin (VITAMIN  B-12) 1000 MCG tablet     estradiol (ESTRACE) 1 MG tablet     estradiol (VAGIFEM) 10 MCG TABS vaginal tablet     finasteride (PROSCAR) 5 MG tablet     Fluocinolone Acetonide Scalp (DERMA-SMOOTHE/FS SCALP) 0.01 % OIL oil     Fluocinolone Acetonide Scalp 0.01 % OIL oil     hydrocortisone (CORTEF) 10 MG tablet     hydrocortisone (CORTEF) 5 MG tablet     ketoconazole (NIZORAL) 2 % external shampoo     levothyroxine (SYNTHROID/LEVOTHROID) 175 MCG tablet     levothyroxine (SYNTHROID/LEVOTHROID) 200 MCG tablet     losartan-hydrochlorothiazide (HYZAAR) 100-25 MG tablet     simvastatin (ZOCOR) 40 MG tablet     zinc 50 MG TABS     No current facility-administered medications for this visit.      Past Medical History:   Patient Active Problem List   Diagnosis     Prolactinoma (H)     Panhypopituitarism (H)     Hormone replacement therapy (postmenopausal)     Carpal tunnel syndrome     History of robot-assisted  laparoscopic hysterectomy     Past Medical History:   Diagnosis Date     Hyperlipidemia LDL goal < 130      Hypertension      Hypothyroidism      Osteopenia     DEXA: 12/11; T-score of -1.9        Panhypopituitarism (H)         CC Ana Lilia Leslie MD  [address] [place] [id number] on clo2se of this encounter.

## 2022-03-06 RX ORDER — KETOCONAZOLE 20 MG/ML
SHAMPOO TOPICAL
Qty: 100 ML | Refills: 11 | Status: SHIPPED | OUTPATIENT
Start: 2022-03-07 | End: 2022-05-25

## 2022-03-08 NOTE — PROGRESS NOTES
HairMetrix Summary (March 8, 2022)    Frontal anterior      Mid scalp      Vertex      Occipital      Right temporal       Left temporal      Summary

## 2022-03-17 ENCOUNTER — OFFICE VISIT (OUTPATIENT)
Dept: OBGYN | Facility: CLINIC | Age: 67
End: 2022-03-17
Attending: OBSTETRICS & GYNECOLOGY
Payer: COMMERCIAL

## 2022-03-17 ENCOUNTER — ANCILLARY PROCEDURE (OUTPATIENT)
Dept: BONE DENSITY | Facility: CLINIC | Age: 67
End: 2022-03-17
Attending: INTERNAL MEDICINE
Payer: COMMERCIAL

## 2022-03-17 ENCOUNTER — LAB (OUTPATIENT)
Dept: LAB | Facility: CLINIC | Age: 67
End: 2022-03-17
Payer: COMMERCIAL

## 2022-03-17 VITALS
WEIGHT: 185 LBS | SYSTOLIC BLOOD PRESSURE: 139 MMHG | DIASTOLIC BLOOD PRESSURE: 85 MMHG | BODY MASS INDEX: 30.82 KG/M2 | HEART RATE: 71 BPM | HEIGHT: 65 IN

## 2022-03-17 DIAGNOSIS — N95.1 SYMPTOMATIC MENOPAUSAL OR FEMALE CLIMACTERIC STATES: ICD-10-CM

## 2022-03-17 DIAGNOSIS — M81.0 OSTEOPOROSIS, UNSPECIFIED OSTEOPOROSIS TYPE, UNSPECIFIED PATHOLOGICAL FRACTURE PRESENCE: ICD-10-CM

## 2022-03-17 DIAGNOSIS — Z12.31 ENCOUNTER FOR SCREENING MAMMOGRAM FOR BREAST CANCER: ICD-10-CM

## 2022-03-17 DIAGNOSIS — L65.9 LOSS OF HAIR: ICD-10-CM

## 2022-03-17 DIAGNOSIS — E03.8 SECONDARY HYPOTHYROIDISM: ICD-10-CM

## 2022-03-17 DIAGNOSIS — E23.0 HYPOPITUITARISM (H): ICD-10-CM

## 2022-03-17 DIAGNOSIS — D35.2 PROLACTINOMA (H): ICD-10-CM

## 2022-03-17 DIAGNOSIS — Z00.00 ENCOUNTER FOR SCREENING AND PREVENTATIVE CARE: Primary | ICD-10-CM

## 2022-03-17 DIAGNOSIS — E23.0 PANHYPOPITUITARISM (H): ICD-10-CM

## 2022-03-17 LAB
DEPRECATED CALCIDIOL+CALCIFEROL SERPL-MC: 65 UG/L (ref 20–75)
PROLACTIN SERPL-MCNC: 43 UG/L (ref 3–27)
T4 FREE SERPL-MCNC: 1.64 NG/DL (ref 0.76–1.46)
TSH SERPL DL<=0.005 MIU/L-ACNC: 0.02 MU/L (ref 0.4–4)

## 2022-03-17 PROCEDURE — G0463 HOSPITAL OUTPT CLINIC VISIT: HCPCS

## 2022-03-17 PROCEDURE — 99397 PER PM REEVAL EST PAT 65+ YR: CPT | Mod: GC | Performed by: OBSTETRICS & GYNECOLOGY

## 2022-03-17 PROCEDURE — 82306 VITAMIN D 25 HYDROXY: CPT | Performed by: INTERNAL MEDICINE

## 2022-03-17 PROCEDURE — 84439 ASSAY OF FREE THYROXINE: CPT | Performed by: PATHOLOGY

## 2022-03-17 PROCEDURE — 84443 ASSAY THYROID STIM HORMONE: CPT | Performed by: PATHOLOGY

## 2022-03-17 PROCEDURE — 36415 COLL VENOUS BLD VENIPUNCTURE: CPT | Performed by: PATHOLOGY

## 2022-03-17 PROCEDURE — 84146 ASSAY OF PROLACTIN: CPT | Performed by: INTERNAL MEDICINE

## 2022-03-17 PROCEDURE — 77080 DXA BONE DENSITY AXIAL: CPT | Performed by: INTERNAL MEDICINE

## 2022-03-17 RX ORDER — ESTRADIOL 1 MG/1
1 TABLET ORAL DAILY
Qty: 90 TABLET | Refills: 3 | Status: SHIPPED | OUTPATIENT
Start: 2022-03-17 | End: 2023-04-06

## 2022-03-17 RX ORDER — ESTRADIOL 1 MG/1
1 TABLET ORAL DAILY
Qty: 90 TABLET | Refills: 1 | Status: SHIPPED | OUTPATIENT
Start: 2022-03-17 | End: 2022-03-17

## 2022-03-17 NOTE — LETTER
3/17/2022       RE: Maria Esther Lowe  [address] [facility] [address]     Dear Colleague,    Thank you for referring your patient, Maria Esther Lowe, to the [facility] [place] [facility]. Please see a copy of my visit note below.    [facility] clinic note     SUBJECTIVE     HPI:    Maria Esther Lowe is a 66 year old , here for annual exam.     Reports some occasional leakage of urine just before she sits on the toilet if her bladder is very full. Not overly bothersome to her, improves if she urinates more frequently. Does not want to pursue pelvic floor physical therapy at this time. Gets up once/night to use the bathroom. Had a transient episode of right sided breast pain a few weeks ago, this resolved. Is due for a mammogram. Otherwise no concerns. She uses estrace tablet daily and estrogen cream occasionally.     GYN History  - LMP: No LMP recorded. Patient has had a hysterectomy.  - Pap Smears: No history of abnormal paps, last , NILM. S/p hysterectomy   - Sexual Activity/Concerns: No concerns. Sexually active with .   - Hx STIs/UTIs: None     Obstetric History  OB History    Para Term  AB Living   0 0 0 0 0 0   SAB IAB Ectopic Multiple Live Births   0 0 0 0 0     Past Medical History  Past Medical History:   Diagnosis Date     Hyperlipidemia LDL goal < 130      Hypertension      Hypothyroidism      Osteopenia     DEXA: ; T-score of -1.9        Panhypopituitarism (H)      Past Surgical History  Past Surgical History:   Procedure Laterality Date     DAVINCI HYSTERECTOMY TOTAL, BILATERAL SALPINGO-OOPHORECTOMY, COMBINED Bilateral 2018    Procedure: COMBINED DAVINCI HYSTERECTOMY TOTAL, SALPINGO-OOPHORECTOMY;  Pelvic Washings, Davinci Assisted Laparoscopic Total Hysterectomy, Bilateral Salpingo Oophorectomy, cystoscopy;  Surgeon: Tiffany Lew MD;  [facility]     OPERATIVE HYSTEROSCOPY  N/A 1/30/2018    Procedure: OPERATIVE HYSTEROSCOPY;  Operative Hysteroscopy, Resection of Endometrial Polyp, Dilation and Curettage;  Surgeon: Daysi Munoz MD;  [facility]     PROBE LACRIMAL DUCT, INSERT STENT BILATERAL, COMBINED Bilateral 11/26/2018    Procedure: BILATERAL NASOLACRIMAL STENT - LARGE DIAMETER STEN TUBE;  Surgeon: Davina Payton MD;  [facility]     resected prolactin-producing pituitary adenoma.  1975    Postoperative radiation therapy.      Medications  Current Outpatient Medications   Medication     CALCIUM 500 +D 500-400 MG-UNIT TABS     cholecalciferol (VITAMIN D3) 125 mcg (5000 units) capsule     clobetasol (TEMOVATE) 0.05 % external solution     conjugated estrogens (PREMARIN) cream     cyanocobalamin (VITAMIN  B-12) 1000 MCG tablet     estradiol (ESTRACE) 1 MG tablet     estradiol (VAGIFEM) 10 MCG TABS vaginal tablet     finasteride (PROSCAR) 5 MG tablet     Fluocinolone Acetonide Scalp (DERMA-SMOOTHE/FS SCALP) 0.01 % OIL oil     Fluocinolone Acetonide Scalp 0.01 % OIL oil     hydrocortisone (CORTEF) 10 MG tablet     ketoconazole (NIZORAL) 2 % external shampoo     ketoconazole (NIZORAL) 2 % external shampoo     levothyroxine (SYNTHROID/LEVOTHROID) 175 MCG tablet     levothyroxine (SYNTHROID/LEVOTHROID) 200 MCG tablet     losartan-hydrochlorothiazide (HYZAAR) 100-25 MG tablet     simvastatin (ZOCOR) 40 MG tablet     No current facility-administered medications for this visit.     Allergies  Allergies   Allergen Reactions     Ceftriaxone Other (See Comments)     Unsure of reaction.     Social History  Social History     Tobacco Use     Smoking status: Never Smoker     Smokeless tobacco: Never Used   Substance Use Topics     Alcohol use: No     Drug use: No     Family History  Family History   Problem Relation Age of Onset     Diabetes Paternal Grandmother      Depression Paternal Grandmother      Thyroid Disease Maternal Grandmother      Skin Cancer Father       "Melanoma No family hx of      ROS: 10-Point ROS negative except as noted in HPI    OBJECTIVE     Physical Exam  /85   Pulse 71   Ht 1.651 m (5' 5\")   Wt 83.9 kg (185 lb)   Breastfeeding No   BMI 30.79 kg/m    Gen: Well-appearing, NAD  HEENT: Normocephalic, atraumatic  Neck: Thyroid is not enlarged, no appreciable masses palpated. Non-tender  CV:  Regular rate, warm and well perfused   Pulm: Breathing comfortably on room air   Abd: Soft, non-tender, non-distended  Ext: No LE edema  Breast: Symmetric, no nipple discharge or retraction, no axillary lymphadenopathy, no palpable nodules or masses bilaterally    Pelvic:  Right labia is agglutinated to the right labium majus.  The left labia minora appears normal.  Introitus appears atrophic, no erythema or abnormal secretions.   Urethral meatus: normal   Urethra: no masses, tenderness, or scarring   Bladder: no masses or tenderness   Vagina: atrophic.   Cervix: surgically absent  Uterus: surgically absent  Adnexa: Surgically absent  Rectum:anus normal       ASSESSMENT & PLAN     Maria Esther Lowe is a 66 year old  female here for annual exam.     Plan:   HRT surveillance   - Working well for her, would like to continue. Risks and benefits discussed  - Prescription for Estrace 1 mg tablet daily refilled   - Continue estrogen cream as needed, does not need refill.      Screening:   - Paps: no further testing, s/p hysterectomy   - Mammogram: due, last 10/22/2020, order placed.    - colonoscopy: up to date, last 2020  - Lipids: up to date, last 3/2021 WNL  - HgA1c: up to date, last 3/2021 5.5    Immunizations:   - Flu: up to date   - Covid: up to date (s/p 3 doses)   - s/p pneumococcal 23 valent   - Tdap: up to date  - s/p shingrix     Return to clinic for annual exam in one year.     Staffed with Dr. Vipin Rossi MD   OBGYN resident PGY1  2022 10:22 AM     Appreciate Dr. Rossi's note above, patient also seen and examined by me. " I agree with the note above.   Tiffany Lew MD

## 2022-03-17 NOTE — PROGRESS NOTES
[facility] clinic note     SUBJECTIVE     HPI:    Maria Esther Lowe is a 66 year old , here for annual exam.     Reports some occasional leakage of urine just before she sits on the toilet if her bladder is very full. Not overly bothersome to her, improves if she urinates more frequently. Does not want to pursue pelvic floor physical therapy at this time. Gets up once/night to use the bathroom. Had a transient episode of right sided breast pain a few weeks ago, this resolved. Is due for a mammogram. Otherwise no concerns. She uses estrace tablet daily and estrogen cream occasionally.     GYN History  - LMP: No LMP recorded. Patient has had a hysterectomy.  - Pap Smears: No history of abnormal paps, last , NILM. S/p hysterectomy   - Sexual Activity/Concerns: No concerns. Sexually active with .   - Hx STIs/UTIs: None     Obstetric History  OB History    Para Term  AB Living   0 0 0 0 0 0   SAB IAB Ectopic Multiple Live Births   0 0 0 0 0     Past Medical History  Past Medical History:   Diagnosis Date     Hyperlipidemia LDL goal < 130      Hypertension      Hypothyroidism      Osteopenia     DEXA: ; T-score of -1.9        Panhypopituitarism (H)      Past Surgical History  Past Surgical History:   Procedure Laterality Date     DAVINCI HYSTERECTOMY TOTAL, BILATERAL SALPINGO-OOPHORECTOMY, COMBINED Bilateral 2018    Procedure: COMBINED DAVINCI HYSTERECTOMY TOTAL, SALPINGO-OOPHORECTOMY;  Pelvic Washings, Davinci Assisted Laparoscopic Total Hysterectomy, Bilateral Salpingo Oophorectomy, cystoscopy;  Surgeon: Tiffany Lew MD;  [facility]     OPERATIVE HYSTEROSCOPY N/A 2018    Procedure: OPERATIVE HYSTEROSCOPY;  Operative Hysteroscopy, Resection of Endometrial Polyp, Dilation and Curettage;  Surgeon: Daysi Munoz MD;  [facility]     PROBE LACRIMAL DUCT, INSERT STENT BILATERAL, COMBINED Bilateral 2018    Procedure: BILATERAL NASOLACRIMAL STENT -  "LARGE DIAMETER STEN TUBE;  Surgeon: Davina Payton MD;  [facility]     resected prolactin-producing pituitary adenoma.  1975    Postoperative radiation therapy.      Medications  Current Outpatient Medications   Medication     CALCIUM 500 +D 500-400 MG-UNIT TABS     cholecalciferol (VITAMIN D3) 125 mcg (5000 units) capsule     clobetasol (TEMOVATE) 0.05 % external solution     conjugated estrogens (PREMARIN) cream     cyanocobalamin (VITAMIN  B-12) 1000 MCG tablet     estradiol (ESTRACE) 1 MG tablet     estradiol (VAGIFEM) 10 MCG TABS vaginal tablet     finasteride (PROSCAR) 5 MG tablet     Fluocinolone Acetonide Scalp (DERMA-SMOOTHE/FS SCALP) 0.01 % OIL oil     Fluocinolone Acetonide Scalp 0.01 % OIL oil     hydrocortisone (CORTEF) 10 MG tablet     ketoconazole (NIZORAL) 2 % external shampoo     ketoconazole (NIZORAL) 2 % external shampoo     levothyroxine (SYNTHROID/LEVOTHROID) 175 MCG tablet     levothyroxine (SYNTHROID/LEVOTHROID) 200 MCG tablet     losartan-hydrochlorothiazide (HYZAAR) 100-25 MG tablet     simvastatin (ZOCOR) 40 MG tablet     No current facility-administered medications for this visit.     Allergies  Allergies   Allergen Reactions     Ceftriaxone Other (See Comments)     Unsure of reaction.     Social History  Social History     Tobacco Use     Smoking status: Never Smoker     Smokeless tobacco: Never Used   Substance Use Topics     Alcohol use: No     Drug use: No     Family History  Family History   Problem Relation Age of Onset     Diabetes Paternal Grandmother      Depression Paternal Grandmother      Thyroid Disease Maternal Grandmother      Skin Cancer Father      Melanoma No family hx of      ROS: 10-Point ROS negative except as noted in HPI    OBJECTIVE     Physical Exam  /85   Pulse 71   Ht 1.651 m (5' 5\")   Wt 83.9 kg (185 lb)   Breastfeeding No   BMI 30.79 kg/m    Gen: Well-appearing, NAD  HEENT: Normocephalic, atraumatic  Neck: Thyroid is not enlarged, no " appreciable masses palpated. Non-tender  CV:  Regular rate, warm and well perfused   Pulm: Breathing comfortably on room air   Abd: Soft, non-tender, non-distended  Ext: No LE edema  Breast: Symmetric, no nipple discharge or retraction, no axillary lymphadenopathy, no palpable nodules or masses bilaterally    Pelvic:  Right labia is agglutinated to the right labium majus.  The left labia minora appears normal.  Introitus appears atrophic, no erythema or abnormal secretions.   Urethral meatus: normal   Urethra: no masses, tenderness, or scarring   Bladder: no masses or tenderness   Vagina: atrophic.   Cervix: surgically absent  Uterus: surgically absent  Adnexa: Surgically absent  Rectum:anus normal       ASSESSMENT & PLAN     Maria Esther Lowe is a 66 year old  female here for annual exam.     Plan:   HRT surveillance   - Working well for her, would like to continue. Risks and benefits discussed  - Prescription for Estrace 1 mg tablet daily refilled   - Continue estrogen cream as needed, does not need refill.      Screening:   - Paps: no further testing, s/p hysterectomy   - Mammogram: due, last 10/22/2020, order placed.    - colonoscopy: up to date, last 2020  - Lipids: up to date, last 3/2021 WNL  - HgA1c: up to date, last 3/2021 5.5    Immunizations:   - Flu: up to date   - Covid: up to date (s/p 3 doses)   - s/p pneumococcal 23 valent   - Tdap: up to date  - s/p shingrix     Return to clinic for annual exam in one year.     Staffed with Dr. Vipin Rossi MD   OBGYN resident PGY1  2022 10:22 AM     Appreciate Dr. Rossi's note above, patient also seen and examined by me. I agree with the note above.   Tiffany Lew MD

## 2022-03-22 ENCOUNTER — ANCILLARY PROCEDURE (OUTPATIENT)
Dept: MAMMOGRAPHY | Facility: CLINIC | Age: 67
End: 2022-03-22
Attending: OBSTETRICS & GYNECOLOGY
Payer: COMMERCIAL

## 2022-03-22 DIAGNOSIS — Z12.31 ENCOUNTER FOR SCREENING MAMMOGRAM FOR BREAST CANCER: ICD-10-CM

## 2022-03-22 PROCEDURE — 77067 SCR MAMMO BI INCL CAD: CPT

## 2022-03-22 PROCEDURE — 77067 SCR MAMMO BI INCL CAD: CPT | Mod: 26 | Performed by: RADIOLOGY

## 2022-04-08 ENCOUNTER — OFFICE VISIT (OUTPATIENT)
Dept: ENDOCRINOLOGY | Facility: CLINIC | Age: 67
End: 2022-04-08
Payer: COMMERCIAL

## 2022-04-08 VITALS
OXYGEN SATURATION: 97 % | DIASTOLIC BLOOD PRESSURE: 78 MMHG | SYSTOLIC BLOOD PRESSURE: 148 MMHG | HEART RATE: 88 BPM | WEIGHT: 185 LBS | BODY MASS INDEX: 30.79 KG/M2

## 2022-04-08 DIAGNOSIS — E23.0 HYPOPITUITARISM (H): Primary | ICD-10-CM

## 2022-04-08 DIAGNOSIS — E03.8 SECONDARY HYPOTHYROIDISM: ICD-10-CM

## 2022-04-08 DIAGNOSIS — E78.5 HYPERLIPIDEMIA LDL GOAL <100: ICD-10-CM

## 2022-04-08 DIAGNOSIS — E23.6 EMPTY SELLA (H): ICD-10-CM

## 2022-04-08 DIAGNOSIS — D35.2 PITUITARY ADENOMA (H): ICD-10-CM

## 2022-04-08 LAB
ALBUMIN SERPL-MCNC: 3.6 G/DL (ref 3.4–5)
ALP SERPL-CCNC: 89 U/L (ref 40–150)
ALT SERPL W P-5'-P-CCNC: 35 U/L (ref 0–50)
ANION GAP SERPL CALCULATED.3IONS-SCNC: 8 MMOL/L (ref 3–14)
AST SERPL W P-5'-P-CCNC: 24 U/L (ref 0–45)
BILIRUB SERPL-MCNC: 0.2 MG/DL (ref 0.2–1.3)
BUN SERPL-MCNC: 22 MG/DL (ref 7–30)
CALCIUM SERPL-MCNC: 9.6 MG/DL (ref 8.5–10.1)
CHLORIDE BLD-SCNC: 107 MMOL/L (ref 94–109)
CO2 SERPL-SCNC: 27 MMOL/L (ref 20–32)
CREAT SERPL-MCNC: 1.12 MG/DL (ref 0.52–1.04)
GFR SERPL CREATININE-BSD FRML MDRD: 54 ML/MIN/1.73M2
GLUCOSE BLD-MCNC: 136 MG/DL (ref 70–99)
POTASSIUM BLD-SCNC: 3.3 MMOL/L (ref 3.4–5.3)
PROT SERPL-MCNC: 7.3 G/DL (ref 6.8–8.8)
SODIUM SERPL-SCNC: 142 MMOL/L (ref 133–144)
T4 FREE SERPL-MCNC: 1.18 NG/DL (ref 0.76–1.46)
TSH SERPL DL<=0.005 MIU/L-ACNC: 0.04 MU/L (ref 0.4–4)

## 2022-04-08 PROCEDURE — 84305 ASSAY OF SOMATOMEDIN: CPT | Performed by: INTERNAL MEDICINE

## 2022-04-08 PROCEDURE — 80053 COMPREHEN METABOLIC PANEL: CPT | Performed by: INTERNAL MEDICINE

## 2022-04-08 PROCEDURE — 99215 OFFICE O/P EST HI 40 MIN: CPT | Performed by: INTERNAL MEDICINE

## 2022-04-08 PROCEDURE — 84439 ASSAY OF FREE THYROXINE: CPT | Performed by: INTERNAL MEDICINE

## 2022-04-08 PROCEDURE — 36415 COLL VENOUS BLD VENIPUNCTURE: CPT | Performed by: INTERNAL MEDICINE

## 2022-04-08 PROCEDURE — 84443 ASSAY THYROID STIM HORMONE: CPT | Performed by: INTERNAL MEDICINE

## 2022-04-08 RX ORDER — SIMVASTATIN 40 MG
40 TABLET ORAL DAILY
Qty: 90 TABLET | Refills: 3 | Status: SHIPPED | OUTPATIENT
Start: 2022-04-08 | End: 2022-12-01

## 2022-04-08 RX ORDER — LEVOTHYROXINE SODIUM 150 UG/1
TABLET ORAL
Qty: 48 TABLET | Refills: 3 | Status: SHIPPED | OUTPATIENT
Start: 2022-04-08 | End: 2022-12-01

## 2022-04-08 RX ORDER — LEVOTHYROXINE SODIUM 175 UG/1
TABLET ORAL
Qty: 36 TABLET | Refills: 3 | Status: SHIPPED | OUTPATIENT
Start: 2022-04-08 | End: 2023-04-05

## 2022-04-08 ASSESSMENT — PATIENT HEALTH QUESTIONNAIRE - PHQ9: SUM OF ALL RESPONSES TO PHQ QUESTIONS 1-9: 6

## 2022-04-08 NOTE — NURSING NOTE
Maria Esther Lowe's goals for this visit include:   Chief Complaint   Patient presents with     New Patient     Consult     She requests these members of her care team be copied on today's visit information: Yes    PCP: Estela Boswell    Referring Provider:  Referred Self, MD  No address on file    BP (!) 148/78 (BP Location: Left arm, Patient Position: Sitting, Cuff Size: Adult Large)   Pulse 88   Wt 83.9 kg (185 lb)   SpO2 97%   BMI 30.79 kg/m      Do you need any medication refills at today's visit? Yes    Eddie Short [facility]  Adult Endocrinology  [facility]

## 2022-04-08 NOTE — PROGRESS NOTES
- Endocrinology Follow up-    Reason for visit/consult:     Prolactinoma (H)  Panhypopituitarism (H)  Hypothyroidism, unspecified type    Primary care provider: No primary care provider on file.    Assessment and Plan  66 year old female with following conditions    # Panhypopituitarism-empty sella   Hydrocortisone 10/5 mg continue    # previous record of low IGF1  Will repeat IGF1 and if still low, then we will try GH therapy with low dose such as 0.1-0.2 mg daily injection. After IGF1 results, we will communicate with the patient.     # Remote history of a prolactin-producing pituitary adenoma-   Post surgery followed by RT, empty sella    - PRL level very mildly increased, no concern for tumor regrwoth at this point    - transfer most recent 2022 [facility] [place]    # secondary hypothyroidism  Free T4 still mildly elevated but improving.     - further reduce from LT4 175 daily to LT4 175 mcg 3 days a week, 150 mcg 4 days a week    #Osteopenia.   3/2022, stable osteopenia    - continue citracal 3 tabs (945 mg elemental Ca and VitD 750IU)      # Vitamin D take  She is currently taking extra 5000 international unit(s) vitamin D and level 2021 was vitamin D 84, now 64    - continue vitamin D 5000 international unit(s) from daily to Mon/Wed/Fri only    - recheck level in 3 month     # Hair loss- on Estradiol 1 mg, estradiol cream, finadteride  Patient described improving.     No hot flush    # Hysterectomy  Post in 9/2018        - RTC with me in 6 month       Total 45 minutes spent on the date of the encounter doing chart review, history and exam, documentation and further activities as noted above.    Leona Arrington MD  Staff Physician  Endocrinology and Metabolism  [facility]  License: [id number]  Pager: [phone]    Interval History as of 4/8/2022 : Patient has been doing well. Medication compliance : excellent.  Still has had hair loss.   Interval History as of 7/26/2021 : Patient has been doing well. Last seen . Medication compliance   . New event includes  .  Interval History as of 9/9/2019 : Patient has been doing well. Stable on low dose hydorcoritisone dose 10 mg daily only for many years.  She still had dizziness and fatigue chronically. Also complainted poor quality of sleep at night. No hot flush,   Interval History as of 10/2018: 1/2018 dizziness went to ED in [facility], was fine.   BW stable, HTN, hysterectomy 9/2018. Large cyst, uterus fluid. Will be seen surgeon soon. Yellow fluid still. 3-4 pads daily.   HPI: A 61 yo female previous Dr. JAQUELINE Perdomo's patient, here for follow up. She has remote history of prolactinoma in her 20's, she underwent surgical removal in 1975 followed by RT. 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   Currently taking hydrocotisone 10 mg am only for at least several years, she has been doing well, she requires afternoon nap 1 pm for 20 minutes.   She has been taking LT4 was on 175 mcg and last year increased to 200 mcg with compliance.    Regarding hypogonadism, she has been taking premarin (conjugated estrogen) 0.625 mg. Progesterone (Prometrium) 100 mg daily. Her main concern today is about hair loss. After pituitary surgery, she lost lots hair, but after that with HRT, her hair started to grow. However past several months, she started to lose hair again and wishing to have more hair come back.   Appetite: good, she is doing two jobs, She is  lives with her , adopted one child long time ago who is age 31 now.  No HA.       Past Medical/Surgical History:  Past Medical History:   Diagnosis Date     Hyperlipidemia LDL goal < 130      Hypertension      Hypothyroidism      Osteopenia     DEXA: 12/11; T-score of -1.9        Panhypopituitarism (H)      Past Surgical History:   Procedure Laterality Date     DAVINCI HYSTERECTOMY TOTAL, BILATERAL  SALPINGO-OOPHORECTOMY, COMBINED Bilateral 9/11/2018    Procedure: COMBINED DAVINCI HYSTERECTOMY TOTAL, SALPINGO-OOPHORECTOMY;  Pelvic Washings, Davinci Assisted Laparoscopic Total Hysterectomy, Bilateral Salpingo Oophorectomy, cystoscopy;  Surgeon: Tiffany Lew MD;  [facility]     OPERATIVE HYSTEROSCOPY N/A 1/30/2018    Procedure: OPERATIVE HYSTEROSCOPY;  Operative Hysteroscopy, Resection of Endometrial Polyp, Dilation and Curettage;  Surgeon: Daysi Munoz MD;  [facility]     PROBE LACRIMAL DUCT, INSERT STENT BILATERAL, COMBINED Bilateral 11/26/2018    Procedure: BILATERAL NASOLACRIMAL STENT - LARGE DIAMETER STEN TUBE;  Surgeon: Davina Payton MD;  [facility]     resected prolactin-producing pituitary adenoma.  1975    Postoperative radiation therapy.        Allergies:  Allergies   Allergen Reactions     Ceftriaxone Other (See Comments)     Unsure of reaction.       Current Medications   Current Outpatient Medications   Medication     CALCIUM 500 +D 500-400 MG-UNIT TABS     cholecalciferol (VITAMIN D3) 125 mcg (5000 units) capsule     clobetasol (TEMOVATE) 0.05 % external solution     conjugated estrogens (PREMARIN) cream     cyanocobalamin (VITAMIN  B-12) 1000 MCG tablet     estradiol (ESTRACE) 1 MG tablet     estradiol (VAGIFEM) 10 MCG TABS vaginal tablet     finasteride (PROSCAR) 5 MG tablet     Fluocinolone Acetonide Scalp (DERMA-SMOOTHE/FS SCALP) 0.01 % OIL oil     Fluocinolone Acetonide Scalp 0.01 % OIL oil     hydrocortisone (CORTEF) 10 MG tablet     ketoconazole (NIZORAL) 2 % external shampoo     ketoconazole (NIZORAL) 2 % external shampoo     levothyroxine (SYNTHROID/LEVOTHROID) 175 MCG tablet     levothyroxine (SYNTHROID/LEVOTHROID) 200 MCG tablet     losartan-hydrochlorothiazide (HYZAAR) 100-25 MG tablet     simvastatin (ZOCOR) 40 MG tablet     No current facility-administered medications for this visit.       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     ROS:  Full review of systems taken with the help of the intake sheet. Otherwise a complete 14 point review of systems was taken and is negative unless stated in the history above.    Physical Exam:   BP (!) 148/78 (BP Location: Left arm, Patient Position: Sitting, Cuff Size: Adult Large)   Pulse 88   Wt 83.9 kg (185 lb)   SpO2 97%   BMI 30.79 kg/m    General: well appearing, no acute distress, pleasant and conversant,   Mental Status/neuro: alert and oriented  Resp: no acute distress  Face: normal facial color    Labs : I reviewed data from epic and extract and summarize the pertinent data here.        3/17/2022 11:37 3/22/2022 11:40 4/8/2022 16:24   T4 Free 1.64 (H)  1.18   TSH 0.02 (L)  0.04 (L)        1/29/2015 11:34 12/3/2015 09:23 7/22/2016 10:04 12/6/2016 18:49 10/23/2017 11:15   Prolactin 23 21 32 (H) 30 (H) 32 (H)     Lab Results   Component Value Date     10/23/2017      Lab Results   Component Value Date    POTASSIUM 3.9 10/23/2017     Lab Results   Component Value Date    CHLORIDE 105 10/23/2017     Lab Results   Component Value Date    ARI 8.5 10/23/2017     Lab Results   Component Value Date    CO2 24 10/23/2017     Lab Results   Component Value Date    BUN 14 10/23/2017     Lab Results   Component Value Date    CR 1.01 10/23/2017     Lab Results   Component Value Date     10/23/2017     Lab Results   Component Value Date    TSH 0.06 10/23/2017     Lab Results   Component Value Date    T4 1.26 10/23/2017       Lab Results   Component Value Date    FSH  07/22/2016     <0.2  FSH  Reference Range   Female: Follicular      2.5-10.2           Mid-cycle       3.4-33.4           Luteal          1.5-9.1           Postmenopausal  23.0-116.3         Lab Results   Component Value Date    PROLACTIN 30 12/06/2016     Bone density : 10/24/2018   HISTORY: 62 yo female osteopenia, hysterectomy, panhypopituitarism;  Osteopenia, unspecified location     COMPARISON:   10/3/2015     Age: 63  years.  Height: 65 inches  Weight: 181 pounds  Sex: Female  Ethnicity: White     Image quality: Adequate     Lumbar spine T-score in region of L1-L4 = -1.9   L1-4 percent change: 0.6%      HIPS:  Mean total hip T-score: -1.2  Mean total hip percent change: 1.4%      Left femoral neck T-score = -1.6  Right femoral neck T-score= -1.2      COMPARISON TO PRIOR:  Percent change in the spine and hips is NOT significant (or considered  invalid) accounting for the precision errors for this facility.                                                                      IMPRESSION:  Osteopenia    Bone density : 3/17/2022 I reviewed the original images and explained to the patient.     Impression  The most negative and valid T-score of -1.5 at the level of the left femoral neck corresponds with low bone density according to WHO criteria for postmenopausal females and men age 50 and over. The risk of osteoporotic fracture increases approximately 2-fold for each 1.0 SD decrease in T-score.     Results   Lumbar spine   T-score -1.4 , BMD 1.017 g/cm2.      Left Femur neck  T-score -1.5 , BMD 0.828 g/cm2.  Left Total hip  T-score -1.2 , BMD 0.861 g/cm2.     Right Femur neck  T-score -1.0, BMD  0.905 g/cm2.  Right Total hip  T-score -1.2 , BMD  0.859 g/cm2.     Interval change  Bone density compared to the prior study has changed at lumbar spine by +7.6%, at the right total femur by +3.2%.     Percent changes not mentioned or within remaining regions are insignificant  Please note that the differential diagnosis of BMD increase in the  spine includes improvement due to pharmacotherapy vs inter-current progression of spine degeneration or fracture

## 2022-04-08 NOTE — LETTER
4/8/2022         RE: Maria Esther Lowe  [address] [facility] [address]        Dear Colleague,    Thank you for referring your patient, Maria Esther Lowe, to the [facility]. Please see a copy of my visit note below.                                                                                - Endocrinology Follow up-    Reason for visit/consult:     Prolactinoma (H)  Panhypopituitarism (H)  Hypothyroidism, unspecified type    Primary care provider: No primary care provider on file.    Assessment and Plan  66 year old female with following conditions    # Panhypopituitarism-empty sella   Hydrocortisone 10/5 mg continue    # previous record of low IGF1  Will repeat IGF1 and if still low, then we will try GH therapy with low dose such as 0.1-0.2 mg daily injection. After IGF1 results, we will communicate with the patient.     # Remote history of a prolactin-producing pituitary adenoma-   Post surgery followed by RT, empty sella    - PRL level very mildly increased, no concern for tumor regrwoth at this point    - transfer most recent 2022 MRI from [facility] to [place]    # secondary hypothyroidism  Free T4 still mildly elevated but improving.     - further reduce from LT4 175 daily to LT4 175 mcg 3 days a week, 150 mcg 4 days a week    #Osteopenia.   3/2022, stable osteopenia    - continue citracal 3 tabs (945 mg elemental Ca and VitD 750IU)      # Vitamin D take  She is currently taking extra 5000 international unit(s) vitamin D and level 2021 was vitamin D 84, now 64    - continue vitamin D 5000 international unit(s) from daily to Mon/Wed/Fri only    - recheck level in 3 month     # Hair loss- on Estradiol 1 mg, estradiol cream, finadteride  Patient described improving.     No hot flush    # Hysterectomy  Post in 9/2018        - RTC with me in 6 month       Total 45 minutes spent on the date of the encounter doing chart review, history and exam, documentation and further activities  as noted above.    Leona Arrington MD  Staff Physician  Endocrinology and Metabolism  [facility]  License: [id number]  Pager: [phone]    Interval History as of 4/8/2022 : Patient has been doing well. Medication compliance : excellent. Still has had hair loss.   Interval History as of 7/26/2021 : Patient has been doing well. Last seen . Medication compliance   . New event includes  .  Interval History as of 9/9/2019 : Patient has been doing well. Stable on low dose hydorcoritisone dose 10 mg daily only for many years.  She still had dizziness and fatigue chronically. Also complainted poor quality of sleep at night. No hot flush,   Interval History as of 10/2018: 1/2018 dizziness went to ED in [facility], was fine.   BW stable, HTN, hysterectomy 9/2018. Large cyst, uterus fluid. Will be seen surgeon soon. Yellow fluid still. 3-4 pads daily.   HPI: A 63 yo female previous Dr. JAQUELINE Perdomo's patient, here for follow up. She has remote history of prolactinoma in her 20's, she underwent surgical removal in 1975 followed by RT. 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   Currently taking hydrocotisone 10 mg am only for at least several years, she has been doing well, she requires afternoon nap 1 pm for 20 minutes.   She has been taking LT4 was on 175 mcg and last year increased to 200 mcg with compliance.    Regarding hypogonadism, she has been taking premarin (conjugated estrogen) 0.625 mg. Progesterone (Prometrium) 100 mg daily. Her main concern today is about hair loss. After pituitary surgery, she lost lots hair, but after that with HRT, her hair started to grow. However past several months, she started to lose hair again and wishing to have more hair come back.   Appetite: good, she is doing two jobs, She is  lives with her , adopted one child long time ago who is age 31 now.  No HA.       Past Medical/Surgical History:  Past Medical History:   Diagnosis Date      Hyperlipidemia LDL goal < 130      Hypertension      Hypothyroidism      Osteopenia     DEXA: 12/11; T-score of -1.9        Panhypopituitarism (H)      Past Surgical History:   Procedure Laterality Date     DAVINCI HYSTERECTOMY TOTAL, BILATERAL SALPINGO-OOPHORECTOMY, COMBINED Bilateral 9/11/2018    Procedure: COMBINED DAVINCI HYSTERECTOMY TOTAL, SALPINGO-OOPHORECTOMY;  Pelvic Washings, Davinci Assisted Laparoscopic Total Hysterectomy, Bilateral Salpingo Oophorectomy, cystoscopy;  Surgeon: Tiffany Lew MD;  Location: [facility] OR     OPERATIVE HYSTEROSCOPY N/A 1/30/2018    Procedure: OPERATIVE HYSTEROSCOPY;  Operative Hysteroscopy, Resection of Endometrial Polyp, Dilation and Curettage;  Surgeon: Daysi Munoz MD;  Location: [facility] OR     PROBE LACRIMAL DUCT, INSERT STENT BILATERAL, COMBINED Bilateral 11/26/2018    Procedure: BILATERAL NASOLACRIMAL STENT - LARGE DIAMETER STEN TUBE;  Surgeon: Davina Payton MD;  Location: [facility] OR     resected prolactin-producing pituitary adenoma.  1975    Postoperative radiation therapy.        Allergies:  Allergies   Allergen Reactions     Ceftriaxone Other (See Comments)     Unsure of reaction.       Current Medications   Current Outpatient Medications   Medication     CALCIUM 500 +D 500-400 MG-UNIT TABS     cholecalciferol (VITAMIN D3) 125 mcg (5000 units) capsule     clobetasol (TEMOVATE) 0.05 % external solution     conjugated estrogens (PREMARIN) cream     cyanocobalamin (VITAMIN  B-12) 1000 MCG tablet     estradiol (ESTRACE) 1 MG tablet     estradiol (VAGIFEM) 10 MCG TABS vaginal tablet     finasteride (PROSCAR) 5 MG tablet     Fluocinolone Acetonide Scalp (DERMA-SMOOTHE/FS SCALP) 0.01 % OIL oil     Fluocinolone Acetonide Scalp 0.01 % OIL oil     hydrocortisone (CORTEF) 10 MG tablet     ketoconazole (NIZORAL) 2 % external shampoo     ketoconazole (NIZORAL) 2 % external shampoo     levothyroxine (SYNTHROID/LEVOTHROID) 175 MCG tablet     levothyroxine  (SYNTHROID/LEVOTHROID) 200 MCG tablet     losartan-hydrochlorothiazide (HYZAAR) 100-25 MG tablet     simvastatin (ZOCOR) 40 MG tablet     No current facility-administered medications for this visit.       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     ROS:  Full review of systems taken with the help of the intake sheet. Otherwise a complete 14 point review of systems was taken and is negative unless stated in the history above.    Physical Exam:   BP (!) 148/78 (BP Location: Left arm, Patient Position: Sitting, Cuff Size: Adult Large)   Pulse 88   Wt 83.9 kg (185 lb)   SpO2 97%   BMI 30.79 kg/m    General: well appearing, no acute distress, pleasant and conversant,   Mental Status/neuro: alert and oriented  Resp: no acute distress  Face: normal facial color    Labs : I reviewed data from epic and extract and summarize the pertinent data here.        3/17/2022 11:37 3/22/2022 11:40 4/8/2022 16:24   T4 Free 1.64 (H)  1.18   TSH 0.02 (L)  0.04 (L)        1/29/2015 11:34 12/3/2015 09:23 7/22/2016 10:04 12/6/2016 18:49 10/23/2017 11:15   Prolactin 23 21 32 (H) 30 (H) 32 (H)     Lab Results   Component Value Date     10/23/2017      Lab Results   Component Value Date    POTASSIUM 3.9 10/23/2017     Lab Results   Component Value Date    CHLORIDE 105 10/23/2017     Lab Results   Component Value Date    ARI 8.5 10/23/2017     Lab Results   Component Value Date    CO2 24 10/23/2017     Lab Results   Component Value Date    BUN 14 10/23/2017     Lab Results   Component Value Date    CR 1.01 10/23/2017      Lab Results   Component Value Date     10/23/2017     Lab Results   Component Value Date    TSH 0.06 10/23/2017     Lab Results   Component Value Date    T4 1.26 10/23/2017       Lab Results   Component Value Date    FSH  07/22/2016     <0.2  FSH Reference Range   Female: Follicular      2.5-10.2           Mid-cycle       3.4-33.4           Luteal          1.5-9.1           Postmenopausal  23.0-116.3         Lab Results   Component Value Date    PROLACTIN 30 12/06/2016     Bone density : 10/24/2018   HISTORY: 64 yo female osteopenia, hysterectomy, panhypopituitarism;  Osteopenia, unspecified location     COMPARISON:   10/3/2015     Age: 63  years.  Height: 65 inches  Weight: 181 pounds  Sex: Female  Ethnicity: White     Image quality: Adequate     Lumbar spine T-score in region of L1-L4 = -1.9   L1-4 percent change: 0.6%      HIPS:  Mean total hip T-score: -1.2  Mean total hip percent change: 1.4%      Left femoral neck T-score = -1.6  Right femoral neck T-score= -1.2      COMPARISON TO PRIOR:  Percent change in the spine and hips is NOT significant (or considered  invalid) accounting for the precision errors for this facility.                                                                      IMPRESSION:  Osteopenia    Bone density : 3/17/2022 I reviewed the original images and explained to the patient.     Impression  The most negative and valid T-score of -1.5 at the level of the left femoral neck corresponds with low bone density according to WHO criteria for postmenopausal females and men age 50 and over. The risk of osteoporotic fracture increases approximately 2-fold for each 1.0 SD decrease in T-score.     Results   Lumbar spine   T-score -1.4 , BMD 1.017 g/cm2.      Left Femur neck  T-score -1.5 , BMD 0.828 g/cm2.  Left Total hip  T-score -1.2 , BMD 0.861 g/cm2.     Right Femur neck  T-score -1.0, BMD  0.905 g/cm2.  Right Total hip  T-score -1.2 , BMD  0.859 g/cm2.     Interval change  Bone  density compared to the prior study has changed at lumbar spine by +7.6%, at the right total femur by +3.2%.     Percent changes not mentioned or within remaining regions are insignificant  Please note that the differential diagnosis of BMD increase in the spine includes improvement due to pharmacotherapy vs inter-current progression of spine degeneration or fracture         Again, thank you for allowing me to participate in the care of your patient.        Sincerely,        Leona Arrington MD

## 2022-04-12 LAB — IGF-I BLD-MCNC: 23 NG/ML (ref 32–238)

## 2022-04-21 ENCOUNTER — MYC MEDICAL ADVICE (OUTPATIENT)
Dept: ENDOCRINOLOGY | Facility: CLINIC | Age: 67
End: 2022-04-21
Payer: COMMERCIAL

## 2022-04-24 ENCOUNTER — MYC MEDICAL ADVICE (OUTPATIENT)
Dept: DERMATOLOGY | Facility: CLINIC | Age: 67
End: 2022-04-24
Payer: COMMERCIAL

## 2022-04-25 ENCOUNTER — VIRTUAL VISIT (OUTPATIENT)
Dept: DERMATOLOGY | Facility: CLINIC | Age: 67
End: 2022-04-25
Payer: COMMERCIAL

## 2022-04-25 ENCOUNTER — MYC MEDICAL ADVICE (OUTPATIENT)
Dept: DERMATOLOGY | Facility: CLINIC | Age: 67
End: 2022-04-25

## 2022-04-25 DIAGNOSIS — L63.9 ALOPECIA AREATA: Primary | ICD-10-CM

## 2022-04-25 PROCEDURE — 99214 OFFICE O/P EST MOD 30 MIN: CPT | Mod: TEL | Performed by: DERMATOLOGY

## 2022-04-25 RX ORDER — CLOBETASOL PROPIONATE 0.5 MG/ML
SOLUTION TOPICAL
Qty: 60 ML | Refills: 1 | Status: SHIPPED | OUTPATIENT
Start: 2022-04-25 | End: 2022-05-25

## 2022-04-25 NOTE — PROGRESS NOTES
[facility] Dermatology Note  Encounter Date: Apr 25, 2022  Store-and-Forward and Telephone ([phone] ). Location of teledermatologist: [facility].  Start time: 3:02pm End time: 3:17pm.    - TSH <0.01 in 6/30/2021  - She is on estradiol per endocrinology  2. Pituitary prolactinoma s/p removal in 1975 followed by radiation with subsequent development of panhypopituitarism      ____________________________________________     Assessment & Plan:     # Female pattern hair loss, favor androgenetic alopecia +/- contribution from underlying thyroid disease but had prior bx with alopecia areata. Based on phone call now has an AA flare. Follows with endo. Her alopecia areata appears active now. We will go ahead and start treatment with steorids  - Continue Minoxidil BID  - Continue Hair Max laser comb TIW  - Continue finasteride with no change, daily  - hold oil, dermasmoothe  - Future: spironolactone or oral Minoxidil or PRP- see endo, check with them first for marquise  Prior recs:   micropigmentation at [facility]          #vitamin D  -endo record reviewed  -treated via ENDO endocrinologist now three times per week dosing              Procedures Performed:    None    Follow-up:  Please add on at 8:30 am for injeciton of scalp for hair loss. Plan for 3cc kenalog 5mg/cc# Hair labs     Staff:     Carley Serrano MD    Department of Dermatology  [facility]: Phone: [phone], Fax:[phone]  [facility]: Phone: [phone], Fax: [phone]      ____________________________________________    CC: Derm Problem (Kathy, is here for a hair loss appt)    HPI:  Ms. Maria Esther Lowe is a(n) 66 year old female who presents today as a return patient for hair loss. Pt say Dr. Arrington about 2 weeks ago and thyroid was adjusted    Patient is otherwise  feeling well, without additional skin concerns.    Labs Reviewed:  Last dermpath reviewed on hair    Physical Exam:  Vitals: There were no vitals taken for this visit.  SKIN: Teledermatology photos were reviewed; image quality and interpretability:  acceptable. Image date: taken today  - shiny patches of hair loss on the crown and frontal scalp  - No other lesions of concern on areas examined.     Medications:  Current Outpatient Medications   Medication     CALCIUM 500 +D 500-400 MG-UNIT TABS     cholecalciferol (VITAMIN D3) 125 mcg (5000 units) capsule     clobetasol (TEMOVATE) 0.05 % external solution     conjugated estrogens (PREMARIN) cream     cyanocobalamin (VITAMIN  B-12) 1000 MCG tablet     estradiol (ESTRACE) 1 MG tablet     estradiol (VAGIFEM) 10 MCG TABS vaginal tablet     finasteride (PROSCAR) 5 MG tablet     Fluocinolone Acetonide Scalp (DERMA-SMOOTHE/FS SCALP) 0.01 % OIL oil     hydrocortisone (CORTEF) 10 MG tablet     ketoconazole (NIZORAL) 2 % external shampoo     levothyroxine (SYNTHROID/LEVOTHROID) 150 MCG tablet     levothyroxine (SYNTHROID/LEVOTHROID) 175 MCG tablet     losartan-hydrochlorothiazide (HYZAAR) 100-25 MG tablet     simvastatin (ZOCOR) 40 MG tablet     Fluocinolone Acetonide Scalp 0.01 % OIL oil     ketoconazole (NIZORAL) 2 % external shampoo     levothyroxine (SYNTHROID/LEVOTHROID) 200 MCG tablet     No current facility-administered medications for this visit.      Past Medical/Surgical History:   Patient Active Problem List   Diagnosis     Prolactinoma (H)     Panhypopituitarism (H)     Hormone replacement therapy (postmenopausal)     Carpal tunnel syndrome     History of robot-assisted laparoscopic hysterectomy     Past Medical History:   Diagnosis Date     Hyperlipidemia LDL goal < 130      Hypertension      Hypothyroidism      Osteopenia     DEXA: 12/11; T-score of -1.9        Panhypopituitarism (H)        CC No referring provider defined for this encounter. on close of this  encounter.

## 2022-04-25 NOTE — PATIENT INSTRUCTIONS
[facility] Dermatology Visit    Thank you for allowing us to participate in your care. Your findings, instructions and follow-up plan are as follows:         When should I call my doctor?  If you are worsening or not improving, please, contact us or seek urgent care as noted below.     Who should I call with questions (adults)?  [facility] (adult and pediatric): [phone]  [facility] (adult): [phone]  For urgent needs outside of business hours call the [facility] at [phone] and ask for the dermatology resident on call  If this is a medical emergency and you are unable to reach an ER, Call 911    Who should I call with questions (pediatric)?  [facility]- Pediatric Dermatology  Dr. Malathi Hendricks, Dr. Virginia Gunn, Dr. Ileana Rosario, Toma Jordan, PA  Dr. Veronica Nance, Dr. Isabel Nguyen & Dr. Santi Barraza  Non Urgent  Nurse Triage Line; [phone]- Laura and Ira RN Care Coordinators   Cora (/Complex ) [phone]    If you need a prescription refill, please contact your pharmacy. Refills are approved or denied by our physicians during normal business hours, Monday through Fridays  Per office policy, refills will not be granted if you have not been seen within the past year (or sooner depending on your child's condition).    Scheduling Information:  Pediatric Appointment Scheduling and Call Center [phone]  Radiology Scheduling- [phone]  Sedation Unit Scheduling- [phone]  [place] Scheduling- General [phone]; Pediatric Dermatology [phone]  Main  Services: [phone]  Citizen of [place]: [phone]  [place]yp[place]: [phone]  Hmong/Dario/Nauruan: [phone]  Preadmission Nursing Department Fax Number: [phone] (fax all pre-operative paperwork to this number)    For urgent matters arising during evenings, weekends, or  holidays that cannot wait for normal business hours please call [phone] and ask for the dermatology resident on call to be paged.

## 2022-04-25 NOTE — LETTER
4/25/2022         RE: Maria Esther Lowe  [address] [place] [address]        Dear Colleague,    Thank you for referring your patient, Maria Esther Lowe, to the [facility]. Please see a copy of my visit note below.    [facility] Dermatology Note  Encounter Date: Apr 25, 2022  Store-and-Forward and Telephone ([phone] ). Location of teledermatologist: [facility].  Start time: 3:02pm End time: 3:17pm.    - TSH <0.01 in 6/30/2021  - She is on estradiol per endocrinology  2. Pituitary prolactinoma s/p removal in 1975 followed by radiation with subsequent development of panhypopituitarism      ____________________________________________     Assessment & Plan:     # Female pattern hair loss, favor androgenetic alopecia +/- contribution from underlying thyroid disease but had prior bx with alopecia areata. Based on phone call now has an AA flare. Follows with endo. Her alopecia areata appears active now. We will go ahead and start treatment with steorids  - Continue Minoxidil BID  - Continue Hair Max laser comb TIW  - Continue finasteride with no change, daily  - hold oil, dermasmoothe  - Future: spironolactone or oral Minoxidil or PRP- see endo, check with them first for marquise  Prior recs:   micropigmentation at [facility]          #vitamin D  -endo record reviewed  -treated via ENDO endocrinologist now three times per week dosing              Procedures Performed:    None    Follow-up:  Please add on at 8:30 am for injeciton of scalp for hair loss. Plan for 3cc kenalog 5mg/cc# Hair labs     Staff:     Carley Serrano MD    Department of Dermatology  [facility]: Phone: [phone], Fax:[phone]  [facility]: Phone: [phone], Fax:  [phone]      ____________________________________________    CC: Derm Problem (Kathy, is here for a hair loss appt)    HPI:  Ms. Maria Esther Lowe is a(n) 66 year old female who presents today as a return patient for hair loss. Pt say Dr. Arrington about 2 weeks ago and thyroid was adjusted    Patient is otherwise feeling well, without additional skin concerns.    Labs Reviewed:  Last dermpath reviewed on hair    Physical Exam:  Vitals: There were no vitals taken for this visit.  SKIN: Teledermatology photos were reviewed; image quality and interpretability:  acceptable. Image date: taken today  - shiny patches of hair loss on the crown and frontal scalp  - No other lesions of concern on areas examined.     Medications:  Current Outpatient Medications   Medication     CALCIUM 500 +D 500-400 MG-UNIT TABS     cholecalciferol (VITAMIN D3) 125 mcg (5000 units) capsule     clobetasol (TEMOVATE) 0.05 % external solution     conjugated estrogens (PREMARIN) cream     cyanocobalamin (VITAMIN  B-12) 1000 MCG tablet     estradiol (ESTRACE) 1 MG tablet     estradiol (VAGIFEM) 10 MCG TABS vaginal tablet     finasteride (PROSCAR) 5 MG tablet     Fluocinolone Acetonide Scalp (DERMA-SMOOTHE/FS SCALP) 0.01 % OIL oil     hydrocortisone (CORTEF) 10 MG tablet     ketoconazole (NIZORAL) 2 % external shampoo     levothyroxine (SYNTHROID/LEVOTHROID) 150 MCG tablet     levothyroxine (SYNTHROID/LEVOTHROID) 175 MCG tablet     losartan-hydrochlorothiazide (HYZAAR) 100-25 MG tablet     simvastatin (ZOCOR) 40 MG tablet     Fluocinolone Acetonide Scalp 0.01 % OIL oil     ketoconazole (NIZORAL) 2 % external shampoo     levothyroxine (SYNTHROID/LEVOTHROID) 200 MCG tablet     No current facility-administered medications for this visit.      Past Medical/Surgical History:   Patient Active Problem List   Diagnosis     Prolactinoma (H)     Panhypopituitarism (H)     Hormone replacement therapy (postmenopausal)     Carpal tunnel syndrome     History  of robot-assisted laparoscopic hysterectomy     Past Medical History:   Diagnosis Date     Hyperlipidemia LDL goal < 130      Hypertension      Hypothyroidism      Osteopenia     DEXA: 12/11; T-score of -1.9        Panhypopituitarism (H)        CC No referring provider defined for this encounter. on close of this encounter.          Again, thank you for allowing me to participate in the care of your patient.        Sincerely,        Carley Serrano MD

## 2022-04-25 NOTE — NURSING NOTE
Chief Complaint   Patient presents with     Derm Problem     Kathy, is here for a hair loss appt        Teledermatology Nurse Call Patients:     Are you in the [facility] at the time of the encounter?yes    Today's visit will be billed to you and your insurance.    A teledermatology visit is not as thorough as an in-person visit and the quality of the photograph sent may not be of the same quality as that taken by the dermatology clinic.    Dayday Fernandez VF

## 2022-04-27 ENCOUNTER — OFFICE VISIT (OUTPATIENT)
Dept: DERMATOLOGY | Facility: CLINIC | Age: 67
End: 2022-04-27
Payer: COMMERCIAL

## 2022-04-27 DIAGNOSIS — L63.9 ALOPECIA AREATA: Primary | ICD-10-CM

## 2022-04-27 PROCEDURE — 99213 OFFICE O/P EST LOW 20 MIN: CPT | Mod: 25 | Performed by: DERMATOLOGY

## 2022-04-27 PROCEDURE — 11901 INJECT SKIN LESIONS >7: CPT | Performed by: DERMATOLOGY

## 2022-04-27 NOTE — PROGRESS NOTES
[facility] Dermatology Note  Encounter Date: Apr 27, 2022  Office Visit     Dermatology Problem List:  1. 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 Repeat bx 5/20/2020 most consistent with androgenetic alopecia (which makes sense in setting of #2 as well)   - Biopsy in 2016 consistent with alopecia areata, but not clinically consistent; had outside ILK without improvement per report  - Repeat bx 5/20/2020 with nonscarring alopecia with marked miniaturization, favoring androgenetic alopecia. Also mild perifollicular fibrosis.  - Current tx: LLLT 3x weekly (started years ago), finasteride 5 mg daily (started 6/2020), ketoconazole shampoo, Minoxidil 2x daily, clobetasol solution   2. Pituitary prolactinoma s/p removal in 1975 followed by radiation with subsequent development of panhypopituitarism   3.  TSH <0.01 in 6/30/2021  - She is on estradiol per endocrinology       ____________________________________________    Assessment & Plan:   # Female pattern hair loss, favor androgenetic alopecia +/- contribution from underlying thyroid disease but had prior bx with alopecia areata.Consider evolving AA flare based on history but her hair pull is negative. We do not see patches of hair loss today. She has stopped her thyroid med. Recommend we do low dose steroid injections for any brewing disease and then also consider that this increase in shed could be from an intermittent use of increased Rogaine. She will go ahead and shift to 5% topical Rogaine and then delay topical steroids.    - Increase Minoxidil BID from 2% to 5% okay for once a day  - Continue Hair Max laser comb TIW  - Continue finasteride with no change, daily  - Hold Clobetasol old for a week. The use for two weeks and then back to normal routine.  - Continue to hold oil, dermasmoothe  - Okay to use aveda conditioner, discussed focusing the shampoo on  the scalp rather than on the hair shafts and focusing the conditioner on the hair shafts to help with dryness.   - Future: spironolactone or oral Minoxidil or PRP- see endo, check with them first for marquise  Prior recs: micropigmentation at [facility], patient is not interested     #Thyroid dysfunction pt self treating thyroid dosing  -recommend following endocrinology dosing guidelines    Procedures Performed:   Kenalog intralesional injection procedure note: After verbal consent and discussion of risks including but not limited to atrophy, pain,   time out was performed, the patient underwent positioning, 3 total cc of Kenalog 5 mg/cc was injected into 25 sites on the frontal and mid scalp. The patient tolerated the procedure well and left the Dermatology clinic in good condition.      Follow-up: 1 month(s) in-person  (already scheduled for May) for ILK injections and hairmetrix    Staff and Scribe:    I, Tanesha Jin, am serving as a scribe to document services personally performed by Carley Serrano MD based on data collection and the provider's statements to me.     Provider Disclosure:   The documentation recorded by the scribe accurately reflects the services I personally performed and the decisions made by me.    Carley Serrano MD    Department of Dermatology  [facility]: Phone: [phone], Fax:[phone]  [facility]: Phone: [phone], Fax: [phone]      ____________________________________________    CC: Derm Problem (Kathy is here today for hair loss injections.)    HPI:  Ms. Maria Esther Lowe is a 66 year old female who presents as a return patient for follow-up of hair loss, diagnosed as or androgenetic alopecia +  alopecia areata.   - Last seen virtually on 4/25/22   - Shedding or thinning, or both: shedding   - Current tx: minoxidil BID, clobetasol solution  (used twice), laser comb three times per week, finasteride, hydrocortisone (for prior history of pituitary tumor), ketoconazole  - If using Rogaine, 1 cannister lasts how long: not use, 3-4 weeks  - Scalp or hair care habits/products: Aveda  Yes- decreasing synthroid  Any new medications, supplements, or products? (please list below)     No Scalp pain   No Scalp burning   No Scalp itching    No Eyebrow changes    No Eyelash changes   No Beard changes    No Other body hair changes    No Nail changes    No Additional symptoms? (please list below)     Decrease in thyroid medication a couple of weeks ago and then a few days after noticed increased shedding. Also started using minoxidil more frequently (sometimes twice daily) about  Months ago.  Reports having dry hair with using the ketoconazole shampoo, using aveda conditioner.     - Overall course: worsening, increased shedding for the past few weeks   Patient is otherwise feeling well, in usual state of health, and has no additional skin concerns today.     Labs:  4/8/22:   TSH: low at 0.04 mU/L  T4: wnl   CMP:   Slightly elevated Cr at 1.12, but not a significant change from 3 years ago  Elevated glucose  Slightly decrease K at 3.3   3/17/22  Vitamin D: wnl       Physical Exam:  Vitals: There were no vitals taken for this visit.  GEN: Well developed, well-nourished, in no acute distress, in a pleasant mood.    SKIN: Focused examination of ascalp, face, and nails was performed.  - Dubon type: II   - Carlos part width of 4  - mild diffuse erythema   - mild loose scaling of the scalp   -  negative hair pull test   -  normal eyelash density  - lateral eyebrow thinning   - No other lesions of concern on areas examined.     Medications:  Current Outpatient Medications   Medication     CALCIUM 500 +D 500-400 MG-UNIT TABS     cholecalciferol (VITAMIN D3) 125 mcg (5000 units) capsule     clobetasol (TEMOVATE) 0.05 % external solution     clobetasol (TEMOVATE) 0.05 %  external solution     conjugated estrogens (PREMARIN) cream     cyanocobalamin (VITAMIN  B-12) 1000 MCG tablet     estradiol (ESTRACE) 1 MG tablet     estradiol (VAGIFEM) 10 MCG TABS vaginal tablet     finasteride (PROSCAR) 5 MG tablet     Fluocinolone Acetonide Scalp (DERMA-SMOOTHE/FS SCALP) 0.01 % OIL oil     hydrocortisone (CORTEF) 10 MG tablet     ketoconazole (NIZORAL) 2 % external shampoo     levothyroxine (SYNTHROID/LEVOTHROID) 150 MCG tablet     levothyroxine (SYNTHROID/LEVOTHROID) 175 MCG tablet     losartan-hydrochlorothiazide (HYZAAR) 100-25 MG tablet     simvastatin (ZOCOR) 40 MG tablet     No current facility-administered medications for this visit.      Past Medical History:   Patient Active Problem List   Diagnosis     Prolactinoma (H)     Panhypopituitarism (H)     Hormone replacement therapy (postmenopausal)     Carpal tunnel syndrome     History of robot-assisted laparoscopic hysterectomy     Past Medical History:   Diagnosis Date     Hyperlipidemia LDL goal < 130      Hypertension      Hypothyroidism      Osteopenia     DEXA: 12/11; T-score of -1.9        Panhypopituitarism (H)        CC No referring provider defined for this encounter. on close of this encounter.

## 2022-04-27 NOTE — PATIENT INSTRUCTIONS
Hold clobetasol for one week (following injections). Then resume clobetasol for two weeks. Then back to normal routine.   Increase Rogaine to 5% either solution or foam.    Intralesional Kenalog (ILK) Injections    Intralesional steroid injection involves a corticosteroid, such as triamcinolone acetonide (brand name Kenalog), which is injected directly into a lesion on or immediately below the skin. Intralesional kenalog may be used to treat the following skin conditions:    Alopecia areata  Discoid lupus erythematosus  Keloids/hypertrophic scars  Granuloma annulare and other granulomatous disorders  Hypertrophic lichen planus  Lichen simplex chronicus (neurodermatitis)  Localised psoriasis  Necrobiosis lipoidica  Acne cysts (nodulocystic acne)  Small infantile hemangiomas    Possible side-effects of intralesional Kenalog (ILK) injections include bleeding, pain, infection, contact dermatitis, nerve damage, scar formation and need for a repeat procedure.    Some people may experience delayed side-effects including:  Lipoatrophy, appearing as skin indentations or dimples around the injection sites a few weeks after treatment; these may be permanent.  White marks (leukoderma) or brown marks (postinflammatory pigmentation) at the site of injection or spreading from the site of injection - these may resolve or persist long term.  Telangiectasia, or small dilated blood vessels at the site of injection.   Increased hair growth at the site of injection - this resolves eventually.  Steroid acne: steroids increase growth hormone, leading to increased sebum (oil) production by the sebaceous glands. Steroid acne generally improves once the steroid has been stopped.      Who should I call with questions?  [facility]: [phone]   [facility]: [phone]  For urgent needs outside of business hours call the [facility] at [phone] and ask for the  dermatology resident on call            Topical Rogaine (Minoxidil) for Pattern Hair Loss    Minoxidil is an FDA approved over the counter topical for the treatment of hair loss and thinning hair in men and women.   Initially a 2% solution was available however this required application twice daily. A 5% solution is also now approved, only requiring application once per day.     Available Products:   Rogaine 5% solution: Packaged for men however can be used by men or women. Use dropper and apply directly to scalp at bedtime. This product can cause an allergy because of presence of propylene glycol. Stop this product if you develop a rash or itching and contact your physician.   Rogaine 5% foam: Packaged for men and women: Apply foam directly to the scalp once daily. This is less greasy compared to the solution. This formula is preferred for those who had a reaction to the solution product. If you develop rash or itching, stop the product and contact your physician.     What if I stop minoxidil topical?   After stopping minoxidil the hair will return to the usual pattern of thinning. Using the product 3-4 times per week is better than not using product at all.       Can I use generic minoxidil?   Yes, look for 5% minoxidil.     What are the side effects?  The most common side effect is rash or itching of the scalp. This can occur if a contact allergy develops with propylene glycol. A small group of patients noticed the appearance of facial hair if the product runs onto the face or with prolonged use. Keep it away from the face.  This can cause temporary shedding. Please, call us if this happens.  This can irritated the scalp. Please, call us if this happens.  Stop this product if you become pregnant or are breastfeeding      Last updated: 11/2/2021

## 2022-04-27 NOTE — LETTER
4/27/2022       RE: Maria Esther Lowe  [address] [facility] [address]     Dear Colleague,    Thank you for referring your patient, Maria Esther Lowe, to the [facility] [place] at [facility]. Please see a copy of my visit note below.    [facility] Dermatology Note  Encounter Date: Apr 27, 2022  Office Visit     Dermatology Problem List:  1. 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 Repeat bx 5/20/2020 most consistent with androgenetic alopecia (which makes sense in setting of #2 as well)   - Biopsy in 2016 consistent with alopecia areata, but not clinically consistent; had outside ILK without improvement per report  - Repeat bx 5/20/2020 with nonscarring alopecia with marked miniaturization, favoring androgenetic alopecia. Also mild perifollicular fibrosis.  - Current tx: LLLT 3x weekly (started years ago), finasteride 5 mg daily (started 6/2020), ketoconazole shampoo, Minoxidil 2x daily, clobetasol solution   2. Pituitary prolactinoma s/p removal in 1975 followed by radiation with subsequent development of panhypopituitarism   3.  TSH <0.01 in 6/30/2021  - She is on estradiol per endocrinology       ____________________________________________    Assessment & Plan:   # Female pattern hair loss, favor androgenetic alopecia +/- contribution from underlying thyroid disease but had prior bx with alopecia areata.Consider evolving AA flare based on history but her hair pull is negative. We do not see patches of hair loss today. She has stopped her thyroid med. Recommend we do low dose steroid injections for any brewing disease and then also consider that this increase in shed could be from an intermittent use of increased Rogaine. She will go ahead and shift to 5% topical Rogaine and then delay topical steroids.     - Increase Minoxidil BID from 2% to 5% okay for once a day  - Continue Hair Max laser comb TIW  - Continue finasteride with no change, daily  - Hold Clobetasol old for a week. The use for two weeks and then back to normal routine.  - Continue to hold oil, dermasmoothe  - Okay to use aveda conditioner, discussed focusing the shampoo on the scalp rather than on the hair shafts and focusing the conditioner on the hair shafts to help with dryness.   - Future: spironolactone or oral Minoxidil or PRP- see endo, check with them first for marquise  Prior recs: micropigmentation at [facility], patient is not interested     #Thyroid dysfunction pt self treating thyroid dosing  -recommend following endocrinology dosing guidelines    Procedures Performed:   Kenalog intralesional injection procedure note: After verbal consent and discussion of risks including but not limited to atrophy, pain,   time out was performed, the patient underwent positioning, 3 total cc of Kenalog 5 mg/cc was injected into 25 sites on the frontal and mid scalp. The patient tolerated the procedure well and left the Dermatology clinic in good condition.      Follow-up: 1 month(s) in-person  (already scheduled for May) for ILK injections and hairmetrix    Staff and Scribe:    I, Tanesha Jin, am serving as a scribe to document services personally performed by Carley Serrano MD based on data collection and the provider's statements to me.     Provider Disclosure:   The documentation recorded by the scribe accurately reflects the services I personally performed and the decisions made by me.    Carley Serrano MD    Department of Dermatology  [facility]: Phone: [phone], Fax:[phone]  [facility]: Phone: [phone], Fax: [phone]      ____________________________________________    CC: Derm Problem (Kathy is here  today for hair loss injections.)    HPI:  Ms. Maria Esther oLwe is a 66 year old female who presents as a return patient for follow-up of hair loss, diagnosed as or androgenetic alopecia +  alopecia areata.   - Last seen virtually on 4/25/22   - Shedding or thinning, or both: shedding   - Current tx: minoxidil BID, clobetasol solution (used twice), laser comb three times per week, finasteride, hydrocortisone (for prior history of pituitary tumor), ketoconazole  - If using Rogaine, 1 cannister lasts how long: not use, 3-4 weeks  - Scalp or hair care habits/products: Aveda  Yes- decreasing synthroid  Any new medications, supplements, or products? (please list below)     No Scalp pain   No Scalp burning   No Scalp itching    No Eyebrow changes    No Eyelash changes   No Beard changes    No Other body hair changes    No Nail changes    No Additional symptoms? (please list below)     Decrease in thyroid medication a couple of weeks ago and then a few days after noticed increased shedding. Also started using minoxidil more frequently (sometimes twice daily) about  Months ago.  Reports having dry hair with using the ketoconazole shampoo, using aveda conditioner.     - Overall course: worsening, increased shedding for the past few weeks   Patient is otherwise feeling well, in usual state of health, and has no additional skin concerns today.     Labs:  4/8/22:   TSH: low at 0.04 mU/L  T4: wnl   CMP:   Slightly elevated Cr at 1.12, but not a significant change from 3 years ago  Elevated glucose  Slightly decrease K at 3.3   3/17/22  Vitamin D: wnl       Physical Exam:  Vitals: There were no vitals taken for this visit.  GEN: Well developed, well-nourished, in no acute distress, in a pleasant mood.    SKIN: Focused examination of ascalp, face, and nails was performed.  - Dubon type: II   - Carlos part width of 4  - mild diffuse erythema   - mild loose scaling of the scalp   -  negative hair pull test   -  normal eyelash  density  - lateral eyebrow thinning   - No other lesions of concern on areas examined.     Medications:  Current Outpatient Medications   Medication     CALCIUM 500 +D 500-400 MG-UNIT TABS     cholecalciferol (VITAMIN D3) 125 mcg (5000 units) capsule     clobetasol (TEMOVATE) 0.05 % external solution     clobetasol (TEMOVATE) 0.05 % external solution     conjugated estrogens (PREMARIN) cream     cyanocobalamin (VITAMIN  B-12) 1000 MCG tablet     estradiol (ESTRACE) 1 MG tablet     estradiol (VAGIFEM) 10 MCG TABS vaginal tablet     finasteride (PROSCAR) 5 MG tablet     Fluocinolone Acetonide Scalp (DERMA-SMOOTHE/FS SCALP) 0.01 % OIL oil     hydrocortisone (CORTEF) 10 MG tablet     ketoconazole (NIZORAL) 2 % external shampoo     levothyroxine (SYNTHROID/LEVOTHROID) 150 MCG tablet     levothyroxine (SYNTHROID/LEVOTHROID) 175 MCG tablet     losartan-hydrochlorothiazide (HYZAAR) 100-25 MG tablet     simvastatin (ZOCOR) 40 MG tablet     No current facility-administered medications for this visit.      Past Medical History:   Patient Active Problem List   Diagnosis     Prolactinoma (H)     Panhypopituitarism (H)     Hormone replacement therapy (postmenopausal)     Carpal tunnel syndrome     History of robot-assisted laparoscopic hysterectomy     Past Medical History:   Diagnosis Date     Hyperlipidemia LDL goal < 130      Hypertension      Hypothyroidism      Osteopenia     DEXA: 12/11; T-score of -1.9        Panhypopituitarism (H)        CC No referring provider defined for this encounter. on close of this encounter.

## 2022-05-04 NOTE — TELEPHONE ENCOUNTER
Per Dr Arrington, unable to find MRI brain(pituitary) in PACS today. Writer completed outside image archiving request form.    Eddie Short [facility]  Adult Endocrinology  [facility]

## 2022-05-19 NOTE — PROGRESS NOTES
[facility] Dermatology Note  Encounter Date: May 25, 2022  Office Visit     Dermatology Problem List:  1. 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 Repeat bx 5/20/2020 most consistent with androgenetic alopecia (which makes sense in setting of #2 as well)   - Biopsy in 2016 consistent with alopecia areata, but not clinically consistent; had outside ILK without improvement per report  - Repeat bx 5/20/2020 with nonscarring alopecia with marked miniaturization, favoring androgenetic alopecia. Also mild perifollicular fibrosis.  - Current tx: LLLT 3x weekly (started years ago), finasteride 5 mg daily (started 6/2020), ketoconazole shampoo, Minoxidil 5% once daily, clobetasol solution, multivitamin    - Prior tx: Minoxidil 2% BID, dermasmoothe oil   2. Pituitary prolactinoma s/p removal in 1975 followed by radiation with subsequent development of panhypopituitarism   3.  TSH <0.01 in 6/30/2021  - She is on estradiol per endocrinology  ____________________________________________    Assessment & Plan:     # Female pattern hair loss, favor androgenetic alopecia has old bx with alopecia areata. Improved hair counts today. We will see how 3 more months of minoxdil treatment impacts hair and then add PRP at follow up if needed  - Continue Minoxidil 5% foam or solution once daily  - Continue Hair Max laser comb three times per week  - Continue ketoconazole shampoo 2-3x weekly, alternating with Aveeda shampoo. Also using an Aveeda conditioner    - Continue finasteride 5 mg daily. Denies decreased libido, impotence, weakness, rash, depression. Patient does report dizziness once in a while, but does not think it is associated with the finasteride. Last mammogram was normal.      - Reduce Clobetasol solution from daily to 3x weekly   - Ok to continue multivitamin   - Patient is interested in Nutrofol. Recommend holding off  on starting.   - Discussed options today: PRP vs spironolactone vs oral minoxidil vs topical Rogaine for 3 more months. Patient elects to continue Rogaine for 3 months and then reassess. Patient interested in PRP after 3 months.   - HairMetrix performed today  - Future considerations: spironolactone or oral Minoxidil or PRP - see endo, check with them first for marquise. Our plan is to overall ADDPRP At next visit.   - Prior recs: micropigmentation at [facility], patient is not interested      #Thyroid dysfunction pt self treating thyroid dosing  - Recommend following endocrinology dosing guidelines    Procedures Performed:   None    Follow-up: 3 week(s) in-person, or earlier for new or changing lesions.     Staff and Scribe:     Scribe Disclosure  I, Franchesca Mccall, am serving as a scribe to document services personally performed by Dr. Carley Serrano MD, based on data collection and the provider's statements to me.     Provider Disclosure:   The documentation recorded by the scribe accurately reflects the services I personally performed and the decisions made by me. Libby Hernandez present and also  Kayden Ortiz MD also present observing after patient consent and Also Dr. Catracho Serrano MD    Department of Dermatology  [facility]: Phone: [phone], Fax:[phone]  [facility]: Phone: [phone], Fax: [phone]        ____________________________________________    CC: Hair Loss    HPI:  Ms. Maria Esther Lowe is a 66 year old female who presents as a return patient for follow-up of hair loss, diagnosed as female pattern hair loss, favor androgenetic alopecia with evolving AA.   - Last seen in-clinic on 4/27/2022 when minoxidil was changed from 2% BID to 5% once daily. Additionally, she was continued on LLLT 3x weekly, finasteride daily, and clobetasol  solution. Dermasmoothe oil was held.   - Current tx: Patient is switching between Rogaine 5% foam and solution, LLLT 2-3x weekly, and clobetasol solution nightly. Also taking a multivitamin daily that has biotin in it.   - If using Rogaine, 1 cannister lasts how lon month   - Scalp or hair care habits/products: She is shampooing every 2-3 days shampoo with ketoconazole shampoo and alternating with aveeda. Also using aveeda conditioner and some hairspray.     Patient is wondering if she should add nutrofol to regimen, has seen ads for this on television. Patient would like to discuss treatments that will help with her scalp dryness. Unsure if injections are needed today.     Patient has been following with [facility]. States that she may need a growth hormone insulin injection, but has difficulty understanding her endocrinologist.     No Any new medications, supplements, or products? (please list below)     No Scalp pain   No Scalp burning   No Scalp itching    No Eyebrow changes    No Eyelash changes   No Other body hair changes    Improved Nail changes    No Additional symptoms? (please list below)     - Overall course: No improvement over the last year, has been growing on and off.   Does not notice growth , not stable   - COVID status: no   - COVID vaccine: booster     Patient is otherwise feeling well, in usual state of health, and has no additional skin concerns today.     Labs:  Last zinc reviewed    Physical Exam:  Vitals: There were no vitals taken for this visit.  GEN: Well developed, well-nourished, in no acute distress, in a pleasant mood.    SKIN: Focused examination of the scalp, face and nails was performed.  - Carlos part width of 3  - Mild follicular prominence   - Mild erythema   - 1 cm fibers along the frontal hairline  - No other lesions of concern on areas examined.       Medications:  Current Outpatient Medications   Medication     CALCIUM 500 +D 500-400 MG-UNIT TABS     cholecalciferol  (VITAMIN D3) 125 mcg (5000 units) capsule     clobetasol (TEMOVATE) 0.05 % external solution     conjugated estrogens (PREMARIN) cream     cyanocobalamin (VITAMIN  B-12) 1000 MCG tablet     estradiol (ESTRACE) 1 MG tablet     estradiol (VAGIFEM) 10 MCG TABS vaginal tablet     finasteride (PROSCAR) 5 MG tablet     hydrocortisone (CORTEF) 10 MG tablet     ketoconazole (NIZORAL) 2 % external shampoo     levothyroxine (SYNTHROID/LEVOTHROID) 150 MCG tablet     levothyroxine (SYNTHROID/LEVOTHROID) 175 MCG tablet     losartan-hydrochlorothiazide (HYZAAR) 100-25 MG tablet     simvastatin (ZOCOR) 40 MG tablet     Current Facility-Administered Medications   Medication     triamcinolone acetonide (KENALOG-10) injection 10 mg      Past Medical History:   Patient Active Problem List   Diagnosis     Prolactinoma (H)     Panhypopituitarism (H)     Hormone replacement therapy (postmenopausal)     Carpal tunnel syndrome     History of robot-assisted laparoscopic hysterectomy     Past Medical History:   Diagnosis Date     Hyperlipidemia LDL goal < 130      Hypertension      Hypothyroidism      Osteopenia     DEXA: 12/11; T-score of -1.9        Panhypopituitarism (H)        CC Ana Lilia Leslie MD  [address]  [place] [address] on close of this encounter.

## 2022-05-25 ENCOUNTER — OFFICE VISIT (OUTPATIENT)
Dept: DERMATOLOGY | Facility: CLINIC | Age: 67
End: 2022-05-25
Payer: COMMERCIAL

## 2022-05-25 DIAGNOSIS — L63.9 ALOPECIA AREATA: ICD-10-CM

## 2022-05-25 DIAGNOSIS — L65.9 NON-SCARRING ALOPECIA: ICD-10-CM

## 2022-05-25 DIAGNOSIS — L65.8 FEMALE PATTERN ALOPECIA: ICD-10-CM

## 2022-05-25 PROCEDURE — 99214 OFFICE O/P EST MOD 30 MIN: CPT | Performed by: DERMATOLOGY

## 2022-05-25 RX ORDER — CLOBETASOL PROPIONATE 0.5 MG/ML
SOLUTION TOPICAL
Qty: 60 ML | Refills: 3 | Status: SHIPPED | OUTPATIENT
Start: 2022-05-25 | End: 2022-08-24

## 2022-05-25 RX ORDER — KETOCONAZOLE 20 MG/ML
SHAMPOO TOPICAL
Qty: 100 ML | Refills: 11 | Status: SHIPPED | OUTPATIENT
Start: 2022-05-25 | End: 2022-08-24

## 2022-05-25 RX ORDER — FINASTERIDE 5 MG/1
5 TABLET, FILM COATED ORAL DAILY
Qty: 30 TABLET | Refills: 11 | Status: SHIPPED | OUTPATIENT
Start: 2022-05-25 | End: 2023-03-10

## 2022-05-25 NOTE — LETTER
5/25/2022       RE: Maria Esther Lowe  [address] [facility] [address]     Dear Colleague,    Thank you for referring your patient, Maria Esther Lowe, to the [facility] [place] at [facility]. Please see a copy of my visit note below.    [facility] Dermatology Note  Encounter Date: May 25, 2022  Office Visit     Dermatology Problem List:  1. 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 Repeat bx 5/20/2020 most consistent with androgenetic alopecia (which makes sense in setting of #2 as well)   - Biopsy in 2016 consistent with alopecia areata, but not clinically consistent; had outside ILK without improvement per report  - Repeat bx 5/20/2020 with nonscarring alopecia with marked miniaturization, favoring androgenetic alopecia. Also mild perifollicular fibrosis.  - Current tx: LLLT 3x weekly (started years ago), finasteride 5 mg daily (started 6/2020), ketoconazole shampoo, Minoxidil 5% once daily, clobetasol solution, multivitamin    - Prior tx: Minoxidil 2% BID, dermasmoothe oil   2. Pituitary prolactinoma s/p removal in 1975 followed by radiation with subsequent development of panhypopituitarism   3.  TSH <0.01 in 6/30/2021  - She is on estradiol per endocrinology  ____________________________________________    Assessment & Plan:     # Female pattern hair loss, favor androgenetic alopecia has old bx with alopecia areata. Improved hair counts today. We will see how 3 more months of minoxdil treatment impacts hair and then add PRP at follow up if needed  - Continue Minoxidil 5% foam or solution once daily  - Continue Hair Max laser comb three times per week  - Continue ketoconazole shampoo 2-3x weekly, alternating with Aveeda shampoo. Also using an Aveeda conditioner    - Continue finasteride 5 mg daily. Denies decreased libido,  impotence, weakness, rash, depression. Patient does report dizziness once in a while, but does not think it is associated with the finasteride. Last mammogram was normal.      - Reduce Clobetasol solution from daily to 3x weekly   - Ok to continue multivitamin   - Patient is interested in Nutrofol. Recommend holding off on starting.   - Discussed options today: PRP vs spironolactone vs oral minoxidil vs topical Rogaine for 3 more months. Patient elects to continue Rogaine for 3 months and then reassess. Patient interested in PRP after 3 months.   - HairMetrix performed today  - Future considerations: spironolactone or oral Minoxidil or PRP - see endo, check with them first for marquise. Our plan is to overall ADDPRP At next visit.   - Prior recs: micropigmentation at [facility], patient is not interested      #Thyroid dysfunction pt self treating thyroid dosing  - Recommend following endocrinology dosing guidelines    Procedures Performed:   None    Follow-up: 3 week(s) in-person, or earlier for new or changing lesions.     Staff and Scribe:     Scribe Disclosure  I, Franchesca Mccall, am serving as a scribe to document services personally performed by Dr. Carley Serrano MD, based on data collection and the provider's statements to me.     Provider Disclosure:   The documentation recorded by the scribe accurately reflects the services I personally performed and the decisions made by me. Libby Hernandez present and also  Kayden Ortiz MD also present observing after patient consent and Also Dr. Catracho Serrano MD    Department of Dermatology  [facility]: Phone: [phone], Fax:[phone]  [facility]: Phone: [phone], Fax: [phone]        ____________________________________________    CC: Hair Loss    HPI:  Ms. Maria Esther Lowe is a 66 year old female who  presents as a return patient for follow-up of hair loss, diagnosed as female pattern hair loss, favor androgenetic alopecia with evolving AA.   - Last seen in-clinic on 2022 when minoxidil was changed from 2% BID to 5% once daily. Additionally, she was continued on LLLT 3x weekly, finasteride daily, and clobetasol solution. Dermasmoothe oil was held.   - Current tx: Patient is switching between Rogaine 5% foam and solution, LLLT 2-3x weekly, and clobetasol solution nightly. Also taking a multivitamin daily that has biotin in it.   - If using Rogaine, 1 cannister lasts how lon month   - Scalp or hair care habits/products: She is shampooing every 2-3 days shampoo with ketoconazole shampoo and alternating with aveeda. Also using aveeda conditioner and some hairspray.     Patient is wondering if she should add nutrofol to regimen, has seen ads for this on television. Patient would like to discuss treatments that will help with her scalp dryness. Unsure if injections are needed today.     Patient has been following with endo. States that she may need a growth hormone insulin injection, but has difficulty understanding her endocrinologist.     No Any new medications, supplements, or products? (please list below)     No Scalp pain   No Scalp burning   No Scalp itching    No Eyebrow changes    No Eyelash changes   No Other body hair changes    Improved Nail changes    No Additional symptoms? (please list below)     - Overall course: No improvement over the last year, has been growing on and off.   Does not notice growth , not stable   - COVID status: no   - COVID vaccine: booster     Patient is otherwise feeling well, in usual state of health, and has no additional skin concerns today.     Labs:  Last zinc reviewed    Physical Exam:  Vitals: There were no vitals taken for this visit.  GEN: Well developed, well-nourished, in no acute distress, in a pleasant mood.    SKIN: Focused examination of the scalp, face  and nails was performed.  - Carlos part width of 3  - Mild follicular prominence   - Mild erythema   - 1 cm fibers along the frontal hairline  - No other lesions of concern on areas examined.       Medications:  Current Outpatient Medications   Medication     CALCIUM 500 +D 500-400 MG-UNIT TABS     cholecalciferol (VITAMIN D3) 125 mcg (5000 units) capsule     clobetasol (TEMOVATE) 0.05 % external solution     conjugated estrogens (PREMARIN) cream     cyanocobalamin (VITAMIN  B-12) 1000 MCG tablet     estradiol (ESTRACE) 1 MG tablet     estradiol (VAGIFEM) 10 MCG TABS vaginal tablet     finasteride (PROSCAR) 5 MG tablet     hydrocortisone (CORTEF) 10 MG tablet     ketoconazole (NIZORAL) 2 % external shampoo     levothyroxine (SYNTHROID/LEVOTHROID) 150 MCG tablet     levothyroxine (SYNTHROID/LEVOTHROID) 175 MCG tablet     losartan-hydrochlorothiazide (HYZAAR) 100-25 MG tablet     simvastatin (ZOCOR) 40 MG tablet     Current Facility-Administered Medications   Medication     triamcinolone acetonide (KENALOG-10) injection 10 mg      Past Medical History:   Patient Active Problem List   Diagnosis     Prolactinoma (H)     Panhypopituitarism (H)     Hormone replacement therapy (postmenopausal)     Carpal tunnel syndrome     History of robot-assisted laparoscopic hysterectomy     Past Medical History:   Diagnosis Date     Hyperlipidemia LDL goal < 130      Hypertension      Hypothyroidism      Osteopenia     DEXA: 12/11; T-score of -1.9        Panhypopituitarism (H)        CC Ana Lilia Leslie MD  [address]  [place] [address] on close of this encounter.

## 2022-05-25 NOTE — PATIENT INSTRUCTIONS
Platelet rich plasma (PRP) for treatment of alopecia   Platelet rich plasma or  PRP  treatment involves taking the patients blood and concentrating the platelets as platelets are known to contain valuable growth factors and possibly stimulate hair growth.    PRP is not an FDA approved treatment for alopecia. However, some studies have shown improvement in patients with thinning hair.   PRP is not typically covered by insurance.   Typical treatment regimens are every 1-2 months for 3-4 months. Then, an assessment is usually performed to determine future treatment times.   Risks of this procedure include but are not limited to swelling, redness, pain, headache, bleeding, bruising, scarring, nerve injury, numbness, and infection.    Blood testing is needed prior to treatment    There is a possibility of no improvement, slight improvement or worsening with this treatment.      You should not have this procedure if you are pregnant, breast feeding, or if you have a blood/clotting disorders.     You will need to block 2 hours of your day for this appointment      1 day before treatment:   Do not take additional blood thinners or pain medication not prescribed by a provider(doctor or PA-C or NP) such as ibuprofen and aspirin  Minimize or avoid alcohol consumption  You may continue other hair loss treatments such as laser treatment or minoxidil before your procedure. If you are not sure if you should be stopping a medication, please ask.   It is ok to color your hair up to 7 days before the procedure  Please come to your visit hydrated. Drink plenty of fluids the day prior and the am of the treatment    Day and After the Procedure:  Shower the morning of your treatment and wash your hair and scalp very thoroughly using your regular shampoo  Do not apply sprays, gels or any other styling products to your hair  If you wear a hair system, please remove it prior to shampooing and do not wear it to your PRP treatments  Please  eat a normal breakfast or lunch the day of your PRP session  Drink a bottle of water (500 mL) at least 2 hours before your session  Expect hair and scalp photography in addition to the blood draw to be performed prior to treatment   Do not use hair products for 24 hours after your treatment  Avoid saunas, steam rooms and swimming for 2 days after your treatment  Shower the morning after the procedure  Do not travel on an airplane the day of the procedure  No vigorous exercise the day of the procedure      CONTACT THE OFFICE IMMEDIATELY IF ANY OF THE FOLLOWING SIGNS OF INFECTION OCCUR:  Draining - looks like pus  Increased warmth at or around the treated area  Fever of 101.5 100.6 or greater  Severe pain         Who should I call with questions?  [facility]: [phone]   [facility]: [phone]  For urgent needs outside of business hours call the [facility] at [phone] and ask for the dermatology resident on call  [employer] messaging is delayed, please use the phone for urgent issues         Continue Minoxidil 5% foam or solution once daily  - Continue Hair Max laser comb three times per week  - Continue ketoconazole shampoo 2-3x weekly, alternating with Aveeda shampoo. Ok to use Aveeda conditioner    - Continue finasteride 5 mg daily.    - Reduce Clobetasol solution from daily to 3x weekly   - Ok to continue multivitamin

## 2022-06-07 ENCOUNTER — MYC MEDICAL ADVICE (OUTPATIENT)
Dept: DERMATOLOGY | Facility: CLINIC | Age: 67
End: 2022-06-07
Payer: COMMERCIAL

## 2022-06-09 ENCOUNTER — TELEPHONE (OUTPATIENT)
Dept: DERMATOLOGY | Facility: CLINIC | Age: 67
End: 2022-06-09
Payer: COMMERCIAL

## 2022-06-09 NOTE — TELEPHONE ENCOUNTER
----- Message from Tigist Brooks CMA sent at 6/8/2022  3:40 PM CDT -----  Yes at the end of clinic   ----- Message -----  From: Paola Ortiz  Sent: 6/8/2022  11:42 AM CDT  To: Carley Serrano MD, [facility]    Hello,    This patient last visit on 5/25 states to RTN in 3 months. Where can I put this patient? Can I use the cosmetic slot. Patient states she was suppose to come back to the [facility] for a picture because the  does not have the equipment. Please advise.    Thanks    Paola Ortiz  Clinic Coordinator

## 2022-06-09 NOTE — TELEPHONE ENCOUNTER
Call and left message for patient can be a return cosmetic at 8:30am or closer to 12:15 pm with Dr Serrano in Derm. Phone number given.

## 2022-08-18 ENCOUNTER — TELEPHONE (OUTPATIENT)
Dept: DERMATOLOGY | Facility: CLINIC | Age: 67
End: 2022-08-18

## 2022-08-18 NOTE — TELEPHONE ENCOUNTER
[facility] Call Center    Phone Message    May a detailed message be left on voicemail: yes     Reason for Call: Appointment Intake    Referring Provider Name: NA  Diagnosis and/or Symptoms: 3 month follow-up hair loss/hair metrix  Pt was last seen in May and was told a nurse was going to schedule her. Please review and call pt back to set up.  There is no opening available   Thanks     Action Taken: Message routed to:  [facility] Derm    Travel Screening: Not Applicable

## 2022-08-20 NOTE — PROGRESS NOTES
[facility] Dermatology Note  Encounter Date: Aug 24, 2022  Office Visit     Dermatology Problem List:  1. 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 Repeat bx 5/20/2020 most consistent with androgenetic alopecia (which makes sense in setting of #2 as well)   - Biopsy in 2016 consistent with alopecia areata, but not clinically consistent; had outside ILK without improvement per report  - Repeat bx 5/20/2020 with nonscarring alopecia with marked miniaturization, favoring androgenetic alopecia. Also mild perifollicular fibrosis.  - Current tx: LLLT 2-3x weekly (started years ago), finasteride 5 mg daily (started 6/2020), ketoconazole shampoo alternating with head and shoulders, Minoxidil 5% once daily, clobetasol solution 2x a week, calcipotriene solution twice weekly, multivitamin    Hair labs: 4/8/2022  TSH: 0.04 (low)  Cbc: normal 2 years ago  Ferritin:105 (2 years ago)  Zinc: 72 (9/15/2021)  Vitamin D: 65  2. Pituitary prolactinoma s/p removal in 1975 followed by radiation with subsequent development of panhypopituitarism   3.  TSH <0.01 in 6/30/2021  - She is on estradiol per endocrinology  ____________________________________________    Assessment & Plan:    # Female pattern hair loss, favor androgenetic alopecia has old bx with alopecia areata. Hair growth noted on exam today with erythema and scale. Pt does not want orals at this time  - Continue Minoxidil 5% foam or solution once daily  - Continue Hair Max laser comb TIW  - Continue ketoconazole shampoo 2-3x weekly, start alternating with head and shoulders. Stop Aveda shampoo.  - Continue finasteride 5 mg daily.  Reviewed and denies dizziness and mood issues.     - Decrease clobetasol solution from 3x weekly to 2x weekly  - Start calcipotriene solution twice weekly  - HairMetrix performed today  - Prior recs: micropigmentation at [facility],  patient is not interested   - Future: consider low dose oral minoxidil or PRP     #Thyroid dysfunction pt self treating thyroid dosing  - Follows with endocrinology    Procedures Performed:   HairMetrix    Follow-up: 6 month(s) in-person, or earlier for new or changing lesions    Staff and Scribe:     Scribe Disclosure:  IShaw, am serving as a scribe to document services personally performed by Carley Serrano MD based on data collection and the provider's statements to me.     I, Kalyn Leal, am serving as a scribe to document services personally performed by Carley Serrano MD based on data collection and the provider's statements to me.     Provider Disclosure:   The documentation recorded by the scribe accurately reflects the services I personally performed and the decisions made by me.    Carley Serrano MD    Department of Dermatology  [facility]: Phone: [phone], Fax:[phone]  [facility]: Phone: [phone], Fax: [phone]      ____________________________________________    CC: Hair Loss    HPI:  Ms. Maria Esther Lowe is a(n) 67 year old female who presents today as a return patient for hair loss. Last seen by me on 2022, at which time patient was reduced on clobetasol solution from daily to 3x weekly for treatment of female pattern hair loss.    The patient reports the hair loss has been stable.    - If using Rogaine, 1 cannister lasts how lon months  - Scalp or hair care habits/products: see above    - Overall course: stable    Patient is otherwise feeling well, in usual state of health, and has no additional skin concerns today.     Hair Metrix:  Frontal: improved numbers 115 to 196. Significant scale.  Mid Scalp: Stable, telangectasia, very minimal scale  Vertex: Stable,  143 to 132, scale remains unchanged from last time, still needs  improvement  Occipital: Stable with improved scalp health, scale improved significantly  R temporal: Stable numbers, improved scale  L temporal: Improved numbers 72 to 105, scale improved from May 2022 but still minor scale      Physical Exam:  Vitals: There were no vitals taken for this visit.  GEN: Well developed, well-nourished, in no acute distress, in a pleasant mood.    SKIN: Focused examination of scalp and face was performed.  - Carlos part width of 3  - Diffuse hair growth noted.  - 1-2 cm hair growth along part and frontal hairline.  - Mild erythema of the crown.  - Scaling of the scalp   - Negative hair pull test   - Normal eyelash density  - Normal eyebrow density  - No nail pitting or dystrophy   - No scalp folliculitis/pustules   - No other lesions of concern on areas examined.     Labs:  None reviewed.    Medications:  Current Outpatient Medications   Medication     CALCIUM 500 +D 500-400 MG-UNIT TABS     cholecalciferol (VITAMIN D3) 125 mcg (5000 units) capsule     clobetasol (TEMOVATE) 0.05 % external solution     conjugated estrogens (PREMARIN) cream     cyanocobalamin (VITAMIN  B-12) 1000 MCG tablet     estradiol (ESTRACE) 1 MG tablet     estradiol (VAGIFEM) 10 MCG TABS vaginal tablet     finasteride (PROSCAR) 5 MG tablet     hydrocortisone (CORTEF) 10 MG tablet     ketoconazole (NIZORAL) 2 % external shampoo     levothyroxine (SYNTHROID/LEVOTHROID) 150 MCG tablet     levothyroxine (SYNTHROID/LEVOTHROID) 175 MCG tablet     losartan-hydrochlorothiazide (HYZAAR) 100-25 MG tablet     simvastatin (ZOCOR) 40 MG tablet     No current facility-administered medications for this visit.      Past Medical History:   Patient Active Problem List   Diagnosis     Prolactinoma (H)     Panhypopituitarism (H)     Hormone replacement therapy (postmenopausal)     Carpal tunnel syndrome     History of robot-assisted laparoscopic hysterectomy     Past Medical History:   Diagnosis Date     Hyperlipidemia LDL goal < 130       Hypertension      Hypothyroidism      Osteopenia     DEXA: 12/11; T-score of -1.9        Panhypopituitarism (H)        CC No referring provider defined for this encounter. on close of this encounter.

## 2022-08-24 ENCOUNTER — OFFICE VISIT (OUTPATIENT)
Dept: DERMATOLOGY | Facility: CLINIC | Age: 67
End: 2022-08-24
Payer: COMMERCIAL

## 2022-08-24 DIAGNOSIS — L63.9 ALOPECIA AREATA: ICD-10-CM

## 2022-08-24 DIAGNOSIS — L65.8 FEMALE PATTERN ALOPECIA: ICD-10-CM

## 2022-08-24 PROCEDURE — 99214 OFFICE O/P EST MOD 30 MIN: CPT | Performed by: DERMATOLOGY

## 2022-08-24 RX ORDER — KETOCONAZOLE 20 MG/ML
SHAMPOO TOPICAL
Qty: 100 ML | Refills: 11 | Status: SHIPPED | OUTPATIENT
Start: 2022-08-24 | End: 2023-06-09

## 2022-08-24 RX ORDER — CLOBETASOL PROPIONATE 0.5 MG/ML
SOLUTION TOPICAL
Qty: 60 ML | Refills: 3 | Status: SHIPPED | OUTPATIENT
Start: 2022-08-24 | End: 2023-06-09

## 2022-08-24 RX ORDER — CALCIPOTRIENE 0.05 MG/ML
SOLUTION TOPICAL
Qty: 60 ML | Refills: 3 | Status: SHIPPED | OUTPATIENT
Start: 2022-08-24 | End: 2023-06-09

## 2022-08-24 NOTE — PROGRESS NOTES
YEs next visit in 6 months, on a Friday, at [place], label the visit note as hair metrix or the hair fellow wont know to come  thanks

## 2022-08-24 NOTE — PATIENT INSTRUCTIONS
Use clobetasol twice weekly  Use calcipotriene twice weekly   Continue Rogaine    Follow up in 6 months

## 2022-08-24 NOTE — LETTER
8/24/2022       RE: Maria Esther Lowe  [address] [place] [facility] [address]     Dear Colleague,    Thank you for referring your patient, Maria Esther Lowe, to the [facility] [place] at [facility]. Please see a copy of my visit note below.    [facility] Dermatology Note  Encounter Date: Aug 24, 2022  Office Visit     Dermatology Problem List:  1. 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 Repeat bx 5/20/2020 most consistent with androgenetic alopecia (which makes sense in setting of #2 as well)   - Biopsy in 2016 consistent with alopecia areata, but not clinically consistent; had outside ILK without improvement per report  - Repeat bx 5/20/2020 with nonscarring alopecia with marked miniaturization, favoring androgenetic alopecia. Also mild perifollicular fibrosis.  - Current tx: LLLT 2-3x weekly (started years ago), finasteride 5 mg daily (started 6/2020), ketoconazole shampoo alternating with head and shoulders, Minoxidil 5% once daily, clobetasol solution 2x a week, calcipotriene solution twice weekly, multivitamin    Hair labs: 4/8/2022  TSH: 0.04 (low)  Cbc: normal 2 years ago  Ferritin:105 (2 years ago)  Zinc: 72 (9/15/2021)  Vitamin D: 65  2. Pituitary prolactinoma s/p removal in 1975 followed by radiation with subsequent development of panhypopituitarism   3.  TSH <0.01 in 6/30/2021  - She is on estradiol per endocrinology  ____________________________________________    Assessment & Plan:    # Female pattern hair loss, favor androgenetic alopecia has old bx with alopecia areata. Hair growth noted on exam today with erythema and scale. Pt does not want orals at this time  - Continue Minoxidil 5% foam or solution once daily  - Continue Hair Max laser comb TIW  - Continue ketoconazole shampoo 2-3x weekly, start alternating with head  and shoulders. Stop Aveda shampoo.  - Continue finasteride 5 mg daily.  Reviewed and denies dizziness and mood issues.     - Decrease clobetasol solution from 3x weekly to 2x weekly  - Start calcipotriene solution twice weekly  - HairMetrix performed today  - Prior recs: micropigmentation at [facility], patient is not interested   - Future: consider low dose oral minoxidil or PRP     #Thyroid dysfunction pt self treating thyroid dosing  - Follows with endocrinology    Procedures Performed:   HairMetrix    Follow-up: 6 month(s) in-person, or earlier for new or changing lesions    Staff and Scribe:     Scribe Disclosure:  I, Shaw Huff, am serving as a scribe to document services personally performed by Carley Serrano MD based on data collection and the provider's statements to me.     I, Kalyn Leal, am serving as a scribe to document services personally performed by Carley Serrano MD based on data collection and the provider's statements to me.     Provider Disclosure:   The documentation recorded by the scribe accurately reflects the services I personally performed and the decisions made by me.    Carley Serrano MD    Department of Dermatology  [facility]: Phone: [phone], Fax:[phone]  [facility]: Phone: [phone], Fax: [phone]      ____________________________________________    CC: Hair Loss    HPI:  Ms. Maria Esther Lowe is a(n) 67 year old female who presents today as a return patient for hair loss. Last seen by me on 2022, at which time patient was reduced on clobetasol solution from daily to 3x weekly for treatment of female pattern hair loss.    The patient reports the hair loss has been stable.    - If using Rogaine, 1 cannister lasts how lon months  - Scalp or hair care habits/products: see above    - Overall course: stable    Patient is  otherwise feeling well, in usual state of health, and has no additional skin concerns today.     Hair Metrix:  Frontal: improved numbers 115 to 196. Significant scale.  Mid Scalp: Stable, telangectasia, very minimal scale  Vertex: Stable,  143 to 132, scale remains unchanged from last time, still needs improvement  Occipital: Stable with improved scalp health, scale improved significantly  R temporal: Stable numbers, improved scale  L temporal: Improved numbers 72 to 105, scale improved from May 2022 but still minor scale      Physical Exam:  Vitals: There were no vitals taken for this visit.  GEN: Well developed, well-nourished, in no acute distress, in a pleasant mood.    SKIN: Focused examination of scalp and face was performed.  - Carlos part width of 3  - Diffuse hair growth noted.  - 1-2 cm hair growth along part and frontal hairline.  - Mild erythema of the crown.  - Scaling of the scalp   - Negative hair pull test   - Normal eyelash density  - Normal eyebrow density  - No nail pitting or dystrophy   - No scalp folliculitis/pustules   - No other lesions of concern on areas examined.     Labs:  None reviewed.    Medications:  Current Outpatient Medications   Medication     CALCIUM 500 +D 500-400 MG-UNIT TABS     cholecalciferol (VITAMIN D3) 125 mcg (5000 units) capsule     clobetasol (TEMOVATE) 0.05 % external solution     conjugated estrogens (PREMARIN) cream     cyanocobalamin (VITAMIN  B-12) 1000 MCG tablet     estradiol (ESTRACE) 1 MG tablet     estradiol (VAGIFEM) 10 MCG TABS vaginal tablet     finasteride (PROSCAR) 5 MG tablet     hydrocortisone (CORTEF) 10 MG tablet     ketoconazole (NIZORAL) 2 % external shampoo     levothyroxine (SYNTHROID/LEVOTHROID) 150 MCG tablet     levothyroxine (SYNTHROID/LEVOTHROID) 175 MCG tablet     losartan-hydrochlorothiazide (HYZAAR) 100-25 MG tablet     simvastatin (ZOCOR) 40 MG tablet     No current facility-administered medications for this visit.      Past Medical  History:   Patient Active Problem List   Diagnosis     Prolactinoma (H)     Panhypopituitarism (H)     Hormone replacement therapy (postmenopausal)     Carpal tunnel syndrome     History of robot-assisted laparoscopic hysterectomy     Past Medical History:   Diagnosis Date     Hyperlipidemia LDL goal < 130      Hypertension      Hypothyroidism      Osteopenia     DEXA: 12/11; T-score of -1.9        Panhypopituitarism (H)        CC No referring provider defined for this encounter. on close of this encounter.

## 2022-10-30 ENCOUNTER — HEALTH MAINTENANCE LETTER (OUTPATIENT)
Age: 67
End: 2022-10-30

## 2022-11-25 DIAGNOSIS — D35.2 PITUITARY ADENOMA (H): ICD-10-CM

## 2022-11-25 DIAGNOSIS — E78.5 HYPERLIPIDEMIA LDL GOAL <100: ICD-10-CM

## 2022-12-01 NOTE — TELEPHONE ENCOUNTER
Simvastatin 40 MG Oral Tablet  Last Written Prescription Date:  4/8/2022  Last Fill Quantity: 90,   # refills: 3  Last Office Visit :  4/8/2022  Future Office visit:  None    Routing refill request to provider for review/approval because:  Needing updated LIPID Profile for this med  Refer to Provider for review     Recent Labs   Lab Test 03/03/21  1155   LDL 70       MYLAN-LEVOTHYROX 150MCG TABLET  Last Written Prescription Date:  4/8/2022  Last Fill Quantity: 48,   # refills: 3  Last Office Visit :  4/8/2022  Future Office visit:  None    Routing refill request to provider for review/approval because:  Abnormal TSH  Refer to Provider for review   Recent Labs   Lab Test 04/08/22  1624   TSH 0.04*       Kari Christopher RN  Central Triage Red Flags/Med Refills

## 2022-12-03 RX ORDER — LEVOTHYROXINE SODIUM 150 UG/1
TABLET ORAL
Qty: 90 TABLET | Refills: 3 | Status: SHIPPED | OUTPATIENT
Start: 2022-12-03 | End: 2024-08-19

## 2022-12-03 RX ORDER — SIMVASTATIN 40 MG
40 TABLET ORAL DAILY
Qty: 100 TABLET | Refills: 3 | Status: SHIPPED | OUTPATIENT
Start: 2022-12-03 | End: 2023-11-15

## 2022-12-27 DIAGNOSIS — E23.0 PANHYPOPITUITARISM (H): ICD-10-CM

## 2022-12-29 RX ORDER — LOSARTAN POTASSIUM AND HYDROCHLOROTHIAZIDE 25; 100 MG/1; MG/1
1 TABLET ORAL DAILY
Qty: 90 TABLET | Refills: 0 | Status: SHIPPED | OUTPATIENT
Start: 2022-12-29 | End: 2023-03-24

## 2022-12-29 NOTE — TELEPHONE ENCOUNTER
losartan-hydrochlorothiazide (HYZAAR) 100-25 MG tablet     Last Written Prescription Date:  02-  Last Fill Quantity: 90,   # refills: 2  Last Office Visit : 04-  Future Office visit:  Not on file    Routing refill request to provider for review/approval because:    Angiotensin-II Receptors Failed 12/27/2022 10:05 PM   Protocol Details  Last blood pressure under 140/90 in past 12 months    Normal serum creatinine on file in past 12 months    Normal serum potassium on file in past 12 months     Lab Test 04/08/22  1624   CR 1.12*        Lab Test 04/08/22  1624   POTASSIUM 3.3*     04/08/22 (!) 148/78   03/17/22 139/85   03/17/21 (!) 144/78

## 2023-01-22 DIAGNOSIS — L65.9 NON-SCARRING ALOPECIA: ICD-10-CM

## 2023-01-25 RX ORDER — FINASTERIDE 5 MG/1
TABLET, FILM COATED ORAL
Qty: 100 TABLET | Refills: 2 | OUTPATIENT
Start: 2023-01-25

## 2023-02-03 ENCOUNTER — OFFICE VISIT (OUTPATIENT)
Dept: DERMATOLOGY | Facility: CLINIC | Age: 68
End: 2023-02-03
Payer: COMMERCIAL

## 2023-02-03 DIAGNOSIS — L65.8 FEMALE PATTERN ALOPECIA: Primary | ICD-10-CM

## 2023-02-03 PROCEDURE — 99214 OFFICE O/P EST MOD 30 MIN: CPT | Performed by: DERMATOLOGY

## 2023-02-03 ASSESSMENT — PAIN SCALES - GENERAL: PAINLEVEL: NO PAIN (0)

## 2023-02-03 NOTE — PATIENT INSTRUCTIONS
Platelet rich plasma (PRP) for treatment of alopecia   Platelet rich plasma or  PRP  treatment involves taking the patients blood and concentrating the platelets as platelets are known to contain valuable growth factors and possibly stimulate hair growth.    PRP is not an FDA approved treatment for alopecia. However, some studies have shown improvement in patients with thinning hair.   PRP is not typically covered by insurance.   Typical treatment regimens are every 1-2 months for 3-4 months. Then, an assessment is usually performed to determine future treatment times.   Risks of this procedure include but are not limited to swelling, redness, pain, headache, bleeding, bruising, scarring, nerve injury, numbness, and infection.    Blood testing is needed prior to treatment    There is a possibility of no improvement, slight improvement or worsening with this treatment.      You should not have this procedure if you are pregnant, breast feeding, or if you have a blood/clotting disorders.     You will need to block 2 hours of your day for this appointment      1 day before treatment:   Do not take additional blood thinners or pain medication not prescribed by a provider(doctor or PA-C or NP) such as ibuprofen and aspirin  Minimize or avoid alcohol consumption  You may continue other hair loss treatments such as laser treatment or minoxidil before your procedure. If you are not sure if you should be stopping a medication, please ask.   It is ok to color your hair up to 7 days before the procedure  Please come to your visit hydrated. Drink plenty of fluids the day prior and the am of the treatment    Day and After the Procedure:  Shower the morning of your treatment and wash your hair and scalp very thoroughly using your regular shampoo  Do not apply sprays, gels or any other styling products to your hair  If you wear a hair system, please remove it prior to shampooing and do not wear it to your PRP treatments  Please  eat a normal breakfast or lunch the day of your PRP session  Drink a bottle of water (500 mL) at least 2 hours before your session  Expect hair and scalp photography in addition to the blood draw to be performed prior to treatment   Do not use hair products for 24 hours after your treatment  Avoid saunas, steam rooms and swimming for 2 days after your treatment  Shower the morning after the procedure  Do not travel on an airplane the day of the procedure  No vigorous exercise the day of the procedure      CONTACT THE OFFICE IMMEDIATELY IF ANY OF THE FOLLOWING SIGNS OF INFECTION OCCUR:  Draining - looks like pus  Increased warmth at or around the treated area  Fever of 101.5 100.6 or greater  Severe pain         Who should I call with questions?  [facility]: [phone]   [facility]: [phone]  For urgent needs outside of business hours call the [facility] at [phone] and ask for the dermatology resident on call  Mychart messaging is delayed, please use the phone for urgent issues      The use of minoxidil by  mouth to grow hair is not FDA approved.   Description and Brand Names ([facility])       Drug information provided by: [employer]    US Brand Name  Guero  Descriptions    Minoxidil belongs to the general class of medicines called antihypertensives. It is used to treat high blood pressure (hypertension).    High blood pressure adds to the workload of the heart and arteries. If it continues for a long time, the heart and arteries may not function properly. This can damage the blood vessels of the brain, heart, and kidneys, resulting in a stroke, heart failure, or kidney failure. High blood pressure may also increase the risk of heart attacks. These problems may be less likely to occur if blood pressure is controlled.    Minoxidil works by relaxing blood vessels so that blood passes through them more easily. This helps to lower  blood pressure.    Minoxidil has other effects that could be bothersome for some patients. These include increased hair growth, weight gain, fast heartbeat, and chest pain. Before you take this medicine, be sure that you have discussed the use of it with your doctor.    Minoxidil is being applied to the scalp in liquid form by some balding men to stimulate hair growth. However, improper use of liquids made from minoxidil tablets can result in minoxidil being absorbed into the body, where it may cause unwanted effects on the heart and blood vessels.    Minoxidil is available only with your doctor's prescription.    This product is available in the following dosage forms:    Tablet    Before Using  Drug information provided by: [employer]    In deciding to use a medicine, the risks of taking the medicine must be weighed against the good it will do. This is a decision you and your doctor will make. For this medicine, the following should be considered:    Allergies  Tell your doctor if you have ever had any unusual or allergic reaction to this medicine or any other medicines. Also tell your health care professional if you have any other types of allergies, such as to foods, dyes, preservatives, or animals. For non-prescription products, read the label or package ingredients carefully.    Pediatric  Although there is no specific information comparing use of minoxidil in children with use in other age groups, this medicine is not expected to cause different side effects or problems in children than it does in adults.    Geriatric  Elderly patients may be more sensitive to the effects of minoxidil. In addition, minoxidil may reduce tolerance to cold temperatures in elderly patients.    Breastfeeding  Studies in women suggest that this medication poses minimal risk to the infant when used during breastfeeding.    Drug Interactions  Although certain medicines should not be used together at all, in other cases two  different medicines may be used together even if an interaction might occur. In these cases, your doctor may want to change the dose, or other precautions may be necessary. Tell your healthcare professional if you are taking any other prescription or nonprescription (over-the-counter [OTC]) medicine.    Other Interactions  Certain medicines should not be used at or around the time of eating food or eating certain types of food since interactions may occur. Using alcohol or tobacco with certain medicines may also cause interactions to occur. Discuss with your healthcare professional the use of your medicine with food, alcohol, or tobacco.    Other Medical Problems  The presence of other medical problems may affect the use of this medicine. Make sure you tell your doctor if you have any other medical problems, especially:    Angina (chest pain)--Minoxidil may make this condition worse  Heart attack or stroke (recent)--Lowering blood pressure may make problems resulting from heart attack or stroke worse  Heart or blood vessel disease--Minoxidil can cause fluid buildup, which can cause problems  Kidney disease--Effects may be increased because of slower removal of minoxidil from the body  Pheochromocytoma--Minoxidil may cause the tumor to be more active    Storage  Store the medicine in a closed container at room temperature, away from heat, moisture, and direct light. Keep from freezing.    Keep out of the reach of children.    Do not keep outdated medicine or medicine no longer needed.    Precautions  Drug information provided by: [employer]    It is important that your doctor check your progress at regular visits to make sure that this medicine is working properly.    Ask your doctor about checking your pulse rate before and after taking minoxidil. Then, while you are taking this medicine, check your pulse regularly while you are resting. If it increases by 20 beats or more a minute, check with your doctor right  away.    While you are taking minoxidil, weigh yourself every day. A weight gain of 2 to 3 pounds (about 1 kg) in an adult is normal and should be lost with continued treatment. However, if you suddenly gain 5 pounds (2 kg) or more (for a child, 2 pounds [1 kg] or more) or if you notice swelling of your feet or lower legs, check with your doctor right away.    Do not take other medicines unless they have been discussed with your doctor. This especially includes over-the-counter (nonprescription) medicines for appetite control, asthma, colds, cough, hay fever, or sinus problems, since they may tend to increase your blood pressure.    Side Effects  Drug information provided by: [employer]    Along with its needed effects, a medicine may cause some unwanted effects. Although not all of these side effects may occur, if they do occur they may need medical attention.    Check with your doctor immediately if any of the following side effects occur:    More common  Fast or irregular heartbeat  weight gain (rapid) of more than 5 pounds (2 pounds in children)  Less common  Chest pain  shortness of breath  Check with your doctor as soon as possible if any of the following side effects occur:    More common  Bloating  flushing or redness of skin  swelling of feet or lower legs  Less common  Numbness or tingling of hands, feet, or face  Rare  Skin rash and itching  Some side effects may occur that usually do not need medical attention. These side effects may go away during treatment as your body adjusts to the medicine. Also, your health care professional may be able to tell you about ways to prevent or reduce some of these side effects. Check with your health care professional if any of the following side effects continue or are bothersome or if you have any questions about them:    More common  Increase in hair growth, usually on face, arms, and back  Less common or rare  Breast tenderness in males and  females  headache  This medicine causes a temporary increase in hair growth in most people. Hair may grow longer and darker in both men and women. This may first be noticed on the face several weeks after you start taking minoxidil. Later, new hair growth may be noticed on the back, arms, legs, and scalp. Talk to your doctor about shaving or using a hair remover during this time. After treatment with minoxidil has ended, the hair will stop growing, although it may take several months for the new hair growth to go away.    Other side effects not listed may also occur in some patients. If you notice any other effects, check with your healthcare professional.    Call your doctor for medical advice about side effects. You may report side effects to the FDA at 6-546-FDA-8895.

## 2023-02-03 NOTE — LETTER
2/3/2023         RE: Maria Esther Lowe  [address] [facility] [address]        Dear Colleague,    Thank you for referring your patient, Maria Esther Lowe, to the [facility]. Please see a copy of my visit note below.    [facility] Dermatology Note  Encounter Date: Feb 3, 2023  Office Visit     Dermatology Problem List:  1. 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 Repeat bx 5/20/2020 most consistent with androgenetic alopecia (which makes sense in setting of #2 as well)   - Biopsy in 2016 consistent with alopecia areata, but not clinically consistent; had outside ILK without improvement per report  - Repeat bx 5/20/2020 with nonscarring alopecia with marked miniaturization, favoring androgenetic alopecia. Also mild perifollicular fibrosis.  - Current tx: LLLT 2-3x weekly (started years ago), finasteride 5 mg daily (started 6/2020), ketoconazole shampoo alternating with head and shoulders, Minoxidil 5% once daily, clobetasol solution 2x a week, calcipotriene solution twice weekly, multivitamin    Hair labs: 4/8/2022  TSH: 0.04 (low)  Cbc: normal 2 years ago  Ferritin:105 (2 years ago)  Zinc: 72 (9/15/2021)  Vitamin D: 65  2. Pituitary prolactinoma s/p removal in 1975 followed by radiation with subsequent development of panhypopituitarism   3.  TSH <0.01 in 6/30/2021  - She is on estradiol per endocrinology  ____________________________________________     Assessment & Plan:     # Female pattern hair loss, favor androgenetic alopecia has old bx with alopecia areata. HairMetrix performed today, hair growth noted globally on exam today compared to previous photographs. She will consider PRP versus oral minoxidil  - Continue minoxidil 5% foam once daily  - Continue HairMax laser comb three times weekly.   - Continue ketoconazole shampoo 2-3x weekly.  - Continue finasteride 5 mg  daily. Consider discontinuing on follow up.   - Continue clobetasol solution 2 times weekly.  - Continue calcipotriene 0.005% solution twice weekly.  - HairMetrix was performed today.  - Consider oral minoxidil pending EKG if patient would like. Hx of long QT  - Prior recs: micropigmentation at [facility], patient is not interested   - Future: consider low dose oral minoxidil or PRP     # Thyroid dysfunction, TSH 0.04 from 4/22  - Follows with endocrinology    Procedures Performed:   HairMetrix: Due to performing HairMetrix for the first time in [place], only some comparison numbers are available. Additionally, frontal, right temporal, and left temporal locations may not be consistent from prior.  - Frontal anterior scalp: 196 to 169 today  - Mid scalp: 137 today, some telangiectasias noted on exam  - Vertex scalp: 132 to 166 today, mild scale  - Occipital scalp: 163 today  - Right temple: 73 today  - Left temple: 105 to 110 today     Follow-up: 3 months hair metrix MG, s he declines changes in treatment today  Staff and Scribe:     Scribe Disclosure:   I, Gavin Ng, am serving as a scribe to document services personally performed by this physician, Dr. Carley Serrano, based on data collection and the provider's statements to me.       Provider Disclosure:   The documentation recorded by the scribe accurately reflects the services I personally performed and the decisions made by me.    Carley Serrano MD    Department of Dermatology  [facility]: Phone: [phone], Fax:[phone]  [facility]: Phone: [phone], Fax: [phone]    ____________________________________________    CC: No chief complaint on file.    HPI:  Ms. Maria Esther Lowe is a(n) 67 year old female who presents today as a return patient for hair loss.     Last seen 8/24/22 for hair loss. At that time,  HairMetrix was performed, and patient was instructed to continue minoxidil 5% topical once daily, continue LLLT three times weekly, continue ketoconazole shampoo 2-3 times weekly, continue finasteride 5 mg daily, decrease clobetasol frequency to twice weekly, and start calcipotriene solution twice weekly.     Today, she feels that her shedding is stable, and does not feel she is growing much new hair. Has been using minoxidil foam daily, finasteride daily, light comb three times weekly.     Patient is otherwise feeling well, without additional skin concerns.    Labs Reviewed:  N/A    Physical Exam:  Vitals: There were no vitals taken for this visit.  SKIN: Focused examination of scalp was performed.  - Carlos 3  - Erythema on the crown   - No other lesions of concern on areas examined.     Medications:  Current Outpatient Medications   Medication     calcipotriene (DOVONOX) 0.005 % external solution     CALCIUM 500 +D 500-400 MG-UNIT TABS     cholecalciferol (VITAMIN D3) 125 mcg (5000 units) capsule     clobetasol (TEMOVATE) 0.05 % external solution     conjugated estrogens (PREMARIN) cream     cyanocobalamin (VITAMIN  B-12) 1000 MCG tablet     estradiol (ESTRACE) 1 MG tablet     estradiol (VAGIFEM) 10 MCG TABS vaginal tablet     finasteride (PROSCAR) 5 MG tablet     hydrocortisone (CORTEF) 10 MG tablet     ketoconazole (NIZORAL) 2 % external shampoo     levothyroxine (SYNTHROID/LEVOTHROID) 150 MCG tablet     levothyroxine (SYNTHROID/LEVOTHROID) 175 MCG tablet     losartan-hydrochlorothiazide (HYZAAR) 100-25 MG tablet     simvastatin (ZOCOR) 40 MG tablet     No current facility-administered medications for this visit.      Past Medical History:   Patient Active Problem List   Diagnosis     Prolactinoma (H)     Panhypopituitarism (H)     Hormone replacement therapy (postmenopausal)     Carpal tunnel syndrome     History of robot-assisted laparoscopic hysterectomy     Past Medical History:   Diagnosis Date      Hyperlipidemia LDL goal < 130      Hypertension      Hypothyroidism      Osteopenia     DEXA: 12/11; T-score of -1.9        Panhypopituitarism (H)         CC No referring provider defined for this encounter. on close of this encounter.       Again, thank you for allowing me to participate in the care of your patient.        Sincerely,        Carley Serrano MD

## 2023-02-03 NOTE — PROGRESS NOTES
[facility] Dermatology Note  Encounter Date: Feb 3, 2023  Office Visit     Dermatology Problem List:  1. 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 Repeat bx 5/20/2020 most consistent with androgenetic alopecia (which makes sense in setting of #2 as well)   - Biopsy in 2016 consistent with alopecia areata, but not clinically consistent; had outside ILK without improvement per report  - Repeat bx 5/20/2020 with nonscarring alopecia with marked miniaturization, favoring androgenetic alopecia. Also mild perifollicular fibrosis.  - Current tx: LLLT 2-3x weekly (started years ago), finasteride 5 mg daily (started 6/2020), ketoconazole shampoo alternating with head and shoulders, Minoxidil 5% once daily, clobetasol solution 2x a week, calcipotriene solution twice weekly, multivitamin    Hair labs: 4/8/2022  TSH: 0.04 (low)  Cbc: normal 2 years ago  Ferritin:105 (2 years ago)  Zinc: 72 (9/15/2021)  Vitamin D: 65  2. Pituitary prolactinoma s/p removal in 1975 followed by radiation with subsequent development of panhypopituitarism   3.  TSH <0.01 in 6/30/2021  - She is on estradiol per endocrinology  ____________________________________________     Assessment & Plan:     # Female pattern hair loss, favor androgenetic alopecia has old bx with alopecia areata. HairMetrix performed today, hair growth noted globally on exam today compared to previous photographs. She will consider PRP versus oral minoxidil  - Continue minoxidil 5% foam once daily  - Continue HairMax laser comb three times weekly.   - Continue ketoconazole shampoo 2-3x weekly.  - Continue finasteride 5 mg daily. Consider discontinuing on follow up.   - Continue clobetasol solution 2 times weekly.  - Continue calcipotriene 0.005% solution twice weekly.  - HairMetrix was performed today.  - Consider oral minoxidil pending EKG if patient would like. Hx of long  QT  - Prior recs: micropigmentation at [facility], patient is not interested   - Future: consider low dose oral minoxidil or PRP     # Thyroid dysfunction, TSH 0.04 from 4/22  - Follows with endocrinology    Procedures Performed:   HairMetrix: Due to performing HairMetrix for the first time in [place], only some comparison numbers are available. Additionally, frontal, right temporal, and left temporal locations may not be consistent from prior.  - Frontal anterior scalp: 196 to 169 today  - Mid scalp: 137 today, some telangiectasias noted on exam  - Vertex scalp: 132 to 166 today, mild scale  - Occipital scalp: 163 today  - Right temple: 73 today  - Left temple: 105 to 110 today     Follow-up: 3 months hair metrix MG, s he declines changes in treatment today  Staff and Scribe:     Scribe Disclosure:   I, Gavin Ng, am serving as a scribe to document services personally performed by this physician, Dr. Carley Serrano, based on data collection and the provider's statements to me.       Provider Disclosure:   The documentation recorded by the scribe accurately reflects the services I personally performed and the decisions made by me.    Carley Serrano MD    Department of Dermatology  [facility]: Phone: [phone], Fax:[phone]  [facility]: Phone: [phone], Fax: [phone]    ____________________________________________    CC: No chief complaint on file.    HPI:  Ms. Maria Esther Lowe is a(n) 67 year old female who presents today as a return patient for hair loss.     Last seen 8/24/22 for hair loss. At that time, HairMetrix was performed, and patient was instructed to continue minoxidil 5% topical once daily, continue LLLT three times weekly, continue ketoconazole shampoo 2-3 times weekly, continue finasteride 5 mg daily, decrease clobetasol frequency to twice weekly,  and start calcipotriene solution twice weekly.     Today, she feels that her shedding is stable, and does not feel she is growing much new hair. Has been using minoxidil foam daily, finasteride daily, light comb three times weekly.     Patient is otherwise feeling well, without additional skin concerns.    Labs Reviewed:  N/A    Physical Exam:  Vitals: There were no vitals taken for this visit.  SKIN: Focused examination of scalp was performed.  - Carlos 3  - Erythema on the crown   - No other lesions of concern on areas examined.     Medications:  Current Outpatient Medications   Medication     calcipotriene (DOVONOX) 0.005 % external solution     CALCIUM 500 +D 500-400 MG-UNIT TABS     cholecalciferol (VITAMIN D3) 125 mcg (5000 units) capsule     clobetasol (TEMOVATE) 0.05 % external solution     conjugated estrogens (PREMARIN) cream     cyanocobalamin (VITAMIN  B-12) 1000 MCG tablet     estradiol (ESTRACE) 1 MG tablet     estradiol (VAGIFEM) 10 MCG TABS vaginal tablet     finasteride (PROSCAR) 5 MG tablet     hydrocortisone (CORTEF) 10 MG tablet     ketoconazole (NIZORAL) 2 % external shampoo     levothyroxine (SYNTHROID/LEVOTHROID) 150 MCG tablet     levothyroxine (SYNTHROID/LEVOTHROID) 175 MCG tablet     losartan-hydrochlorothiazide (HYZAAR) 100-25 MG tablet     simvastatin (ZOCOR) 40 MG tablet     No current facility-administered medications for this visit.      Past Medical History:   Patient Active Problem List   Diagnosis     Prolactinoma (H)     Panhypopituitarism (H)     Hormone replacement therapy (postmenopausal)     Carpal tunnel syndrome     History of robot-assisted laparoscopic hysterectomy     Past Medical History:   Diagnosis Date     Hyperlipidemia LDL goal < 130      Hypertension      Hypothyroidism      Osteopenia     DEXA: 12/11; T-score of -1.9        Panhypopituitarism (H)         CC No referring provider defined for this encounter. on close of this encounter.

## 2023-02-16 ENCOUNTER — TELEPHONE (OUTPATIENT)
Dept: DERMATOLOGY | Facility: CLINIC | Age: 68
End: 2023-02-16
Payer: COMMERCIAL

## 2023-02-16 NOTE — TELEPHONE ENCOUNTER
[facility] Call Center    Phone Message    May a detailed message be left on voicemail: no     Reason for Call: Other: Pt, Kathy calling to get appt for 4 weeks from last appt 2/3/23 with Dr. Serrano. Mikki scheduled her for 5/19 and that will not work for her.  Please call Kathy at: [phone]- advised next appt with Dr. Serrano is in Sept.      Action Taken: Other: [facility] DERM    Travel Screening: Not Applicable                                                                    ;

## 2023-02-16 NOTE — TELEPHONE ENCOUNTER
Spoke to patient. She states that she needs to reschedule her 5/19 appointment because she will be out of town. She asked for an appointment earlier in the day. I informed her Dr. Serrano had no other openings that day. I looked at her schedule for the next few weeks and was unable to find a spot to move her appointment as she is slotted for 45 minutes. Patient is insistent that she needs to be seen within a 4 month time period so I offered to put her on a waiting list if something opens up sooner. She declined and said she needs a new appointment but I was unable to accommodate her requests.     Michelle DEY

## 2023-03-08 DIAGNOSIS — L65.9 NON-SCARRING ALOPECIA: ICD-10-CM

## 2023-03-10 RX ORDER — FINASTERIDE 5 MG/1
1 TABLET, FILM COATED ORAL DAILY
Qty: 90 TABLET | Refills: 1 | Status: SHIPPED | OUTPATIENT
Start: 2023-03-10 | End: 2023-03-14

## 2023-03-10 NOTE — TELEPHONE ENCOUNTER
Finasteride 5 MG Oral Tablet  Last Written Prescription Date:   5/25/2022  Last Fill Quantity: 30,   # refills: 11  Last Office Visit :  2/3/2023  Future Office visit:   6/9/2023  Pharmacy asking for 90 day supply with refills to cover a whole year.   Protocol for us to fill is only 6 months.   Refer to Provider for review and refills per Providers orders for Pt care.       Kari Christopher RN  Central Triage Red Flags/Med Refills

## 2023-03-10 NOTE — TELEPHONE ENCOUNTER
Received refill request for finasteride as the resident on call.   Reviewed patient's chart and attached communication.   Patient last seen 2/3/23 for alopecia, plan was to continue the above medication until follow-up. RTC scheduled for June 2023.     After reviewing the medication list and assessment and plan from last visit, the refill request was approved.    CC'ing Dr. Maggie KEITA.    Rohit Nayak MD  PGY-3 Dermatology

## 2023-03-12 ENCOUNTER — MYC MEDICAL ADVICE (OUTPATIENT)
Dept: DERMATOLOGY | Facility: CLINIC | Age: 68
End: 2023-03-12
Payer: COMMERCIAL

## 2023-03-12 DIAGNOSIS — L65.9 NON-SCARRING ALOPECIA: ICD-10-CM

## 2023-03-14 RX ORDER — FINASTERIDE 5 MG/1
1 TABLET, FILM COATED ORAL DAILY
Qty: 90 TABLET | Refills: 1 | Status: SHIPPED | OUTPATIENT
Start: 2023-03-14 | End: 2023-06-09

## 2023-03-23 DIAGNOSIS — E23.0 PANHYPOPITUITARISM (H): ICD-10-CM

## 2023-03-24 NOTE — TELEPHONE ENCOUNTER
losartan-hydrochlorothiazide (HYZAAR) 100-25 MG tablet       Last Written Prescription Date:  12-  Last Fill Quantity: 90  # refills: 0  Last Office Visit : 04-  Future Office visit:  Not on file    Routing refill request to provider for review/approval because:    Angiotensin-II Receptors Failed 03/23/2023 04:31 AM   Protocol Details  Last blood pressure under 140/90 in past 12 months    Normal serum creatinine on file in past 12 months    Normal serum potassium on file in past 12 archie     Lab Test 04/08/22  1624   CR 1.12*     Lab Test 04/08/22  1624   POTASSIUM 3.3*     BP Readings from Last 3 Encounters:   04/08/22 (!) 148/78   03/17/22 139/85   03/17/21 (!) 144/78

## 2023-03-25 RX ORDER — LOSARTAN POTASSIUM AND HYDROCHLOROTHIAZIDE 25; 100 MG/1; MG/1
1 TABLET ORAL DAILY
Qty: 30 TABLET | Refills: 1 | Status: SHIPPED | OUTPATIENT
Start: 2023-03-25 | End: 2023-05-23

## 2023-04-01 DIAGNOSIS — E03.8 SECONDARY HYPOTHYROIDISM: ICD-10-CM

## 2023-04-01 DIAGNOSIS — E23.0 HYPOPITUITARISM (H): ICD-10-CM

## 2023-04-05 RX ORDER — LEVOTHYROXINE SODIUM 175 UG/1
TABLET ORAL
Qty: 36 TABLET | Refills: 0 | Status: SHIPPED | OUTPATIENT
Start: 2023-04-05 | End: 2023-05-31

## 2023-04-05 NOTE — TELEPHONE ENCOUNTER
Last Clinic Visit: 4/8/2022  [facility]  TSH and clinic visit will be overdue by the end of the week  Scheduling has been notified to contact the pt for appointment.

## 2023-04-06 ENCOUNTER — MYC MEDICAL ADVICE (OUTPATIENT)
Dept: OBGYN | Facility: CLINIC | Age: 68
End: 2023-04-06
Payer: COMMERCIAL

## 2023-04-06 DIAGNOSIS — L65.9 LOSS OF HAIR: ICD-10-CM

## 2023-04-06 DIAGNOSIS — E23.0 PANHYPOPITUITARISM (H): ICD-10-CM

## 2023-04-06 RX ORDER — ESTRADIOL 1 MG/1
1 TABLET ORAL DAILY
Qty: 90 TABLET | Refills: 1 | Status: SHIPPED | OUTPATIENT
Start: 2023-04-06 | End: 2023-11-10

## 2023-04-06 NOTE — TELEPHONE ENCOUNTER
Patients last annual with Dr. Lew was on 3-17-22.     Will send a short supply until she can get in for an annual. Sent [employer] message letting the patient know.

## 2023-04-12 ENCOUNTER — TELEPHONE (OUTPATIENT)
Dept: OBGYN | Facility: CLINIC | Age: 68
End: 2023-04-12
Payer: COMMERCIAL

## 2023-04-12 DIAGNOSIS — Z12.31 VISIT FOR SCREENING MAMMOGRAM: Primary | ICD-10-CM

## 2023-04-12 NOTE — TELEPHONE ENCOUNTER
[facility] Call Center    Phone Message    May a detailed message be left on voicemail: yes     Reason for Call: Order(s): Mammo Orders  Reason for requested: Patient has an annual exam on 06/22 and would like to have a Mammogram as well   Date needed: June  Provider name: Tiffany Lew      Please place mammogram orders and contact patient to schedule. Thank you      Action Taken: Other: [facility]    Travel Screening: Not Applicable

## 2023-04-12 NOTE — TELEPHONE ENCOUNTER
Called patient to verify if she is having any symptoms. She denies symptoms. Screening MA ordered. [facility] phone number provided for scheduling. No further questions at this time.

## 2023-05-18 DIAGNOSIS — E23.0 PANHYPOPITUITARISM (H): ICD-10-CM

## 2023-05-23 RX ORDER — LOSARTAN POTASSIUM AND HYDROCHLOROTHIAZIDE 25; 100 MG/1; MG/1
1 TABLET ORAL DAILY
Qty: 90 TABLET | Refills: 0 | Status: SHIPPED | OUTPATIENT
Start: 2023-05-23 | End: 2023-08-13

## 2023-05-23 NOTE — TELEPHONE ENCOUNTER
Losartan Potassium-HCTZ 100-25 MG Oral Tablet  Last Written Prescription Date:  3/25/23  Last Fill Quantity: 30,   # refills: 1   Last Office Visit : 04-  Future Office visit:  10/11/23    Routing refill request to provider for review/approval because:  Labs due/ abn (4/8/22). Appt 10/11/23  Lab Test 04/08/22   CR 1.12*        Lab Test 04/08/22      POTASSIUM 3.3*

## 2023-06-01 ENCOUNTER — HEALTH MAINTENANCE LETTER (OUTPATIENT)
Age: 68
End: 2023-06-01

## 2023-06-08 NOTE — PROGRESS NOTES
[facility] Dermatology Note  Encounter Date: Jun 9, 2023  Office Visit     Dermatology Problem List:  1. 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 Repeat bx 5/20/2020 most consistent with androgenetic alopecia (which makes sense in setting of #2 as well)   - Biopsy in 2016 consistent with alopecia areata, but not clinically consistent; had outside ILK without improvement per report  - Repeat bx 5/20/2020 with nonscarring alopecia with marked miniaturization, favoring androgenetic alopecia. Also mild perifollicular fibrosis.  - Current tx: LLLT 2-3x weekly (started years ago), finasteride 5 mg daily (started 6/2020), ketoconazole shampoo alternating with head and shoulders, Minoxidil 5% once daily, clobetasol solution 2x a week, calcipotriene solution twice weekly, multivitamin    Hair labs: 4/8/2022  TSH: 0.04 (low)  Cbc: normal 2 years ago  Ferritin:105 (2 years ago)  Zinc: 72 (9/15/2021)  Vitamin D: 65  2. Pituitary prolactinoma s/p removal in 1975 followed by radiation with subsequent development of panhypopituitarism   3.  TSH <0.01 in 6/30/2021  - She is on estradiol per endocrinology  ____________________________________________    Assessment & Plan:    # Female pattern hair loss, favor androgenetic alopecia has old bx with alopecia areata. Hair Metrix performed today. Discussed additional treatment options such as the risks and benefits of oral minoxidil.  - Discussed additional treatment options such as switching finasteride 5mg to dutasteride 0.5mg daily.     - Continue minoxidil 5% foam once daily  - Continue HairMax laser comb three times weekly.   - Continue ketoconazole shampoo 2-3x weekly.  - Stop finasteride 5 mg daily. Consider discontinuing on follow up.   - Continue clobetasol solution 2 times weekly.  - Continue calcipotriene 0.005% solution twice weekly.  -Start dutasteride 0.5mg daily.   Courtesy follow up phone call . left message. RVP reported positive for Rhino/entero and COVID. left message for family to call back to ED for results. Risks reviewed including but not limited to. Patient is not a tamsulosin or other 5-alpha reductase inhibitors and sulfa drug allergy .Not pregnant.    Dizziness, libido, breast changes,  Fall/syncope.   - HairMetrix was performed today.  - Prior recs: micropigmentation at [facility], patient is not interested   - Future consideration: low dose oral minoxidil- Hx of long QT, spironolactone   -Referral to cardiology for EKG.               Procedures Performed:   HairMetrix:  - Frontal anterior scalp: 196 to 169 to 144 today, mild scaling  - Mid scalp: 137 to 116 today,   - Vertex scalp: 132 to 166 to 126 today, mild scaling   - Occipital scalp: 163 to 146 today, mild scaling   - Right temple: 73 to 89 today, mild scaling  - Left temple: 105 to 110 to 104 today, mild scaling    Follow-up: 4 months in person.     Staff and Scribe:     Scribe Disclosure:   I, Leighann Moy, am serving as a scribe to document services personally performed by this physician, Dr. Carley Serrano, based on data collection and the provider's statements to me.       Provider Disclosure:   The documentation recorded by the scribe accurately reflects the services I personally performed and the decisions made by me.    Carley Serrano MD    Department of Dermatology  [facility]: Phone: [phone], Fax:[phone]  [facility]: Phone: [phone], Fax: [phone]    ____________________________________________    CC: Hair Loss (Patient here for hair loss follow up and hair metrix, hair loss stable )    HPI:  Ms. Maria Esther Lowe is a(n) 67 year old female who presents today as a return patient for hair loss. Pt reports she feels hair loss is worse. Pt reports not interested in PRP procedure. Pt reports concerns with taking oral minoxidil.     Last seen 2/3/23 for FFA. At that time Hair Metrix was performed and  pt was instructed to continue minoxidil 5% foam once daily, the HairMax laser comb three times daily, ketoconazole shampoo 2-3x weekly, finasteride 5mg daily, clobetasol solution 2x weekly, and calcipotriene 0.005% solution twice weekly.     She reports doing minoxidil 5% foam every other day for the last few months. The HairMax laser comb 2 times per week. Ketoconazole shampoo once per week (every other time she shampoos). She uses Aveda shampoo or Head and Shoulders shampoo on the days she does not use ketoconazole. Not using clobetasol or calcipotriene.    She is not interested in oral minoxidil due to the potential of weight gain. She has questions regarding biotin, particularly Nutrafol.     Patient is otherwise feeling well, without additional skin concerns.    Labs Reviewed:  N/A    Physical Exam:  Vitals: There were no vitals taken for this visit.  SKIN: Focused examination of the scalp was performed.  -thinning crown, mild erythema, see hair metrix number   - No other lesions of concern on areas examined.     Medications:  Current Outpatient Medications   Medication     calcipotriene (DOVONOX) 0.005 % external solution     CALCIUM 500 +D 500-400 MG-UNIT TABS     cholecalciferol (VITAMIN D3) 125 mcg (5000 units) capsule     clobetasol (TEMOVATE) 0.05 % external solution     conjugated estrogens (PREMARIN) cream     cyanocobalamin (VITAMIN  B-12) 1000 MCG tablet     estradiol (ESTRACE) 1 MG tablet     estradiol (VAGIFEM) 10 MCG TABS vaginal tablet     finasteride (PROSCAR) 5 MG tablet     hydrocortisone (CORTEF) 10 MG tablet     ketoconazole (NIZORAL) 2 % external shampoo     levothyroxine (SYNTHROID/LEVOTHROID) 150 MCG tablet     levothyroxine (SYNTHROID/LEVOTHROID) 175 MCG tablet     losartan-hydrochlorothiazide (HYZAAR) 100-25 MG tablet     simvastatin (ZOCOR) 40 MG tablet     No current facility-administered medications for this visit.      Past Medical History:   Patient Active Problem List   Diagnosis      Prolactinoma (H)     Panhypopituitarism (H)     Hormone replacement therapy (postmenopausal)     Carpal tunnel syndrome     History of robot-assisted laparoscopic hysterectomy     Past Medical History:   Diagnosis Date     Hyperlipidemia LDL goal < 130      Hypertension      Hypothyroidism      Osteopenia     DEXA: 12/11; T-score of -1.9        Panhypopituitarism (H)

## 2023-06-09 ENCOUNTER — OFFICE VISIT (OUTPATIENT)
Dept: DERMATOLOGY | Facility: CLINIC | Age: 68
End: 2023-06-09
Payer: COMMERCIAL

## 2023-06-09 DIAGNOSIS — Z76.89 ENCOUNTER PRIOR TO INITIATION OF MEDICATION: Primary | ICD-10-CM

## 2023-06-09 DIAGNOSIS — L63.9 ALOPECIA AREATA: ICD-10-CM

## 2023-06-09 DIAGNOSIS — L65.8 FEMALE PATTERN ALOPECIA: ICD-10-CM

## 2023-06-09 PROCEDURE — 99214 OFFICE O/P EST MOD 30 MIN: CPT | Performed by: DERMATOLOGY

## 2023-06-09 RX ORDER — DUTASTERIDE 0.5 MG/1
0.5 CAPSULE, LIQUID FILLED ORAL DAILY
Qty: 90 CAPSULE | Refills: 3 | Status: SHIPPED | OUTPATIENT
Start: 2023-06-09 | End: 2023-08-02

## 2023-06-09 RX ORDER — KETOCONAZOLE 20 MG/ML
SHAMPOO TOPICAL
Qty: 100 ML | Refills: 11 | Status: SHIPPED | OUTPATIENT
Start: 2023-06-09 | End: 2024-06-21

## 2023-06-09 RX ORDER — CLOBETASOL PROPIONATE 0.5 MG/ML
SOLUTION TOPICAL
Qty: 60 ML | Refills: 3 | Status: SHIPPED | OUTPATIENT
Start: 2023-06-09 | End: 2024-06-21

## 2023-06-09 NOTE — LETTER
6/9/2023         RE: Maria Esther Lowe  [address] [place] [facility] [address]        Dear Colleague,    Thank you for referring your patient, Maria Esther Lowe, to the [facility]. Please see a copy of my visit note below.    [facility] Dermatology Note  Encounter Date: Jun 9, 2023  Office Visit     Dermatology Problem List:  1. 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 Repeat bx 5/20/2020 most consistent with androgenetic alopecia (which makes sense in setting of #2 as well)   - Biopsy in 2016 consistent with alopecia areata, but not clinically consistent; had outside ILK without improvement per report  - Repeat bx 5/20/2020 with nonscarring alopecia with marked miniaturization, favoring androgenetic alopecia. Also mild perifollicular fibrosis.  - Current tx: LLLT 2-3x weekly (started years ago), finasteride 5 mg daily (started 6/2020), ketoconazole shampoo alternating with head and shoulders, Minoxidil 5% once daily, clobetasol solution 2x a week, calcipotriene solution twice weekly, multivitamin    Hair labs: 4/8/2022  TSH: 0.04 (low)  Cbc: normal 2 years ago  Ferritin:105 (2 years ago)  Zinc: 72 (9/15/2021)  Vitamin D: 65  2. Pituitary prolactinoma s/p removal in 1975 followed by radiation with subsequent development of panhypopituitarism   3.  TSH <0.01 in 6/30/2021  - She is on estradiol per endocrinology  ____________________________________________    Assessment & Plan:    # Female pattern hair loss, favor androgenetic alopecia has old bx with alopecia areata. Hair Metrix performed today. Discussed additional treatment options such as the risks and benefits of oral minoxidil.  - Discussed additional treatment options such as switching finasteride 5mg to dutasteride 0.5mg daily.     - Continue minoxidil 5% foam once daily  - Continue HairMax laser comb three times weekly.   -  Continue ketoconazole shampoo 2-3x weekly.  - Stop finasteride 5 mg daily. Consider discontinuing on follow up.   - Continue clobetasol solution 2 times weekly.  - Continue calcipotriene 0.005% solution twice weekly.  -Start dutasteride 0.5mg daily.  Risks reviewed including but not limited to. Patient is not a tamsulosin or other 5-alpha reductase inhibitors and sulfa drug allergy .Not pregnant.    Dizziness, libido, breast changes,  Fall/syncope.   - HairMetrix was performed today.  - Prior recs: micropigmentation at [facility], patient is not interested   - Future consideration: low dose oral minoxidil- Hx of long QT, spironolactone   -Referral to cardiology for EKG.               Procedures Performed:   HairMetrix:  - Frontal anterior scalp: 196 to 169 to 144 today, mild scaling  - Mid scalp: 137 to 116 today,   - Vertex scalp: 132 to 166 to 126 today, mild scaling   - Occipital scalp: 163 to 146 today, mild scaling   - Right temple: 73 to 89 today, mild scaling  - Left temple: 105 to 110 to 104 today, mild scaling    Follow-up: 4 months in person.     Staff and Scribe:     Scribe Disclosure:   I, Leighann Moy, am serving as a scribe to document services personally performed by this physician, Dr. Carley Serrano, based on data collection and the provider's statements to me.       Provider Disclosure:   The documentation recorded by the scribe accurately reflects the services I personally performed and the decisions made by me.    Carley Serrano MD    Department of Dermatology  [facility]: Phone: [phone], Fax:[phone]  [facility]: Phone: [phone], Fax: [phone]    ____________________________________________    CC: Hair Loss (Patient here for hair loss follow up and hair metrix, hair loss stable )    HPI:  Ms. Maria Esther Lowe is a(n) 67 year old female who  presents today as a return patient for hair loss. Pt reports she feels hair loss is worse. Pt reports not interested in PRP procedure. Pt reports concerns with taking oral minoxidil.     Last seen 2/3/23 for FFA. At that time Hair Metrix was performed and pt was instructed to continue minoxidil 5% foam once daily, the HairMax laser comb three times daily, ketoconazole shampoo 2-3x weekly, finasteride 5mg daily, clobetasol solution 2x weekly, and calcipotriene 0.005% solution twice weekly.     She reports doing minoxidil 5% foam every other day for the last few months. The HairMax laser comb 2 times per week. Ketoconazole shampoo once per week (every other time she shampoos). She uses Aveda shampoo or Head and Shoulders shampoo on the days she does not use ketoconazole. Not using clobetasol or calcipotriene.    She is not interested in oral minoxidil due to the potential of weight gain. She has questions regarding biotin, particularly Nutrafol.     Patient is otherwise feeling well, without additional skin concerns.    Labs Reviewed:  N/A    Physical Exam:  Vitals: There were no vitals taken for this visit.  SKIN: Focused examination of the scalp was performed.  -thinning crown, mild erythema, see hair metrix number   - No other lesions of concern on areas examined.     Medications:  Current Outpatient Medications   Medication     calcipotriene (DOVONOX) 0.005 % external solution     CALCIUM 500 +D 500-400 MG-UNIT TABS     cholecalciferol (VITAMIN D3) 125 mcg (5000 units) capsule     clobetasol (TEMOVATE) 0.05 % external solution     conjugated estrogens (PREMARIN) cream     cyanocobalamin (VITAMIN  B-12) 1000 MCG tablet     estradiol (ESTRACE) 1 MG tablet     estradiol (VAGIFEM) 10 MCG TABS vaginal tablet     finasteride (PROSCAR) 5 MG tablet     hydrocortisone (CORTEF) 10 MG tablet     ketoconazole (NIZORAL) 2 % external shampoo     levothyroxine (SYNTHROID/LEVOTHROID) 150 MCG tablet     levothyroxine  (SYNTHROID/LEVOTHROID) 175 MCG tablet     losartan-hydrochlorothiazide (HYZAAR) 100-25 MG tablet     simvastatin (ZOCOR) 40 MG tablet     No current facility-administered medications for this visit.      Past Medical History:   Patient Active Problem List   Diagnosis     Prolactinoma (H)     Panhypopituitarism (H)     Hormone replacement therapy (postmenopausal)     Carpal tunnel syndrome     History of robot-assisted laparoscopic hysterectomy     Past Medical History:   Diagnosis Date     Hyperlipidemia LDL goal < 130      Hypertension      Hypothyroidism      Osteopenia     DEXA: 12/11; T-score of -1.9        Panhypopituitarism (H)        Again, thank you for allowing me to participate in the care of your patient.        Sincerely,        Carley Serrano MD

## 2023-06-09 NOTE — NURSING NOTE
Maria Esther Lowe's goals for this visit include:   Chief Complaint   Patient presents with     Hair Loss     Patient here for hair loss follow up and hair metrix, hair loss stable        She requests these members of her care team be copied on today's visit information:     PCP: Estela Boswell    Referring Provider:  No referring provider defined for this encounter.    There were no vitals taken for this visit.    Do you need any medication refills at today's visit? No      Mima Schroeder EMT

## 2023-06-09 NOTE — PATIENT INSTRUCTIONS
Spironolactone but kidney function is up  Finasteride to dutasteride   Increase use of topical minoxidil    [facility] Dermatology Visit    Thank you for allowing us to participate in your care. Your findings, instructions and follow-up plan are as follows:         When should I call my doctor?  If you are worsening or not improving, please, contact us or seek urgent care as noted below.     Who should I call with questions (adults)?  [facility] (adult and pediatric): [phone]  [facility] (adult): [phone]  For urgent needs outside of business hours call the [facility] at [phone] and ask for the dermatology resident on call  If this is a medical emergency and you are unable to reach an ER, Call [phone]    Who should I call with questions (pediatric)?  [facility]- Pediatric Dermatology  Dr. Malathi Hendricks, Dr. Virginia Gunn, Dr. Ileana Rosario, Toma Jordan, PA  Dr. Veronica Nance, Dr. Isabel Nguyen & Dr. Santi Barraza  Non Urgent  Nurse Triage Line; [phone]- Laura and Ira RN Care Coordinators   Cora (/Complex ) [phone]    If you need a prescription refill, please contact your pharmacy. Refills are approved or denied by our physicians during normal business hours, Monday through Fridays  Per office policy, refills will not be granted if you have not been seen within the past year (or sooner depending on your child's condition).    Scheduling Information:  Pediatric Appointment Scheduling and Call Center [phone]  Radiology Scheduling- [phone]  Sedation Unit Scheduling- [phone]  [place] Scheduling- General [phone]; Pediatric Dermatology [phone]  Main  Services: [phone]  Italian: [phone]  Albanian: [phone]  Hmong/Dario/Albanian: [phone]  Preadmission Nursing Department Fax Number: [phone]  "(fax all pre-operative paperwork to this number)    For urgent matters arising during evenings, weekends, or holidays that cannot wait for normal business hours please call [phone] and ask for the dermatology resident on call to be paged.       Dutasteride is not FDA approved for hair loss.     Dutasteride and tamsulosin: Patient drug information  Outline  Brand Names: US  Brand Names: Adelina      What is this drug used for?  What do I need to tell my doctor BEFORE I take this drug?  What are some things I need to know or do while I take this drug?  What are some side effects that I need to call my doctor about right away?  What are some other side effects of this drug?  How is this drug best taken?  What do I do if I miss a dose?  How do I store and/or throw out this drug?  General drug facts  Last Reviewed Date  Consumer Information Use and Disclaimer  RELATED TOPICS  Dutasteride and tamsulosin: Drug information  Access [employer] for additional drug information, tools, and databases.  Copyright 9486-2504 [employer] All rights reserved.  Contributor Disclosures    (For additional information see \"Dutasteride and tamsulosin: Drug information\")    You must carefully read the \"Consumer Information Use and Disclaimer\" below in order to understand and correctly use this information.    Brand Names: US Cho  Brand Names: Adelina Cho      What is this drug used for?   It is used to treat the signs of an enlarged prostate.   It may be given to you for other reasons. Talk with the doctor.  What do I need to tell my doctor BEFORE I take this drug?  For all patients taking this drug:   If you are allergic to this drug; any part of this drug; or any other drugs, foods, or substances. Tell your doctor about the allergy and what signs you had.   If you take any drugs (prescription or OTC, natural products, vitamins) that must not be taken with this drug, like certain drugs that are used for HIV, infections, " or depression. There are many drugs that must not be taken with this drug. Your doctor or pharmacist can tell you if you are taking a drug that must not be taken with this drug.   If you are female. This drug is not approved for use in females. This drug may cause harm to an unborn baby if it is taken during pregnancy. Talk with your doctor if you are pregnant, plan on getting pregnant, or are breast-feeding.  Children:   If the patient is a child. This drug is not approved for use in children.  This is not a list of all drugs or health problems that interact with this drug.  Tell your doctor and pharmacist about all of your drugs (prescription or OTC, natural products, vitamins) and health problems. You must check to make sure that it is safe for you to take this drug with all of your drugs and health problems. Do not start, stop, or change the dose of any drug without checking with your doctor.  What are some things I need to know or do while I take this drug?   Tell all of your health care providers that you take this drug. This includes your doctors, nurses, pharmacists, and dentists.   Avoid driving and doing other tasks or actions that call for you to be alert until you see how this drug affects you.   To lower the chance of feeling dizzy or passing out, rise slowly if you have been sitting or lying down. Be careful going up and down stairs.   Do not donate blood while using this drug and for 6 months after stopping.   In one trial of dutasteride for prostate cancer, a serious form of prostate cancer was seen more often in people taking dutasteride than in those who were not. Talk with your doctor.   Pregnant females or females who may be pregnant must not touch the capsules. If one of these females touches a leaking capsule, the area must be washed right away with soap and water.   If you are having cataract surgery or other eye procedure, talk with your doctor.   Have blood work checked as you have been told  by the doctor. Talk with the doctor.   Check your blood pressure as you have been told.   Have a rectal exam (to check prostate gland) and blood work (PSA test). Talk with your doctor.   If you have a sulfa (sulfonamide) allergy, talk with your doctor.   This drug may affect certain lab tests. Tell all of your health care providers and lab workers that you take this drug.   Talk with your doctor before you drink alcohol.   This drug may affect sperm. This may affect being able to father a child. Talk with the doctor.    What are some side effects that I need to call my doctor about right away?  WARNING/CAUTION: Even though it may be rare, some people may have very bad and sometimes deadly side effects when taking a drug. Tell your doctor or get medical help right away if you have any of the following signs or symptoms that may be related to a very bad side effect:   Signs of an allergic reaction, like rash; hives; itching; red, swollen, blistered, or peeling skin with or without fever; wheezing; tightness in the chest or throat; trouble breathing, swallowing, or talking; unusual hoarseness; or swelling of the mouth, face, lips, tongue, or throat.   Dizziness or passing out.   A lump in the breast, breast pain or soreness, or nipple discharge.   Enlarged breasts.   Depression.   Call your doctor right away if you have a painful erection (hard penis) or an erection that lasts for longer than 4 hours. This may happen even when you are not having sex. If this is not treated right away, it may lead to lasting sex problems and you may not be able to have sex.  What are some other side effects of this drug?  All drugs may cause side effects. However, many people have no side effects or only have minor side effects. Call your doctor or get medical help if any of these side effects or any other side effects bother you or do not go away:   Runny nose.   This drug may cause lowered interest in sex, ejaculation problems, or  trouble getting or keeping an erection. This could go on after you stop this drug. Talk with your doctor if these effects go on or bother you.  These are not all of the side effects that may occur. If you have questions about side effects, call your doctor. Call your doctor for medical advice about side effects.  You may report side effects to your national health agency.      How is this drug best taken?  Use this drug as ordered by your doctor. Read all information given to you. Follow all instructions closely.   Keep taking this drug as you have been told by your doctor or other health care provider, even if you feel well.   Take 30 minutes after the same meal every day.   Swallow whole. Do not chew, open, or crush.   Do not take or touch the capsule if it is deformed, changes color, or is leaking.  What do I do if I miss a dose?   Take a missed dose as soon as you think about it, after a meal.   If it is close to the time for your next dose, skip the missed dose and go back to your normal time.   Do not take 2 doses at the same time or extra doses.   If you miss taking this drug for a few days in a row, call your doctor before you start taking it again.  How do I store and/or throw out this drug?   Store at room temperature in a dry place. Do not store in a bathroom.   Protect from heat.   Keep all drugs in a safe place. Keep all drugs out of the reach of children and pets.   Throw away unused or  drugs. Do not flush down a toilet or pour down a drain unless you are told to do so. Check with your pharmacist if you have questions about the best way to throw out drugs. There may be drug take-back programs in your area.  General drug facts   If your symptoms or health problems do not get better or if they become worse, call your doctor.   Do not share your drugs with others and do not take anyone else's drugs.   Some drugs may have another patient information leaflet. If you have any questions about this  drug, please talk with your doctor, nurse, pharmacist, or other health care provider.   If you think there has been an overdose, call your poison control center or get medical care right away. Be ready to tell or show what was taken, how much, and when it happened.  Last Reviewed Date  2021-01-19  Consumer Information Use and Disclaimer  This generalized information is a limited summary of diagnosis, treatment, and/or medication information. It is not meant to be comprehensive and should be used as a tool to help the user understand and/or assess potential diagnostic and treatment options. It does NOT include all information about conditions, treatments, medications, side effects, or risks that may apply to a specific patient. It is not intended to be medical advice or a substitute for the medical advice, diagnosis, or treatment of a health care provider based on the health care provider's examination and assessment of a patient's specific and unique circumstances. Patients must speak with a health care provider for complete information about their health, medical questions, and treatment options, including any risks or benefits regarding use of medications. This information does not endorse any treatments or medications as safe, effective, or approved for treating a specific patient. UpToDate, Inc. and its affiliates disclaim any warranty or liability relating to this information or the use thereof. The use of this information is governed by the Terms of Use, available at https://www.SAFCell.com/en/know/clinical-effectiveness-terms.    2023 UpToDate, Inc. and its affiliates and/or licensors. All rights reserved.  Use of [employer] is subject to the Terms of Use.

## 2023-06-12 ENCOUNTER — TELEPHONE (OUTPATIENT)
Dept: DERMATOLOGY | Facility: CLINIC | Age: 68
End: 2023-06-12
Payer: COMMERCIAL

## 2023-06-12 NOTE — TELEPHONE ENCOUNTER
Left Voicemail (1st Attempt) for the patient to call back and schedule the following:    Appointment type: Return  Provider: Dr. Serrano  Return date: 4 months  Specialty phone number: [phone]  Additonal Notes: Return in about 4 months (around 10/9/2023) for hair dejan coyne Procedure   Dermatology, Surgery, Urology  [facility]

## 2023-06-16 ENCOUNTER — TELEPHONE (OUTPATIENT)
Dept: DERMATOLOGY | Facility: CLINIC | Age: 68
End: 2023-06-16
Payer: COMMERCIAL

## 2023-06-16 NOTE — TELEPHONE ENCOUNTER
Left Voicemail (2nd Attempt) for the patient to call back and schedule the following:    Appointment type: Return  Provider: Dr. Serrano  Return date: 10/9/2023  Specialty phone number: [phone]  Additonal Notes: Hair metrix with Dr.Farah Poppy coyne Procedure   Dermatology, Surgery, Urology  [facility]

## 2023-06-22 ENCOUNTER — APPOINTMENT (OUTPATIENT)
Dept: LAB | Facility: CLINIC | Age: 68
End: 2023-06-22
Attending: OBSTETRICS & GYNECOLOGY
Payer: COMMERCIAL

## 2023-06-22 ENCOUNTER — OFFICE VISIT (OUTPATIENT)
Dept: OBGYN | Facility: CLINIC | Age: 68
End: 2023-06-22
Attending: OBSTETRICS & GYNECOLOGY
Payer: COMMERCIAL

## 2023-06-22 ENCOUNTER — HOSPITAL ENCOUNTER (OUTPATIENT)
Facility: CLINIC | Age: 68
End: 2023-06-22
Attending: OBSTETRICS & GYNECOLOGY | Admitting: OBSTETRICS & GYNECOLOGY
Payer: COMMERCIAL

## 2023-06-22 ENCOUNTER — ANCILLARY PROCEDURE (OUTPATIENT)
Dept: MAMMOGRAPHY | Facility: CLINIC | Age: 68
End: 2023-06-22
Attending: OBSTETRICS & GYNECOLOGY
Payer: COMMERCIAL

## 2023-06-22 VITALS
HEIGHT: 65 IN | DIASTOLIC BLOOD PRESSURE: 75 MMHG | BODY MASS INDEX: 30.32 KG/M2 | HEART RATE: 75 BPM | WEIGHT: 182 LBS | SYSTOLIC BLOOD PRESSURE: 134 MMHG

## 2023-06-22 DIAGNOSIS — Z01.411 ENCOUNTER FOR GYNECOLOGICAL EXAMINATION WITH ABNORMAL FINDING: Primary | ICD-10-CM

## 2023-06-22 DIAGNOSIS — Z79.899 OTHER LONG TERM (CURRENT) DRUG THERAPY: ICD-10-CM

## 2023-06-22 DIAGNOSIS — N90.89 LABIA IRRITATION: ICD-10-CM

## 2023-06-22 DIAGNOSIS — Z13.220 LIPID SCREENING: ICD-10-CM

## 2023-06-22 DIAGNOSIS — Z79.890 HORMONE REPLACEMENT THERAPY (POSTMENOPAUSAL): ICD-10-CM

## 2023-06-22 DIAGNOSIS — Z12.31 VISIT FOR SCREENING MAMMOGRAM: ICD-10-CM

## 2023-06-22 DIAGNOSIS — Z13.1 SCREENING FOR DIABETES MELLITUS: ICD-10-CM

## 2023-06-22 LAB — HBA1C MFR BLD: 5.8 %

## 2023-06-22 PROCEDURE — 77067 SCR MAMMO BI INCL CAD: CPT

## 2023-06-22 PROCEDURE — 36415 COLL VENOUS BLD VENIPUNCTURE: CPT | Performed by: OBSTETRICS & GYNECOLOGY

## 2023-06-22 PROCEDURE — 77067 SCR MAMMO BI INCL CAD: CPT | Mod: 26 | Performed by: RADIOLOGY

## 2023-06-22 PROCEDURE — 83036 HEMOGLOBIN GLYCOSYLATED A1C: CPT | Performed by: OBSTETRICS & GYNECOLOGY

## 2023-06-22 PROCEDURE — G0101 CA SCREEN;PELVIC/BREAST EXAM: HCPCS | Performed by: OBSTETRICS & GYNECOLOGY

## 2023-06-22 RX ORDER — ACETAMINOPHEN 325 MG/1
975 TABLET ORAL ONCE
Status: CANCELLED | OUTPATIENT
Start: 2023-06-22 | End: 2023-06-22

## 2023-06-22 ASSESSMENT — ANXIETY QUESTIONNAIRES
3. WORRYING TOO MUCH ABOUT DIFFERENT THINGS: NOT AT ALL
GAD7 TOTAL SCORE: 0
6. BECOMING EASILY ANNOYED OR IRRITABLE: NOT AT ALL
7. FEELING AFRAID AS IF SOMETHING AWFUL MIGHT HAPPEN: NOT AT ALL
1. FEELING NERVOUS, ANXIOUS, OR ON EDGE: NOT AT ALL
5. BEING SO RESTLESS THAT IT IS HARD TO SIT STILL: NOT AT ALL
2. NOT BEING ABLE TO STOP OR CONTROL WORRYING: NOT AT ALL
GAD7 TOTAL SCORE: 0

## 2023-06-22 ASSESSMENT — PATIENT HEALTH QUESTIONNAIRE - PHQ9
SUM OF ALL RESPONSES TO PHQ QUESTIONS 1-9: 0
5. POOR APPETITE OR OVEREATING: NOT AT ALL

## 2023-06-22 ASSESSMENT — PAIN SCALES - GENERAL: PAINLEVEL: NO PAIN (0)

## 2023-06-22 NOTE — LETTER
2023       RE: Maria Esther Lowe  [address] [facility] [address]     Dear Colleague,    Thank you for referring your patient, Maria Esther Lowe, to the [facility] [place] at [facility]. Please see a copy of my visit note below.    Chief Complaint   Patient presents with    Physical     Annual exam   Melissa Tilley LPN      Progress Note    SUBJECTIVE:  Maria Esther Lowe is an 67 year old  , who requests a breast and pelvic exam.    Patient is followed by Estela Boswell NP for primary care.    Concerns today include: Maria Esther has been on hormone therapy for many years and she states she isn't sure why anymore.  We discuss the risks of hormones including increase risk of heart attack and stroke.  We discuss that at this many years post menopause she is not likely to still have vasomotor symptoms.  She elects to stop.  We discussed that she will want to continue to use the vaginal and topical estrogen regularly, two times a week.    Kathy complains of irritation on her vulva, she believes it is related to some area of tissue we have seen in prior exams.  She would like to have this removed as it can get irritated and become quite painful.    Patent hasn't seen her primary care provider recently and requests screening labs.    Menstrual History:       No data to display                Last    Lab Results   Component Value Date    PAP NIL 2015     History of abnormal Pap smear: Status post benign hysterectomy. Health Maintenance and Surgical History updated.    Last   Lab Results   Component Value Date    HPV16 Negative 2015     Last   Lab Results   Component Value Date    HPV18 Negative 2015     Last   Lab Results   Component Value Date    HRHPV Negative 2015       Mammogram current: yes    HISTORY:  Prescription Medications as of 2023         Rx Number Disp Refills Start End Last Dispensed Date Next  Fill Date Owning Pharmacy    CALCIUM 500 +D 500-400 MG-UNIT TABS  180 tablet 3 12/13/2011    [id number] - [place], [place] - [address]    Sig: Take 1 tablet by mouth 2 times daily.    Class: Historical    Route: Oral    cholecalciferol (VITAMIN D3) 125 mcg (5000 units) capsule  90 capsule 1 2/15/2022    [facility] - [place], [place] - [address]    Sig: Take 1 capsule (125 mcg) by mouth daily Call clinic to schedule follow up appointment. Important. MUST BE SEEN IN CLINIC FOR ANY FURTHER REFILLS    Class: E-Prescribe    Route: Oral    clobetasol (TEMOVATE) 0.05 % external solution  60 mL 3 6/9/2023    [facility] - [place], [place] - [address]    Sig: Use twice weekly    Class: E-Prescribe    cyanocobalamin (VITAMIN  B-12) 1000 MCG tablet            Sig: Take by mouth every morning    Class: Historical    Route: Oral    dutasteride (AVODART) 0.5 MG capsule  90 capsule 3 6/9/2023    [facility] - [place], [place] - [address]    Sig: Take 1 capsule (0.5 mg) by mouth daily    Class: E-Prescribe    Route: Oral    estradiol (ESTRACE) 1 MG tablet  90 tablet 1 4/6/2023    [facility] - [place], [place] - [address]    Sig: Take 1 tablet (1 mg) by mouth daily    Class: E-Prescribe    Route: Oral    hydrocortisone (CORTEF) 10 MG tablet  135 tablet 1 2/15/2022    [facility] - [place], [place] - [address]    Sig: TAKE 1 TABLET EVERY MORNING AND 1/2 TABLET AT MIDDAY (NEED TO SCHEDULE APPOINTMENT FOR SEPTEMBER, [phone])    Class: E-Prescribe    ketoconazole (NIZORAL) 2 % external shampoo  100 mL 11 6/9/2023    [facility] - [place], [place] - [address]    Sig: Apply topically 2-3 times per week.    Class: E-Prescribe    levothyroxine (SYNTHROID/LEVOTHROID) 150 MCG tablet  90 tablet 3 12/3/2022     [facility] - [place], [place] - [address]    Sig: TAKE 1 TABLET BY MOUTH  DAILY ON TUESDAY, THURSDAY, SATURDAY,     Class: E-Prescribe    Notes to Pharmacy: Requesting 1 year supply    levothyroxine (SYNTHROID/LEVOTHROID) 175 MCG tablet  36 tablet 3 2023    [facility] - [facility] [address]    Sig: TAKE 1 TABLET BY MOUTH DAILY ON   AND FRIDAY    Class: E-Prescribe    Notes to Pharmacy: Requesting 1 year supply- needs appt and labs    losartan-hydrochlorothiazide (HYZAAR) 100-25 MG tablet  90 tablet 0 2023    [facility] - [place], [place] - [address]    Sig: TAKE 1 TABLET BY MOUTH DAILY    Class: E-Prescribe    Notes to Pharmacy: Requesting 1 year supply    Route: Oral    simvastatin (ZOCOR) 40 MG tablet  100 tablet 3 12/3/2022    [facility] - [place], [place] - [address]    Sig: Take 1 tablet (40 mg) by mouth daily    Class: E-Prescribe    Notes to Pharmacy: Requesting 1 year supply    Route: Oral          No Known Allergies  Immunization History   Administered Date(s) Administered    COVID-19 Bivalent 18+ (Moderna) 2022    COVID-19 Monovalent 18+ (Moderna) 2021, 2021, 2021    Influenza (IIV3) PF 2013, 2017    Influenza Vaccine >6 months (Alfuria,Fluzone) 2015, 10/25/2018    Influenza Vaccine, 6+MO IM (QUADRIVALENT W/PRESERVATIVES) 2016    Pneumococcal 23 valent 10/22/2020    TDAP Vaccine (Boostrix) 2013    Zoster recombinant adjuvanted (SHINGRIX) 10/22/2020       OB History    Para Term  AB Living   0 0 0 0 0 0   SAB IAB Ectopic Multiple Live Births   0 0 0 0 0     Past Medical History:   Diagnosis Date    Hyperlipidemia LDL goal < 130     Hypertension     Hypothyroidism     Osteopenia     DEXA: ; T-score of -1.9       Panhypopituitarism (H)       Past Surgical History:   Procedure Laterality Date    DAVINCI HYSTERECTOMY TOTAL, BILATERAL SALPINGO-OOPHORECTOMY, COMBINED Bilateral 9/11/2018    Procedure: COMBINED DAVINCI HYSTERECTOMY TOTAL, SALPINGO-OOPHORECTOMY;  Pelvic Washings, Davinci Assisted Laparoscopic Total Hysterectomy, Bilateral Salpingo Oophorectomy, cystoscopy;  Surgeon: Tiffany Lew MD;  Location: [facility] OR    OPERATIVE HYSTEROSCOPY N/A 1/30/2018    Procedure: OPERATIVE HYSTEROSCOPY;  Operative Hysteroscopy, Resection of Endometrial Polyp, Dilation and Curettage;  Surgeon: Daysi Munoz MD;  Location: [facility] OR    PROBE LACRIMAL DUCT, INSERT STENT BILATERAL, COMBINED Bilateral 11/26/2018    Procedure: BILATERAL NASOLACRIMAL STENT - LARGE DIAMETER STEN TUBE;  Surgeon: Davina Payton MD;  Location: [facility] OR    resected prolactin-producing pituitary adenoma.  1975    Postoperative radiation therapy.      Family History   Problem Relation Age of Onset    Diabetes Paternal Grandmother     Depression Paternal Grandmother     Thyroid Disease Maternal Grandmother     Skin Cancer Father     Melanoma No family hx of      Social History     Socioeconomic History    Marital status:      Spouse name: None    Number of children: None    Years of education: None    Highest education level: None   Tobacco Use    Smoking status: Never    Smokeless tobacco: Never   Substance and Sexual Activity    Alcohol use: No    Drug use: No    Sexual activity: Yes     Partners: Male     Birth control/protection: Post-menopausal   Social History Narrative    How much exercise per week? Daily    How much calcium per day?         How much caffeine per day?      How much vitamin D per day?      Do you/your family wear seatbelts?  Yes    Do you/your family use safety helmets? No    Do you/your family use sunscreen? Yes    Do you/your family keep firearms in the home? Yes    Do you/your family have a smoke detector(s)? Yes        Do you feel safe in your home?  "Yes    Has anyone ever touched you in an unwanted manner? No                   ROS    EXAM:  Blood pressure 134/75, pulse 75, height 1.651 m (5' 5\"), weight 82.6 kg (182 lb), not currently breastfeeding. Body mass index is 30.29 kg/m .  General appearance: Pleasant female in no acute distress.     BREAST EXAM:  Breast: Without visible skin changes. No dimpling or lesions seen.   Breasts supple, non-tender with palpation, no dominant mass, nodularity, or nipple discharge noted bilaterally. Axillary nodes negative.      PELVIC EXAM:  EG/BUS: Right labium minus is agglutinated to the right labium majus.  The left labium minus is mildly edematous with portion agglutinated to the left labium majus.  This is the area which is painful and gets irritated. The clitoral natarajan retracts and clitoris appears normal.  Introitus appears atrophic, no erythema or abnormal secretions Bartholin's, Urethra, Macungie's normal.   Urethral meatus: normal   Urethra: no masses, tenderness, or scarring   Bladder: no masses or tenderness   Vagina: atrophic, thin, dry with creamy, white and odorless  secretions  Cervix: surgically absent  Uterus: surgically absent  Adnexa: Surgically absent  Rectum:anus normal       ASSESSMENT:  Encounter Diagnoses   Name Primary?    Encounter for gynecological examination with abnormal finding Yes    Labia irritation     Screening for diabetes mellitus     Other long term (current) drug therapy- estrogen therapy     Lipid screening     Hormone replacement therapy (postmenopausal)       67 year old Female Pelvic and Breast Exam  Hormone therapy, stopping systemic estrogen today.  Labial irritation and edema causing pain  Screening labs for DM and lipids    PLAN:   Orders Placed This Encounter   Procedures    Pelvic and Breast Exam Procedure []    Lipid Profile    Hemoglobin A1c    Case Request: EXAM UNDER ANESTHESIA, PELVIS, REMOVAL OF PORTION OF LEFT LABIUM MINUS   Reviewed data regarding risks and benefits " of hormone therapy.  Recommend stopping and patient agrees.  She will call with any concerns.    Discussed removal of left labium minus to remove portion which becomes inflamed.  Discussed use of estrogen cream and warm baths as it heals.  Patient will likely try to schedule at end of summer.  Patient will have pre-op physical with her PCP.    Return in one year/PRN for preventive care or problems/concerns.     Verbalized understanding and agreement with visit plan.      Sincerely,    Tiffany Lew MD

## 2023-06-22 NOTE — PROGRESS NOTES
Progress Note    SUBJECTIVE:  Maria Eshter Lowe is an 67 year old  , who requests a breast and pelvic exam.    Patient is followed by Estela Boswell NP for primary care.    Concerns today include: Maria Esther has been on hormone therapy for many years and she states she isn't sure why anymore.  We discuss the risks of hormones including increase risk of heart attack and stroke.  We discuss that at this many years post menopause she is not likely to still have vasomotor symptoms.  She elects to stop.  We discussed that she will want to continue to use the vaginal and topical estrogen regularly, two times a week.    Kathy complains of irritation on her vulva, she believes it is related to some area of tissue we have seen in prior exams.  She would like to have this removed as it can get irritated and become quite painful.    Poli hasn't seen her primary care provider recently and requests screening labs.    Menstrual History:       No data to display                Last    Lab Results   Component Value Date    PAP NIL 2015     History of abnormal Pap smear: Status post benign hysterectomy. Health Maintenance and Surgical History updated.    Last   Lab Results   Component Value Date    HPV16 Negative 2015     Last   Lab Results   Component Value Date    HPV18 Negative 2015     Last   Lab Results   Component Value Date    HRHPV Negative 2015       Mammogram current: yes    HISTORY:  Prescription Medications as of 2023       Rx Number Disp Refills Start End Last Dispensed Date Next Fill Date Owning Pharmacy    CALCIUM 500 +D 500-400 MG-UNIT TABS  180 tablet 3 2011    [id number] - [place], [place] - [address]    Sig: Take 1 tablet by mouth 2 times daily.    Class: Historical    Route: Oral    cholecalciferol (VITAMIN D3) 125 mcg (5000 units) capsule  90 capsule 1 2/15/2022    [facility] - [place] - [address]    Sig: Take 1  capsule (125 mcg) by mouth daily Call clinic to schedule follow up appointment. Important. MUST BE SEEN IN CLINIC FOR ANY FURTHER REFILLS    Class: E-Prescribe    Route: Oral    clobetasol (TEMOVATE) 0.05 % external solution  60 mL 3 6/9/2023    [facility] ([facility] - [place], [place] - [address]    Sig: Use twice weekly    Class: E-Prescribe    cyanocobalamin (VITAMIN  B-12) 1000 MCG tablet            Sig: Take by mouth every morning    Class: Historical    Route: Oral    dutasteride (AVODART) 0.5 MG capsule  90 capsule 3 6/9/2023    [facility] - [place], [place] - [address]    Sig: Take 1 capsule (0.5 mg) by mouth daily    Class: E-Prescribe    Route: Oral    estradiol (ESTRACE) 1 MG tablet  90 tablet 1 4/6/2023    [facility] - [place], [place] - [address]    Sig: Take 1 tablet (1 mg) by mouth daily    Class: E-Prescribe    Route: Oral    hydrocortisone (CORTEF) 10 MG tablet  135 tablet 1 2/15/2022    [facility] - [place], [place] - [address]    Sig: TAKE 1 TABLET EVERY MORNING AND 1/2 TABLET AT MIDDAY (NEED TO SCHEDULE APPOINTMENT FOR SEPTEMBER, [phone])    Class: E-Prescribe    ketoconazole (NIZORAL) 2 % external shampoo  100 mL 11 6/9/2023    [facility] - [place], [place] - [address]    Sig: Apply topically 2-3 times per week.    Class: E-Prescribe    levothyroxine (SYNTHROID/LEVOTHROID) 150 MCG tablet  90 tablet 3 12/3/2022    [facility] ([facility] - [place], [place] - [address]    Sig: TAKE 1 TABLET BY MOUTH  DAILY ON TUESDAY, THURSDAY, SATURDAY, SUNDAY    Class: E-Prescribe    Notes to Pharmacy: Requesting 1 year supply    levothyroxine (SYNTHROID/LEVOTHROID) 175 MCG tablet  36 tablet 3 5/31/2023    [facility] ) - [place], [place] - [address]    Sig: TAKE 1  TABLET BY MOUTH DAILY ON   AND FRIDAY    Class: E-Prescribe    Notes to Pharmacy: Requesting 1 year supply- needs appt and labs    losartan-hydrochlorothiazide (HYZAAR) 100-25 MG tablet  90 tablet 0 2023    [facility] - [place], [place] - [address]    Sig: TAKE 1 TABLET BY MOUTH DAILY    Class: E-Prescribe    Notes to Pharmacy: Requesting 1 year supply    Route: Oral    simvastatin (ZOCOR) 40 MG tablet  100 tablet 3 12/3/2022    [facility] - [place], [place] - [address]    Sig: Take 1 tablet (40 mg) by mouth daily    Class: E-Prescribe    Notes to Pharmacy: Requesting 1 year supply    Route: Oral        No Known Allergies  Immunization History   Administered Date(s) Administered     COVID-19 Bivalent 18+ (Moderna) 2022     COVID-19 Monovalent 18+ (Moderna) 2021, 2021, 2021     Influenza (IIV3) PF 2013, 2017     Influenza Vaccine >6 months (Alfuria,Fluzone) 2015, 10/25/2018     Influenza Vaccine, 6+MO IM (QUADRIVALENT W/PRESERVATIVES) 2016     Pneumococcal 23 valent 10/22/2020     TDAP Vaccine (Boostrix) 2013     Zoster recombinant adjuvanted (SHINGRIX) 10/22/2020       OB History    Para Term  AB Living   0 0 0 0 0 0   SAB IAB Ectopic Multiple Live Births   0 0 0 0 0     Past Medical History:   Diagnosis Date     Hyperlipidemia LDL goal < 130      Hypertension      Hypothyroidism      Osteopenia     DEXA: ; T-score of -1.9        Panhypopituitarism (H)      Past Surgical History:   Procedure Laterality Date     DAVINCI HYSTERECTOMY TOTAL, BILATERAL SALPINGO-OOPHORECTOMY, COMBINED Bilateral 2018    Procedure: COMBINED DAVINCI HYSTERECTOMY TOTAL, SALPINGO-OOPHORECTOMY;  Pelvic Washings, Davinci Assisted Laparoscopic Total Hysterectomy, Bilateral Salpingo Oophorectomy, cystoscopy;  Surgeon: Tiffany Lew MD;  Location: [facility]     OPERATIVE  "HYSTEROSCOPY N/A 1/30/2018    Procedure: OPERATIVE HYSTEROSCOPY;  Operative Hysteroscopy, Resection of Endometrial Polyp, Dilation and Curettage;  Surgeon: Daysi Munoz MD;  Location: [facility] OR     PROBE LACRIMAL DUCT, INSERT STENT BILATERAL, COMBINED Bilateral 11/26/2018    Procedure: BILATERAL NASOLACRIMAL STENT - LARGE DIAMETER STEN TUBE;  Surgeon: Davina Payton MD;  Location: [facility] OR     resected prolactin-producing pituitary adenoma.  1975    Postoperative radiation therapy.      Family History   Problem Relation Age of Onset     Diabetes Paternal Grandmother      Depression Paternal Grandmother      Thyroid Disease Maternal Grandmother      Skin Cancer Father      Melanoma No family hx of      Social History     Socioeconomic History     Marital status:      Spouse name: None     Number of children: None     Years of education: None     Highest education level: None   Tobacco Use     Smoking status: Never     Smokeless tobacco: Never   Substance and Sexual Activity     Alcohol use: No     Drug use: No     Sexual activity: Yes     Partners: Male     Birth control/protection: Post-menopausal   Social History Narrative    How much exercise per week? Daily    How much calcium per day?         How much caffeine per day?      How much vitamin D per day?      Do you/your family wear seatbelts?  Yes    Do you/your family use safety helmets? No    Do you/your family use sunscreen? Yes    Do you/your family keep firearms in the home? Yes    Do you/your family have a smoke detector(s)? Yes        Do you feel safe in your home? Yes    Has anyone ever touched you in an unwanted manner? No                   ROS    EXAM:  Blood pressure 134/75, pulse 75, height 1.651 m (5' 5\"), weight 82.6 kg (182 lb), not currently breastfeeding. Body mass index is 30.29 kg/m .  General appearance: Pleasant female in no acute distress.     BREAST EXAM:  Breast: Without visible skin changes. No dimpling or lesions seen.  "  Breasts supple, non-tender with palpation, no dominant mass, nodularity, or nipple discharge noted bilaterally. Axillary nodes negative.      PELVIC EXAM:  EG/BUS: Right labium minus is agglutinated to the right labium majus.  The left labium minus is mildly edematous with portion agglutinated to the left labium majus.  This is the area which is painful and gets irritated. The clitoral natarajan retracts and clitoris appears normal.  Introitus appears atrophic, no erythema or abnormal secretions Bartholin's, Urethra, [place]'s normal.   Urethral meatus: normal   Urethra: no masses, tenderness, or scarring   Bladder: no masses or tenderness   Vagina: atrophic, thin, dry with creamy, white and odorless  secretions  Cervix: surgically absent  Uterus: surgically absent  Adnexa: Surgically absent  Rectum:anus normal       ASSESSMENT:  Encounter Diagnoses   Name Primary?     Encounter for gynecological examination with abnormal finding Yes     Labia irritation      Screening for diabetes mellitus      Other long term (current) drug therapy- estrogen therapy      Lipid screening      Hormone replacement therapy (postmenopausal)       67 year old Female Pelvic and Breast Exam  Hormone therapy, stopping systemic estrogen today.  Labial irritation and edema causing pain  Screening labs for DM and lipids    PLAN:   Orders Placed This Encounter   Procedures     Pelvic and Breast Exam Procedure []     Lipid Profile     Hemoglobin A1c     Case Request: EXAM UNDER ANESTHESIA, PELVIS, REMOVAL OF PORTION OF LEFT LABIUM MINUS   Reviewed data regarding risks and benefits of hormone therapy.  Recommend stopping and patient agrees.  She will call with any concerns.    Discussed removal of left labium minus to remove portion which becomes inflamed.  Discussed use of estrogen cream and warm baths as it heals.  Patient will likely try to schedule at end of summer.  Patient will have pre-op physical with her PCP.    Return in one year/PRN  for preventive care or problems/concerns.     Verbalized understanding and agreement with visit plan.

## 2023-06-23 ENCOUNTER — TELEPHONE (OUTPATIENT)
Dept: DERMATOLOGY | Facility: CLINIC | Age: 68
End: 2023-06-23
Payer: COMMERCIAL

## 2023-06-23 NOTE — TELEPHONE ENCOUNTER
[facility] Call Center    Phone Message    May a detailed message be left on voicemail: no     Reason for Call: Other: Kathy Melendez calling to get 4 mo. Follow up appt with Dr. Serrano- Sometime in Oct/ Nov.  I scheduled 1st available in Feb.  Please call to get her in sooner: [phone].     Action Taken: Other: [facility]    Travel Screening: Not Applicable

## 2023-06-26 NOTE — TELEPHONE ENCOUNTER
Called and left vm regarding patient needing a sooner appointment in November or October. Told patient to give us a call back at [phone] and we can try to figure something out.     Elina Mix LPN

## 2023-06-27 ENCOUNTER — TELEPHONE (OUTPATIENT)
Dept: OBGYN | Facility: CLINIC | Age: 68
End: 2023-06-27
Payer: COMMERCIAL

## 2023-06-27 NOTE — TELEPHONE ENCOUNTER
Called Kathy to inquire why she didn't have lipid panel drawn.  She states she was not fasting that day, but plans to have it drawn at [facility] next week.

## 2023-07-01 NOTE — TELEPHONE ENCOUNTER
No net yet.       We can schedule Wednesdays at main campus.     There might be one here Fridays but she has not started yet    Can you keep in pool 2 weeks

## 2023-07-13 NOTE — TELEPHONE ENCOUNTER
[facility] Call Center    Phone Message    May a detailed message be left on voicemail: yes     Reason for Call: Other: Pt states she hasn't been called back to schedule in [place]. Pt states she was supposed to be seen 4 months after her June visit. Pt is scheduled for February but states that is too long. Please call Pt back to discuss. Thank you.      Action Taken: Message routed to:  Adult Clinics: Dermatology p [id number]    Travel Screening: Not Applicable

## 2023-07-13 NOTE — TELEPHONE ENCOUNTER
Per Maggie's message below, pt needs to be seen at [facility] due to [place] not having hair fellows at this time. Pt given scheduling number to contact [facility]. Will route message to them as well.    Jaylene San RN on 7/13/2023 at 10:50 AM

## 2023-08-02 ENCOUNTER — TELEPHONE (OUTPATIENT)
Dept: DERMATOLOGY | Facility: CLINIC | Age: 68
End: 2023-08-02
Payer: COMMERCIAL

## 2023-08-02 DIAGNOSIS — Z76.89 ENCOUNTER PRIOR TO INITIATION OF MEDICATION: Primary | ICD-10-CM

## 2023-08-02 RX ORDER — DUTASTERIDE 0.5 MG/1
0.5 CAPSULE, LIQUID FILLED ORAL DAILY
Qty: 90 CAPSULE | Refills: 3 | Status: SHIPPED | OUTPATIENT
Start: 2023-08-02 | End: 2023-11-08

## 2023-08-02 NOTE — TELEPHONE ENCOUNTER
[facility] Call Center    Phone Message    May a detailed message be left on voicemail: yes     Reason for Call: Patient states Dr Maggie told her to stop taking her old meds and start on Butasteride, but she has never received an Rx and wants to know when the change will happen. Please call back [phone] and it Rx can be sent please send to [facility] in [place]    Action Taken: Message routed to:  Adult Clinics: Dermatology p [phone]    Travel Screening: Not Applicable

## 2023-08-02 NOTE — TELEPHONE ENCOUNTER
Pt returned call, [facility] stated that they never received script. Resent prescription to [facility].    Jaylene San RN on 8/2/2023 at 4:06 PM

## 2023-08-02 NOTE — TELEPHONE ENCOUNTER
Pt called and informed that prescription was sent on 6/9 to [facility]. She state that they never received the order and that they sent us a request. Writer did not see any request in chart. Pt will call [facility] back to see if they have prescription. If not she will need this resent.    Jaylene San RN on 8/2/2023 at 3:57 PM

## 2023-09-23 DIAGNOSIS — E23.0 PANHYPOPITUITARISM (H): ICD-10-CM

## 2023-09-26 RX ORDER — HYDROCORTISONE 10 MG/1
TABLET ORAL
Qty: 150 TABLET | Refills: 3 | Status: SHIPPED | OUTPATIENT
Start: 2023-09-26 | End: 2024-08-19

## 2023-09-26 NOTE — TELEPHONE ENCOUNTER
hydrocortisone (CORTEF)  10MG  Last Written Prescription Date:  2/15/22  Last Fill Quantity: 135,   # refills: 1  Last Office Visit : 4/8/22  Future Office visit:  10/11/23  RTC  6 MOS   Routing refill request to provider for review/approval because: Drug not on the refill protocol   Pt outside of RTC timeframe / OCT 2022

## 2023-11-08 ENCOUNTER — OFFICE VISIT (OUTPATIENT)
Dept: DERMATOLOGY | Facility: CLINIC | Age: 68
End: 2023-11-08
Payer: COMMERCIAL

## 2023-11-08 DIAGNOSIS — Z51.81 MEDICATION MONITORING ENCOUNTER: ICD-10-CM

## 2023-11-08 DIAGNOSIS — Z76.89 ENCOUNTER PRIOR TO INITIATION OF MEDICATION: ICD-10-CM

## 2023-11-08 DIAGNOSIS — L64.9 ANDROGENETIC ALOPECIA: Primary | ICD-10-CM

## 2023-11-08 PROCEDURE — 99214 OFFICE O/P EST MOD 30 MIN: CPT | Performed by: DERMATOLOGY

## 2023-11-08 RX ORDER — CALCIPOTRIENE 0.05 MG/ML
1 SOLUTION TOPICAL 2 TIMES DAILY
Qty: 60 ML | Refills: 1 | Status: SHIPPED | OUTPATIENT
Start: 2023-11-08 | End: 2024-02-09

## 2023-11-08 RX ORDER — DUTASTERIDE 0.5 MG/1
0.5 CAPSULE, LIQUID FILLED ORAL DAILY
Qty: 90 CAPSULE | Refills: 3 | Status: SHIPPED | OUTPATIENT
Start: 2023-11-08 | End: 2024-06-21

## 2023-11-08 NOTE — LETTER
11/8/2023       RE: Maria Esther Lowe  [address] [facility] [address]     Dear Colleague,    Thank you for referring your patient, Maria Esther Lowe, to the [facility] [place] at [facility]. Please see a copy of my visit note below.    [facility] Dermatology Note  Encounter Date: Nov 8, 2023  Office Visit       Dermatology Problem List:  1. 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 Repeat bx 5/20/2020 most consistent with androgenetic alopecia (which makes sense in setting of #2 as well)   - Biopsy in 2016 consistent with alopecia areata, but not clinically consistent; had outside ILK without improvement per report  - Repeat bx 5/20/2020 with nonscarring alopecia with marked miniaturization, favoring androgenetic alopecia. Also mild perifollicular fibrosis.  - Current tx: LLLT 2-3x weekly (started years ago), dutasteride 0.5mg daily, ketoconazole shampoo alternating with head and shoulders, Minoxidil 5% once daily, clobetasol solution 2x a week, calcipotriene solution twice weekly, multivitamin    -Previous tx: finasteride 5 mg daily  Hair labs: 4/8/2022  TSH: 0.04 (low)  Cbc: normal 2 years ago  Ferritin:105 (2 years ago)  Zinc: 72 (9/15/2021)  Vitamin D: 65  2. Pituitary prolactinoma s/p removal in 1975 followed by radiation with subsequent development of panhypopituitarism   3.  TSH <0.01 in 6/30/2021  - She is on estradiol per endocrinology  ____________________________________________     Assessment & Plan:     # Female pattern hair loss, favor androgenetic alopecia has old bx with alopecia areata. Hair Metrix performed today. Discussed additional treatment options such as the risks and benefits of oral minoxidil.  - Continue minoxidil 5% foam daily  - Continue HairMax laser comb tLLLLT  - Continue ketoconazole shampoo 2-3  weekly  - Continue clobetasol solution twice weekly.   - Start calcipotriene 0.005% solution twice weekly.  - Continue dutasteride 0.5mg daily.  Denies dizziness. Denies any of these side effects   - HairMetrix was performed today.  - Prior recs: micropigmentation at [facility], patient is not interested   - Future consideration: low dose oral minoxidil- Hx of long QT (counseled patient that she would need clearance from cardiology first), spironolactone, increasing topical minoxidil to twice daily, PRP-discussed benefits, risks, and cost  -CMP  - Referred to cardiology today for oral minoxidil clearance due to pt Hx or long QT       Procedures Performed:   HairMetrix: 6/9/2023 to 11/8/2023  - Frontal anterior scalp: 144 to 146  - Mid scalp: 116 to 109  - Vertex scalp: 126 to 147  - Occipital scalp: 146 to 168  - Right temple: 89 to 67  - Left temple: 104 to 85      Follow-up: for hair loss 3 months, see cards in the mean time    Staff and Scribe:     Scribe Disclosure:   I, Gavin Ng, am serving as a scribe to document services personally performed by this physician, Dr. Carley Serrano, based on data collection and the provider's statements to me.   Scribe Disclosure:   I, Josephine Interiano ([email or web address]), am serving as a scribe to document services personally performed by this physician, Dr. Carley Serrano, based on data collection and the provider's statements to me.     Scribe Disclosure:   I, Mare Tucker, am serving as a scribe to document services personally performed by Carley Serrano MD based on data collection and the provider's statements to me.         Provider Disclosure:   The documentation recorded by the scribe accurately reflects the services I personally performed and the decisions made by me.    Carley Serrano MD    Department of Dermatology  [facility]: Phone: [phone], Fax:[phone]  [facility]  [facility]: Phone: [phone], Fax: [phone]   ____________________________________________    CC: Hair Loss    HPI:  Ms. Maria Esther Lowe is a(n) 68 year old female who presents today as a return patient for hair loss.    Last seen 6/9/2023 for female pattern hair loss. At that time, pt was instructed to continue topicals, stop finasteride and start dutasteride daily. Pt referred to cardiology for EKG.    Today, she feels that hair growth is improved. Denies scalp sxs. She is on the same regimen but has not started calciprotriene solution. Denies any adverse effects from the dutasteride. Patient believes she has some darkening of skin due to sun exposure in [place].     Patient is otherwise feeling well, without additional skin concerns.    Labs Reviewed:    Last Comprehensive Metabolic Panel:  Lab Results   Component Value Date     04/08/2022    POTASSIUM 3.3 (L) 04/08/2022    CHLORIDE 107 04/08/2022    CO2 27 04/08/2022    ANIONGAP 8 04/08/2022     (H) 04/08/2022    BUN 22 04/08/2022    CR 1.12 (H) 04/08/2022    GFRESTIMATED 54 (L) 04/08/2022    ARI 9.6 04/08/2022     TSH   Date Value Ref Range Status   04/08/2022 0.04 (L) 0.40 - 4.00 mU/L Final   06/30/2021 <0.01 (L) 0.40 - 4.00 mU/L Final         Physical Exam:  Vitals: There were no vitals taken for this visit.  SKIN: Focused examination of scalp was performed.  - Mild-mod loose scale in the frontal and midscalp regions  - Mild perifollicular scale in the vertex  - Negative hair pull test  - Regrowth layers: Subtle but appreciable layers - 1 cm (1st layer), 2-3 cm (2nd layer), 4 cm (3rd layer)   - No other lesions of concern on areas examined.     Medications:  Current Outpatient Medications   Medication    CALCIUM 500 +D 500-400 MG-UNIT TABS    cholecalciferol (VITAMIN D3) 125 mcg (5000 units) capsule    clobetasol (TEMOVATE) 0.05 % external solution    cyanocobalamin (VITAMIN  B-12) 1000 MCG tablet    dutasteride (AVODART) 0.5 MG  capsule    estradiol (ESTRACE) 1 MG tablet    hydrocortisone (CORTEF) 10 MG tablet    ketoconazole (NIZORAL) 2 % external shampoo    levothyroxine (SYNTHROID/LEVOTHROID) 150 MCG tablet    levothyroxine (SYNTHROID/LEVOTHROID) 175 MCG tablet    losartan-hydrochlorothiazide (HYZAAR) 100-25 MG tablet    simvastatin (ZOCOR) 40 MG tablet     No current facility-administered medications for this visit.      Past Medical History:   Patient Active Problem List   Diagnosis    Prolactinoma (H)    Panhypopituitarism (H24)    Hormone replacement therapy (postmenopausal)    Carpal tunnel syndrome    History of robot-assisted laparoscopic hysterectomy     Past Medical History:   Diagnosis Date    Hyperlipidemia LDL goal < 130     Hypertension     Hypothyroidism     Osteopenia     DEXA: 12/11; T-score of -1.9       Panhypopituitarism (H)         CC No referring provider defined for this encounter. on close of this encounter.

## 2023-11-08 NOTE — PATIENT INSTRUCTIONS
[facility] Dermatology Visit Scalp Photography Pre visit information    Thank you for allowing us to participate in your care.     Plan to be in the building for 90 minutes for your hair visit.     Please come 30 minutes prior to your physician visit for scalp photos.     Plan for photography of the scalp during the visit called hair metrix.     What is hair metrix?  Hair metrix photos are scalp and hair images taken using cameras and  artificial intelligence that give us quantitative hair data to track your progress. This data describes your scalp/hair condition such as the number of fibers in a given area, the fiber s density, the distance of the fibers from one another, and more information. It can also give us information about scalp health visually. We use them as a clinical tool to objectively track progress.     When should I call my doctor?  If you are worsening or not improving, please, contact us or seek urgent care as noted below.     Who should I call with questions (adults)?  [facility] (adult and pediatric): [phone]   [facility] (adult): [phone]  For urgent needs outside of business hours call the [facility] at [phone] and ask for the dermatology resident on call  If this is a medical emergency and you are unable to reach an ER, Call [phone]      Who should I call with questions (pediatric)?  [facility]- Pediatric Dermatology  Dr. Malathi Hendricks, Dr. Virginia Gunn, Dr. Ileana Rosario, [facility]  Dr. Veronica Nance, Dr. Isabel Nguyen & Dr. Santi Barraza  Non Urgent  Nurse Triage Line; [phone]- Laura and Ira DEY Care Coordinators   Cora (/Complex ) [phone]    If you need a prescription refill, please contact your pharmacy. Refills are approved or denied by our Physicians during normal business hours, Monday through  Fridays  Per office policy, refills will not be granted if you have not been seen within the past year (or sooner depending on your child's condition)    Scheduling Information:  Pediatric Appointment Scheduling and Call Center [phone]  Radiology Scheduling- [phone]  Sedation Unit Scheduling- [phone]  [place] Scheduling- General [phone]; Pediatric Dermatology [phone]  Main  Services: [phone]  Kazakh: [phone]  Senegalese: [phone]  Hmong/Chinese/Uzbek: [phone]  Preadmission Nursing Department Fax Number: [phone] (Fax all pre-operative paperwork to this number)    For urgent matters arising during evenings, weekends, or holidays that cannot wait for normal business hours please call [phone] and ask for the Dermatology Resident On-Call to be paged.           The use of minoxidil by  mouth to grow hair is not FDA approved.   Description and Brand Names ([facility])       Drug information provided by: [employer]    US Brand Name  Guero  Descriptions    Minoxidil belongs to the general class of medicines called antihypertensives. It is used to treat high blood pressure (hypertension).    High blood pressure adds to the workload of the heart and arteries. If it continues for a long time, the heart and arteries may not function properly. This can damage the blood vessels of the brain, heart, and kidneys, resulting in a stroke, heart failure, or kidney failure. High blood pressure may also increase the risk of heart attacks. These problems may be less likely to occur if blood pressure is controlled.    Minoxidil works by relaxing blood vessels so that blood passes through them more easily. This helps to lower blood pressure.    Minoxidil has other effects that could be bothersome for some patients. These include increased hair growth, weight gain, fast heartbeat, and chest pain. Before you take this medicine, be sure that you have discussed the use of it  with your doctor.    Minoxidil is being applied to the scalp in liquid form by some balding men to stimulate hair growth. However, improper use of liquids made from minoxidil tablets can result in minoxidil being absorbed into the body, where it may cause unwanted effects on the heart and blood vessels.    Minoxidil is available only with your doctor's prescription.    This product is available in the following dosage forms:    Tablet    Before Using  Drug information provided by: [employer]    In deciding to use a medicine, the risks of taking the medicine must be weighed against the good it will do. This is a decision you and your doctor will make. For this medicine, the following should be considered:    Allergies  Tell your doctor if you have ever had any unusual or allergic reaction to this medicine or any other medicines. Also tell your health care professional if you have any other types of allergies, such as to foods, dyes, preservatives, or animals. For non-prescription products, read the label or package ingredients carefully.    Pediatric  Although there is no specific information comparing use of minoxidil in children with use in other age groups, this medicine is not expected to cause different side effects or problems in children than it does in adults.    Geriatric  Elderly patients may be more sensitive to the effects of minoxidil. In addition, minoxidil may reduce tolerance to cold temperatures in elderly patients.    Breastfeeding  Studies in women suggest that this medication poses minimal risk to the infant when used during breastfeeding.    Drug Interactions  Although certain medicines should not be used together at all, in other cases two different medicines may be used together even if an interaction might occur. In these cases, your doctor may want to change the dose, or other precautions may be necessary. Tell your healthcare professional if you are taking any other prescription or  nonprescription (over-the-counter [OTC]) medicine.    Other Interactions  Certain medicines should not be used at or around the time of eating food or eating certain types of food since interactions may occur. Using alcohol or tobacco with certain medicines may also cause interactions to occur. Discuss with your healthcare professional the use of your medicine with food, alcohol, or tobacco.    Other Medical Problems  The presence of other medical problems may affect the use of this medicine. Make sure you tell your doctor if you have any other medical problems, especially:    Angina (chest pain)--Minoxidil may make this condition worse  Heart attack or stroke (recent)--Lowering blood pressure may make problems resulting from heart attack or stroke worse  Heart or blood vessel disease--Minoxidil can cause fluid buildup, which can cause problems  Kidney disease--Effects may be increased because of slower removal of minoxidil from the body  Pheochromocytoma--Minoxidil may cause the tumor to be more active    Storage  Store the medicine in a closed container at room temperature, away from heat, moisture, and direct light. Keep from freezing.    Keep out of the reach of children.    Do not keep outdated medicine or medicine no longer needed.    Precautions  Drug information provided by: [employer]    It is important that your doctor check your progress at regular visits to make sure that this medicine is working properly.    Ask your doctor about checking your pulse rate before and after taking minoxidil. Then, while you are taking this medicine, check your pulse regularly while you are resting. If it increases by 20 beats or more a minute, check with your doctor right away.    While you are taking minoxidil, weigh yourself every day. A weight gain of 2 to 3 pounds (about 1 kg) in an adult is normal and should be lost with continued treatment. However, if you suddenly gain 5 pounds (2 kg) or more (for a child, 2  pounds [1 kg] or more) or if you notice swelling of your feet or lower legs, check with your doctor right away.    Do not take other medicines unless they have been discussed with your doctor. This especially includes over-the-counter (nonprescription) medicines for appetite control, asthma, colds, cough, hay fever, or sinus problems, since they may tend to increase your blood pressure.    Side Effects  Drug information provided by: [employer]    Along with its needed effects, a medicine may cause some unwanted effects. Although not all of these side effects may occur, if they do occur they may need medical attention.    Check with your doctor immediately if any of the following side effects occur:    More common  Fast or irregular heartbeat  weight gain (rapid) of more than 5 pounds (2 pounds in children)  Less common  Chest pain  shortness of breath  Check with your doctor as soon as possible if any of the following side effects occur:    More common  Bloating  flushing or redness of skin  swelling of feet or lower legs  Less common  Numbness or tingling of hands, feet, or face  Rare  Skin rash and itching  Some side effects may occur that usually do not need medical attention. These side effects may go away during treatment as your body adjusts to the medicine. Also, your health care professional may be able to tell you about ways to prevent or reduce some of these side effects. Check with your health care professional if any of the following side effects continue or are bothersome or if you have any questions about them:    More common  Increase in hair growth, usually on face, arms, and back  Less common or rare  Breast tenderness in males and females  headache  This medicine causes a temporary increase in hair growth in most people. Hair may grow longer and darker in both men and women. This may first be noticed on the face several weeks after you start taking minoxidil. Later, new hair growth may be  noticed on the back, arms, legs, and scalp. Talk to your doctor about shaving or using a hair remover during this time. After treatment with minoxidil has ended, the hair will stop growing, although it may take several months for the new hair growth to go away.    Other side effects not listed may also occur in some patients. If you notice any other effects, check with your healthcare professional.    Call your doctor for medical advice about side effects. You may report side effects to the FDA at 6-654-NZJ-4837.

## 2023-11-10 ENCOUNTER — OFFICE VISIT (OUTPATIENT)
Dept: ENDOCRINOLOGY | Facility: CLINIC | Age: 68
End: 2023-11-10
Payer: COMMERCIAL

## 2023-11-10 VITALS
WEIGHT: 177 LBS | BODY MASS INDEX: 29.45 KG/M2 | HEART RATE: 80 BPM | SYSTOLIC BLOOD PRESSURE: 155 MMHG | OXYGEN SATURATION: 98 % | DIASTOLIC BLOOD PRESSURE: 75 MMHG

## 2023-11-10 DIAGNOSIS — E03.9 HYPOTHYROIDISM, UNSPECIFIED TYPE: Primary | ICD-10-CM

## 2023-11-10 DIAGNOSIS — M85.80 OSTEOPENIA, UNSPECIFIED LOCATION: ICD-10-CM

## 2023-11-10 DIAGNOSIS — Z51.81 MEDICATION MONITORING ENCOUNTER: ICD-10-CM

## 2023-11-10 LAB
ALBUMIN SERPL BCG-MCNC: 4.3 G/DL (ref 3.5–5.2)
ALP SERPL-CCNC: 103 U/L (ref 35–104)
ALT SERPL W P-5'-P-CCNC: 28 U/L (ref 0–50)
ANION GAP SERPL CALCULATED.3IONS-SCNC: 10 MMOL/L (ref 7–15)
AST SERPL W P-5'-P-CCNC: 35 U/L (ref 0–45)
BILIRUB SERPL-MCNC: 0.3 MG/DL
BUN SERPL-MCNC: 22.8 MG/DL (ref 8–23)
CALCIUM SERPL-MCNC: 9.7 MG/DL (ref 8.8–10.2)
CHLORIDE SERPL-SCNC: 104 MMOL/L (ref 98–107)
CREAT SERPL-MCNC: 1.3 MG/DL (ref 0.51–0.95)
DEPRECATED HCO3 PLAS-SCNC: 28 MMOL/L (ref 22–29)
EGFRCR SERPLBLD CKD-EPI 2021: 45 ML/MIN/1.73M2
GLUCOSE SERPL-MCNC: 148 MG/DL (ref 70–99)
POTASSIUM SERPL-SCNC: 3.7 MMOL/L (ref 3.4–5.3)
PROT SERPL-MCNC: 7.2 G/DL (ref 6.4–8.3)
SODIUM SERPL-SCNC: 142 MMOL/L (ref 135–145)
T4 FREE SERPL-MCNC: 1.59 NG/DL (ref 0.9–1.7)
TSH SERPL DL<=0.005 MIU/L-ACNC: 0.04 UIU/ML (ref 0.3–4.2)

## 2023-11-10 PROCEDURE — 84443 ASSAY THYROID STIM HORMONE: CPT | Performed by: INTERNAL MEDICINE

## 2023-11-10 PROCEDURE — 82306 VITAMIN D 25 HYDROXY: CPT | Performed by: INTERNAL MEDICINE

## 2023-11-10 PROCEDURE — 84439 ASSAY OF FREE THYROXINE: CPT | Performed by: INTERNAL MEDICINE

## 2023-11-10 PROCEDURE — 84480 ASSAY TRIIODOTHYRONINE (T3): CPT | Performed by: INTERNAL MEDICINE

## 2023-11-10 PROCEDURE — 36415 COLL VENOUS BLD VENIPUNCTURE: CPT | Performed by: INTERNAL MEDICINE

## 2023-11-10 PROCEDURE — 80053 COMPREHEN METABOLIC PANEL: CPT | Performed by: INTERNAL MEDICINE

## 2023-11-10 PROCEDURE — 99214 OFFICE O/P EST MOD 30 MIN: CPT | Performed by: INTERNAL MEDICINE

## 2023-11-10 NOTE — PROGRESS NOTES
- Endocrinology Follow up-    Reason for visit/consult:     Prolactinoma (H)  Panhypopituitarism (H)  Hypothyroidism, unspecified type    Primary care provider: No primary care provider on file.    Assessment and Plan  68 year old female with following conditions    # Panhypopituitarism-empty sella   Hydrocortisone 10/5 mg continue    # previous record of low IGF1  Will repeat IGF1 and if still low, then we will try GH therapy with low dose such as 0.1-0.2 mg daily injection. IGF1 is low 20-30s but she does not want GH product      # Remote history of a prolactin-producing pituitary adenoma-   Post surgery followed by RT, empty sella    - PRL level very mildly increased, no concern for tumor regrwoth at this point    - transfer most recent 2022 MRI [facility] to [place]    # secondary hypothyroidism  Free T4 still mildly elevated but improving. Currently taking LT4 175 daily to LT4 175 mcg 3 days a week, 150 mcg 4 days a week    - TSH, free T4, total T3      #Osteopenia.   3/2022, stable osteopenia    - continue citracal 3 tabs (945 mg elemental Ca and VitD 750IU)      # Vitamin D take  She is currently taking extra 5000 international unit(s) vitamin D and level 2021 was vitamin D 84, now 64    - continue vitamin D 5000 international unit(s) from daily to Mon/Wed/Fri only    - recheck vitamin D level today    # Hair loss  Improving and followed by dermatologist    No hot flush    # Hysterectomy  Post in 9/2018        - RTC with me in 6 month       Total 45 minutes spent on the date of the encounter doing chart review, history and exam, documentation and further activities as noted above.    Leona Arrington MD  Staff Physician  Endocrinology and Metabolism  [facility]  License: [id number]  Pager: [phone]    Interval History as of 11/10/2023 : Patient has been doing well. Medication compliance  to LT4 excellent . New event  includes : her hair is improving after Rogain and now she is starting minoxidil..   Interval History as of 4/8/2022 : Patient has been doing well. Medication compliance : excellent. Still has had hair loss.   Interval History as of 7/26/2021 : Patient has been doing well. Last seen . Medication compliance   . New event includes  .  Interval History as of 9/9/2019 : Patient has been doing well. Stable on low dose hydorcoritisone dose 10 mg daily only for many years.  She still had dizziness and fatigue chronically. Also complainted poor quality of sleep at night. No hot flush,   Interval History as of 10/2018: 1/2018 dizziness went to ED in [facility], was fine.   BW stable, HTN, hysterectomy 9/2018. Large cyst, uterus fluid. Will be seen surgeon soon. Yellow fluid still. 3-4 pads daily.   HPI: A 61 yo female previous Dr. JAQUELINE Perdomo's patient, here for follow up. She has remote history of prolactinoma in her 20's, she underwent surgical removal in 1975 followed by RT. 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   Currently taking hydrocotisone 10 mg am only for at least several years, she has been doing well, she requires afternoon nap 1 pm for 20 minutes.   She has been taking LT4 was on 175 mcg and last year increased to 200 mcg with compliance.    Regarding hypogonadism, she has been taking premarin (conjugated estrogen) 0.625 mg. Progesterone (Prometrium) 100 mg daily. Her main concern today is about hair loss. After pituitary surgery, she lost lots hair, but after that with HRT, her hair started to grow. However past several months, she started to lose hair again and wishing to have more hair come back.   Appetite: good, she is doing two jobs, She is  lives with her , adopted one child long time ago who is age 31 now.  No HA.       Past Medical/Surgical History:  Past Medical History:   Diagnosis Date    Hyperlipidemia LDL goal < 130     Hypertension     Hypothyroidism      Osteopenia     DEXA: 12/11; T-score of -1.9       Panhypopituitarism (H24)      Past Surgical History:   Procedure Laterality Date    DAVINCI HYSTERECTOMY TOTAL, BILATERAL SALPINGO-OOPHORECTOMY, COMBINED Bilateral 9/11/2018    Procedure: COMBINED DAVINCI HYSTERECTOMY TOTAL, SALPINGO-OOPHORECTOMY;  Pelvic Washings, Davinci Assisted Laparoscopic Total Hysterectomy, Bilateral Salpingo Oophorectomy, cystoscopy;  Surgeon: Tiffany Lew MD;  Location: [facility] OR    OPERATIVE HYSTEROSCOPY N/A 1/30/2018    Procedure: OPERATIVE HYSTEROSCOPY;  Operative Hysteroscopy, Resection of Endometrial Polyp, Dilation and Curettage;  Surgeon: Daysi Munoz MD;  Location: [facility] OR    PROBE LACRIMAL DUCT, INSERT STENT BILATERAL, COMBINED Bilateral 11/26/2018    Procedure: BILATERAL NASOLACRIMAL STENT - LARGE DIAMETER STEN TUBE;  Surgeon: Davina Payton MD;  Location: [facility] OR    resected prolactin-producing pituitary adenoma.  1975    Postoperative radiation therapy.        Allergies:  No Known Allergies      Current Medications   Current Outpatient Medications   Medication    CALCIUM 500 +D 500-400 MG-UNIT TABS    clobetasol (TEMOVATE) 0.05 % external solution    dutasteride (AVODART) 0.5 MG capsule    hydrocortisone (CORTEF) 10 MG tablet    ketoconazole (NIZORAL) 2 % external shampoo    levothyroxine (SYNTHROID/LEVOTHROID) 150 MCG tablet    levothyroxine (SYNTHROID/LEVOTHROID) 175 MCG tablet    losartan-hydrochlorothiazide (HYZAAR) 100-25 MG tablet    simvastatin (ZOCOR) 40 MG tablet    calcipotriene (DOVONOX) 0.005 % external solution    cholecalciferol (VITAMIN D3) 125 mcg (5000 units) capsule    cyanocobalamin (VITAMIN  B-12) 1000 MCG tablet     No current facility-administered medications for this visit.       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     ROS:  Full review of systems taken with the help of the intake sheet. Otherwise a complete 14 point review of systems was taken and is negative unless stated in the history above.    Physical Exam:   There were no vitals taken for this visit.  General: well appearing, no acute distress, pleasant and conversant,   Mental Status/neuro: alert and oriented  Resp: no acute distress  Face: normal facial color    Labs : I reviewed data from epic and extract and summarize the pertinent data here.        3/17/2022 11:37 3/22/2022 11:40 4/8/2022 16:24   T4 Free 1.64 (H)  1.18   TSH 0.02 (L)  0.04 (L)        1/29/2015 11:34 12/3/2015 09:23 7/22/2016 10:04 12/6/2016 18:49 10/23/2017 11:15   Prolactin 23 21 32 (H) 30 (H) 32 (H)     Lab Results   Component Value Date     10/23/2017      Lab Results   Component Value Date    POTASSIUM 3.9 10/23/2017     Lab Results   Component Value Date    CHLORIDE 105 10/23/2017     Lab Results   Component Value Date    ARI 8.5 10/23/2017     Lab Results   Component Value Date    CO2 24 10/23/2017     Lab Results   Component Value Date    BUN 14 10/23/2017     Lab Results   Component Value Date    CR 1.01 10/23/2017     Lab Results   Component Value Date     10/23/2017     Lab Results   Component Value Date    TSH 0.06 10/23/2017     Lab Results   Component Value Date    T4 1.26 10/23/2017       Lab Results   Component Value Date    FSH  07/22/2016     <0.2  FSH Reference Range   Female: Follicular      2.5-10.2           Mid-cycle       3.4-33.4           Luteal          1.5-9.1           Postmenopausal  23.0-116.3         Lab Results   Component Value Date    PROLACTIN 30 12/06/2016     Bone density : 10/24/2018   HISTORY:  64 yo female osteopenia, hysterectomy, panhypopituitarism;  Osteopenia, unspecified location     COMPARISON:   10/3/2015     Age: 63  years.  Height: 65 inches  Weight: 181 pounds  Sex: Female  Ethnicity: White     Image quality: Adequate     Lumbar spine T-score in region of L1-L4 = -1.9   L1-4 percent change: 0.6%      HIPS:  Mean total hip T-score: -1.2  Mean total hip percent change: 1.4%      Left femoral neck T-score = -1.6  Right femoral neck T-score= -1.2      COMPARISON TO PRIOR:  Percent change in the spine and hips is NOT significant (or considered  invalid) accounting for the precision errors for this facility.                                                                      IMPRESSION:  Osteopenia    Bone density : 3/17/2022 I reviewed the original images and explained to the patient.     Impression  The most negative and valid T-score of -1.5 at the level of the left femoral neck corresponds with low bone density according to WHO criteria for postmenopausal females and men age 50 and over. The risk of osteoporotic fracture increases approximately 2-fold for each 1.0 SD decrease in T-score.     Results   Lumbar spine   T-score -1.4 , BMD 1.017 g/cm2.      Left Femur neck  T-score -1.5 , BMD 0.828 g/cm2.  Left Total hip  T-score -1.2 , BMD 0.861 g/cm2.     Right Femur neck  T-score -1.0, BMD  0.905 g/cm2.  Right Total hip  T-score -1.2 , BMD  0.859 g/cm2.     Interval change  Bone density compared to the prior study has changed at lumbar spine by +7.6%, at the right total femur by +3.2%.     Percent changes not mentioned or within remaining regions are insignificant  Please note that the differential diagnosis of BMD increase in the spine includes improvement due to pharmacotherapy vs inter-current progression of spine degeneration or fracture

## 2023-11-10 NOTE — PATIENT INSTRUCTIONS
[facility]-Department of Endocrinology  Diabetes Educators:   Ileana Ga, RN and Lia Chu RN  Clinic Nurse: TIBURCIO Pickens  CMA's: Violeta Braden and Dexter   EMT: Sd  Scheduling/Clinic phone number : [phone]   Clinic Fax: [phone]  On-Call Endocrine at the [place] (after hours/weekends): [phone] option 4    Please call the number below to schedule your labs.  [facility] [phone]   [facility] [phone]   [place] [phone]   [facility]  [phone]   [facility] [phone]   [place] [phone]   [facility] [place]) [phone]   [facility] Walk-In Only   [place] [phone]   [place] [phone]   [place] [phone]   [place] [phone]     Please reach out to the following centers to schedule your imaging appointment:  Imaging (DEXA, CT, MRI, XRAY)    [facility] ([facility], [place]) [phone]   [facility] ([place], [place]) [phone]   [facility] ([facility]) [phone]   [facility] ([facility]) [phone]     Appointment Reminders:  * Please bring meter with for staff to download  * If you are due ONLY for an A1C, it is scheduled with the nurse and will be done in clinic. You do not need to schedule a lab appointment. Fasting is not required for an A1C.  * Refill request should be submitted to your pharmacy. They will contact clinic for approval.

## 2023-11-10 NOTE — LETTER
11/10/2023         RE: Maria Esther Lowe  [address] [facility] [address]        Dear Colleague,    Thank you for referring your patient, Maria Esther Lowe, to the [facility]. Please see a copy of my visit note below.                                                                                - Endocrinology Follow up-    Reason for visit/consult:     Prolactinoma (H)  Panhypopituitarism (H)  Hypothyroidism, unspecified type    Primary care provider: No primary care provider on file.    Assessment and Plan  68 year old female with following conditions    # Panhypopituitarism-empty sella   Hydrocortisone 10/5 mg continue    # previous record of low IGF1  Will repeat IGF1 and if still low, then we will try GH therapy with low dose such as 0.1-0.2 mg daily injection. IGF1 is low 20-30s but she does not want GH product      # Remote history of a prolactin-producing pituitary adenoma-   Post surgery followed by RT, empty sella    - PRL level very mildly increased, no concern for tumor regrwoth at this point    - transfer most recent 2022 MRI from [facility] to [place]    # secondary hypothyroidism  Free T4 still mildly elevated but improving. Currently taking LT4 175 daily to LT4 175 mcg 3 days a week, 150 mcg 4 days a week    - TSH, free T4, total T3      #Osteopenia.   3/2022, stable osteopenia    - continue citracal 3 tabs (945 mg elemental Ca and VitD 750IU)      # Vitamin D take  She is currently taking extra 5000 international unit(s) vitamin D and level 2021 was vitamin D 84, now 64    - continue vitamin D 5000 international unit(s) from daily to Mon/Wed/Fri only    - recheck vitamin D level today    # Hair loss  Improving and followed by dermatologist    No hot flush    # Hysterectomy  Post in 9/2018        - RTC with me in 6 month       Total 45 minutes spent on the date of the encounter doing chart review, history and exam, documentation and further activities as noted  above.    Leona Arrington MD  Staff Physician  Endocrinology and Metabolism  [facility]  License: [id number]  Pager: [phone]    Interval History as of 11/10/2023 : Patient has been doing well. Medication compliance  to LT4 excellent . New event includes : her hair is improving after Rogain and now she is starting minoxidil..   Interval History as of 4/8/2022 : Patient has been doing well. Medication compliance : excellent. Still has had hair loss.   Interval History as of 7/26/2021 : Patient has been doing well. Last seen . Medication compliance   . New event includes  .  Interval History as of 9/9/2019 : Patient has been doing well. Stable on low dose hydorcoritisone dose 10 mg daily only for many years.  She still had dizziness and fatigue chronically. Also complainted poor quality of sleep at night. No hot flush,   Interval History as of 10/2018: 1/2018 dizziness went to ED in [facility], was fine.   BW stable, HTN, hysterectomy 9/2018. Large cyst, uterus fluid. Will be seen surgeon soon. Yellow fluid still. 3-4 pads daily.   HPI: A 63 yo female previous Dr. JAQUELINE Perdomo's patient, here for follow up. She has remote history of prolactinoma in her 20's, she underwent surgical removal in 1975 followed by RT. 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   Currently taking hydrocotisone 10 mg am only for at least several years, she has been doing well, she requires afternoon nap 1 pm for 20 minutes.   She has been taking LT4 was on 175 mcg and last year increased to 200 mcg with compliance.    Regarding hypogonadism, she has been taking premarin (conjugated estrogen) 0.625 mg. Progesterone (Prometrium) 100 mg daily. Her main concern today is about hair loss. After pituitary surgery, she lost lots hair, but after that with HRT, her hair started to grow. However past several months, she started to lose hair again and wishing to have more hair come back.   Appetite: good, she is  doing two jobs, She is  lives with her , adopted one child long time ago who is age 31 now.  No HA.       Past Medical/Surgical History:  Past Medical History:   Diagnosis Date     Hyperlipidemia LDL goal < 130      Hypertension      Hypothyroidism      Osteopenia     DEXA: 12/11; T-score of -1.9        Panhypopituitarism (H24)      Past Surgical History:   Procedure Laterality Date     DAVINCI HYSTERECTOMY TOTAL, BILATERAL SALPINGO-OOPHORECTOMY, COMBINED Bilateral 9/11/2018    Procedure: COMBINED DAVINCI HYSTERECTOMY TOTAL, SALPINGO-OOPHORECTOMY;  Pelvic Washings, Davinci Assisted Laparoscopic Total Hysterectomy, Bilateral Salpingo Oophorectomy, cystoscopy;  Surgeon: Tiffany Lew MD;  Location: [facility] OR     OPERATIVE HYSTEROSCOPY N/A 1/30/2018    Procedure: OPERATIVE HYSTEROSCOPY;  Operative Hysteroscopy, Resection of Endometrial Polyp, Dilation and Curettage;  Surgeon: Daysi Munoz MD;  Location: [facility] OR     PROBE LACRIMAL DUCT, INSERT STENT BILATERAL, COMBINED Bilateral 11/26/2018    Procedure: BILATERAL NASOLACRIMAL STENT - LARGE DIAMETER STEN TUBE;  Surgeon: Davina Payton MD;  Location: [facility] OR     resected prolactin-producing pituitary adenoma.  1975    Postoperative radiation therapy.        Allergies:  No Known Allergies      Current Medications   Current Outpatient Medications   Medication     CALCIUM 500 +D 500-400 MG-UNIT TABS     clobetasol (TEMOVATE) 0.05 % external solution     dutasteride (AVODART) 0.5 MG capsule     hydrocortisone (CORTEF) 10 MG tablet     ketoconazole (NIZORAL) 2 % external shampoo     levothyroxine (SYNTHROID/LEVOTHROID) 150 MCG tablet     levothyroxine (SYNTHROID/LEVOTHROID) 175 MCG tablet     losartan-hydrochlorothiazide (HYZAAR) 100-25 MG tablet     simvastatin (ZOCOR) 40 MG tablet     calcipotriene (DOVONOX) 0.005 % external solution     cholecalciferol (VITAMIN D3) 125 mcg (5000 units) capsule     cyanocobalamin (VITAMIN  B-12) 1000 MCG tablet      No current facility-administered medications for this visit.       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     ROS:  Full review of systems taken with the help of the intake sheet. Otherwise a complete 14 point review of systems was taken and is negative unless stated in the history above.    Physical Exam:   There were no vitals taken for this visit.  General: well appearing, no acute distress, pleasant and conversant,   Mental Status/neuro: alert and oriented  Resp: no acute distress  Face: normal facial color    Labs : I reviewed data from epic and extract and summarize the pertinent data here.        3/17/2022 11:37 3/22/2022 11:40 4/8/2022 16:24   T4 Free 1.64 (H)  1.18   TSH 0.02 (L)  0.04 (L)        1/29/2015 11:34 12/3/2015 09:23 7/22/2016 10:04 12/6/2016 18:49 10/23/2017 11:15   Prolactin 23 21 32 (H) 30 (H) 32 (H)     Lab Results   Component Value Date     10/23/2017      Lab Results   Component Value Date    POTASSIUM 3.9 10/23/2017     Lab Results   Component Value Date    CHLORIDE 105 10/23/2017     Lab Results   Component Value Date    ARI 8.5 10/23/2017     Lab Results   Component Value Date    CO2 24 10/23/2017     Lab Results   Component Value Date    BUN 14 10/23/2017     Lab Results   Component Value Date    CR 1.01 10/23/2017     Lab Results   Component Value Date     10/23/2017     Lab Results   Component Value Date    TSH 0.06 10/23/2017     Lab Results   Component Value Date    T4 1.26 10/23/2017       Lab Results   Component Value Date    FSH   07/22/2016     <0.2  FSH Reference Range   Female: Follicular      2.5-10.2           Mid-cycle       3.4-33.4           Luteal          1.5-9.1           Postmenopausal  23.0-116.3         Lab Results   Component Value Date    PROLACTIN 30 12/06/2016     Bone density : 10/24/2018   HISTORY: 64 yo female osteopenia, hysterectomy, panhypopituitarism;  Osteopenia, unspecified location     COMPARISON:   10/3/2015     Age: 63  years.  Height: 65 inches  Weight: 181 pounds  Sex: Female  Ethnicity: White     Image quality: Adequate     Lumbar spine T-score in region of L1-L4 = -1.9   L1-4 percent change: 0.6%      HIPS:  Mean total hip T-score: -1.2  Mean total hip percent change: 1.4%      Left femoral neck T-score = -1.6  Right femoral neck T-score= -1.2      COMPARISON TO PRIOR:  Percent change in the spine and hips is NOT significant (or considered  invalid) accounting for the precision errors for this facility.                                                                      IMPRESSION:  Osteopenia    Bone density : 3/17/2022 I reviewed the original images and explained to the patient.     Impression  The most negative and valid T-score of -1.5 at the level of the left femoral neck corresponds with low bone density according to WHO criteria for postmenopausal females and men age 50 and over. The risk of osteoporotic fracture increases approximately 2-fold for each 1.0 SD decrease in T-score.     Results   Lumbar spine   T-score -1.4 , BMD 1.017 g/cm2.      Left Femur neck  T-score -1.5 , BMD 0.828 g/cm2.  Left Total hip  T-score -1.2 , BMD 0.861 g/cm2.     Right Femur neck  T-score -1.0, BMD  0.905 g/cm2.  Right Total hip  T-score -1.2 , BMD  0.859 g/cm2.     Interval change  Bone density compared to the prior study has changed at lumbar spine by +7.6%, at the right total femur by +3.2%.     Percent changes not mentioned or within remaining regions are insignificant  Please note that the differential diagnosis of  BMD increase in the spine includes improvement due to pharmacotherapy vs inter-current progression of spine degeneration or fracture         Again, thank you for allowing me to participate in the care of your patient.        Sincerely,        Leona Arrington MD

## 2023-11-10 NOTE — NURSING NOTE
Maria Esther Lowe's goals for this visit include:   Chief Complaint   Patient presents with    Follow Up     Pituitary      She requests these members of her care team be copied on today's visit information: No     PCP: Estela Boswell    Referring Provider:  No referring provider defined for this encounter.    BP (!) 155/75 (BP Location: Right arm, Patient Position: Sitting, Cuff Size: Adult Large)   Pulse 80   Wt 80.3 kg (177 lb)   SpO2 98%   BMI 29.45 kg/m      Do you need any medication refills at today's visit? Not sure    Violeta Aguilera, EDI  Adult Endocrinology   [facility]

## 2023-11-11 DIAGNOSIS — E78.5 HYPERLIPIDEMIA LDL GOAL <100: ICD-10-CM

## 2023-11-11 LAB
T3 SERPL-MCNC: 121 NG/DL (ref 85–202)
VIT D+METAB SERPL-MCNC: 61 NG/ML (ref 20–50)

## 2023-11-14 ENCOUNTER — TELEPHONE (OUTPATIENT)
Dept: DERMATOLOGY | Facility: CLINIC | Age: 68
End: 2023-11-14
Payer: COMMERCIAL

## 2023-11-14 NOTE — TELEPHONE ENCOUNTER
Pt called and notified of pathology results. Pt will follow up with primary. She denied having any other questions or concerns.    Jaylene San RN on 11/14/2023 at 2:15 PM        Comprehensive metabolic panel  Order: [id number]  Status: Final result       Visible to patient: Yes (not seen)       Dx: Medication monitoring encounter    1 Result Note       1 Patient Communication           Component  Ref Range & Units 4 d ago  (11/10/23) 1 yr ago  (4/8/22) 5 yr ago  (9/12/18) 5 yr ago  (9/11/18) 5 yr ago  (8/13/18) 5 yr ago  (1/30/18) 6 yr ago  (10/23/17)  Sodium  135 - 145 mmol/L 142 142 R 136 R  141 R  136 R  Comment: Reference intervals for this test were updated on 09/26/2023 to more accurately reflect our healthy population. There may be differences in the flagging of prior results with similar values performed with this method. Interpretation of those prior results can be made in the context of the updated reference intervals.  Potassium  3.4 - 5.3 mmol/L 3.7        Carbon Dioxide (CO2)  22 - 29 mmol/L 28        Anion Gap  7 - 15 mmol/L 10 8 R 6 R  8 R  7 R  Urea Nitrogen  8.0 - 23.0 mg/dL 22.8        Creatinine  0.51 - 0.95 mg/dL 1.30 High  1.12 High  R 1.11 High  R 1.25 High  R 1.08 High  R 1.04 R 1.01 R  GFR Estimate  >60 mL/min/1.73m2 45 Low  54 Low  CM 50 Low  R, CM 43 Low  R, CM 51 Low  R, CM 54 Low  R, CM 56 Low  R, CM  Calcium  8.8 - 10.2 mg/dL 9.7 9.6 R 8.2 Low  R  8.8 R  8.5 R  Chloride  98 - 107 mmol/L 104        Glucose  70 - 99 mg/dL 148 High         Alkaline Phosphatase  35 - 104 U/L 103 89 R       AST  0 - 45 U/L 35 24       Comment: Reference intervals for this test were updated on 6/12/2023 to more accurately reflect our healthy population. There may be differences in the flagging of prior results with similar values performed with this method. Interpretation of those prior results can be made in the context of the updated reference intervals.  ALT  0 - 50 U/L 28 35       Comment:  Reference intervals for this test were updated on 6/12/2023 to more accurately reflect our healthy population. There may be differences in the flagging of prior results with similar values performed with this method. Interpretation of those prior results can be made in the context of the updated reference intervals.    Protein Total  6.4 - 8.3 g/dL 7.2 7.3 R       Albumin  3.5 - 5.2 g/dL 4.3        Bilirubin Total  <=1.2 mg/dL 0.3 0.2 R       Resulting Agency [facility]          Specimen Collected: 11/10/23  3:49 PM Last Resulted: 11/10/23  4:13 PM        Dear Maria Esther Lowe,     We are writing to inform you of your test results that show an elevated Creatining and you need to see your primary care as this is trending up.     Thank you for taking the time to be seen in our dermatology clinic. If you have further questions or concerns, please contact the clinic(see phone number listed below).        Sincerely,     Carley Serrano MD    Department of Dermatology  [facility]: Phone: [phone], Fax:[phone]  [facility]: Phone: [phone], Fax: [phone]

## 2023-11-15 RX ORDER — SIMVASTATIN 40 MG
40 TABLET ORAL DAILY
Qty: 90 TABLET | Refills: 3 | Status: SHIPPED | OUTPATIENT
Start: 2023-11-15 | End: 2024-08-19

## 2023-11-15 NOTE — TELEPHONE ENCOUNTER
simvastatin (ZOCOR) 40 MG     Last Written Prescription Date:  12/3/22  Last Fill Quantity: 100,   # refills: 3  Last Office Visit : 11/10/23  Future Office visit:  12/13/24  Routing refill request to provider for review/approval because: 90 day refill pending * over due LDL  LDL Cholesterol Calculated   Date Value Ref Range Status   03/03/2021 70 <100 mg/dL Final     Comment:     Desirable:       <100 mg/dl

## 2023-11-24 ENCOUNTER — APPOINTMENT (OUTPATIENT)
Dept: CARDIOLOGY | Facility: CLINIC | Age: 68
End: 2023-11-24
Attending: DERMATOLOGY
Payer: COMMERCIAL

## 2023-11-24 PROCEDURE — 93010 ELECTROCARDIOGRAM REPORT: CPT | Performed by: INTERNAL MEDICINE

## 2023-11-25 NOTE — PROGRESS NOTES
Endocrine Clinic Consult      Maria Esther Lowe is a 68 year old female who is being evaluated via a billable video visit.        Consulting provider: No referring provider defined for this encounter.    Reason for consultation: Panhypopituitiarism      Assessment:  Lisette is a elderly postmenopausal woman with distant history of prolactinoma s/p radiation to the pituitary now with panhypopituitarism and empty sella syndrome.  She is on thyroid and steroid hormone replacement.  Patient had multiple questions in regards to thyroid hormone replacement and concerns.  1 main concern remains hair loss.  Reviewed to take levothyroxine and hydrocortisone in the morning rather than in the evening, dose might improve sleep.  New finding of chronic renal insufficiency stage 3, recommended to hydrate, work on blood pressure control, and follow-up  Vitamin B12-no risk factors, B12 have never been low in the past, she is asymptomatic, recommended to not recheck right now, does not require supplementation  Vitamin D level rechecked, DEXA scan last year in osteopenic range without increased fracture risk  Glucose-random glucose levels have been above 100, will check A1c and fasting glucose  Prolactin prolactin levels had been mildly elevated in the past, reviewed that these levels are not with adverse effect or other consequence right now, that could be secondary to empty sella and stalk compression, okay to follow  Empty sella on most recent MRI pituitary from 2021, no indication to repeat    Growth hormone deficiency and this was last discussed today, would first like to establish better control of other hormone replacement, however should be readdressed in the future , patient has concerned about adverse effects,       Plan:   Decrease levothyroxine to take 150 mcg in the morning  Take hydrocortisone 10 mg in the morning instead of in the evening  Lab work soon for free T4, basic metabolic profile, A1c, fasting lipids    40  minutes spent on the date of the encounter doing chart review, review of test results, interpretation of tests, patient visit and documentation     This note was generated using computer recognized voice recognition. This might result in some expected imperfection.    Diana Yanes MD  Endocrinology and Diabetes  Telephone contact:  [facility] [place] [phone]  [facility] [place] [phone]           HPI:   Maria Esther Lowe is a 68 year old woman with panhypopituitarism  History of pituitary prolctinoma s/p removal in 1975 followedby radiation with subsequent development of panhypopituitiarism    Pt's main concern  Patient would like to have more labs checked including also vitamin D levels recheck of prolactin in such, concerned about her low TSH  Ongoing hair loss, patient is followed by dermatologist closely, this has improved some recently  Insomnia, patient notes more problems with falling and staying asleep for about 1 year    Symptoms of hypo- and hyperthyroidism:  Weight change able to loose; heat or cold intolerance no; abnormal bowel movements no; hair loss YES, change in skin pattern ; anxiety no, depression no; fatigue no; heart racing no; tremors no; menses no    Exposure to radiation: yes  FH of thyroid Cancer: no     Hydrocortisone 10 mg in the evening  Levothyroxine 150 alternating with 175 mcg in the evening    Current Problem List:   Patient Active Problem List   Diagnosis    Prolactinoma (H)    Panhypopituitarism (H24)    Hormone replacement therapy (postmenopausal)    Carpal tunnel syndrome    History of robot-assisted laparoscopic hysterectomy           Past Medical and Past Surgical History:  Past Medical History:   Diagnosis Date    Hyperlipidemia LDL goal < 130     Hypertension     Hypothyroidism     Osteopenia     DEXA: 12/11; T-score of -1.9       Panhypopituitarism (H24)        Past Surgical History:   Procedure Laterality Date    ESTUARDO  HYSTERECTOMY TOTAL, BILATERAL SALPINGO-OOPHORECTOMY, COMBINED Bilateral 9/11/2018    Procedure: COMBINED DAVINCI HYSTERECTOMY TOTAL, SALPINGO-OOPHORECTOMY;  Pelvic Washings, Davinci Assisted Laparoscopic Total Hysterectomy, Bilateral Salpingo Oophorectomy, cystoscopy;  Surgeon: Tiffany Lew MD;  Location: [facility] OR    OPERATIVE HYSTEROSCOPY N/A 1/30/2018    Procedure: OPERATIVE HYSTEROSCOPY;  Operative Hysteroscopy, Resection of Endometrial Polyp, Dilation and Curettage;  Surgeon: Daysi Munoz MD;  Location: [facility] OR    PROBE LACRIMAL DUCT, INSERT STENT BILATERAL, COMBINED Bilateral 11/26/2018    Procedure: BILATERAL NASOLACRIMAL STENT - LARGE DIAMETER STEN TUBE;  Surgeon: Davina Payton MD;  Location: [facility] OR    resected prolactin-producing pituitary adenoma.  1975    Postoperative radiation therapy.        Medications:   Current Outpatient Medications   Medication Sig Dispense Refill    calcipotriene (DOVONOX) 0.005 % external solution Apply 1 mL topically 2 times daily (Patient not taking: Reported on 11/10/2023) 60 mL 1    CALCIUM 500 +D 500-400 MG-UNIT TABS Take 1 tablet by mouth 2 times daily. 180 tablet 3    cholecalciferol (VITAMIN D3) 125 mcg (5000 units) capsule Take 1 capsule (125 mcg) by mouth daily Call clinic to schedule follow up appointment. Important. MUST BE SEEN IN CLINIC FOR ANY FURTHER REFILLS (Patient not taking: Reported on 11/10/2023) 90 capsule 1    clobetasol (TEMOVATE) 0.05 % external solution Use twice weekly 60 mL 3    cyanocobalamin (VITAMIN  B-12) 1000 MCG tablet Take by mouth every morning (Patient not taking: Reported on 11/10/2023)      dutasteride (AVODART) 0.5 MG capsule Take 1 capsule (0.5 mg) by mouth daily 90 capsule 3    hydrocortisone (CORTEF) 10 MG tablet TAKE 1 TABLET BY MOUTH IN  THE MORNING AND ONE-HALF  TABLET BY MOUTH AT MIDDAY 150 tablet 3    ketoconazole (NIZORAL) 2 % external shampoo Apply topically 2-3 times per week. 100 mL 11    levothyroxine  (SYNTHROID/LEVOTHROID) 150 MCG tablet TAKE 1 TABLET BY MOUTH  DAILY ON TUESDAY, THURSDAY, SATURDAY, SUNDAY 90 tablet 3    levothyroxine (SYNTHROID/LEVOTHROID) 175 MCG tablet TAKE 1 TABLET BY MOUTH DAILY ON  MONDAY WEDNESDAY AND FRIDAY 36 tablet 3    losartan-hydrochlorothiazide (HYZAAR) 100-25 MG tablet TAKE 1 TABLET BY MOUTH DAILY 90 tablet 3    simvastatin (ZOCOR) 40 MG tablet TAKE 1 TABLET BY MOUTH DAILY 90 tablet 3       Allergies:   No Known Allergies    Social History     Tobacco Use    Smoking status: Never     Passive exposure: Never    Smokeless tobacco: Never   Substance Use Topics    Alcohol use: No       Family History   Problem Relation Age of Onset    Diabetes Paternal Grandmother     Depression Paternal Grandmother     Thyroid Disease Maternal Grandmother     Skin Cancer Father     Melanoma No family hx of        Physical Examination:  not currently breastfeeding.  There is no height or weight on file to calculate BMI.    Wt Readings from Last 6 Encounters:   11/10/23 80.3 kg (177 lb)   06/22/23 82.6 kg (182 lb)   04/08/22 83.9 kg (185 lb)   03/17/22 83.9 kg (185 lb)   10/22/20 81.6 kg (180 lb)   09/09/19 80.3 kg (177 lb)       General: Well appearing woma in no distress, up and go quick  Eyes: EOMI. Sclerae and conjunctivae are clear.   HENT: No thyromegaly or mass.    Lymphatic: No cervical or supraclavicular lymphadenopathy.  Cardiovascular: RRR, with normal S1+S2 and no murmurs.   Skin: No rash or lesions. No abdominal striae.    Extremities: No peripheral edema.   Neurologic: No tremor with hands outstretched. 2+ patellar reflexes.  Muskuloskeletal: rib-pelvis distance 3         Labs and Studies:   Lab Results   Component Value Date     11/10/2023    CO2 28 11/10/2023    CHLORIDE 104 11/10/2023    CR 1.30 (H) 11/10/2023    TRIG 140 03/03/2021    HDL 48 (L) 03/03/2021    HGB 14.7 03/18/2020     Lab Results   Component Value Date     11/10/2023    CHLORIDE 104 11/10/2023    CO2 28  "11/10/2023     (H) 11/10/2023    CR 1.30 (H) 11/10/2023    CR 1.12 (H) 04/08/2022    CR 1.11 (H) 09/12/2018    CR 1.25 (H) 09/11/2018    CR 1.08 (H) 08/13/2018    ARI 9.7 11/10/2023    ALBUMIN 4.3 11/10/2023    ALKPHOS 103 11/10/2023    LDL 70 03/03/2021    HDL 48 (L) 03/03/2021    TRIG 140 03/03/2021    TSH 0.04 (L) 11/10/2023    TSH 0.04 (L) 04/08/2022    TSH 0.02 (L) 03/17/2022     No results found for: \"MICROL\"  Lab Results   Component Value Date    A1C 5.8 (H) 06/22/2023    A1C 5.5 03/03/2021       Lab Results   Component Value Date    HGB 14.7 03/18/2020       .sutlabd    Recent Labs   Lab Test 11/10/23  1549 04/08/22  1624 03/17/22  1137 09/09/19  1224 10/22/18  1227 12/06/16  1849 07/22/16  1004 12/03/15  0923   FSH  --   --   --   --   --   --  <0.2  FSH Reference Range   Female: Follicular      2.5-10.2           Mid-cycle       3.4-33.4           Luteal          1.5-9.1           Postmenopausal  23.0-116.3   <0.2  FSH Reference Range   Female: Follicular      2.5-10.2           Mid-cycle       3.4-33.4           Luteal          1.5-9.1           Postmenopausal  23.0-116.3     T4 1.59 1.18 1.64*   < >  --    < > 1.02 1.28   T3 121  --   --   --   --   --   --   --    TSH 0.04* 0.04* 0.02*   < > 0.11*   < > 0.13* 0.07*   SHANTE  --   --   --   --  13.8  --   --   --     < > = values in this interval not displayed.           Lab Results   Component Value Date    PROLACTIN 43 (H) 03/17/2022             DX Hip/Pelvis/Spine    [facility] - [address] [address]  Phone: [phone]   Fax: [phone]      Patient name:   Maria Esther Lowe  Patient demographics:  66 year old White Female  History:  Evaluation of Bone Density, Family History of Fracture, Family History of Osteoporosis, Hysterectomy, Ovaries Removed, Post Menopausal and Thyroid Condition  Current treatments:  Calcium, Cholesterol Lowering Drugs, Estrogen, " Fosamax/Alendronate, Sterids/Prednisone, Thyroid Medications, Vitamin D  Scan:    StockUp        Impression  The most negative and valid T-score of -1.5 at the level of the left femoral neck corresponds with low bone density according to WHO criteria for postmenopausal females and men age 50 and over. The risk of osteoporotic fracture increases approximately 2-fold for each 1.0 SD decrease in T-score.    Results  Lumbar spine  T-score -1.4 , BMD 1.017 g/cm2.    Left Femur neck  T-score -1.5 , BMD 0.828 g/cm2.  Left Total hip  T-score -1.2 , BMD 0.861 g/cm2.    Right Femur neck  T-score -1.0, BMD  0.905 g/cm2.  Right Total hip  T-score -1.2 , BMD  0.859 g/cm2.    Interval change  Bone density compared to the prior study has changed at lumbar spine by +7.6%, at the right total femur by +3.2%.    Percent changes not mentioned or within remaining regions are insignificant  Please note that the differential diagnosis of BMD increase in the spine includes improvement due to pharmacotherapy vs inter-current progression of spine degeneration or fracture    Fracture risk  The risk of osteoporotic fracture increases approximately 2-fold for each 1.0 SD decrease in T-score.  Low bone density is not the only risk factor for fracture; consider factors such as patient's age, fall risk, injury risk, previous osteoporotic fracture, family history of osteoporosis, etc.    Repeat  For patients eligible for Medicare, routine testing is allowed once every 2 years.  Testing frequency can be increased for patients on corticosteroids.    Clinical correlation recommended    Technical quality  Satisfactory.    Principal result :  Julien Pickens MD, [facility]  Division of Endocrinology and Diabetes  Department of Medicine      References:  Ref. 1. WHO categories:  T-score > -1.0  = normal             .  T-score -1.0 to -2.5 = low bone density  T-score < -2.5 = osteoporosis        .    Ref. 2. 2015 ISCD  official position statements:  www.iscd.org.    Ref. 3.  Today's examination is compared to the technically similar prior study of the total hip and femur if available. Only changes deemed likely to be significant based on historical data are reported.  According to the ISCD position statements, total hip rather than femoral neck regions are to be compared because larger areas give better precision.  LSC = least significant changes at the [facility] (historical data)  AP spine =  0.032 g/cm2  (6/26/2007)  Left hip = 0.029 g/cm2  (6/15/2007)  Right hip = 0.018 g/cm2  (6/15/2007)  Left mid radius = 0.043 g/cm2  (6/26/2007)  Please note that the differential diagnosis of increase in bone density at the lumbar spine includes improvement due to pharmacotherapy vs inter-current progression of spine degeneration or fracture.    Ref. 4 Fracture risk is calculated in patients aged 40 to 90 years old with low bone density not on osteoporosis treatment. A 10 year fracture risk of 3% and higher for hip fracture and 20% and higher for major osteoporotic fracture is considered higher than acceptable risk and might be an indication for medical treatment.    Ref. 5.  By definition, osteoporosis may be diagnosed in the presence or with the history of a low trauma or fragility fracture.  Fragility and low trauma fracture is defined as a fracture resulting from the force of a fall from a standing height or less or a bone that breaks under conditions that would not cause a normal bone to break.    Ref. 6. [employer]F Physician's Guideline Website address:  www.nof.org.

## 2023-11-26 LAB
ATRIAL RATE - MUSE: 65 BPM
DIASTOLIC BLOOD PRESSURE - MUSE: NORMAL MMHG
INTERPRETATION ECG - MUSE: NORMAL
P AXIS - MUSE: 37 DEGREES
PR INTERVAL - MUSE: 184 MS
QRS DURATION - MUSE: 84 MS
QT - MUSE: 448 MS
QTC - MUSE: 465 MS
R AXIS - MUSE: -6 DEGREES
SYSTOLIC BLOOD PRESSURE - MUSE: NORMAL MMHG
T AXIS - MUSE: 50 DEGREES
VENTRICULAR RATE- MUSE: 65 BPM

## 2023-11-27 ENCOUNTER — OFFICE VISIT (OUTPATIENT)
Dept: ENDOCRINOLOGY | Facility: CLINIC | Age: 68
End: 2023-11-27
Payer: COMMERCIAL

## 2023-11-27 VITALS — HEART RATE: 72 BPM | OXYGEN SATURATION: 100 %

## 2023-11-27 DIAGNOSIS — E23.0 HYPOPITUITARISM (H): Primary | ICD-10-CM

## 2023-11-27 DIAGNOSIS — E03.9 HYPOTHYROIDISM, UNSPECIFIED TYPE: ICD-10-CM

## 2023-11-27 PROCEDURE — 99215 OFFICE O/P EST HI 40 MIN: CPT | Performed by: INTERNAL MEDICINE

## 2023-11-27 RX ORDER — LEVOTHYROXINE SODIUM 150 UG/1
150 TABLET ORAL DAILY
Qty: 90 TABLET | Refills: 3 | Status: SHIPPED | OUTPATIENT
Start: 2023-11-27 | End: 2024-01-29

## 2023-11-27 NOTE — LETTER
11/27/2023         RE: Maria Esther Lowe  [address] [facility] [address]        Dear Colleague,    Thank you for referring your patient, Maria Esther Lowe, to the [facility]. Please see a copy of my visit note below.        Endocrine Clinic Consult      Maria Esther Lowe is a 68 year old female who is being evaluated via a billable video visit.        Consulting provider: No referring provider defined for this encounter.    Reason for consultation: Panhypopituitiarism      Assessment:  Lisette is a elderly postmenopausal woman with distant history of prolactinoma s/p radiation to the pituitary now with panhypopituitarism and empty sella syndrome.  She is on thyroid and steroid hormone replacement.  Patient had multiple questions in regards to thyroid hormone replacement and concerns.  1 main concern remains hair loss.  Reviewed to take levothyroxine and hydrocortisone in the morning rather than in the evening, dose might improve sleep.  New finding of chronic renal insufficiency stage 3, recommended to hydrate, work on blood pressure control, and follow-up  Vitamin B12-no risk factors, B12 have never been low in the past, she is asymptomatic, recommended to not recheck right now, does not require supplementation  Vitamin D level rechecked, DEXA scan last year in osteopenic range without increased fracture risk  Glucose-random glucose levels have been above 100, will check A1c and fasting glucose  Prolactin prolactin levels had been mildly elevated in the past, reviewed that these levels are not with adverse effect or other consequence right now, that could be secondary to empty sella and stalk compression, okay to follow  Empty sella on most recent MRI pituitary from 2021, no indication to repeat    Growth hormone deficiency and this was last discussed today, would first like to establish better control of other hormone replacement, however should be readdressed in the future ,  patient has concerned about adverse effects,       Plan:   Decrease levothyroxine to take 150 mcg in the morning  Take hydrocortisone 10 mg in the morning instead of in the evening  Lab work soon for free T4, basic metabolic profile, A1c, fasting lipids    40 minutes spent on the date of the encounter doing chart review, review of test results, interpretation of tests, patient visit and documentation     This note was generated using computer recognized voice recognition. This might result in some expected imperfection.    Diana Yanes MD  Endocrinology and Diabetes  Telephone contact:  [facility] [place] [phone]  [facility] [phone]           HPI:   Maria Esther Lowe is a 68 year old woman with panhypopituitarism  History of pituitary prolctinoma s/p removal in 1975 followedby radiation with subsequent development of panhypopituitiarism    Pt's main concern  Patient would like to have more labs checked including also vitamin D levels recheck of prolactin in such, concerned about her low TSH  Ongoing hair loss, patient is followed by dermatologist closely, this has improved some recently  Insomnia, patient notes more problems with falling and staying asleep for about 1 year    Symptoms of hypo- and hyperthyroidism:  Weight change able to loose; heat or cold intolerance no; abnormal bowel movements no; hair loss YES, change in skin pattern ; anxiety no, depression no; fatigue no; heart racing no; tremors no; menses no    Exposure to radiation: yes  FH of thyroid Cancer: no     Hydrocortisone 10 mg in the evening  Levothyroxine 150 alternating with 175 mcg in the evening    Current Problem List:   Patient Active Problem List   Diagnosis     Prolactinoma (H)     Panhypopituitarism (H24)     Hormone replacement therapy (postmenopausal)     Carpal tunnel syndrome     History of robot-assisted laparoscopic hysterectomy           Past Medical and Past Surgical  History:  Past Medical History:   Diagnosis Date     Hyperlipidemia LDL goal < 130      Hypertension      Hypothyroidism      Osteopenia     DEXA: 12/11; T-score of -1.9        Panhypopituitarism (H24)        Past Surgical History:   Procedure Laterality Date     DAVINCI HYSTERECTOMY TOTAL, BILATERAL SALPINGO-OOPHORECTOMY, COMBINED Bilateral 9/11/2018    Procedure: COMBINED DAVINCI HYSTERECTOMY TOTAL, SALPINGO-OOPHORECTOMY;  Pelvic Washings, Davinci Assisted Laparoscopic Total Hysterectomy, Bilateral Salpingo Oophorectomy, cystoscopy;  Surgeon: Tiffany Lew MD;  Location: [facility] OR     OPERATIVE HYSTEROSCOPY N/A 1/30/2018    Procedure: OPERATIVE HYSTEROSCOPY;  Operative Hysteroscopy, Resection of Endometrial Polyp, Dilation and Curettage;  Surgeon: Daysi Munoz MD;  Location: [facility] OR     PROBE LACRIMAL DUCT, INSERT STENT BILATERAL, COMBINED Bilateral 11/26/2018    Procedure: BILATERAL NASOLACRIMAL STENT - LARGE DIAMETER STEN TUBE;  Surgeon: Davina Payton MD;  Location: [facility] OR     resected prolactin-producing pituitary adenoma.  1975    Postoperative radiation therapy.        Medications:   Current Outpatient Medications   Medication Sig Dispense Refill     calcipotriene (DOVONOX) 0.005 % external solution Apply 1 mL topically 2 times daily (Patient not taking: Reported on 11/10/2023) 60 mL 1     CALCIUM 500 +D 500-400 MG-UNIT TABS Take 1 tablet by mouth 2 times daily. 180 tablet 3     cholecalciferol (VITAMIN D3) 125 mcg (5000 units) capsule Take 1 capsule (125 mcg) by mouth daily Call clinic to schedule follow up appointment. Important. MUST BE SEEN IN CLINIC FOR ANY FURTHER REFILLS (Patient not taking: Reported on 11/10/2023) 90 capsule 1     clobetasol (TEMOVATE) 0.05 % external solution Use twice weekly 60 mL 3     cyanocobalamin (VITAMIN  B-12) 1000 MCG tablet Take by mouth every morning (Patient not taking: Reported on 11/10/2023)       dutasteride (AVODART) 0.5 MG capsule Take 1 capsule (0.5  mg) by mouth daily 90 capsule 3     hydrocortisone (CORTEF) 10 MG tablet TAKE 1 TABLET BY MOUTH IN  THE MORNING AND ONE-HALF  TABLET BY MOUTH AT MIDDAY 150 tablet 3     ketoconazole (NIZORAL) 2 % external shampoo Apply topically 2-3 times per week. 100 mL 11     levothyroxine (SYNTHROID/LEVOTHROID) 150 MCG tablet TAKE 1 TABLET BY MOUTH  DAILY ON TUESDAY, THURSDAY, SATURDAY, SUNDAY 90 tablet 3     levothyroxine (SYNTHROID/LEVOTHROID) 175 MCG tablet TAKE 1 TABLET BY MOUTH DAILY ON  MONDAY WEDNESDAY AND FRIDAY 36 tablet 3     losartan-hydrochlorothiazide (HYZAAR) 100-25 MG tablet TAKE 1 TABLET BY MOUTH DAILY 90 tablet 3     simvastatin (ZOCOR) 40 MG tablet TAKE 1 TABLET BY MOUTH DAILY 90 tablet 3       Allergies:   No Known Allergies    Social History     Tobacco Use     Smoking status: Never     Passive exposure: Never     Smokeless tobacco: Never   Substance Use Topics     Alcohol use: No       Family History   Problem Relation Age of Onset     Diabetes Paternal Grandmother      Depression Paternal Grandmother      Thyroid Disease Maternal Grandmother      Skin Cancer Father      Melanoma No family hx of        Physical Examination:  not currently breastfeeding.  There is no height or weight on file to calculate BMI.    Wt Readings from Last 6 Encounters:   11/10/23 80.3 kg (177 lb)   06/22/23 82.6 kg (182 lb)   04/08/22 83.9 kg (185 lb)   03/17/22 83.9 kg (185 lb)   10/22/20 81.6 kg (180 lb)   09/09/19 80.3 kg (177 lb)       General: Well appearing woma in no distress, up and go quick  Eyes: EOMI. Sclerae and conjunctivae are clear.   HENT: No thyromegaly or mass.    Lymphatic: No cervical or supraclavicular lymphadenopathy.  Cardiovascular: RRR, with normal S1+S2 and no murmurs.   Skin: No rash or lesions. No abdominal striae.    Extremities: No peripheral edema.   Neurologic: No tremor with hands outstretched. 2+ patellar reflexes.  Muskuloskeletal: rib-pelvis distance 3         Labs and Studies:   Lab Results  "  Component Value Date     11/10/2023    CO2 28 11/10/2023    CHLORIDE 104 11/10/2023    CR 1.30 (H) 11/10/2023    TRIG 140 03/03/2021    HDL 48 (L) 03/03/2021    HGB 14.7 03/18/2020     Lab Results   Component Value Date     11/10/2023    CHLORIDE 104 11/10/2023    CO2 28 11/10/2023     (H) 11/10/2023    CR 1.30 (H) 11/10/2023    CR 1.12 (H) 04/08/2022    CR 1.11 (H) 09/12/2018    CR 1.25 (H) 09/11/2018    CR 1.08 (H) 08/13/2018    ARI 9.7 11/10/2023    ALBUMIN 4.3 11/10/2023    ALKPHOS 103 11/10/2023    LDL 70 03/03/2021    HDL 48 (L) 03/03/2021    TRIG 140 03/03/2021    TSH 0.04 (L) 11/10/2023    TSH 0.04 (L) 04/08/2022    TSH 0.02 (L) 03/17/2022     No results found for: \"MICROL\"  Lab Results   Component Value Date    A1C 5.8 (H) 06/22/2023    A1C 5.5 03/03/2021       Lab Results   Component Value Date    HGB 14.7 03/18/2020       .sutlabd    Recent Labs   Lab Test 11/10/23  1549 04/08/22  1624 03/17/22  1137 09/09/19  1224 10/22/18  1227 12/06/16  1849 07/22/16  1004 12/03/15  0923   FSH  --   --   --   --   --   --  <0.2  FSH Reference Range   Female: Follicular      2.5-10.2           Mid-cycle       3.4-33.4           Luteal          1.5-9.1           Postmenopausal  23.0-116.3   <0.2  FSH Reference Range   Female: Follicular      2.5-10.2           Mid-cycle       3.4-33.4           Luteal          1.5-9.1           Postmenopausal  23.0-116.3     T4 1.59 1.18 1.64*   < >  --    < > 1.02 1.28   T3 121  --   --   --   --   --   --   --    TSH 0.04* 0.04* 0.02*   < > 0.11*   < > 0.13* 0.07*   SHANTE  --   --   --   --  13.8  --   --   --     < > = values in this interval not displayed.           Lab Results   Component Value Date    PROLACTIN 43 (H) 03/17/2022             DX Hip/Pelvis/Spine    [address] [address]  Phone: [phone]   Fax: [phone]      Patient name:   Maria Esther Lowe  Patient " demographics:  66 year old White Female  History:  Evaluation of Bone Density, Family History of Fracture, Family History of Osteoporosis, Hysterectomy, Ovaries Removed, Post Menopausal and Thyroid Condition  Current treatments:  Calcium, Cholesterol Lowering Drugs, Estrogen, Fosamax/Alendronate, Sterids/Prednisone, Thyroid Medications, Vitamin D  Scan:    [employer]        Impression  The most negative and valid T-score of -1.5 at the level of the left femoral neck corresponds with low bone density according to WHO criteria for postmenopausal females and men age 50 and over. The risk of osteoporotic fracture increases approximately 2-fold for each 1.0 SD decrease in T-score.    Results  Lumbar spine  T-score -1.4 , BMD 1.017 g/cm2.    Left Femur neck  T-score -1.5 , BMD 0.828 g/cm2.  Left Total hip  T-score -1.2 , BMD 0.861 g/cm2.    Right Femur neck  T-score -1.0, BMD  0.905 g/cm2.  Right Total hip  T-score -1.2 , BMD  0.859 g/cm2.    Interval change  Bone density compared to the prior study has changed at lumbar spine by +7.6%, at the right total femur by +3.2%.    Percent changes not mentioned or within remaining regions are insignificant  Please note that the differential diagnosis of BMD increase in the spine includes improvement due to pharmacotherapy vs inter-current progression of spine degeneration or fracture    Fracture risk  The risk of osteoporotic fracture increases approximately 2-fold for each 1.0 SD decrease in T-score.  Low bone density is not the only risk factor for fracture; consider factors such as patient's age, fall risk, injury risk, previous osteoporotic fracture, family history of osteoporosis, etc.    Repeat  For patients eligible for Medicare, routine testing is allowed once every 2 years.  Testing frequency can be increased for patients on corticosteroids.    Clinical correlation recommended    Technical quality  Satisfactory.    Principal result :  Julien  MD Celio, [facility]  Division of Endocrinology and Diabetes  Department of Medicine      References:  Ref. 1. WHO categories:  T-score > -1.0  = normal             .  T-score -1.0 to -2.5 = low bone density  T-score < -2.5 = osteoporosis        .    Ref. 2. 2015 ISCD official position statements:  www.iscd.org.    Ref. 3.  Today's examination is compared to the technically similar prior study of the total hip and femur if available. Only changes deemed likely to be significant based on historical data are reported.  According to the ISCD position statements, total hip rather than femoral neck regions are to be compared because larger areas give better precision.  LSC = least significant changes at the [facility] (historical data)  AP spine =  0.032 g/cm2  (6/26/2007)  Left hip = 0.029 g/cm2  (6/15/2007)  Right hip = 0.018 g/cm2  (6/15/2007)  Left mid radius = 0.043 g/cm2  (6/26/2007)  Please note that the differential diagnosis of increase in bone density at the lumbar spine includes improvement due to pharmacotherapy vs inter-current progression of spine degeneration or fracture.    Ref. 4 Fracture risk is calculated in patients aged 40 to 90 years old with low bone density not on osteoporosis treatment. A 10 year fracture risk of 3% and higher for hip fracture and 20% and higher for major osteoporotic fracture is considered higher than acceptable risk and might be an indication for medical treatment.    Ref. 5.  By definition, osteoporosis may be diagnosed in the presence or with the history of a low trauma or fragility fracture.  Fragility and low trauma fracture is defined as a fracture resulting from the force of a fall from a standing height or less or a bone that breaks under conditions that would not cause a normal bone to break.    Ref. 6. NOF Physician's Guideline Website address:  www.nof.org.                      Again, thank you for allowing me to participate in the care of your patient.         Sincerely,        Diana Yanes MD

## 2023-11-27 NOTE — PATIENT INSTRUCTIONS
"Take Levothryoxine 150 mcg in the morning with water, do not eat for 1 hours    Take Hydrocortisone 10 mg in the morning, with or without food    Continue Multivitamins every day    Please drink 2 quarts of water daily      Fasting labs    To schedule a lab appointment,  Either use testbirds  Or call as below    Lab    General [phone]   [facility] [phone]   [place] [phone]   [facility]  [phone]   [facility] [phone]   [place] [phone]   [facility]) [phone]   [facility] Only   [place] [phone]   [place] [phone]   [place] [phone]   [place] [phone]     To get a \"Good Mallory Estimate\" for out of pocket costs: tel [phone]      "

## 2023-11-27 NOTE — NURSING NOTE
Maria Esther Lowe's goals for this visit include:   Chief Complaint   Patient presents with    New Patient     Pituitary tumor 2nd opinion     She requests these members of her care team be copied on today's visit information: yes    PCP: Carley Serrano    Referring Provider:  No referring provider defined for this encounter.    Pulse 72   SpO2 100%     Do you need any medication refills at today's visit? None    Sd Martinez, EMT

## 2023-11-28 ENCOUNTER — TELEPHONE (OUTPATIENT)
Dept: ENDOCRINOLOGY | Facility: CLINIC | Age: 68
End: 2023-11-28

## 2023-11-28 ENCOUNTER — LAB (OUTPATIENT)
Dept: LAB | Facility: CLINIC | Age: 68
End: 2023-11-28
Payer: COMMERCIAL

## 2023-11-28 DIAGNOSIS — E23.0 PANHYPOPITUITARISM (H): Primary | ICD-10-CM

## 2023-11-28 DIAGNOSIS — E23.0 HYPOPITUITARISM (H): ICD-10-CM

## 2023-11-28 LAB
ANION GAP SERPL CALCULATED.3IONS-SCNC: 10 MMOL/L (ref 7–15)
BUN SERPL-MCNC: 21.9 MG/DL (ref 8–23)
CALCIUM SERPL-MCNC: 9.2 MG/DL (ref 8.8–10.2)
CHLORIDE SERPL-SCNC: 103 MMOL/L (ref 98–107)
CHOLEST SERPL-MCNC: 137 MG/DL
CREAT SERPL-MCNC: 1.32 MG/DL (ref 0.51–0.95)
DEPRECATED HCO3 PLAS-SCNC: 26 MMOL/L (ref 22–29)
EGFRCR SERPLBLD CKD-EPI 2021: 44 ML/MIN/1.73M2
GLUCOSE SERPL-MCNC: 91 MG/DL (ref 70–99)
HBA1C MFR BLD: 5.7 % (ref 0–5.6)
HDLC SERPL-MCNC: 42 MG/DL
LDLC SERPL CALC-MCNC: 73 MG/DL
NONHDLC SERPL-MCNC: 95 MG/DL
POTASSIUM SERPL-SCNC: 4 MMOL/L (ref 3.4–5.3)
PROLACTIN SERPL 3RD IS-MCNC: 23 NG/ML (ref 5–23)
SODIUM SERPL-SCNC: 139 MMOL/L (ref 135–145)
T4 FREE SERPL-MCNC: 1.7 NG/DL (ref 0.9–1.7)
TRIGL SERPL-MCNC: 110 MG/DL

## 2023-11-28 PROCEDURE — 84146 ASSAY OF PROLACTIN: CPT

## 2023-11-28 PROCEDURE — 36415 COLL VENOUS BLD VENIPUNCTURE: CPT

## 2023-11-28 PROCEDURE — 84439 ASSAY OF FREE THYROXINE: CPT

## 2023-11-28 PROCEDURE — 80048 BASIC METABOLIC PNL TOTAL CA: CPT

## 2023-11-28 PROCEDURE — 83036 HEMOGLOBIN GLYCOSYLATED A1C: CPT

## 2023-11-28 PROCEDURE — 82306 VITAMIN D 25 HYDROXY: CPT

## 2023-11-30 NOTE — CONFIDENTIAL NOTE
DIAGNOSIS:   self referred- elevated creatatine    DATE RECEIVED: 01.26.2024    NOTES STATUS DETAILS   OFFICE NOTE from referring provider     OFFICE NOTE from other specialist  Internal 11.27.2023 Diana Yanes MD    *Only VASCULITIS or LUPUS gather office notes for the following     *PULMONARY       *ENT     *DERMATOLOGY     *RHEUMATOLOGY     DISCHARGE SUMMARY from hospital     DISCHARGE REPORT from the ER     MEDICATION LIST Internal    IMAGING  (NEED IMAGES AND REPORTS)     KIDNEY CT SCAN     KIDNEY ULTRASOUND     MR ABDOMEN     NUCLEAR MEDICINE RENAL     LABS     CBC Care Everywhere 03.18.2020   CMP Care Everywhere 03.18.2020   BMP Internal 11.28.2023   UA     URINE PROTEIN     RENAL PANEL     BIOPSY     KIDNEY BIOPSY

## 2023-12-04 LAB
DEPRECATED CALCIDIOL+CALCIFEROL SERPL-MC: <69 UG/L (ref 20–75)
VITAMIN D2 SERPL-MCNC: <5 UG/L
VITAMIN D3 SERPL-MCNC: 64 UG/L

## 2023-12-26 ENCOUNTER — TELEPHONE (OUTPATIENT)
Dept: DERMATOLOGY | Facility: CLINIC | Age: 68
End: 2023-12-26
Payer: COMMERCIAL

## 2023-12-26 ENCOUNTER — MYC MEDICAL ADVICE (OUTPATIENT)
Dept: DERMATOLOGY | Facility: CLINIC | Age: 68
End: 2023-12-26
Payer: COMMERCIAL

## 2023-12-26 NOTE — TELEPHONE ENCOUNTER
Patient returned call to reschedule her appointment. I advised her that typically Hair Metrix is done at the [facility]. There is no Friday afternoon availability for the next few months. I booked her for 1/22/24 at 3:15. I sent a staff message to Rebecca to see if Hair Metrix can be done this day. Please call patient back to confirm.     Michelle DEY

## 2023-12-26 NOTE — TELEPHONE ENCOUNTER
Called to reschedule appointment for 2/1/24 due to fact the provider will not be in clinic that day. Left VM to call [phone], no dates given. Message on [employer].     Jim Vazquez on 12/26/2023 at 10:13 AM

## 2023-12-27 ENCOUNTER — MYC MEDICAL ADVICE (OUTPATIENT)
Dept: DERMATOLOGY | Facility: CLINIC | Age: 68
End: 2023-12-27
Payer: COMMERCIAL

## 2024-01-18 DIAGNOSIS — R79.89 ELEVATED SERUM CREATININE: Primary | ICD-10-CM

## 2024-01-26 ENCOUNTER — OFFICE VISIT (OUTPATIENT)
Dept: NEPHROLOGY | Facility: CLINIC | Age: 69
End: 2024-01-26
Attending: INTERNAL MEDICINE
Payer: COMMERCIAL

## 2024-01-26 ENCOUNTER — PRE VISIT (OUTPATIENT)
Dept: NEPHROLOGY | Facility: CLINIC | Age: 69
End: 2024-01-26
Payer: COMMERCIAL

## 2024-01-26 ENCOUNTER — LAB (OUTPATIENT)
Dept: LAB | Facility: CLINIC | Age: 69
End: 2024-01-26
Payer: COMMERCIAL

## 2024-01-26 VITALS
DIASTOLIC BLOOD PRESSURE: 82 MMHG | WEIGHT: 179.1 LBS | SYSTOLIC BLOOD PRESSURE: 151 MMHG | HEART RATE: 90 BPM | OXYGEN SATURATION: 95 % | BODY MASS INDEX: 29.8 KG/M2

## 2024-01-26 DIAGNOSIS — D35.2 PROLACTINOMA (H): ICD-10-CM

## 2024-01-26 DIAGNOSIS — N18.32 CHRONIC KIDNEY DISEASE, STAGE 3B (H): ICD-10-CM

## 2024-01-26 DIAGNOSIS — N18.32 STAGE 3B CHRONIC KIDNEY DISEASE (H): Primary | ICD-10-CM

## 2024-01-26 DIAGNOSIS — Z90.710 HISTORY OF ROBOT-ASSISTED LAPAROSCOPIC HYSTERECTOMY: ICD-10-CM

## 2024-01-26 DIAGNOSIS — R79.89 ELEVATED SERUM CREATININE: ICD-10-CM

## 2024-01-26 DIAGNOSIS — I10 BENIGN ESSENTIAL HYPERTENSION: ICD-10-CM

## 2024-01-26 DIAGNOSIS — E23.0 PANHYPOPITUITARISM (H): ICD-10-CM

## 2024-01-26 LAB
ALBUMIN MFR UR ELPH: 10 MG/DL
ALBUMIN SERPL BCG-MCNC: 4.1 G/DL (ref 3.5–5.2)
ALBUMIN UR-MCNC: NEGATIVE MG/DL
ANION GAP SERPL CALCULATED.3IONS-SCNC: 9 MMOL/L (ref 7–15)
APPEARANCE UR: CLEAR
BILIRUB UR QL STRIP: NEGATIVE
BUN SERPL-MCNC: 21.5 MG/DL (ref 8–23)
CALCIUM SERPL-MCNC: 9.6 MG/DL (ref 8.8–10.2)
CHLORIDE SERPL-SCNC: 103 MMOL/L (ref 98–107)
COLOR UR AUTO: ABNORMAL
CREAT SERPL-MCNC: 1.31 MG/DL (ref 0.51–0.95)
CREAT UR-MCNC: 86 MG/DL
CREAT UR-MCNC: 87 MG/DL
DEPRECATED HCO3 PLAS-SCNC: 28 MMOL/L (ref 22–29)
EGFRCR SERPLBLD CKD-EPI 2021: 44 ML/MIN/1.73M2
ERYTHROCYTE [DISTWIDTH] IN BLOOD BY AUTOMATED COUNT: 11.8 % (ref 10–15)
GLUCOSE SERPL-MCNC: 112 MG/DL (ref 70–99)
GLUCOSE UR STRIP-MCNC: NEGATIVE MG/DL
HCT VFR BLD AUTO: 37.9 % (ref 35–47)
HGB BLD-MCNC: 12.9 G/DL (ref 11.7–15.7)
HGB UR QL STRIP: NEGATIVE
KETONES UR STRIP-MCNC: NEGATIVE MG/DL
LEUKOCYTE ESTERASE UR QL STRIP: ABNORMAL
MCH RBC QN AUTO: 29.5 PG (ref 26.5–33)
MCHC RBC AUTO-ENTMCNC: 34 G/DL (ref 31.5–36.5)
MCV RBC AUTO: 87 FL (ref 78–100)
MICROALBUMIN UR-MCNC: 41.6 MG/L
MICROALBUMIN/CREAT UR: 48.37 MG/G CR (ref 0–25)
MUCOUS THREADS #/AREA URNS LPF: PRESENT /LPF
NITRATE UR QL: NEGATIVE
PH UR STRIP: 5.5 [PH] (ref 5–7)
PHOSPHATE SERPL-MCNC: 3.9 MG/DL (ref 2.5–4.5)
PLATELET # BLD AUTO: 264 10E3/UL (ref 150–450)
POTASSIUM SERPL-SCNC: 3.5 MMOL/L (ref 3.4–5.3)
PROT/CREAT 24H UR: 0.11 MG/MG CR (ref 0–0.2)
PTH-INTACT SERPL-MCNC: 48 PG/ML (ref 15–65)
RBC # BLD AUTO: 4.37 10E6/UL (ref 3.8–5.2)
RBC URINE: 2 /HPF
SODIUM SERPL-SCNC: 140 MMOL/L (ref 135–145)
SP GR UR STRIP: 1.01 (ref 1–1.03)
T4 FREE SERPL-MCNC: 1.72 NG/DL (ref 0.9–1.7)
UROBILINOGEN UR STRIP-MCNC: NORMAL MG/DL
WBC # BLD AUTO: 8.3 10E3/UL (ref 4–11)
WBC URINE: 1 /HPF

## 2024-01-26 PROCEDURE — 84156 ASSAY OF PROTEIN URINE: CPT | Performed by: PATHOLOGY

## 2024-01-26 PROCEDURE — 99000 SPECIMEN HANDLING OFFICE-LAB: CPT | Performed by: PATHOLOGY

## 2024-01-26 PROCEDURE — 99204 OFFICE O/P NEW MOD 45 MIN: CPT | Mod: GC

## 2024-01-26 PROCEDURE — 36415 COLL VENOUS BLD VENIPUNCTURE: CPT | Performed by: PATHOLOGY

## 2024-01-26 PROCEDURE — 83970 ASSAY OF PARATHORMONE: CPT | Performed by: PATHOLOGY

## 2024-01-26 PROCEDURE — 84439 ASSAY OF FREE THYROXINE: CPT | Performed by: PATHOLOGY

## 2024-01-26 PROCEDURE — 81001 URINALYSIS AUTO W/SCOPE: CPT | Performed by: PATHOLOGY

## 2024-01-26 PROCEDURE — G0463 HOSPITAL OUTPT CLINIC VISIT: HCPCS

## 2024-01-26 PROCEDURE — 85027 COMPLETE CBC AUTOMATED: CPT | Performed by: PATHOLOGY

## 2024-01-26 PROCEDURE — 82043 UR ALBUMIN QUANTITATIVE: CPT

## 2024-01-26 PROCEDURE — 80069 RENAL FUNCTION PANEL: CPT | Performed by: PATHOLOGY

## 2024-01-26 NOTE — PATIENT INSTRUCTIONS
"It was a pleasure to see you in nephrology clinic today. I've included a brief summary of our discussion and care plan from today's visit below.  _______________________________________________________________________    My recommendations are summarized as follows:  -Keep a Blood Pressure log. Please make sure that you are using a validated blood pressure device (check \"www.validatebp.org\").  -Avoid all NSAID's. Examples include Ibuprofen (Advil, Motrin), naprosyn (Aleve), celebrex among others. Acetaminophen (Tylenol) is ok with maximum dose in 24 hours of 3000mg.  -Healthy lifestyle measures will keep your kidney's functioning at their current best. This includes regular exercise, maintaining a healthy body weight and smoking cessation.   - Home BP monitoring ,share the pattern in my chart if it persisted SBP >140 ,DBP 90  - Ultrasound of kidneys    Please return to Nephrology Clinic in 4/26/2024 to review your progress.     Who do I call with any questions after my visit?  There are multiple ways to contact your nephrology care team:    -To schedule or reschedule an appointment, please call [phone].  -Reach out via [employer]craft. These messages are answered by your nurse or doctor during business hours and typically in 1-2 days. [employer] messages are best for quick questions/clarifications/updates. Frequently, your doctor or nurse will recommend setting up a follow up appointment to address any significant questions/concerns.  -For urgent questions after business hours, you may reach the on-call Nephrology Fellow by contacting the [facility]  at [phone].    To schedule imaging:   -Please call [phone]     To schedule your lab appointment at the [facility]:  -Please call [phone]    Sincerely,    Dr.Momina Balderas   Nephrology fellow   Division of Nephrology and Hypertension  [facility]   "

## 2024-01-26 NOTE — PROGRESS NOTES
K[facility] Note    Encounter Date: Jan 26, 2024     Assessment and Plan:     #HTN, essential   Current Regimen: Losartan - hydrochlorothiazide  100/25 po daily. BP not being monitored at home ,office BP today 151/82 ,was 120/80 last week.      - Encourage to monitor BP at home       - Share pattern via my chart if BP persistently >140/90 to adjust medication.      - Reinforce DASH diet     #CKD, Stage III b ,presumed secondary to hypertensive nephrosclerosis   Creatinine started to raised since 2016 ,was 1.07 which slowly raised over the years to 1.3 mg/dl today.UA bland ,UPCR is 0.11.No rcent imaging. CKD likely caused by hypertensive nephroscleroses.Unlikely to be caused by GN with inactive urine sediment and normal proteinuria.Takes occasionally Advil ,might cause JACK,difficult to exclude. Would do kidney imaging  to be complete and rule out  possible structural cause.     - Kidney ultrasound   - BP control as able    - Will consider SGLT2i on next visit after reviewing kidney ultrasound.   - Avoid NSAIDs ( Advil )    # Acid/Base: bicarb 28  #Bone/Mineral: calcium ,phosphorus ,vit D and PTH all with in normal range.  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  # Hyperlipemia - On simvastatin   # Pre-diabetes,A1C - 5.7-5.8       Discussed with Dr eRinaldo Balderas MD  Division of Renal Disease and Hypertension  [facility]  [employer] Web Console      Subjective:     Chief Complaint: Came to establish nephrology follow up for CKD.    History of Present Illness:   Maria Esther Lowe is a 68 y.o. female  with past medical history of Prolactinoma ( removed ) ,Panhypopituitarism,Hyperlipidemia and hypertension.She is on thyroxine and steroid replacement and being followed by endocrinologists.She is also taking simavastatin and ,Losartan - hydrochlorothiazide  for HPLD and HTN.BP were  not monitored at home.  Noted to have raised creatinine since 2028 ,when her creatinine 1.08 ,which slowly raised  over the years to 1.3  today. No Leg swelling ,cough ,or shortness of breath.No hematuria or urinary compliant.No history of NSAID use. No history kidney stone.she has stron family history of kidney diseases ,her father is kidney transplant ,and her parental grand mother had CKD. .        Review of Systems:   All organ systems were reviewed and are negative except as noted in the HPI above.    Past Medical History:   Diagnosis Date    Hyperlipidemia LDL goal < 130     Hypertension     Hypothyroidism     Osteopenia     DEXA: 12/11; T-score of -1.9       Panhypopituitarism (H24)      Past Surgical History:   Procedure Laterality Date    DAVINCI HYSTERECTOMY TOTAL, BILATERAL SALPINGO-OOPHORECTOMY, COMBINED Bilateral 9/11/2018    Procedure: COMBINED DAVINCI HYSTERECTOMY TOTAL, SALPINGO-OOPHORECTOMY;  Pelvic Washings, Davinci Assisted Laparoscopic Total Hysterectomy, Bilateral Salpingo Oophorectomy, cystoscopy;  Surgeon: Tiffany Lew MD;  Location: [facility] OR    OPERATIVE HYSTEROSCOPY N/A 1/30/2018    Procedure: OPERATIVE HYSTEROSCOPY;  Operative Hysteroscopy, Resection of Endometrial Polyp, Dilation and Curettage;  Surgeon: Daysi Munoz MD;  Location: [facility] OR    PROBE LACRIMAL DUCT, INSERT STENT BILATERAL, COMBINED Bilateral 11/26/2018    Procedure: BILATERAL NASOLACRIMAL STENT - LARGE DIAMETER STEN TUBE;  Surgeon: Davina Payton MD;  Location: [facility] OR    resected prolactin-producing pituitary adenoma.  1975    Postoperative radiation therapy.      No Known Allergies  Patient's Medications   New Prescriptions    No medications on file   Previous Medications    CALCIPOTRIENE (DOVONOX) 0.005 % EXTERNAL SOLUTION    Apply 1 mL topically 2 times daily    CALCIUM 500 +D 500-400 MG-UNIT TABS    Take 1 tablet by mouth 2 times daily.    CLOBETASOL (TEMOVATE) 0.05 % EXTERNAL SOLUTION    Use twice  weekly    DUTASTERIDE (AVODART) 0.5 MG CAPSULE    Take 1 capsule (0.5 mg) by mouth daily    HYDROCORTISONE (CORTEF) 10 MG TABLET    TAKE 1 TABLET BY MOUTH IN  THE MORNING AND ONE-HALF  TABLET BY MOUTH AT MIDDAY    KETOCONAZOLE (NIZORAL) 2 % EXTERNAL SHAMPOO    Apply topically 2-3 times per week.    LEVOTHYROXINE (SYNTHROID/LEVOTHROID) 150 MCG TABLET    TAKE 1 TABLET BY MOUTH  DAILY ON TUESDAY, THURSDAY, SATURDAY, SUNDAY    LEVOTHYROXINE (SYNTHROID/LEVOTHROID) 150 MCG TABLET    Take 1 tablet (150 mcg) by mouth daily    LOSARTAN-HYDROCHLOROTHIAZIDE (HYZAAR) 100-25 MG TABLET    TAKE 1 TABLET BY MOUTH DAILY    SIMVASTATIN (ZOCOR) 40 MG TABLET    TAKE 1 TABLET BY MOUTH DAILY   Modified Medications    No medications on file   Discontinued Medications    No medications on file     Family History   Problem Relation Age of Onset    Diabetes Paternal Grandmother     Depression Paternal Grandmother     Thyroid Disease Maternal Grandmother     Skin Cancer Father     Melanoma No family hx of      Family Status   Relation Name Status    PGMo  (Not Specified)    MGMo  (Not Specified)    Fa  (Not Specified)    Neg  (Not Specified)     Social History     Social History Narrative    How much exercise per week? Daily    How much calcium per day?         How much caffeine per day?      How much vitamin D per day?      Do you/your family wear seatbelts?  Yes    Do you/your family use safety helmets? No    Do you/your family use sunscreen? Yes    Do you/your family keep firearms in the home? Yes    Do you/your family have a smoke detector(s)? Yes        Do you feel safe in your home? Yes    Has anyone ever touched you in an unwanted manner? No                 Social History     Tobacco Use    Smoking status: Never     Passive exposure: Never    Smokeless tobacco: Never   Substance Use Topics    Alcohol use: No       Objective:     There were no vitals taken for this visit.    Wt Readings from Last 3 Encounters:   11/10/23 80.3 kg (177  lb)   06/22/23 82.6 kg (182 lb)   04/08/22 83.9 kg (185 lb)       Constitutional:  NAD  Head: Atraumatic, normocephalic  Eyes:  Anicteric  ENT: MMM  CV: RRR, no m/r/g  Respiratory: CTA bilaterally  GI: Abdomen soft, non-tender  : No shields, no major abnormalities noted  Musculoskeletal:  Extremities warm and well perfused.  No edema  Skin: No jaundice  Neurologic: Speech normal.  Gait normal.  Strength intact upper and lower extremities.  Psychiatric: AOx3  Hematologic/lymphatic/immunologic:  No petechiae noted      Results:    Lab on 01/26/2024   Component Date Value Ref Range Status    Free T4 01/26/2024 1.72 (H)  0.90 - 1.70 ng/dL Final    Color Urine 01/26/2024 Light Yellow  Colorless, Straw, Light Yellow, Yellow Final    Appearance Urine 01/26/2024 Clear  Clear Final    Glucose Urine 01/26/2024 Negative  Negative mg/dL Final    Bilirubin Urine 01/26/2024 Negative  Negative Final    Ketones Urine 01/26/2024 Negative  Negative mg/dL Final    Specific Gravity Urine 01/26/2024 1.014  1.003 - 1.035 Final    Blood Urine 01/26/2024 Negative  Negative Final    pH Urine 01/26/2024 5.5  5.0 - 7.0 Final    Protein Albumin Urine 01/26/2024 Negative  Negative mg/dL Final    Urobilinogen Urine 01/26/2024 Normal  Normal, 2.0 mg/dL Final    Nitrite Urine 01/26/2024 Negative  Negative Final    Leukocyte Esterase Urine 01/26/2024 Trace (A)  Negative Final    Mucus Urine 01/26/2024 Present (A)  None Seen /LPF Final    RBC Urine 01/26/2024 2  <=2 /HPF Final    WBC Urine 01/26/2024 1  <=5 /HPF Final    Sodium 01/26/2024 140  135 - 145 mmol/L Final    Potassium 01/26/2024 3.5  3.4 - 5.3 mmol/L Final    Chloride 01/26/2024 103  98 - 107 mmol/L Final    Carbon Dioxide (CO2) 01/26/2024 28  22 - 29 mmol/L Final    Anion Gap 01/26/2024 9  7 - 15 mmol/L Final    Glucose 01/26/2024 112 (H)  70 - 99 mg/dL Final    Urea Nitrogen 01/26/2024 21.5  8.0 - 23.0 mg/dL Final    Creatinine 01/26/2024 1.31 (H)  0.51 - 0.95 mg/dL Final    GFR  Estimate 01/26/2024 44 (L)  >60 mL/min/1.73m2 Final    Calcium 01/26/2024 9.6  8.8 - 10.2 mg/dL Final    Albumin 01/26/2024 4.1  3.5 - 5.2 g/dL Final    Phosphorus 01/26/2024 3.9  2.5 - 4.5 mg/dL Final    Total Protein Urine mg/dL 01/26/2024 10.0    mg/dL Final    Total Protein Urine mg/mg Creat 01/26/2024 0.11  0.00 - 0.20 mg/mg Cr Final    Creatinine Urine mg/dL 01/26/2024 87.0  mg/dL Final    WBC Count 01/26/2024 8.3  4.0 - 11.0 10e3/uL Final    RBC Count 01/26/2024 4.37  3.80 - 5.20 10e6/uL Final    Hemoglobin 01/26/2024 12.9  11.7 - 15.7 g/dL Final    Hematocrit 01/26/2024 37.9  35.0 - 47.0 % Final    MCV 01/26/2024 87  78 - 100 fL Final    MCH 01/26/2024 29.5  26.5 - 33.0 pg Final    MCHC 01/26/2024 34.0  31.5 - 36.5 g/dL Final    RDW 01/26/2024 11.8  10.0 - 15.0 % Final    Platelet Count 01/26/2024 264  150 - 450 10e3/uL Final    Parathyroid Hormone Intact 01/26/2024 48  15 - 65 pg/mL Final      I was present with the fellow during the history and exam.  I discussed the case with the fellow and agree with the findings as documented in the assessment and plan.    Madelin Najera MD   of Medicine  Department of Nephrology  [facility]

## 2024-01-29 ENCOUNTER — TELEPHONE (OUTPATIENT)
Dept: ENDOCRINOLOGY | Facility: CLINIC | Age: 69
End: 2024-01-29
Payer: COMMERCIAL

## 2024-01-29 DIAGNOSIS — E03.9 HYPOTHYROIDISM, UNSPECIFIED TYPE: ICD-10-CM

## 2024-01-29 DIAGNOSIS — E23.0 HYPOPITUITARISM (H): Primary | ICD-10-CM

## 2024-01-29 RX ORDER — LEVOTHYROXINE SODIUM 137 UG/1
137 TABLET ORAL DAILY
Qty: 90 TABLET | Refills: 3 | Status: SHIPPED | OUTPATIENT
Start: 2024-01-29 | End: 2024-08-19

## 2024-02-08 ENCOUNTER — OFFICE VISIT (OUTPATIENT)
Dept: DERMATOLOGY | Facility: CLINIC | Age: 69
End: 2024-02-08
Payer: COMMERCIAL

## 2024-02-08 DIAGNOSIS — L65.8 FEMALE PATTERN ALOPECIA: ICD-10-CM

## 2024-02-08 DIAGNOSIS — L64.9 ANDROGENETIC ALOPECIA: Primary | ICD-10-CM

## 2024-02-08 PROCEDURE — 99207 PR NO CHARGE LOS: CPT

## 2024-02-08 NOTE — PROGRESS NOTES
HairMetrix:   - Frontal anterior scalp: 144 to 108 today  - Mid scalp: 116 to 110 today  - Vertex scalp: 126 to 166 today  - Occipital scalp: 146 to 126 today  - Right temple: 89 to 90 today  - Left temple: 104 to 106 today    HairMetrix Summary (2/8/2024)  Frontal anterior (4.5 cm)    Mid scalp (12 cm)    Vertex (24 cm)    Occipital (30 cm)    Right temporal (7, 9 cm)    Left temporal (6, 9 cm)    Summary

## 2024-02-09 ENCOUNTER — OFFICE VISIT (OUTPATIENT)
Dept: DERMATOLOGY | Facility: CLINIC | Age: 69
End: 2024-02-09
Attending: DERMATOLOGY
Payer: COMMERCIAL

## 2024-02-09 DIAGNOSIS — L64.9 ANDROGENETIC ALOPECIA: Primary | ICD-10-CM

## 2024-02-09 PROCEDURE — 99214 OFFICE O/P EST MOD 30 MIN: CPT | Performed by: DERMATOLOGY

## 2024-02-09 ASSESSMENT — PAIN SCALES - GENERAL: PAINLEVEL: NO PAIN (0)

## 2024-02-09 NOTE — PATIENT INSTRUCTIONS
Start Differin cream, apply a pea sized amount to the face at night.       Dr. [facility]  1. Hair Transplantation  [email or web address]  Phone: [phone] [address],  [place] [address]    2. Micropigmentation  -Done by Yuli Shahid          Topical Retinoids    What are topical retinoids?  These are medicines that are related to Vitamin A. They are used on the skin.  Retin-A , Renova , Differin , and Tazorac  are brand names.  Come in creams and clear gels  Used to treat skin conditions like pimples (acne), face wrinkling, or dark-colored sunspots    How do I use these medicines?  Wash face and let dry for 15 to 30 minutes.  Use a large pea-size amount of medicine to cover the whole face. Do not put on close to the eyes and lips. Rub in gently.   Start by using every other day. If you have no irritation after a few days, start to use it daily.   You might have too much irritation with daily use. Use it less often until the irritation goes away. Then try to increase slowly to daily use.   Irritation improves over time.  You may use moisturizer if your skin becomes dry. Look for  non-comedogenic  (non-pore plugging) and oil free products.     What are the side effects?  Dryness   Peeling and flaking   Irritation of the skin   Possible increased chance of sunburns. Protect your skin from sunlight. Wear a hat and use a sunscreen with SPF 30 or higher. Your sunscreen should have both UVA and UVB (broad-spectrum) protection.    Who should I call with questions?  [facility]: [phone]   [facility]: [phone]  For urgent needs outside of business hours call the [facility] at [phone] and ask for the dermatology resident on call       Platelet rich plasma (PRP) for treatment of alopecia   Platelet rich plasma or  PRP  treatment involves taking the patients blood and concentrating the  platelets as platelets are known to contain valuable growth factors and possibly stimulate hair growth.    PRP is not an FDA approved treatment for alopecia. However, some studies have shown improvement in patients with thinning hair.   PRP is not typically covered by insurance.   Typical treatment regimens are every 1-2 months for 3-4 months. Then, an assessment is usually performed to determine future treatment times.   Risks of this procedure include but are not limited to swelling, redness, pain, headache, bleeding, bruising, scarring, nerve injury, numbness, and infection.    Blood testing is needed prior to treatment    There is a possibility of no improvement, slight improvement or worsening with this treatment.      You should not have this procedure if you are pregnant, breast feeding, or if you have a blood/clotting disorders.     You will need to block 2 hours of your day for this appointment      1 day before treatment:   Do not take additional blood thinners or pain medication not prescribed by a provider(doctor or PA-C or NP) such as ibuprofen and aspirin  Minimize or avoid alcohol consumption  You may continue other hair loss treatments such as laser treatment or minoxidil before your procedure. If you are not sure if you should be stopping a medication, please ask.   It is ok to color your hair up to 7 days before the procedure  Please come to your visit hydrated. Drink plenty of fluids the day prior and the am of the treatment    Day and After the Procedure:  Shower the morning of your treatment and wash your hair and scalp very thoroughly using your regular shampoo  Do not apply sprays, gels or any other styling products to your hair  If you wear a hair system, please remove it prior to shampooing and do not wear it to your PRP treatments  Please eat a normal breakfast or lunch the day of your PRP session  Drink a bottle of water (500 mL) at least 2 hours before your session  Expect hair and scalp  photography in addition to the blood draw to be performed prior to treatment   Do not use hair products for 24 hours after your treatment  Avoid saunas, steam rooms and swimming for 2 days after your treatment  Shower the morning after the procedure  Do not travel on an airplane the day of the procedure  No vigorous exercise the day of the procedure      CONTACT THE OFFICE IMMEDIATELY IF ANY OF THE FOLLOWING SIGNS OF INFECTION OCCUR:  Draining - looks like pus  Increased warmth at or around the treated area  Fever of 101.5 100.6 or greater  Severe pain         Who should I call with questions?  [facility]: [phone]   [facility]: [phone]  For urgent needs outside of business hours call the [facility] at [phone] and ask for the dermatology resident on call  M[employer] messaging is delayed, please use the phone for urgent issues

## 2024-02-09 NOTE — NURSING NOTE
Maria Esther Lowe's goals for this visit include:   Chief Complaint   Patient presents with    Derm Problem     Kathy is here today for follow up on hair metrix       She requests these members of her care team be copied on today's visit information:     PCP: Carley Serrano    Referring Provider:  Referred Self, MD  No address on file    There were no vitals taken for this visit.    Do you need any medication refills at today's visit? No    Maria Esther Iniguez RN

## 2024-02-09 NOTE — LETTER
2/9/2024         RE: Maria Esther Lowe  [address] [facility] [address]        Dear Colleague,    Thank you for referring your patient, Maria Esther Lowe, to the [facility]. Please see a copy of my visit note below.      [facility] Dermatology Note  Encounter Date: Feb 9, 2024  Office Visit     Dermatology Problem List:  1. 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 Repeat bx 5/20/2020 most consistent with androgenetic alopecia (which makes sense in setting of #2 as well)   - Biopsy in 2016 consistent with alopecia areata, but not clinically consistent; had outside ILK without improvement per report  - Repeat bx 5/20/2020 with nonscarring alopecia with marked miniaturization, favoring androgenetic alopecia. Also mild perifollicular fibrosis.  - Current tx: LLLT 2-3x weekly (started years ago), dutasteride 0.5mg daily, ketoconazole shampoo alternating with head and shoulders, Minoxidil 5% once daily, clobetasol solution 2x a week, calcipotriene solution twice weekly, multivitamin    -Previous tx: finasteride 5 mg daily  Hair labs: 4/8/2022  TSH: 0.04 (low)  Cbc: normal 2 years ago  Ferritin:105 (2 years ago)  Zinc: 72 (9/15/2021)  Vitamin D: 65  2. Pituitary prolactinoma s/p removal in 1975 followed by radiation with subsequent development of panhypopituitarism   3.  TSH <0.01 in 6/30/2021  - She is on estradiol per endocrinology    ____________________________________________    Assessment & Plan:    # Female pattern hair loss, favor androgenetic alopecia has old bx with alopecia areata. Hair Metrix performed 2/8/24. Reviewed CMP from 11/10/24. Also consider rebiopsy. Patient will consider rebiopsy.   - Continue minoxidil 5% foam daily to twice daily  - Continue HairMax LLLT comb.   - Continue ketoconazole shampoo 2-3 times weekly  - Continue clobetasol solution twice weekly.     - stop calcipotriene  - Continue dutasteride 0.5mg daily.  Denies dizziness. Denies any of these side effects   -consider PRP  - HairMetrix was performed 2/8/24. See separate encounter.  - Prior recs: micropigmentation at [facility]   - Future consideration: low dose oral minoxidil- Hx of long QT (counseled patient that she would need clearance from cardiology first again today), spironolactone, increasing topical minoxidil to twice daily, PRP-discussed benefits, risks, and cost  - Referred to cardiology today for oral minoxidil clearance         # Elevated vitamin D       Procedures Performed:   NA    Follow-up: for hair loss within 6 months, consider rebiopsy, she will also see Dr. Ferrari    Staff and Scribe:     Scribe Disclosure:   I, Mare Tucker, am serving as a scribe to document services personally performed by Carley Serrano MD based on data collection and the provider's statements to me.     Provider Disclosure:   The documentation recorded by the scribe accurately reflects the services I personally performed and the decisions made by me.    Carley Serrano MD    Department of Dermatology  [facility]: Phone: [phone], Fax:[phone]  [facility]: Phone: [phone], Fax: [phone]   ____________________________________________    CC: No chief complaint on file.    HPI:  Ms. Maria Esther Lowe is a(n) 68 year old female who presents today as a return patient for hair loss.    Last seen by me 11/8/23 for hair loss. Added calcipotriene 0.005% solution twice weekly.     Has high vitamin D. Sees endo      Patient is otherwise feeling well, without additional skin concerns.    Labs Reviewed:  Component      Latest Ref Rng 11/10/2023  3:49 PM   Sodium      135 - 145 mmol/L 142    Potassium      3.4 - 5.3 mmol/L 3.7    Carbon Dioxide (CO2)      22 - 29 mmol/L 28    Anion  Gap      7 - 15 mmol/L 10    Urea Nitrogen      8.0 - 23.0 mg/dL 22.8    Creatinine      0.51 - 0.95 mg/dL 1.30 (H)    GFR Estimate      >60 mL/min/1.73m2 45 (L)    Calcium      8.8 - 10.2 mg/dL 9.7    Chloride      98 - 107 mmol/L 104    Glucose      70 - 99 mg/dL 148 (H)    Alkaline Phosphatase      35 - 104 U/L 103    AST      0 - 45 U/L 35    ALT      0 - 50 U/L 28    Protein Total      6.4 - 8.3 g/dL 7.2    Albumin      3.5 - 5.2 g/dL 4.3    Bilirubin Total      <=1.2 mg/dL 0.3       Legend:  (H) High  (L) Low    Physical Exam:  Vitals: There were no vitals taken for this visit.  SKIN: Focused examination of scalp was performed.  - thinning on scalp  - No other lesions of concern on areas examined.     Medications:  Current Outpatient Medications   Medication     calcipotriene (DOVONOX) 0.005 % external solution     CALCIUM 500 +D 500-400 MG-UNIT TABS     clobetasol (TEMOVATE) 0.05 % external solution     dutasteride (AVODART) 0.5 MG capsule     hydrocortisone (CORTEF) 10 MG tablet     ketoconazole (NIZORAL) 2 % external shampoo     levothyroxine (SYNTHROID/LEVOTHROID) 137 MCG tablet     levothyroxine (SYNTHROID/LEVOTHROID) 150 MCG tablet     losartan-hydrochlorothiazide (HYZAAR) 100-25 MG tablet     simvastatin (ZOCOR) 40 MG tablet     No current facility-administered medications for this visit.      Past Medical History:   Patient Active Problem List   Diagnosis     Prolactinoma (H)     Panhypopituitarism (H24)     Hormone replacement therapy (postmenopausal)     Carpal tunnel syndrome     History of robot-assisted laparoscopic hysterectomy     Chronic kidney disease, stage 3b (H)     Past Medical History:   Diagnosis Date     Hyperlipidemia LDL goal < 130      Hypertension      Hypothyroidism      Osteopenia     DEXA: 12/11; T-score of -1.9        Panhypopituitarism (H24)         CC Referred Self, MD  No address on file on close of this encounter.      Again, thank you for allowing me to participate in the  care of your patient.        Sincerely,        Carley Serrano MD

## 2024-02-09 NOTE — PROGRESS NOTES
[facility] Dermatology Note  Encounter Date: Feb 9, 2024  Office Visit     Dermatology Problem List:  1. 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 Repeat bx 5/20/2020 most consistent with androgenetic alopecia (which makes sense in setting of #2 as well)   - Biopsy in 2016 consistent with alopecia areata, but not clinically consistent; had outside ILK without improvement per report  - Repeat bx 5/20/2020 with nonscarring alopecia with marked miniaturization, favoring androgenetic alopecia. Also mild perifollicular fibrosis.  - Current tx: LLLT 2-3x weekly (started years ago), dutasteride 0.5mg daily, ketoconazole shampoo alternating with head and shoulders, Minoxidil 5% once daily, clobetasol solution 2x a week, calcipotriene solution twice weekly, multivitamin    -Previous tx: finasteride 5 mg daily  Hair labs: 4/8/2022  TSH: 0.04 (low)  Cbc: normal 2 years ago  Ferritin:105 (2 years ago)  Zinc: 72 (9/15/2021)  Vitamin D: 65  2. Pituitary prolactinoma s/p removal in 1975 followed by radiation with subsequent development of panhypopituitarism   3.  TSH <0.01 in 6/30/2021  - She is on estradiol per endocrinology    ____________________________________________    Assessment & Plan:    # Female pattern hair loss, favor androgenetic alopecia has old bx with alopecia areata. Hair Metrix performed 2/8/24. Reviewed CMP from 11/10/24. Also consider rebiopsy. Patient will consider rebiopsy.   - Continue minoxidil 5% foam daily to twice daily  - Continue HairMax LLLT comb.   - Continue ketoconazole shampoo 2-3 times weekly  - Continue clobetasol solution twice weekly.    - stop calcipotriene  - Continue dutasteride 0.5mg daily.  Denies dizziness. Denies any of these side effects   -consider PRP  - HairMetrix was performed 2/8/24. See separate encounter.  - Prior recs: micropigmentation at [facility]   - Future  consideration: low dose oral minoxidil- Hx of long QT (counseled patient that she would need clearance from cardiology first again today), spironolactone, increasing topical minoxidil to twice daily, PRP-discussed benefits, risks, and cost  - Referred to cardiology today for oral minoxidil clearance         # Elevated vitamin D       Procedures Performed:   NA    Follow-up: for hair loss within 6 months, consider rebiopsy, she will also see Dr. Ferrari    Staff and Scribe:     Scribe Disclosure:   I, Mare Tucker, am serving as a scribe to document services personally performed by Carley Serrano MD based on data collection and the provider's statements to me.     Provider Disclosure:   The documentation recorded by the scribe accurately reflects the services I personally performed and the decisions made by me.    Carley Serrano MD    Department of Dermatology  [facility]: Phone: [phone], Fax:[phone]  [facility]: Phone: [phone], Fax: [phone]   ____________________________________________    CC: No chief complaint on file.    HPI:  Ms. Maria Esther Lowe is a(n) 68 year old female who presents today as a return patient for hair loss.    Last seen by me 11/8/23 for hair loss. Added calcipotriene 0.005% solution twice weekly.     Has high vitamin D. Sees endo      Patient is otherwise feeling well, without additional skin concerns.    Labs Reviewed:  Component      Latest Ref Rng 11/10/2023  3:49 PM   Sodium      135 - 145 mmol/L 142    Potassium      3.4 - 5.3 mmol/L 3.7    Carbon Dioxide (CO2)      22 - 29 mmol/L 28    Anion Gap      7 - 15 mmol/L 10    Urea Nitrogen      8.0 - 23.0 mg/dL 22.8    Creatinine      0.51 - 0.95 mg/dL 1.30 (H)    GFR Estimate      >60 mL/min/1.73m2 45 (L)    Calcium      8.8 - 10.2 mg/dL 9.7    Chloride      98 - 107 mmol/L 104    Glucose      70 - 99  mg/dL 148 (H)    Alkaline Phosphatase      35 - 104 U/L 103    AST      0 - 45 U/L 35    ALT      0 - 50 U/L 28    Protein Total      6.4 - 8.3 g/dL 7.2    Albumin      3.5 - 5.2 g/dL 4.3    Bilirubin Total      <=1.2 mg/dL 0.3       Legend:  (H) High  (L) Low    Physical Exam:  Vitals: There were no vitals taken for this visit.  SKIN: Focused examination of scalp was performed.  - thinning on scalp  - No other lesions of concern on areas examined.     Medications:  Current Outpatient Medications   Medication    calcipotriene (DOVONOX) 0.005 % external solution    CALCIUM 500 +D 500-400 MG-UNIT TABS    clobetasol (TEMOVATE) 0.05 % external solution    dutasteride (AVODART) 0.5 MG capsule    hydrocortisone (CORTEF) 10 MG tablet    ketoconazole (NIZORAL) 2 % external shampoo    levothyroxine (SYNTHROID/LEVOTHROID) 137 MCG tablet    levothyroxine (SYNTHROID/LEVOTHROID) 150 MCG tablet    losartan-hydrochlorothiazide (HYZAAR) 100-25 MG tablet    simvastatin (ZOCOR) 40 MG tablet     No current facility-administered medications for this visit.      Past Medical History:   Patient Active Problem List   Diagnosis    Prolactinoma (H)    Panhypopituitarism (H24)    Hormone replacement therapy (postmenopausal)    Carpal tunnel syndrome    History of robot-assisted laparoscopic hysterectomy    Chronic kidney disease, stage 3b (H)     Past Medical History:   Diagnosis Date    Hyperlipidemia LDL goal < 130     Hypertension     Hypothyroidism     Osteopenia     DEXA: 12/11; T-score of -1.9       Panhypopituitarism (H24)         CC Referred Self, MD  No address on file on close of this encounter.

## 2024-03-22 ENCOUNTER — TELEPHONE (OUTPATIENT)
Dept: DERMATOLOGY | Facility: CLINIC | Age: 69
End: 2024-03-22
Payer: COMMERCIAL

## 2024-03-22 NOTE — TELEPHONE ENCOUNTER
Patient Contacted,for the patient to call back and schedule the following,  pt hang up      Appointment type: Return   Provider: Dr. Serrano  Return date: 1st available  Specialty phone number: [phone]

## 2024-03-26 ENCOUNTER — TELEPHONE (OUTPATIENT)
Dept: DERMATOLOGY | Facility: CLINIC | Age: 69
End: 2024-03-26
Payer: COMMERCIAL

## 2024-03-26 NOTE — TELEPHONE ENCOUNTER
Left Voicemail (2nd Attempt) for the patient to call back and schedule the following:    Appointment type: Return  Provider: Dr. Serrano  Return date: June 2024  Specialty phone number: [phone]

## 2024-03-27 ENCOUNTER — MYC MEDICAL ADVICE (OUTPATIENT)
Dept: DERMATOLOGY | Facility: CLINIC | Age: 69
End: 2024-03-27
Payer: COMMERCIAL

## 2024-04-01 ENCOUNTER — TELEPHONE (OUTPATIENT)
Dept: DERMATOLOGY | Facility: CLINIC | Age: 69
End: 2024-04-01
Payer: COMMERCIAL

## 2024-04-01 NOTE — TELEPHONE ENCOUNTER
[facility] Call Center    Phone Message    May a detailed message be left on voicemail: yes     Reason for Call: Patient returning call re yes she would like to schedule 6/21 appt at 10:45 - please call to confirm this is scheduled - [phone] Thank you    Action Taken: Message routed to:  Adult Clinics: Dermatology p [id number]    Travel Screening: Not Applicable

## 2024-04-24 DIAGNOSIS — E23.0 PANHYPOPITUITARISM (H): ICD-10-CM

## 2024-05-01 RX ORDER — LOSARTAN POTASSIUM AND HYDROCHLOROTHIAZIDE 25; 100 MG/1; MG/1
1 TABLET ORAL DAILY
Qty: 100 TABLET | OUTPATIENT
Start: 2024-05-01

## 2024-05-01 NOTE — TELEPHONE ENCOUNTER
losartan-hydrochlorothiazide (HYZAAR) 100-25 MG tablet   90 tablet 3 8/13/2023     Last Office Visit : 11-  Future Office visit:  6-7-2024    Angiotensin-II Receptors [id number]/24/2024 09:23 PM   Protocol Details Last blood pressure under 140/90 in past 12 months   BP Readings from Last 3 Encounters:   01/26/24 (!) 151/82   11/10/23 (!) 155/75   06/22/23 134/75     Has GFR on file in past 12 months and most recent value is normal     1-   Labs completed on :    GFR Estimate  >60 mL/min/1.73m2 44 Low         XMedication indicated for associated diagnosis   Chronic Kidney Disease (CDK)               Heart Failure (HF)               Diabetes, Nephropathy              Hypertension               Coronary Artery Disease (CAD)              Raynaud's Disease

## 2024-05-10 DIAGNOSIS — E23.0 PANHYPOPITUITARISM (H): ICD-10-CM

## 2024-05-16 DIAGNOSIS — N18.32 STAGE 3B CHRONIC KIDNEY DISEASE (H): Primary | ICD-10-CM

## 2024-05-20 RX ORDER — LOSARTAN POTASSIUM AND HYDROCHLOROTHIAZIDE 25; 100 MG/1; MG/1
1 TABLET ORAL DAILY
Qty: 100 TABLET | Refills: 2 | OUTPATIENT
Start: 2024-05-20

## 2024-06-09 ENCOUNTER — HEALTH MAINTENANCE LETTER (OUTPATIENT)
Age: 69
End: 2024-06-09

## 2024-06-20 DIAGNOSIS — E23.0 PANHYPOPITUITARISM (H): ICD-10-CM

## 2024-06-20 NOTE — PROGRESS NOTES
[facility] Dermatology Note  Encounter Date: Jun 21, 2024  Office Visit     Dermatology Problem List:  1. 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 Repeat bx 5/20/2020 most consistent with androgenetic alopecia (which makes sense in setting of #2 as well)   - Biopsy in 2016 consistent with alopecia areata, but not clinically consistent; had outside ILK without improvement per report  - Repeat bx 5/20/2020 with nonscarring alopecia with marked miniaturization, favoring androgenetic alopecia. Also mild perifollicular fibrosis.  - Current tx: LLLT 2-3x weekly (started years ago), dutasteride 0.5mg daily, ketoconazole shampoo alternating with head and shoulders, Minoxidil 5% once daily, clobetasol solution 2x a week,  multivitamin    -Previous tx: finasteride 5 mg daily, calcipotriene solution twice weekly,  Hair labs: 4/8/2022  TSH: 0.04 (low)  Cbc: normal 2 years ago  Ferritin:105 (2 years ago)  Zinc: 72 (9/15/2021)  Vitamin D: 65  2. Pituitary prolactinoma s/p removal in 1975 followed by radiation with subsequent development of panhypopituitarism   3.  TSH <0.01 in 6/30/2021  - She is on estradiol per endocrinology    ____________________________________________    Assessment & Plan:    # Female pattern hair loss, favor AGA. Has old bx with alopecia areata. Hair Metrix performed 2/8/24. She is much improved overall.   - Continue to use minoxidil 5% foam one or two times daily  - Continue HairMax low level laser comb   - Continue ketoconazole shampoo 2-3 times a week  - Continue clobetasol solution once weekly.  - Continue dutasteride 0.5mg daily.  Denies SOB, swelling, dizziness.   - BP today 165/82. See PCP for elevation.   - Prior recs: micropigmentation at [facility]   - Recommended Folix laser  - Future consideration: low dose oral minoxidil- Hx of long QT (counseled patient that she would need  clearance from cardiology first again today), spironolactone, increasing topical minoxidil to twice daily, PRP-discussed benefits, risks, and cost             Procedures Performed:   none    Follow-up: 6 month(s) in-person, or earlier for new or changing lesions    Staff and Scribe:     Scribe Disclosure:   I, Mare Tucker, am serving as a scribe to document services personally performed by Carley eSrrano MD based on data collection and the provider's statements to me.     Provider Disclosure:   The documentation recorded by the scribe accurately reflects the services I personally performed and the decisions made by me.    Carley Serrano MD    Department of Dermatology  [facility]: Phone: [phone], Fax:[phone]  [facility]: Phone: [phone], Fax: [phone]   ____________________________________________    CC: Hair Loss (HL- Hair is not growing, the hair is not coming out but pt feels she is not growing hair. Wants to change her medication. )    HPI:  Ms. Maria Esther Lowe is a(n) 68 year old female who presents today as a return patient for hair loss.    Today, patient reports hair is not shedding but does not feel it is growing. Interested in changing medications. Denies side effects from the dutasteride.       No dizziness on medication    Patient is otherwise feeling well, without additional skin concerns.    Labs Reviewed:  N/A    Physical Exam:  Vitals: BP (!) 165/82   Pulse 71   SpO2 94%   SKIN: Focused examination of scalp was performed.  - Some perifollicular prominence noted.   -hair regrowth and de miniturization   - No other lesions of concern on areas examined.     Medications:  Current Outpatient Medications   Medication Sig Dispense Refill    CALCIUM 500 +D 500-400 MG-UNIT TABS Take 1 tablet by mouth 2 times daily. 180 tablet 3    dutasteride (AVODART) 0.5 MG capsule  Take 1 capsule (0.5 mg) by mouth daily 90 capsule 3    hydrocortisone (CORTEF) 10 MG tablet TAKE 1 TABLET BY MOUTH IN  THE MORNING AND ONE-HALF  TABLET BY MOUTH AT MIDDAY 150 tablet 3    ketoconazole (NIZORAL) 2 % external shampoo Apply topically 2-3 times per week. 100 mL 11    levothyroxine (SYNTHROID/LEVOTHROID) 137 MCG tablet Take 1 tablet (137 mcg) by mouth daily 90 tablet 3    losartan-hydrochlorothiazide (HYZAAR) 100-25 MG tablet TAKE 1 TABLET BY MOUTH DAILY 90 tablet 3    simvastatin (ZOCOR) 40 MG tablet TAKE 1 TABLET BY MOUTH DAILY 90 tablet 3    clobetasol (TEMOVATE) 0.05 % external solution Use twice weekly (Patient not taking: Reported on 6/21/2024) 60 mL 3    levothyroxine (SYNTHROID/LEVOTHROID) 150 MCG tablet TAKE 1 TABLET BY MOUTH  DAILY ON TUESDAY, THURSDAY, SATURDAY, SUNDAY (Patient not taking: Reported on 6/21/2024) 90 tablet 3     No current facility-administered medications for this visit.      Past Medical History:   Patient Active Problem List   Diagnosis    Prolactinoma (H)    Panhypopituitarism (H24)    Hormone replacement therapy (postmenopausal)    Carpal tunnel syndrome    History of robot-assisted laparoscopic hysterectomy    Chronic kidney disease, stage 3b (H)     Past Medical History:   Diagnosis Date    Hyperlipidemia LDL goal < 130     Hypertension     Hypothyroidism     Osteopenia     DEXA: 12/11; T-score of -1.9       Panhypopituitarism (H24)         CC No referring provider defined for this encounter. on close of this encounter.

## 2024-06-21 ENCOUNTER — OFFICE VISIT (OUTPATIENT)
Dept: DERMATOLOGY | Facility: CLINIC | Age: 69
End: 2024-06-21
Payer: COMMERCIAL

## 2024-06-21 VITALS — DIASTOLIC BLOOD PRESSURE: 82 MMHG | SYSTOLIC BLOOD PRESSURE: 165 MMHG | HEART RATE: 71 BPM | OXYGEN SATURATION: 94 %

## 2024-06-21 DIAGNOSIS — L65.8 FEMALE PATTERN ALOPECIA: ICD-10-CM

## 2024-06-21 PROCEDURE — 99214 OFFICE O/P EST MOD 30 MIN: CPT | Performed by: DERMATOLOGY

## 2024-06-21 RX ORDER — DUTASTERIDE 0.5 MG/1
0.5 CAPSULE, LIQUID FILLED ORAL DAILY
Qty: 90 CAPSULE | Refills: 3 | Status: SHIPPED | OUTPATIENT
Start: 2024-06-21

## 2024-06-21 RX ORDER — CLOBETASOL PROPIONATE 0.5 MG/ML
SOLUTION TOPICAL
Qty: 60 ML | Refills: 3 | Status: SHIPPED | OUTPATIENT
Start: 2024-06-21

## 2024-06-21 RX ORDER — KETOCONAZOLE 20 MG/ML
SHAMPOO TOPICAL
Qty: 100 ML | Refills: 11 | Status: SHIPPED | OUTPATIENT
Start: 2024-06-21

## 2024-06-21 NOTE — PATIENT INSTRUCTIONS
You are a great candidate for the Folix laser, your treatments would be here at our campus.     Androgenetic Alopecia is the disease that you have.     You need to start using the Clobetasol at least once a week

## 2024-06-21 NOTE — NURSING NOTE
Maria Esther Lowe's goals for this visit include:   Chief Complaint   Patient presents with    Hair Loss     HL- Hair is not growing, the hair is not coming out but pt feels she is not growing hair. Wants to change her medication.        She requests these members of her care team be copied on today's visit information:     PCP: Carley Serrano    Referring Provider:  Referred Self, MD  No address on file    BP (!) 165/82   Pulse 71   SpO2 94%     Do you need any medication refills at today's visit?     Malissa Kim LPN on 6/21/2024 at 11:25 AM

## 2024-06-21 NOTE — LETTER
6/21/2024      Maria Esther Lowe  [address] [facility] [place] [address]      Dear Colleague,    Thank you for referring your patient, Maria Esther Lowe, to the [facility]. Please see a copy of my visit note below.      [facility] Dermatology Note  Encounter Date: Jun 21, 2024  Office Visit     Dermatology Problem List:  1. 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 Repeat bx 5/20/2020 most consistent with androgenetic alopecia (which makes sense in setting of #2 as well)   - Biopsy in 2016 consistent with alopecia areata, but not clinically consistent; had outside ILK without improvement per report  - Repeat bx 5/20/2020 with nonscarring alopecia with marked miniaturization, favoring androgenetic alopecia. Also mild perifollicular fibrosis.  - Current tx: LLLT 2-3x weekly (started years ago), dutasteride 0.5mg daily, ketoconazole shampoo alternating with head and shoulders, Minoxidil 5% once daily, clobetasol solution 2x a week,  multivitamin    -Previous tx: finasteride 5 mg daily, calcipotriene solution twice weekly,  Hair labs: 4/8/2022  TSH: 0.04 (low)  Cbc: normal 2 years ago  Ferritin:105 (2 years ago)  Zinc: 72 (9/15/2021)  Vitamin D: 65  2. Pituitary prolactinoma s/p removal in 1975 followed by radiation with subsequent development of panhypopituitarism   3.  TSH <0.01 in 6/30/2021  - She is on estradiol per endocrinology    ____________________________________________    Assessment & Plan:    # Female pattern hair loss, favor AGA. Has old bx with alopecia areata. Hair Metrix performed 2/8/24. She is much improved overall.   - Continue to use minoxidil 5% foam one or two times daily  - Continue HairMax low level laser comb   - Continue ketoconazole shampoo 2-3 times a week  - Continue clobetasol solution once weekly.  - Continue dutasteride 0.5mg daily.  Denies SOB, swelling,  dizziness.   - BP today 165/82. See PCP for elevation.   - Prior recs: micropigmentation at [facility]   - Recommended Folix laser  - Future consideration: low dose oral minoxidil- Hx of long QT (counseled patient that she would need clearance from cardiology first again today), spironolactone, increasing topical minoxidil to twice daily, PRP-discussed benefits, risks, and cost             Procedures Performed:   none    Follow-up: 6 month(s) in-person, or earlier for new or changing lesions    Staff and Scribe:     Scribe Disclosure:   I, Mare Tucker, am serving as a scribe to document services personally performed by Carley Serrano MD based on data collection and the provider's statements to me.     Provider Disclosure:   The documentation recorded by the scribe accurately reflects the services I personally performed and the decisions made by me.    Carley Serrano MD    Department of Dermatology  [facility]: Phone: [phone], Fax:[phone]  [facility]: Phone: [phone], Fax: [phone]   ____________________________________________    CC: Hair Loss (HL- Hair is not growing, the hair is not coming out but pt feels she is not growing hair. Wants to change her medication. )    HPI:  Ms. Maria Esther Lowe is a(n) 68 year old female who presents today as a return patient for hair loss.    Today, patient reports hair is not shedding but does not feel it is growing. Interested in changing medications. Denies side effects from the dutasteride.       No dizziness on medication    Patient is otherwise feeling well, without additional skin concerns.    Labs Reviewed:  N/A    Physical Exam:  Vitals: BP (!) 165/82   Pulse 71   SpO2 94%   SKIN: Focused examination of scalp was performed.  - Some perifollicular prominence noted.   -hair regrowth and de miniturization   - No other  lesions of concern on areas examined.     Medications:  Current Outpatient Medications   Medication Sig Dispense Refill     CALCIUM 500 +D 500-400 MG-UNIT TABS Take 1 tablet by mouth 2 times daily. 180 tablet 3     dutasteride (AVODART) 0.5 MG capsule Take 1 capsule (0.5 mg) by mouth daily 90 capsule 3     hydrocortisone (CORTEF) 10 MG tablet TAKE 1 TABLET BY MOUTH IN  THE MORNING AND ONE-HALF  TABLET BY MOUTH AT MIDDAY 150 tablet 3     ketoconazole (NIZORAL) 2 % external shampoo Apply topically 2-3 times per week. 100 mL 11     levothyroxine (SYNTHROID/LEVOTHROID) 137 MCG tablet Take 1 tablet (137 mcg) by mouth daily 90 tablet 3     losartan-hydrochlorothiazide (HYZAAR) 100-25 MG tablet TAKE 1 TABLET BY MOUTH DAILY 90 tablet 3     simvastatin (ZOCOR) 40 MG tablet TAKE 1 TABLET BY MOUTH DAILY 90 tablet 3     clobetasol (TEMOVATE) 0.05 % external solution Use twice weekly (Patient not taking: Reported on 6/21/2024) 60 mL 3     levothyroxine (SYNTHROID/LEVOTHROID) 150 MCG tablet TAKE 1 TABLET BY MOUTH  DAILY ON TUESDAY, THURSDAY, SATURDAY, SUNDAY (Patient not taking: Reported on 6/21/2024) 90 tablet 3     No current facility-administered medications for this visit.      Past Medical History:   Patient Active Problem List   Diagnosis     Prolactinoma (H)     Panhypopituitarism (H24)     Hormone replacement therapy (postmenopausal)     Carpal tunnel syndrome     History of robot-assisted laparoscopic hysterectomy     Chronic kidney disease, stage 3b (H)     Past Medical History:   Diagnosis Date     Hyperlipidemia LDL goal < 130      Hypertension      Hypothyroidism      Osteopenia     DEXA: 12/11; T-score of -1.9        Panhypopituitarism (H24)         CC No referring provider defined for this encounter. on close of this encounter.      Again, thank you for allowing me to participate in the care of your patient.        Sincerely,        Carley Serrano MD

## 2024-06-29 NOTE — TELEPHONE ENCOUNTER
Losartan Potassium-HCTZ 100-25 MG Oral Tablet       Last Written Prescription Date:  8-13-23  Last Fill Quantity: 90,   # refills: 3  Last Office Visit : 11-27-23 Joaquín  Future Office visit:  9-18-24 Joaquín      5-24-24  GFR Estimate  >60 mL/min/1.73m2 41 Low      BP Readings from Last 3 Encounters:   06/21/24 (!) 165/82   01/26/24 (!) 151/82   11/10/23 (!) 155/75      Routing refill request to provider for review/approval because:  Blood pressure out of range   ABN GFR  Failed protocol: diagnosis

## 2024-07-01 RX ORDER — LOSARTAN POTASSIUM AND HYDROCHLOROTHIAZIDE 25; 100 MG/1; MG/1
1 TABLET ORAL DAILY
Qty: 100 TABLET | Refills: 1 | Status: SHIPPED | OUTPATIENT
Start: 2024-07-01 | End: 2024-08-19

## 2024-07-02 DIAGNOSIS — E78.5 HYPERLIPIDEMIA LDL GOAL <100: ICD-10-CM

## 2024-07-06 NOTE — TELEPHONE ENCOUNTER
"Combination PRN Medication Administration    Medications Administered: Benadryl+Haldol+Ativan combination    What patient symptom(s) led to the administration of these medications?  Patient woke up from and immediately made requests to have food. Writer prepared snack for the patient and brought to him while sitting in the lounge and patient refused the snacks. Writer offered him alternative and he accepted. Staff informed writer that the patient took of his shirt in the middle of the hallway and threateningly stated that \"someone is going to get hurt\". Writer approached and other nursing staff approach patient in an attempt to de-escalate. Patient then began ramping up; loudly yelling and instigating another female patient. Female patient was redirected, but patient continued to encourage female patient not to listen nursing staff. Began to have more increasingly verbal outbursts and he requested prn medications.      What interventions were attempted to avoid use of these medication (including other PRN medications)?     Verbal de-escalation   Food/snack offered.  Quiet time facilitated but patient declined to participate  Provided distraction    What were the patient's response(s) to the interventions?   Slightly effective    Provider notified: Yes       Date: 07/06/24        Time: 1240    Olimpia Kyle RN    " Decrease LT4 from 150 to 137 mcg for high nl fT4    Diana Yanes MD  Staff Physician  Division of Endocrinology  [facility]  Pager #[phone]

## 2024-07-09 RX ORDER — SIMVASTATIN 40 MG
40 TABLET ORAL DAILY
Qty: 100 TABLET | Refills: 2 | OUTPATIENT
Start: 2024-07-09

## 2024-07-22 ENCOUNTER — TELEPHONE (OUTPATIENT)
Dept: DERMATOLOGY | Facility: CLINIC | Age: 69
End: 2024-07-22

## 2024-07-22 NOTE — TELEPHONE ENCOUNTER
Health Call Center    Phone Message    May a detailed message be left on voicemail: yes     Reason for Call: Appointment Intake    Referring Provider Name: Dr. Serrano  Diagnosis and/or Symptoms: Pt states her Follow-up hair loss visit should be in December per Dr Serrano's request.     Pt is scheduled for first available in February. Please call Pt back to discuss. Thank you.     Action Taken: Message routed to:  Adult Clinics: Dermatology p [id number]    Travel Screening: Not Applicable     Date of Service:

## 2024-07-23 ENCOUNTER — MYC MEDICAL ADVICE (OUTPATIENT)
Dept: DERMATOLOGY | Facility: CLINIC | Age: 69
End: 2024-07-23
Payer: COMMERCIAL

## 2024-07-25 DIAGNOSIS — E23.0 PANHYPOPITUITARISM (H): ICD-10-CM

## 2024-07-25 RX ORDER — HYDROCORTISONE 10 MG/1
TABLET ORAL
Qty: 150 TABLET | Refills: 3 | OUTPATIENT
Start: 2024-07-25

## 2024-07-25 NOTE — TELEPHONE ENCOUNTER
hydrocortisone (CORTEF) 10 MG tablet   150 tablet 3 9/26/2023     Last Office Visit : 1-  Future Office visit:  8-

## 2024-08-07 DIAGNOSIS — E23.0 PANHYPOPITUITARISM (H): ICD-10-CM

## 2024-08-12 ENCOUNTER — LAB (OUTPATIENT)
Dept: LAB | Facility: CLINIC | Age: 69
End: 2024-08-12
Payer: COMMERCIAL

## 2024-08-12 DIAGNOSIS — E03.9 HYPOTHYROIDISM, UNSPECIFIED TYPE: ICD-10-CM

## 2024-08-12 DIAGNOSIS — E23.0 HYPOPITUITARISM (H): ICD-10-CM

## 2024-08-12 LAB
ANION GAP SERPL CALCULATED.3IONS-SCNC: 12 MMOL/L (ref 7–15)
BUN SERPL-MCNC: 26.9 MG/DL (ref 8–23)
CALCIUM SERPL-MCNC: 9.6 MG/DL (ref 8.8–10.4)
CHLORIDE SERPL-SCNC: 104 MMOL/L (ref 98–107)
CREAT SERPL-MCNC: 1.36 MG/DL (ref 0.51–0.95)
EGFRCR SERPLBLD CKD-EPI 2021: 42 ML/MIN/1.73M2
GLUCOSE SERPL-MCNC: 95 MG/DL (ref 70–99)
HCO3 SERPL-SCNC: 25 MMOL/L (ref 22–29)
POTASSIUM SERPL-SCNC: 4 MMOL/L (ref 3.4–5.3)
SODIUM SERPL-SCNC: 141 MMOL/L (ref 135–145)
T4 FREE SERPL-MCNC: 1.66 NG/DL (ref 0.9–1.7)

## 2024-08-12 PROCEDURE — 99000 SPECIMEN HANDLING OFFICE-LAB: CPT

## 2024-08-12 PROCEDURE — 36415 COLL VENOUS BLD VENIPUNCTURE: CPT

## 2024-08-12 PROCEDURE — 80048 BASIC METABOLIC PNL TOTAL CA: CPT

## 2024-08-12 PROCEDURE — 84439 ASSAY OF FREE THYROXINE: CPT

## 2024-08-12 PROCEDURE — 84244 ASSAY OF RENIN: CPT | Mod: 90

## 2024-08-12 RX ORDER — HYDROCORTISONE 10 MG/1
TABLET ORAL
Qty: 150 TABLET | Refills: 3 | OUTPATIENT
Start: 2024-08-12

## 2024-08-13 ENCOUNTER — TELEPHONE (OUTPATIENT)
Dept: OBGYN | Facility: CLINIC | Age: 69
End: 2024-08-13
Payer: COMMERCIAL

## 2024-08-13 DIAGNOSIS — N64.4 PAIN IN BREAST: Primary | ICD-10-CM

## 2024-08-13 NOTE — TELEPHONE ENCOUNTER
[facility] Call Center    Phone Message    May a detailed message be left on voicemail: yes     Reason for Call: Order(s): Other:   Reason for requested: diagnostic + US/ Pt has lump cannot schedule routine mammo  Date needed: ASAP   Provider name: BONIFACIO    Action Taken: Message routed to:  Other: [facility]    Travel Screening: Not Applicable

## 2024-08-14 LAB — RENIN PLAS-CCNC: 9.3 NG/ML/HR

## 2024-08-14 NOTE — TELEPHONE ENCOUNTER
"Kathy returned call.  She reports pain in her R breast, saying \"I can be doing anything and all of a sudden it feels like it is being \"squeezed\" in the middle.\"     When asked if she can feel a lump, Kathy stated she thought she felt something yesterday, but does not feel a lump today.      Per protocol, ordered diagnostic mammogram and R breast US, as well as breast center referral.    "

## 2024-08-18 NOTE — PROGRESS NOTES
Virtual Visit Details    Type of service:  Telephone Visit   Phone call duration: 23 minutes   Originating Location (pt. Location): Home    Distant Location (provider location):  Off-site              Endocrine Clinic Consult    Follow up for Panhypopituitiarism      Assessment:  69 y old woman with postmenopausal with distant history of prolactinoma s/p radiation to the pituitary now with panhypopituitarism and empty sella syndrome.  She is on thyroid and steroid hormone replacement.    Panhypopituitarism  Patient overall has been doing well without addisonian crisis, however continues to take medications different from instructed, does not use stress dose hydrocortisone which probably would need particularly with COVID infection.  Despite lengthy discussion at prior visit and the fact that patient had been on these medications for 5 years or longer she remains unclear how to take it.  Tried to explain and reorient again.    CKD 3B  Patient with decreasing renal function with risk factor of hypertension.  Reviewed low-salt diet avoiding source NSAIDs and alcohol which she orders.  Reviewed importance to establish care with a primary care provider for blood pressure treatment and other primary care issues like possibly colonoscopies which we are not well equipped to do in our specialty clinic.    Low bone density  T-score of -1.5 in 2022, recheck bone density in 2026    Growth hormone deficiency and this was last discussed today, would first like to establish better control of other hormone replacement, however should be readdressed in the future , patient has concerned about adverse effects,       Plan:   Decrease levothyroxine 137 -> 125 mcg in the morning    Take hydrocortisone 10 mg in the morning instead of in the evening, to take hydrocortisone 5 mg between noon and 2 PM  To hydrocortisone stress dosing with infections      Referral for primary care was made     The Longitudinal plan of care for  panhypopituitarism condition was addressed during this visit. Due to added complexity of care, we will continue to support Maria Esther , and the subsequent management of this condition(s) and with the ongoing continuity of care of this condition.    This note was generated using computer recognized voice recognition. This might result in some expected imperfection.    Diana Yanes MD  Endocrinology and Diabetes  Telephone contact:  [facility] [place] [phone]  [facility] [place] [phone]           Interval history    9 m follow up  69 year old woman with panhypopituitarism  History of pituitary prolctinoma s/p removal in 1975 followedby radiation with subsequent development of panhypopituitiarism    Pt's main concern is refill of medications    Patient today asks when to actually take the medications levothyroxine and hydrocortisone.  We had discussed this in detail at our prior visit 9 months ago.  Patient continues to take both in the evening.    No adrenal crisis.    Patient had intentional weight loss of about 20 pounds since last visit.  She feels well.  She denies dizziness with changing position.  However notes muscle weakness in her legs upon standing up.  No problems with walking upstairs and walking in general.  No nausea no vomiting.  Patient had COVID several weeks ago.  She did not increase hydrocortisone however felt somewhat out of it.    Past Medical and Past Surgical History:  Past Medical History:   Diagnosis Date    Hyperlipidemia LDL goal < 130     Hypertension     Hypothyroidism     Osteopenia     DEXA: 12/11; T-score of -1.9       Panhypopituitarism (H24)        Past Surgical History:   Procedure Laterality Date    DAVINCI HYSTERECTOMY TOTAL, BILATERAL SALPINGO-OOPHORECTOMY, COMBINED Bilateral 9/11/2018    Procedure: COMBINED DAVINCI HYSTERECTOMY TOTAL, SALPINGO-OOPHORECTOMY;  Pelvic Washings, Davinci Assisted Laparoscopic Total Hysterectomy,  Bilateral Salpingo Oophorectomy, cystoscopy;  Surgeon: Tiffany Lew MD;  [facility]    OPERATIVE HYSTEROSCOPY N/A 1/30/2018    Procedure: OPERATIVE HYSTEROSCOPY;  Operative Hysteroscopy, Resection of Endometrial Polyp, Dilation and Curettage;  Surgeon: Daysi Munoz MD;  [facility]    PROBE LACRIMAL DUCT, INSERT STENT BILATERAL, COMBINED Bilateral 11/26/2018    Procedure: BILATERAL NASOLACRIMAL STENT - LARGE DIAMETER STEN TUBE;  Surgeon: Davina Payton MD;  Location: [facility] OR    resected prolactin-producing pituitary adenoma.  1975    Postoperative radiation therapy.        Medications:   Current Outpatient Medications   Medication Sig Dispense Refill    CALCIUM 500 +D 500-400 MG-UNIT TABS Take 1 tablet by mouth 2 times daily. 180 tablet 3    clobetasol (TEMOVATE) 0.05 % external solution Use once weekly 60 mL 3    dutasteride (AVODART) 0.5 MG capsule Take 1 capsule (0.5 mg) by mouth daily 90 capsule 3    hydrocortisone (CORTEF) 10 MG tablet TAKE 1 TABLET BY MOUTH IN  THE MORNING AND ONE-HALF  TABLET BY MOUTH AT MIDDAY 150 tablet 3    ketoconazole (NIZORAL) 2 % external shampoo Apply topically 2-3 times per week. 100 mL 11    losartan-hydrochlorothiazide (HYZAAR) 100-25 MG tablet TAKE 1 TABLET BY MOUTH DAILY 100 tablet 1    minoxidil (ROGAINE) 5 % external solution Apply topically daily      simvastatin (ZOCOR) 40 MG tablet TAKE 1 TABLET BY MOUTH DAILY 90 tablet 3       Allergies:   No Known Allergies    Social History     Tobacco Use    Smoking status: Never     Passive exposure: Never    Smokeless tobacco: Never   Substance Use Topics    Alcohol use: No       Family History   Problem Relation Age of Onset    Diabetes Paternal Grandmother     Depression Paternal Grandmother     Thyroid Disease Maternal Grandmother     Skin Cancer Father     Melanoma No family hx of        Review of Systems:  12 point review of system was negative except for above mentioned    Physical Examination:  not  currently breastfeeding.  There is no height or weight on file to calculate BMI.    Wt Readings from Last 6 Encounters:   01/26/24 81.2 kg (179 lb 1.6 oz)   11/10/23 80.3 kg (177 lb)   06/22/23 82.6 kg (182 lb)   04/08/22 83.9 kg (185 lb)   03/17/22 83.9 kg (185 lb)   10/22/20 81.6 kg (180 lb)       Reported vitals:  There were no vitals taken for this visit.   healthy, alert and no distress  PSYCH: Alert and oriented times 3; coherent speech, normal   rate and volume, able to articulate logical thoughts, able   to abstract reason, no tangential thoughts, no hallucinations   or delusions  Her affect is normal and pleasant  RESP: No cough, no audible wheezing, able to talk in full sentences  Remainder of exam unable to be completed due to telephone visits       Labs and Studies:   Lab Results   Component Value Date     08/12/2024    CO2 25 08/12/2024    CHLORIDE 104 08/12/2024    CR 1.36 (H) 08/12/2024    TRIG 110 11/28/2023    HDL 42 (L) 11/28/2023    HGB 13.9 05/24/2024     @resufast(tsh:5  @sutendolabs@        No results found for this or any previous visit.  ;  Results for orders placed in visit on 03/17/22    DX Hip/Pelvis/Spine    [address] [address]  Phone: [phone]   Fax: [phone]      Patient name:   Maria Esther Lowe  Patient demographics:  66 year old White Female  History:  Evaluation of Bone Density, Family History of Fracture, Family History of Osteoporosis, Hysterectomy, Ovaries Removed, Post Menopausal and Thyroid Condition  Current treatments:  Calcium, Cholesterol Lowering Drugs, Estrogen, Fosamax/Alendronate, Sterids/Prednisone, Thyroid Medications, Vitamin D  Scan:    [employer]        Impression  The most negative and valid T-score of -1.5 at the level of the left femoral neck corresponds with low bone density according to WHO criteria for postmenopausal females and men age 50 and  over. The risk of osteoporotic fracture increases approximately 2-fold for each 1.0 SD decrease in T-score.    Results  Lumbar spine  T-score -1.4 , BMD 1.017 g/cm2.    Left Femur neck  T-score -1.5 , BMD 0.828 g/cm2.  Left Total hip  T-score -1.2 , BMD 0.861 g/cm2.    Right Femur neck  T-score -1.0, BMD  0.905 g/cm2.  Right Total hip  T-score -1.2 , BMD  0.859 g/cm2.    Interval change  Bone density compared to the prior study has changed at lumbar spine by +7.6%, at the right total femur by +3.2%.    Percent changes not mentioned or within remaining regions are insignificant  Please note that the differential diagnosis of BMD increase in the spine includes improvement due to pharmacotherapy vs inter-current progression of spine degeneration or fracture    Fracture risk  The risk of osteoporotic fracture increases approximately 2-fold for each 1.0 SD decrease in T-score.  Low bone density is not the only risk factor for fracture; consider factors such as patient's age, fall risk, injury risk, previous osteoporotic fracture, family history of osteoporosis, etc.    Repeat  For patients eligible for Medicare, routine testing is allowed once every 2 years.  Testing frequency can be increased for patients on corticosteroids.    Clinical correlation recommended    Technical quality  Satisfactory.    Principal result :  Julien Pickens MD, [facility]  Division of Endocrinology and Diabetes  Department of Medicine      References:  Ref. 1. WHO categories:  T-score > -1.0  = normal             .  T-score -1.0 to -2.5 = low bone density  T-score < -2.5 = osteoporosis        .    Ref. 2. 2015 ISCD official position statements:  www.iscd.org.    Ref. 3.  Today's examination is compared to the technically similar prior study of the total hip and femur if available. Only changes deemed likely to be significant based on historical data are reported.  According to the ISCD position statements, total hip rather than  femoral neck regions are to be compared because larger areas give better precision.  LSC = least significant changes at the [facility] (historical data)  AP spine =  0.032 g/cm2  (6/26/2007)  Left hip = 0.029 g/cm2  (6/15/2007)  Right hip = 0.018 g/cm2  (6/15/2007)  Left mid radius = 0.043 g/cm2  (6/26/2007)  Please note that the differential diagnosis of increase in bone density at the lumbar spine includes improvement due to pharmacotherapy vs inter-current progression of spine degeneration or fracture.    Ref. 4 Fracture risk is calculated in patients aged 40 to 90 years old with low bone density not on osteoporosis treatment. A 10 year fracture risk of 3% and higher for hip fracture and 20% and higher for major osteoporotic fracture is considered higher than acceptable risk and might be an indication for medical treatment.    Ref. 5.  By definition, osteoporosis may be diagnosed in the presence or with the history of a low trauma or fragility fracture.  Fragility and low trauma fracture is defined as a fracture resulting from the force of a fall from a standing height or less or a bone that breaks under conditions that would not cause a normal bone to break.    Ref. 6. NOF Physician's Guideline Website address:  www.nof.org.    No results found for this or any previous visit.                    Patient would like to have more labs checked including also vitamin D levels recheck of prolactin in such, concerned about her low TSH  Ongoing hair loss, patient is followed by dermatologist closely, this has improved some recently  Insomnia, patient notes more problems with falling and staying asleep for about 1 year    Symptoms of hypo- and hyperthyroidism:  Weight change able to loose; heat or cold intolerance no; abnormal bowel movements no; hair loss YES, change in skin pattern ; anxiety no, depression no; fatigue no; heart racing no; tremors no; menses no    Exposure to radiation: yes  FH of thyroid Cancer:  no     Hydrocortisone 10 mg in the evening    Levothyroxine 150 alternating with 175 mcg in the evening    Current Problem List:   Patient Active Problem List   Diagnosis    Prolactinoma (H)    Panhypopituitarism (H24)    Hormone replacement therapy (postmenopausal)    Carpal tunnel syndrome    History of robot-assisted laparoscopic hysterectomy    Chronic kidney disease, stage 3b (H)           Past Medical and Past Surgical History:  Past Medical History:   Diagnosis Date    Hyperlipidemia LDL goal < 130     Hypertension     Hypothyroidism     Osteopenia     DEXA: 12/11; T-score of -1.9       Panhypopituitarism (H24)        Past Surgical History:   Procedure Laterality Date    DAVINCI HYSTERECTOMY TOTAL, BILATERAL SALPINGO-OOPHORECTOMY, COMBINED Bilateral 9/11/2018    Procedure: COMBINED DAVINCI HYSTERECTOMY TOTAL, SALPINGO-OOPHORECTOMY;  Pelvic Washings, Davinci Assisted Laparoscopic Total Hysterectomy, Bilateral Salpingo Oophorectomy, cystoscopy;  Surgeon: Tiffany Lew MD;  [facility]    OPERATIVE HYSTEROSCOPY N/A 1/30/2018    Procedure: OPERATIVE HYSTEROSCOPY;  Operative Hysteroscopy, Resection of Endometrial Polyp, Dilation and Curettage;  Surgeon: Daysi Munoz MD;  [facility]    PROBE LACRIMAL DUCT, INSERT STENT BILATERAL, COMBINED Bilateral 11/26/2018    Procedure: BILATERAL NASOLACRIMAL STENT - LARGE DIAMETER STEN TUBE;  Surgeon: Davina Payton MD;  [facility]    resected prolactin-producing pituitary adenoma.  1975    Postoperative radiation therapy.        Medications:   Current Outpatient Medications   Medication Sig Dispense Refill    CALCIUM 500 +D 500-400 MG-UNIT TABS Take 1 tablet by mouth 2 times daily. 180 tablet 3    clobetasol (TEMOVATE) 0.05 % external solution Use once weekly 60 mL 3    dutasteride (AVODART) 0.5 MG capsule Take 1 capsule (0.5 mg) by mouth daily 90 capsule 3    hydrocortisone (CORTEF) 10 MG tablet TAKE 1 TABLET BY MOUTH IN  THE MORNING AND  ONE-HALF  TABLET BY MOUTH AT MIDDAY 150 tablet 3    ketoconazole (NIZORAL) 2 % external shampoo Apply topically 2-3 times per week. 100 mL 11    levothyroxine (SYNTHROID/LEVOTHROID) 137 MCG tablet Take 1 tablet (137 mcg) by mouth daily 90 tablet 3    levothyroxine (SYNTHROID/LEVOTHROID) 150 MCG tablet TAKE 1 TABLET BY MOUTH  DAILY ON TUESDAY, THURSDAY, SATURDAY, SUNDAY (Patient not taking: Reported on 6/21/2024) 90 tablet 3    losartan-hydrochlorothiazide (HYZAAR) 100-25 MG tablet TAKE 1 TABLET BY MOUTH DAILY 100 tablet 1    minoxidil (ROGAINE) 5 % external solution Apply topically daily      simvastatin (ZOCOR) 40 MG tablet TAKE 1 TABLET BY MOUTH DAILY 90 tablet 3       Allergies:   No Known Allergies    Social History     Tobacco Use    Smoking status: Never     Passive exposure: Never    Smokeless tobacco: Never   Substance Use Topics    Alcohol use: No       Family History   Problem Relation Age of Onset    Diabetes Paternal Grandmother     Depression Paternal Grandmother     Thyroid Disease Maternal Grandmother     Skin Cancer Father     Melanoma No family hx of        Physical Examination:  not currently breastfeeding.  There is no height or weight on file to calculate BMI.    Weight now at 165 lbs  Wt Readings from Last 6 Encounters:   01/26/24 81.2 kg (179 lb 1.6 oz)   11/10/23 80.3 kg (177 lb)   06/22/23 82.6 kg (182 lb)   04/08/22 83.9 kg (185 lb)   03/17/22 83.9 kg (185 lb)   10/22/20 81.6 kg (180 lb)     General: Well appearing woma in no distress, up and go quick  Eyes: EOMI. Sclerae and conjunctivae are clear.   HENT: No thyromegaly or mass.    Lymphatic: No cervical or supraclavicular lymphadenopathy.  Cardiovascular: RRR, with normal S1+S2 and no murmurs.   Skin: No rash or lesions. No abdominal striae.    Extremities: No peripheral edema.   Neurologic: No tremor with hands outstretched. 2+ patellar reflexes.  Muskuloskeletal: rib-pelvis distance 3         Labs and Studies:   Recent Labs   Lab Test  08/12/24  1029 01/26/24  0849 11/28/23  1006 11/10/23  1549 04/08/22  1624 03/17/22  1137 09/09/19  1224 10/22/18  1227 12/06/16  1849 07/22/16  1004   FSH  --   --   --   --   --   --   --   --   --  <0.2  FSH Reference Range   Female: Follicular      2.5-10.2           Mid-cycle       3.4-33.4           Luteal          1.5-9.1           Postmenopausal  23.0-116.3     T4 1.66 1.72* 1.70 1.59 1.18 1.64*   < >  --    < > 1.02   T3  --   --   --  121  --   --   --   --   --   --    TSH  --   --   --  0.04* 0.04* 0.02*   < > 0.11*   < > 0.13*   SHANTE  --   --   --   --   --   --   --  13.8  --   --    DAMIÁN 9.3  --   --   --   --   --   --   --   --   --     < > = values in this interval not displayed.       Lab Results   Component Value Date     08/12/2024    CO2 25 08/12/2024    CHLORIDE 104 08/12/2024    CR 1.36 (H) 08/12/2024    TRIG 110 11/28/2023    HDL 42 (L) 11/28/2023    HGB 13.9 05/24/2024     Lab Results   Component Value Date     08/12/2024    CHLORIDE 104 08/12/2024    CO2 25 08/12/2024    GLC 95 08/12/2024    CR 1.36 (H) 08/12/2024    CR 1.38 (H) 05/24/2024    CR 1.31 (H) 01/26/2024    CR 1.32 (H) 11/28/2023    CR 1.30 (H) 11/10/2023    ARI 9.6 08/12/2024    ALBUMIN 4.3 05/24/2024    ALKPHOS 103 11/10/2023    LDL 73 11/28/2023    HDL 42 (L) 11/28/2023    TRIG 110 11/28/2023    TSH 0.04 (L) 11/10/2023    TSH 0.04 (L) 04/08/2022    TSH 0.02 (L) 03/17/2022     Lab Results   Component Value Date    MICROL 41.6 01/26/2024     Lab Results   Component Value Date    A1C 5.7 (H) 11/28/2023    A1C 5.8 (H) 06/22/2023    A1C 5.5 03/03/2021       Lab Results   Component Value Date    HGB 13.9 05/24/2024       .[email or web address]    Recent Labs   Lab Test 11/10/23  1549 04/08/22  1624 03/17/22  1137 09/09/19  1224 10/22/18  1227 12/06/16  1849 07/22/16  1004 12/03/15  0923   FSH  --   --   --   --   --   --  <0.2  FSH Reference Range   Female: Follicular      2.5-10.2           Mid-cycle       3.4-33.4           Luteal           1.5-9.1           Postmenopausal  23.0-116.3   <0.2  FSH Reference Range   Female: Follicular      2.5-10.2           Mid-cycle       3.4-33.4           Luteal          1.5-9.1           Postmenopausal  23.0-116.3     T4 1.59 1.18 1.64*   < >  --    < > 1.02 1.28   T3 121  --   --   --   --   --   --   --    TSH 0.04* 0.04* 0.02*   < > 0.11*   < > 0.13* 0.07*   SHANTE  --   --   --   --  13.8  --   --   --     < > = values in this interval not displayed.           Lab Results   Component Value Date    PROLACTIN 43 (H) 03/17/2022             DX Hip/Pelvis/Spine    [address] [address]  Phone: [phone]   Fax: [phone]      Patient name:   Maria Esther Lowe  Patient demographics:  66 year old White Female  History:  Evaluation of Bone Density, Family History of Fracture, Family History of Osteoporosis, Hysterectomy, Ovaries Removed, Post Menopausal and Thyroid Condition  Current treatments:  Calcium, Cholesterol Lowering Drugs, Estrogen, Fosamax/Alendronate, Sterids/Prednisone, Thyroid Medications, Vitamin D  Scan:    [employer]        Impression  The most negative and valid T-score of -1.5 at the level of the left femoral neck corresponds with low bone density according to WHO criteria for postmenopausal females and men age 50 and over. The risk of osteoporotic fracture increases approximately 2-fold for each 1.0 SD decrease in T-score.    Results  Lumbar spine  T-score -1.4 , BMD 1.017 g/cm2.    Left Femur neck  T-score -1.5 , BMD 0.828 g/cm2.  Left Total hip  T-score -1.2 , BMD 0.861 g/cm2.    Right Femur neck  T-score -1.0, BMD  0.905 g/cm2.  Right Total hip  T-score -1.2 , BMD  0.859 g/cm2.    Interval change  Bone density compared to the prior study has changed at lumbar spine by +7.6%, at the right total femur by +3.2%.    Percent changes not mentioned or within remaining regions are  insignificant  Please note that the differential diagnosis of BMD increase in the spine includes improvement due to pharmacotherapy vs inter-current progression of spine degeneration or fracture    Fracture risk  The risk of osteoporotic fracture increases approximately 2-fold for each 1.0 SD decrease in T-score.  Low bone density is not the only risk factor for fracture; consider factors such as patient's age, fall risk, injury risk, previous osteoporotic fracture, family history of osteoporosis, etc.    Repeat  For patients eligible for Medicare, routine testing is allowed once every 2 years.  Testing frequency can be increased for patients on corticosteroids.    Clinical correlation recommended    Technical quality  Satisfactory.    Principal result :  Julien Pickens MD, [facility]  Division of Endocrinology and Diabetes  Department of Medicine      References:  Ref. 1. WHO categories:  T-score > -1.0  = normal             .  T-score -1.0 to -2.5 = low bone density  T-score < -2.5 = osteoporosis        .    Ref. 2. 2015 ISCD official position statements:  www.iscd.org.    Ref. 3.  Today's examination is compared to the technically similar prior study of the total hip and femur if available. Only changes deemed likely to be significant based on historical data are reported.  According to the ISCD position statements, total hip rather than femoral neck regions are to be compared because larger areas give better precision.  LSC = least significant changes at the [facility] (historical data)  AP spine =  0.032 g/cm2  (6/26/2007)  Left hip = 0.029 g/cm2  (6/15/2007)  Right hip = 0.018 g/cm2  (6/15/2007)  Left mid radius = 0.043 g/cm2  (6/26/2007)  Please note that the differential diagnosis of increase in bone density at the lumbar spine includes improvement due to pharmacotherapy vs inter-current progression of spine degeneration or fracture.    Ref. 4 Fracture risk is calculated in patients aged  40 to 90 years old with low bone density not on osteoporosis treatment. A 10 year fracture risk of 3% and higher for hip fracture and 20% and higher for major osteoporotic fracture is considered higher than acceptable risk and might be an indication for medical treatment.    Ref. 5.  By definition, osteoporosis may be diagnosed in the presence or with the history of a low trauma or fragility fracture.  Fragility and low trauma fracture is defined as a fracture resulting from the force of a fall from a standing height or less or a bone that breaks under conditions that would not cause a normal bone to break.    Ref. 6. NOF Physician's Guideline Website address:  www.nof.org.

## 2024-08-19 ENCOUNTER — VIRTUAL VISIT (OUTPATIENT)
Dept: ENDOCRINOLOGY | Facility: CLINIC | Age: 69
End: 2024-08-19
Payer: COMMERCIAL

## 2024-08-19 DIAGNOSIS — E78.5 HYPERLIPIDEMIA LDL GOAL <100: ICD-10-CM

## 2024-08-19 DIAGNOSIS — I10 BENIGN ESSENTIAL HYPERTENSION: ICD-10-CM

## 2024-08-19 DIAGNOSIS — E23.0 PANHYPOPITUITARISM (H): Primary | ICD-10-CM

## 2024-08-19 DIAGNOSIS — M85.9 LOW BONE DENSITY: ICD-10-CM

## 2024-08-19 DIAGNOSIS — E23.0 PANHYPOPITUITARISM (H): ICD-10-CM

## 2024-08-19 PROCEDURE — 99443 PR PHYSICIAN TELEPHONE EVALUATION 21-30 MIN: CPT | Mod: 93 | Performed by: INTERNAL MEDICINE

## 2024-08-19 RX ORDER — SIMVASTATIN 40 MG
40 TABLET ORAL DAILY
Qty: 90 TABLET | Refills: 3 | Status: SHIPPED | OUTPATIENT
Start: 2024-08-19

## 2024-08-19 RX ORDER — LOSARTAN POTASSIUM AND HYDROCHLOROTHIAZIDE 25; 100 MG/1; MG/1
1 TABLET ORAL DAILY
Qty: 100 TABLET | Refills: 1 | Status: SHIPPED | OUTPATIENT
Start: 2024-08-19

## 2024-08-19 RX ORDER — HYDROCORTISONE 10 MG/1
TABLET ORAL
Qty: 120 TABLET | Refills: 3 | Status: SHIPPED | OUTPATIENT
Start: 2024-08-19

## 2024-08-19 RX ORDER — HYDROCORTISONE 5 MG/1
TABLET ORAL
Qty: 90 TABLET | Refills: 4 | Status: SHIPPED | OUTPATIENT
Start: 2024-08-19

## 2024-08-19 NOTE — NURSING NOTE
Current patient location: [place]    Is the patient currently in the state of [place]? YES    Visit mode:TELEPHONE    If the visit is dropped, the patient can be reconnected by: TELEPHONE VISIT: Phone number:   Telephone Information:   Mobile [phone]       Will anyone else be joining the visit? NO  (If patient encounters technical issues they should call [phone])    How would you like to obtain your AVS? MyChart    Are changes needed to the allergy or medication list? Patient reported no allergies but VF was not able to review medication list with her. Patient wanted to discuss dosage of mediation with provider.    Are refills needed on medications prescribed by this physician? Yes   Rooming Documentation:  Patient noted issues with pain in legs sometimes.    Reason for visit: RECHECK    Ruth POPE

## 2024-08-19 NOTE — LETTER
8/19/2024      Maria Esther Lowe  [address] [facility] [address]      Dear Colleague,    Thank you for referring your patient, Maria Esther Lowe, to the [facility]. Please see a copy of my visit note below.    Virtual Visit Details    Type of service:  Telephone Visit   Phone call duration: 23 minutes   Originating Location (pt. Location): Home    Distant Location (provider location):  Off-site              Endocrine Clinic Consult    Follow up for Panhypopituitiarism      Assessment:  69 y old woman with postmenopausal with distant history of prolactinoma s/p radiation to the pituitary now with panhypopituitarism and empty sella syndrome.  She is on thyroid and steroid hormone replacement.    Panhypopituitarism  Patient overall has been doing well without addisonian crisis, however continues to take medications different from instructed, does not use stress dose hydrocortisone which probably would need particularly with COVID infection.  Despite lengthy discussion at prior visit and the fact that patient had been on these medications for 5 years or longer she remains unclear how to take it.  Tried to explain and reorient again.    CKD 3B  Patient with decreasing renal function with risk factor of hypertension.  Reviewed low-salt diet avoiding source NSAIDs and alcohol which she orders.  Reviewed importance to establish care with a primary care provider for blood pressure treatment and other primary care issues like possibly colonoscopies which we are not well equipped to do in our specialty clinic.    Low bone density  T-score of -1.5 in 2022, recheck bone density in 2026    Growth hormone deficiency and this was last discussed today, would first like to establish better control of other hormone replacement, however should be readdressed in the future , patient has concerned about adverse effects,       Plan:   Decrease levothyroxine 137 -> 125 mcg in the morning    Take  hydrocortisone 10 mg in the morning instead of in the evening, to take hydrocortisone 5 mg between noon and 2 PM  To hydrocortisone stress dosing with infections      Referral for primary care was made     The Longitudinal plan of care for panhypopituitarism condition was addressed during this visit. Due to added complexity of care, we will continue to support Maria Esther , and the subsequent management of this condition(s) and with the ongoing continuity of care of this condition.    This note was generated using computer recognized voice recognition. This might result in some expected imperfection.    Diana Yanes MD  Endocrinology and Diabetes  Telephone contact:  [facility] [place] [phone]  [facility] [phone]           Interval history    9 m follow up  69 year old woman with panhypopituitarism  History of pituitary prolctinoma s/p removal in 1975 followedby radiation with subsequent development of panhypopituitiarism    Pt's main concern is refill of medications    Patient today asks when to actually take the medications levothyroxine and hydrocortisone.  We had discussed this in detail at our prior visit 9 months ago.  Patient continues to take both in the evening.    No adrenal crisis.    Patient had intentional weight loss of about 20 pounds since last visit.  She feels well.  She denies dizziness with changing position.  However notes muscle weakness in her legs upon standing up.  No problems with walking upstairs and walking in general.  No nausea no vomiting.  Patient had COVID several weeks ago.  She did not increase hydrocortisone however felt somewhat out of it.    Past Medical and Past Surgical History:  Past Medical History:   Diagnosis Date     Hyperlipidemia LDL goal < 130      Hypertension      Hypothyroidism      Osteopenia     DEXA: 12/11; T-score of -1.9        Panhypopituitarism (H24)        Past Surgical History:   Procedure Laterality  Date     DAVINCI HYSTERECTOMY TOTAL, BILATERAL SALPINGO-OOPHORECTOMY, COMBINED Bilateral 9/11/2018    Procedure: COMBINED DAVINCI HYSTERECTOMY TOTAL, SALPINGO-OOPHORECTOMY;  Pelvic Washings, Davinci Assisted Laparoscopic Total Hysterectomy, Bilateral Salpingo Oophorectomy, cystoscopy;  Surgeon: Tiffany Lew MD;  [facility]     OPERATIVE HYSTEROSCOPY N/A 1/30/2018    Procedure: OPERATIVE HYSTEROSCOPY;  Operative Hysteroscopy, Resection of Endometrial Polyp, Dilation and Curettage;  Surgeon: Daysi Munoz MD;  [facility]     PROBE LACRIMAL DUCT, INSERT STENT BILATERAL, COMBINED Bilateral 11/26/2018    Procedure: BILATERAL NASOLACRIMAL STENT - LARGE DIAMETER STEN TUBE;  Surgeon: Davina Payton MD;  [facility]: [facility] OR     resected prolactin-producing pituitary adenoma.  1975    Postoperative radiation therapy.        Medications:   Current Outpatient Medications   Medication Sig Dispense Refill     CALCIUM 500 +D 500-400 MG-UNIT TABS Take 1 tablet by mouth 2 times daily. 180 tablet 3     clobetasol (TEMOVATE) 0.05 % external solution Use once weekly 60 mL 3     dutasteride (AVODART) 0.5 MG capsule Take 1 capsule (0.5 mg) by mouth daily 90 capsule 3     hydrocortisone (CORTEF) 10 MG tablet TAKE 1 TABLET BY MOUTH IN  THE MORNING AND ONE-HALF  TABLET BY MOUTH AT MIDDAY 150 tablet 3     ketoconazole (NIZORAL) 2 % external shampoo Apply topically 2-3 times per week. 100 mL 11     losartan-hydrochlorothiazide (HYZAAR) 100-25 MG tablet TAKE 1 TABLET BY MOUTH DAILY 100 tablet 1     minoxidil (ROGAINE) 5 % external solution Apply topically daily       simvastatin (ZOCOR) 40 MG tablet TAKE 1 TABLET BY MOUTH DAILY 90 tablet 3       Allergies:   No Known Allergies    Social History     Tobacco Use     Smoking status: Never     Passive exposure: Never     Smokeless tobacco: Never   Substance Use Topics     Alcohol use: No       Family History   Problem Relation Age of Onset     Diabetes Paternal  Grandmother      Depression Paternal Grandmother      Thyroid Disease Maternal Grandmother      Skin Cancer Father      Melanoma No family hx of        Review of Systems:  12 point review of system was negative except for above mentioned    Physical Examination:  not currently breastfeeding.  There is no height or weight on file to calculate BMI.    Wt Readings from Last 6 Encounters:   01/26/24 81.2 kg (179 lb 1.6 oz)   11/10/23 80.3 kg (177 lb)   06/22/23 82.6 kg (182 lb)   04/08/22 83.9 kg (185 lb)   03/17/22 83.9 kg (185 lb)   10/22/20 81.6 kg (180 lb)       Reported vitals:  There were no vitals taken for this visit.   healthy, alert and no distress  PSYCH: Alert and oriented times 3; coherent speech, normal   rate and volume, able to articulate logical thoughts, able   to abstract reason, no tangential thoughts, no hallucinations   or delusions  Her affect is normal and pleasant  RESP: No cough, no audible wheezing, able to talk in full sentences  Remainder of exam unable to be completed due to telephone visits       Labs and Studies:   Lab Results   Component Value Date     08/12/2024    CO2 25 08/12/2024    CHLORIDE 104 08/12/2024    CR 1.36 (H) 08/12/2024    TRIG 110 11/28/2023    HDL 42 (L) 11/28/2023    HGB 13.9 05/24/2024     @resufast(tsh:5  @sutendolabs@        No results found for this or any previous visit.  ;  Results for orders placed in visit on 03/17/22    DX Hip/Pelvis/Spine    [address] [address]  Phone: [phone]   Fax: [phone]      Patient name:   Maria Esther Lowe  Patient demographics:  66 year old White Female  History:  Evaluation of Bone Density, Family History of Fracture, Family History of Osteoporosis, Hysterectomy, Ovaries Removed, Post Menopausal and Thyroid Condition  Current treatments:  Calcium, Cholesterol Lowering Drugs, Estrogen, Fosamax/Alendronate, Sterids/Prednisone, Thyroid  Medications, Vitamin D  Scan:    [employer]        Impression  The most negative and valid T-score of -1.5 at the level of the left femoral neck corresponds with low bone density according to WHO criteria for postmenopausal females and men age 50 and over. The risk of osteoporotic fracture increases approximately 2-fold for each 1.0 SD decrease in T-score.    Results  Lumbar spine  T-score -1.4 , BMD 1.017 g/cm2.    Left Femur neck  T-score -1.5 , BMD 0.828 g/cm2.  Left Total hip  T-score -1.2 , BMD 0.861 g/cm2.    Right Femur neck  T-score -1.0, BMD  0.905 g/cm2.  Right Total hip  T-score -1.2 , BMD  0.859 g/cm2.    Interval change  Bone density compared to the prior study has changed at lumbar spine by +7.6%, at the right total femur by +3.2%.    Percent changes not mentioned or within remaining regions are insignificant  Please note that the differential diagnosis of BMD increase in the spine includes improvement due to pharmacotherapy vs inter-current progression of spine degeneration or fracture    Fracture risk  The risk of osteoporotic fracture increases approximately 2-fold for each 1.0 SD decrease in T-score.  Low bone density is not the only risk factor for fracture; consider factors such as patient's age, fall risk, injury risk, previous osteoporotic fracture, family history of osteoporosis, etc.    Repeat  For patients eligible for Medicare, routine testing is allowed once every 2 years.  Testing frequency can be increased for patients on corticosteroids.    Clinical correlation recommended    Technical quality  Satisfactory.    Principal result :  Julien Pickens MD, [facility]  Division of Endocrinology and Diabetes  Department of Medicine      References:  Ref. 1. WHO categories:  T-score > -1.0  = normal             .  T-score -1.0 to -2.5 = low bone density  T-score < -2.5 = osteoporosis        .    Ref. 2. 2015 ISCD official position statements:  www.iscd.org.    Ref. 3.   Today's examination is compared to the technically similar prior study of the total hip and femur if available. Only changes deemed likely to be significant based on historical data are reported.  According to the ISCD position statements, total hip rather than femoral neck regions are to be compared because larger areas give better precision.  LSC = least significant changes at the [facility] (historical data)  AP spine =  0.032 g/cm2  (6/26/2007)  Left hip = 0.029 g/cm2  (6/15/2007)  Right hip = 0.018 g/cm2  (6/15/2007)  Left mid radius = 0.043 g/cm2  (6/26/2007)  Please note that the differential diagnosis of increase in bone density at the lumbar spine includes improvement due to pharmacotherapy vs inter-current progression of spine degeneration or fracture.    Ref. 4 Fracture risk is calculated in patients aged 40 to 90 years old with low bone density not on osteoporosis treatment. A 10 year fracture risk of 3% and higher for hip fracture and 20% and higher for major osteoporotic fracture is considered higher than acceptable risk and might be an indication for medical treatment.    Ref. 5.  By definition, osteoporosis may be diagnosed in the presence or with the history of a low trauma or fragility fracture.  Fragility and low trauma fracture is defined as a fracture resulting from the force of a fall from a standing height or less or a bone that breaks under conditions that would not cause a normal bone to break.    Ref. 6. NOF Physician's Guideline Website address:  www.nof.org.    No results found for this or any previous visit.                    Patient would like to have more labs checked including also vitamin D levels recheck of prolactin in such, concerned about her low TSH  Ongoing hair loss, patient is followed by dermatologist closely, this has improved some recently  Insomnia, patient notes more problems with falling and staying asleep for about 1 year    Symptoms of hypo- and  hyperthyroidism:  Weight change able to loose; heat or cold intolerance no; abnormal bowel movements no; hair loss YES, change in skin pattern ; anxiety no, depression no; fatigue no; heart racing no; tremors no; menses no    Exposure to radiation: yes  FH of thyroid Cancer: no     Hydrocortisone 10 mg in the evening    Levothyroxine 150 alternating with 175 mcg in the evening    Current Problem List:   Patient Active Problem List   Diagnosis     Prolactinoma (H)     Panhypopituitarism (H24)     Hormone replacement therapy (postmenopausal)     Carpal tunnel syndrome     History of robot-assisted laparoscopic hysterectomy     Chronic kidney disease, stage 3b (H)           Past Medical and Past Surgical History:  Past Medical History:   Diagnosis Date     Hyperlipidemia LDL goal < 130      Hypertension      Hypothyroidism      Osteopenia     DEXA: 12/11; T-score of -1.9        Panhypopituitarism (H24)        Past Surgical History:   Procedure Laterality Date     DAVINCI HYSTERECTOMY TOTAL, BILATERAL SALPINGO-OOPHORECTOMY, COMBINED Bilateral 9/11/2018    Procedure: COMBINED DAVINCI HYSTERECTOMY TOTAL, SALPINGO-OOPHORECTOMY;  Pelvic Washings, Davinci Assisted Laparoscopic Total Hysterectomy, Bilateral Salpingo Oophorectomy, cystoscopy;  Surgeon: Tiffany Lew MD;  [facility]     OPERATIVE HYSTEROSCOPY N/A 1/30/2018    Procedure: OPERATIVE HYSTEROSCOPY;  Operative Hysteroscopy, Resection of Endometrial Polyp, Dilation and Curettage;  Surgeon: Daysi Munoz MD;  [facility]     PROBE LACRIMAL DUCT, INSERT STENT BILATERAL, COMBINED Bilateral 11/26/2018    Procedure: BILATERAL NASOLACRIMAL STENT - LARGE DIAMETER STEN TUBE;  Surgeon: Davina Payton MD;  [facility]     resected prolactin-producing pituitary adenoma.  1975    Postoperative radiation therapy.        Medications:   Current Outpatient Medications   Medication Sig Dispense Refill     CALCIUM 500 +D 500-400 MG-UNIT TABS Take 1  tablet by mouth 2 times daily. 180 tablet 3     clobetasol (TEMOVATE) 0.05 % external solution Use once weekly 60 mL 3     dutasteride (AVODART) 0.5 MG capsule Take 1 capsule (0.5 mg) by mouth daily 90 capsule 3     hydrocortisone (CORTEF) 10 MG tablet TAKE 1 TABLET BY MOUTH IN  THE MORNING AND ONE-HALF  TABLET BY MOUTH AT MIDDAY 150 tablet 3     ketoconazole (NIZORAL) 2 % external shampoo Apply topically 2-3 times per week. 100 mL 11     levothyroxine (SYNTHROID/LEVOTHROID) 137 MCG tablet Take 1 tablet (137 mcg) by mouth daily 90 tablet 3     levothyroxine (SYNTHROID/LEVOTHROID) 150 MCG tablet TAKE 1 TABLET BY MOUTH  DAILY ON TUESDAY, THURSDAY, SATURDAY, SUNDAY (Patient not taking: Reported on 6/21/2024) 90 tablet 3     losartan-hydrochlorothiazide (HYZAAR) 100-25 MG tablet TAKE 1 TABLET BY MOUTH DAILY 100 tablet 1     minoxidil (ROGAINE) 5 % external solution Apply topically daily       simvastatin (ZOCOR) 40 MG tablet TAKE 1 TABLET BY MOUTH DAILY 90 tablet 3       Allergies:   No Known Allergies    Social History     Tobacco Use     Smoking status: Never     Passive exposure: Never     Smokeless tobacco: Never   Substance Use Topics     Alcohol use: No       Family History   Problem Relation Age of Onset     Diabetes Paternal Grandmother      Depression Paternal Grandmother      Thyroid Disease Maternal Grandmother      Skin Cancer Father      Melanoma No family hx of        Physical Examination:  not currently breastfeeding.  There is no height or weight on file to calculate BMI.    Weight now at 165 lbs  Wt Readings from Last 6 Encounters:   01/26/24 81.2 kg (179 lb 1.6 oz)   11/10/23 80.3 kg (177 lb)   06/22/23 82.6 kg (182 lb)   04/08/22 83.9 kg (185 lb)   03/17/22 83.9 kg (185 lb)   10/22/20 81.6 kg (180 lb)     General: Well appearing woma in no distress, up and go quick  Eyes: EOMI. Sclerae and conjunctivae are clear.   HENT: No thyromegaly or mass.    Lymphatic: No cervical or supraclavicular  lymphadenopathy.  Cardiovascular: RRR, with normal S1+S2 and no murmurs.   Skin: No rash or lesions. No abdominal striae.    Extremities: No peripheral edema.   Neurologic: No tremor with hands outstretched. 2+ patellar reflexes.  Muskuloskeletal: rib-pelvis distance 3         Labs and Studies:   Recent Labs   Lab Test 08/12/24  1029 01/26/24  0849 11/28/23  1006 11/10/23  1549 04/08/22  1624 03/17/22  1137 09/09/19  1224 10/22/18  1227 12/06/16  1849 07/22/16  1004   FSH  --   --   --   --   --   --   --   --   --  <0.2  FSH Reference Range   Female: Follicular      2.5-10.2           Mid-cycle       3.4-33.4           Luteal          1.5-9.1           Postmenopausal  23.0-116.3     T4 1.66 1.72* 1.70 1.59 1.18 1.64*   < >  --    < > 1.02   T3  --   --   --  121  --   --   --   --   --   --    TSH  --   --   --  0.04* 0.04* 0.02*   < > 0.11*   < > 0.13*   SHANTE  --   --   --   --   --   --   --  13.8  --   --    DAMIÁN 9.3  --   --   --   --   --   --   --   --   --     < > = values in this interval not displayed.       Lab Results   Component Value Date     08/12/2024    CO2 25 08/12/2024    CHLORIDE 104 08/12/2024    CR 1.36 (H) 08/12/2024    TRIG 110 11/28/2023    HDL 42 (L) 11/28/2023    HGB 13.9 05/24/2024     Lab Results   Component Value Date     08/12/2024    CHLORIDE 104 08/12/2024    CO2 25 08/12/2024    GLC 95 08/12/2024    CR 1.36 (H) 08/12/2024    CR 1.38 (H) 05/24/2024    CR 1.31 (H) 01/26/2024    CR 1.32 (H) 11/28/2023    CR 1.30 (H) 11/10/2023    ARI 9.6 08/12/2024    ALBUMIN 4.3 05/24/2024    ALKPHOS 103 11/10/2023    LDL 73 11/28/2023    HDL 42 (L) 11/28/2023    TRIG 110 11/28/2023    TSH 0.04 (L) 11/10/2023    TSH 0.04 (L) 04/08/2022    TSH 0.02 (L) 03/17/2022     Lab Results   Component Value Date    MICROL 41.6 01/26/2024     Lab Results   Component Value Date    A1C 5.7 (H) 11/28/2023    A1C 5.8 (H) 06/22/2023    A1C 5.5 03/03/2021       Lab Results   Component Value Date    HGB 13.9  05/24/2024       [email or web address]    Recent Labs   Lab Test 11/10/23  1549 04/08/22  1624 03/17/22  1137 09/09/19  1224 10/22/18  1227 12/06/16  1849 07/22/16  1004 12/03/15  0923   FSH  --   --   --   --   --   --  <0.2  FSH Reference Range   Female: Follicular      2.5-10.2           Mid-cycle       3.4-33.4           Luteal          1.5-9.1           Postmenopausal  23.0-116.3   <0.2  FSH Reference Range   Female: Follicular      2.5-10.2           Mid-cycle       3.4-33.4           Luteal          1.5-9.1           Postmenopausal  23.0-116.3     T4 1.59 1.18 1.64*   < >  --    < > 1.02 1.28   T3 121  --   --   --   --   --   --   --    TSH 0.04* 0.04* 0.02*   < > 0.11*   < > 0.13* 0.07*   SHANTE  --   --   --   --  13.8  --   --   --     < > = values in this interval not displayed.           Lab Results   Component Value Date    PROLACTIN 43 (H) 03/17/2022             DX Hip/Pelvis/Spine    [address] [address]  Phone: [phone]   Fax: [phone]      Patient name:   Maria Esther Lowe  Patient demographics:  66 year old White Female  History:  Evaluation of Bone Density, Family History of Fracture, Family History of Osteoporosis, Hysterectomy, Ovaries Removed, Post Menopausal and Thyroid Condition  Current treatments:  Calcium, Cholesterol Lowering Drugs, Estrogen, Fosamax/Alendronate, Sterids/Prednisone, Thyroid Medications, Vitamin D  Scan:    [employer]        Impression  The most negative and valid T-score of -1.5 at the level of the left femoral neck corresponds with low bone density according to WHO criteria for postmenopausal females and men age 50 and over. The risk of osteoporotic fracture increases approximately 2-fold for each 1.0 SD decrease in T-score.    Results  Lumbar spine  T-score -1.4 , BMD 1.017 g/cm2.    Left Femur neck  T-score -1.5 , BMD 0.828 g/cm2.  Left Total hip  T-score -1.2 , BMD 0.861  g/cm2.    Right Femur neck  T-score -1.0, BMD  0.905 g/cm2.  Right Total hip  T-score -1.2 , BMD  0.859 g/cm2.    Interval change  Bone density compared to the prior study has changed at lumbar spine by +7.6%, at the right total femur by +3.2%.    Percent changes not mentioned or within remaining regions are insignificant  Please note that the differential diagnosis of BMD increase in the spine includes improvement due to pharmacotherapy vs inter-current progression of spine degeneration or fracture    Fracture risk  The risk of osteoporotic fracture increases approximately 2-fold for each 1.0 SD decrease in T-score.  Low bone density is not the only risk factor for fracture; consider factors such as patient's age, fall risk, injury risk, previous osteoporotic fracture, family history of osteoporosis, etc.    Repeat  For patients eligible for Medicare, routine testing is allowed once every 2 years.  Testing frequency can be increased for patients on corticosteroids.    Clinical correlation recommended    Technical quality  Satisfactory.    Principal result :  Julien Pickens MD, [facility]  Division of Endocrinology and Diabetes  Department of Medicine      References:  Ref. 1. WHO categories:  T-score > -1.0  = normal             .  T-score -1.0 to -2.5 = low bone density  T-score < -2.5 = osteoporosis        .    Ref. 2. 2015 ISCD official position statements:  www.iscd.org.    Ref. 3.  Today's examination is compared to the technically similar prior study of the total hip and femur if available. Only changes deemed likely to be significant based on historical data are reported.  According to the ISCD position statements, total hip rather than femoral neck regions are to be compared because larger areas give better precision.  LSC = least significant changes at the [facility] (historical data)  AP spine =  0.032 g/cm2  (6/26/2007)  Left hip = 0.029 g/cm2  (6/15/2007)  Right hip = 0.018 g/cm2   (6/15/2007)  Left mid radius = 0.043 g/cm2  (6/26/2007)  Please note that the differential diagnosis of increase in bone density at the lumbar spine includes improvement due to pharmacotherapy vs inter-current progression of spine degeneration or fracture.    Ref. 4 Fracture risk is calculated in patients aged 40 to 90 years old with low bone density not on osteoporosis treatment. A 10 year fracture risk of 3% and higher for hip fracture and 20% and higher for major osteoporotic fracture is considered higher than acceptable risk and might be an indication for medical treatment.    Ref. 5.  By definition, osteoporosis may be diagnosed in the presence or with the history of a low trauma or fragility fracture.  Fragility and low trauma fracture is defined as a fracture resulting from the force of a fall from a standing height or less or a bone that breaks under conditions that would not cause a normal bone to break.    Ref. 6. NOF Physician's Guideline Website address:  www.nof.org.                      Again, thank you for allowing me to participate in the care of your patient.        Sincerely,        Diana Yanes MD

## 2024-08-19 NOTE — PATIENT INSTRUCTIONS
Take Hydrocortisone in the morning 10 mg  Take Hydrocortisone at around 5 mg 12pm-2pm    Double or triple the dose of hydrocortisone for 2 or 3 days if you would have an infection including COVID infection.  Infections are at times of increased risk for adrenal crisis    Decrease levothyroxine to take 125 mcg daily    Please establish care with a primary care provider, this will be particularly important for treatment of hypertension, also health maintenance things like colonoscopies.    Please do lab work prior to next visit in 1 year

## 2024-08-20 ENCOUNTER — ANCILLARY PROCEDURE (OUTPATIENT)
Dept: MAMMOGRAPHY | Facility: CLINIC | Age: 69
End: 2024-08-20
Attending: OBSTETRICS & GYNECOLOGY
Payer: COMMERCIAL

## 2024-08-20 DIAGNOSIS — N64.4 PAIN IN BREAST: ICD-10-CM

## 2024-08-20 PROCEDURE — 77066 DX MAMMO INCL CAD BI: CPT | Performed by: RADIOLOGY

## 2024-08-20 PROCEDURE — G0279 TOMOSYNTHESIS, MAMMO: HCPCS | Performed by: RADIOLOGY

## 2024-08-21 ENCOUNTER — PATIENT OUTREACH (OUTPATIENT)
Dept: ONCOLOGY | Facility: CLINIC | Age: 69
End: 2024-08-21
Payer: COMMERCIAL

## 2024-08-21 NOTE — PROGRESS NOTES
New Patient Oncology Nurse Navigator Note     Referring provider: Tiffany Lew MD      Referring Clinic/Organization: [facility]      Referred to (specialty:) Breast Provider Referral      Date Referral Received: August 14, 2024     Evaluation for:  N64.4 (ICD-10-CM) - Pain in breast    Clinical History (per Nurse review of records provided):      Kathy Lowe is a 69 year old female referred for right medial breast pain. She called ordering provider's office on 8/14 complaining of new onset right breast pain. Bilateral diagnostic mammograms were performed on 8/20/24 showing scattered areas of fibroglandular density. No suspicious mammographic findings in either breast. Right breast ultrasound was not performed.      Records Location: See Bookmarked material     Records Needed: Breast imaging for past 5 years     8/21 0949 - Telephoned and left voice message for patient. Explained my role and purpose for the call and welcomed call if she would like to proceed with breast provider consult with Katarina Sage PA-C.    8/28 1012 - Telephoned and spoke with Kathy. Explained my role and purpose for the call. Offered her an opportunity to meet with Katarina Sage PA-C to clinically follow up on breast pain. In light of favorable imaging on 8/20 and breast pain resolving, Kathy declined to meet with breast provider at this point. This referral can be reopened if breast pain needs to be explored in the future.

## 2024-11-25 ENCOUNTER — TELEPHONE (OUTPATIENT)
Dept: DERMATOLOGY | Facility: CLINIC | Age: 69
End: 2024-11-25
Payer: COMMERCIAL

## 2024-11-25 NOTE — TELEPHONE ENCOUNTER
[facility] Call Center    Phone Message    May a detailed message be left on voicemail: yes     Reason for Call: Other: Patient is very upset as she doesn't know who to see for hair loss. Patient is wondering if she can get a referral to see . Please call patient back to discuss. Thanks.     Action Taken: te sent     Travel Screening: Not Applicable     Date of Service:

## 2024-11-26 ENCOUNTER — MYC MEDICAL ADVICE (OUTPATIENT)
Dept: DERMATOLOGY | Facility: CLINIC | Age: 69
End: 2024-11-26
Payer: COMMERCIAL

## 2024-11-26 NOTE — TELEPHONE ENCOUNTER
Called pt back. Scheduled pt with Naa Harden PA-C in January, and Dr. Nguyen in July.     Ada Ojeda on 11/26/2024 at 10:59 AM

## 2024-11-26 NOTE — TELEPHONE ENCOUNTER
Called pt and she was busy at an appointment. Pt requested we called back at 10AM.    Ada Ojeda on 11/26/2024 at 9:05 AM

## 2024-12-10 DIAGNOSIS — E23.0 PANHYPOPITUITARISM (H): ICD-10-CM

## 2024-12-10 NOTE — TELEPHONE ENCOUNTER
[facility] Call Center    Phone Message    May a detailed message be left on voicemail: yes     Reason for Call: Medication Refill Request    Has the patient contacted the pharmacy for the refill? Yes   Name of medication being requested:   losartan-hydrochlorothiazide (HYZAAR) 100-25 MG tablet     Provider who prescribed the medication:   Pharmacy: [facility] - [place], [place] - [address]    Date medication is needed: ASAP    Action Taken: Other: Endo    Travel Screening: Not Applicable     Date of Service: 12/10/24

## 2024-12-11 RX ORDER — LOSARTAN POTASSIUM AND HYDROCHLOROTHIAZIDE 25; 100 MG/1; MG/1
1 TABLET ORAL DAILY
Qty: 100 TABLET | Refills: 1 | Status: SHIPPED | OUTPATIENT
Start: 2024-12-11

## 2024-12-11 NOTE — TELEPHONE ENCOUNTER
losartan-hydrochlorothiazide (HYZAAR) 100-25 MG tablet   Disp-100 tablet, R-1   Start: 08/19/2024     Diana Yanes MD  Endocrinology, Diabetes, and Metabolism    8/19/2024  [facility]    Routed because: labs ordered. Not drawn. Request per pt call.  Angiotensin-II Receptors [id number]/11/2024 07:34 AM   Protocol Details Most recent blood pressure under 140/90 in past 12 months    Has GFR on file in past 12 months and most recent value is normal    Medication indicated for associated diagnosis     Diuretics (Including Combos) Protocol Failed   Protocol Details Most recent blood pressure under 140/90 in past 12 months    Medication indicated for associated diagnosis    Has GFR on file in past 12 months and most recent value is normal

## 2025-01-03 NOTE — PROGRESS NOTES
[facility] Dermatology Note  Encounter Date: Jan 9, 2025  Office Visit     Reviewed patients past medical history and pertinent chart review prior to patients visit today.     Dermatology Problem List:    1. 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 Repeat bx 5/20/2020 most consistent with androgenetic alopecia (which makes sense in setting of #2 as well)   - Biopsy in 2016 consistent with alopecia areata, but not clinically consistent; had outside ILK without improvement per report  - Repeat bx 5/20/2020 with nonscarring alopecia with marked miniaturization, favoring androgenetic alopecia. Also mild perifollicular fibrosis.  - Current tx: LLLT 3x weekly (started years ago), dutasteride 0.5mg daily, ketoconazole shampoo 2-3x weekly, Minoxidil 5% once daily, clobetasol solution 1-2x a week,  multivitamin    -Previous tx: finasteride 5 mg daily, calcipotriene solution twice weekly,  Hair labs: 4/8/2022  TSH: 0.04 (low)  Cbc: normal 2 years ago  Ferritin:105 (2 years ago)  Zinc: 72 (9/15/2021)  Vitamin D: 65  2. Pituitary prolactinoma s/p removal in 1975 followed by radiation with subsequent development of panhypopituitarism   3.  TSH <0.01 in 6/30/2021  - She is on estradiol per endocrinology    ____________________________________________    Assessment & Plan:     # Female pattern hair loss, favor AGA. Has old bx with alopecia areata. Hair Metrix performed 2/8/24. She is much improved overall.   - Continue to use minoxidil 5% foam one or two times daily  - Continue HairMax low level laser comb, increase to 3x weekly  - Continue ketoconazole shampoo 2-3 times a week  - Continue clobetasol solution 1-2x weekly.  - Continue dutasteride 0.5mg daily.  Denies SOB, swelling, dizziness.   - Future consideration: low dose oral minoxidil- Hx of long QT (counseled patient that she would need clearance from cardiology first  again today), spironolactone, increasing topical minoxidil to twice daily, PRP-discussed benefits, risks, and cost     Follow up: with Dr. Nguyen 7/8/2025     Naa Harden PA-C  [facility]  Dermatology    _______________________________________    CC: Hair Loss (Hair loss follow up- she has been really diligent on putting on Rogaine and using the LLLT comb. She is wondering about other solutions. Left frontal scalp is one of her areas of concern. )    HPI:  Ms. Maria Esther Lowe is a(n) 69 year old female who presents today as a return patient for follow-up of female pattern hair loss.  The patient was last seen at [facility] dermatology in 6/21/2024 at which time she was following up for hair loss.  Over the past 6 months patient has continued with oral dutasteride 0.5 mg.  She notes that she is tolerating this well without side effects.  She has continued using topical Rogaine 1-2 times daily.  She does admit that she has been lax on using the topical clobetasol solution.  She uses her low-level laser comb about 2 times weekly.  She also sporadically uses ketoconazole shampoo.  She denies symptoms on her scalp such as itching, burning, flaking or erythema.  She is overall content with this regimen but wondering if any changes need to be made.    Patient is otherwise feeling well, without additional skin concerns.    Physical Exam:  SKIN: Focused examination of scalp was performed.  -Thinning of hair on frontal and temporal scalp  - Widening of part on vertex scalp    - No other lesions of concern on areas examined.     Medications:  Current Outpatient Medications   Medication Sig Dispense Refill    CALCIUM 500 +D 500-400 MG-UNIT TABS Take 1 tablet by mouth 2 times daily. 180 tablet 3    clobetasol (TEMOVATE) 0.05 % external solution Use once weekly 60 mL 3    dutasteride (AVODART) 0.5 MG capsule Take 1 capsule (0.5 mg) by mouth daily 90 capsule 3    hydrocortisone (CORTEF) 10 MG tablet TAKE 1  TABLET BY MOUTH IN  THE MORNING. Double or triple dose for stress dosing when you are sick. 120 tablet 3    ketoconazole (NIZORAL) 2 % external shampoo Apply topically 2-3 times per week. 100 mL 11    levothyroxine (SYNTHROID/LEVOTHROID) 125 MCG tablet Take 1 tablet (125 mcg) by mouth daily. 90 tablet 4    losartan-hydrochlorothiazide (HYZAAR) 100-25 MG tablet Take 1 tablet by mouth daily. 100 tablet 1    minoxidil (ROGAINE) 5 % external solution Apply topically daily      simvastatin (ZOCOR) 40 MG tablet Take 1 tablet (40 mg) by mouth daily 90 tablet 3     No current facility-administered medications for this visit.      Past Medical History:   Patient Active Problem List   Diagnosis    Prolactinoma (H)    Panhypopituitarism    Hormone replacement therapy (postmenopausal)    Carpal tunnel syndrome    History of robot-assisted laparoscopic hysterectomy    Chronic kidney disease, stage 3b (H)     Past Medical History:   Diagnosis Date    Hyperlipidemia LDL goal < 130     Hypertension     Hypothyroidism     Osteopenia     DEXA: 12/11; T-score of -1.9       Panhypopituitarism        CC No referring provider defined for this encounter. on close of this encounter.

## 2025-01-09 ENCOUNTER — OFFICE VISIT (OUTPATIENT)
Dept: DERMATOLOGY | Facility: CLINIC | Age: 70
End: 2025-01-09
Payer: COMMERCIAL

## 2025-01-09 DIAGNOSIS — L64.9 ANDROGENETIC ALOPECIA: Primary | ICD-10-CM

## 2025-01-09 ASSESSMENT — PAIN SCALES - GENERAL: PAINLEVEL_OUTOF10: NO PAIN (0)

## 2025-01-09 NOTE — LETTER
1/9/2025      Maria Esther Lowe  [address] [facility] [id number]      Dear Colleague,    Thank you for referring your patient, Maria Esther Lowe, to the [facility]. Please see a copy of my visit note below.    [facility] Dermatology Note  Encounter Date: Jan 9, 2025  Office Visit     Reviewed patients past medical history and pertinent chart review prior to patients visit today.     Dermatology Problem List:    1. 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 Repeat bx 5/20/2020 most consistent with androgenetic alopecia (which makes sense in setting of #2 as well)   - Biopsy in 2016 consistent with alopecia areata, but not clinically consistent; had outside ILK without improvement per report  - Repeat bx 5/20/2020 with nonscarring alopecia with marked miniaturization, favoring androgenetic alopecia. Also mild perifollicular fibrosis.  - Current tx: LLLT 3x weekly (started years ago), dutasteride 0.5mg daily, ketoconazole shampoo 2-3x weekly, Minoxidil 5% once daily, clobetasol solution 1-2x a week,  multivitamin    -Previous tx: finasteride 5 mg daily, calcipotriene solution twice weekly,  Hair labs: 4/8/2022  TSH: 0.04 (low)  Cbc: normal 2 years ago  Ferritin:105 (2 years ago)  Zinc: 72 (9/15/2021)  Vitamin D: 65  2. Pituitary prolactinoma s/p removal in 1975 followed by radiation with subsequent development of panhypopituitarism   3.  TSH <0.01 in 6/30/2021  - She is on estradiol per endocrinology    ____________________________________________    Assessment & Plan:     # Female pattern hair loss, favor AGA. Has old bx with alopecia areata. Hair Metrix performed 2/8/24. She is much improved overall.   - Continue to use minoxidil 5% foam one or two times daily  - Continue HairMax low level laser comb, increase to 3x weekly  - Continue ketoconazole shampoo 2-3 times a week  - Continue  clobetasol solution 1-2x weekly.  - Continue dutasteride 0.5mg daily.  Denies SOB, swelling, dizziness.   - Future consideration: low dose oral minoxidil- Hx of long QT (counseled patient that she would need clearance from cardiology first again today), spironolactone, increasing topical minoxidil to twice daily, PRP-discussed benefits, risks, and cost     Follow up: with Dr. Nguyen 7/8/2025     Naa Harden PA-C  [facility]  Dermatology    _______________________________________    CC: Hair Loss (Hair loss follow up- she has been really diligent on putting on Rogaine and using the LLLT comb. She is wondering about other solutions. Left frontal scalp is one of her areas of concern. )    HPI:  Ms. Maria Esther Lowe is a(n) 69 year old female who presents today as a return patient for follow-up of female pattern hair loss.  The patient was last seen at [facility] dermatology in 6/21/2024 at which time she was following up for hair loss.  Over the past 6 months patient has continued with oral dutasteride 0.5 mg.  She notes that she is tolerating this well without side effects.  She has continued using topical Rogaine 1-2 times daily.  She does admit that she has been lax on using the topical clobetasol solution.  She uses her low-level laser comb about 2 times weekly.  She also sporadically uses ketoconazole shampoo.  She denies symptoms on her scalp such as itching, burning, flaking or erythema.  She is overall content with this regimen but wondering if any changes need to be made.    Patient is otherwise feeling well, without additional skin concerns.    Physical Exam:  SKIN: Focused examination of scalp was performed.  -Thinning of hair on frontal and temporal scalp  - Widening of part on vertex scalp    - No other lesions of concern on areas examined.     Medications:  Current Outpatient Medications   Medication Sig Dispense Refill     CALCIUM 500 +D 500-400 MG-UNIT TABS Take 1 tablet by mouth  2 times daily. 180 tablet 3     clobetasol (TEMOVATE) 0.05 % external solution Use once weekly 60 mL 3     dutasteride (AVODART) 0.5 MG capsule Take 1 capsule (0.5 mg) by mouth daily 90 capsule 3     hydrocortisone (CORTEF) 10 MG tablet TAKE 1 TABLET BY MOUTH IN  THE MORNING. Double or triple dose for stress dosing when you are sick. 120 tablet 3     ketoconazole (NIZORAL) 2 % external shampoo Apply topically 2-3 times per week. 100 mL 11     levothyroxine (SYNTHROID/LEVOTHROID) 125 MCG tablet Take 1 tablet (125 mcg) by mouth daily. 90 tablet 4     losartan-hydrochlorothiazide (HYZAAR) 100-25 MG tablet Take 1 tablet by mouth daily. 100 tablet 1     minoxidil (ROGAINE) 5 % external solution Apply topically daily       simvastatin (ZOCOR) 40 MG tablet Take 1 tablet (40 mg) by mouth daily 90 tablet 3     No current facility-administered medications for this visit.      Past Medical History:   Patient Active Problem List   Diagnosis     Prolactinoma (H)     Panhypopituitarism     Hormone replacement therapy (postmenopausal)     Carpal tunnel syndrome     History of robot-assisted laparoscopic hysterectomy     Chronic kidney disease, stage 3b (H)     Past Medical History:   Diagnosis Date     Hyperlipidemia LDL goal < 130      Hypertension      Hypothyroidism      Osteopenia     DEXA: 12/11; T-score of -1.9        Panhypopituitarism        CC No referring provider defined for this encounter. on close of this encounter.       Again, thank you for allowing me to participate in the care of your patient.        Sincerely,        Naa Harden PA-C    Electronically signed

## 2025-01-09 NOTE — NURSING NOTE
Maria Esther Lowe's chief complaint for this visit includes:  Chief Complaint   Patient presents with    Hair Loss     Hair loss follow up- she has been really diligent on putting on Rogaine and using the LLLT comb. She is wondering about other solutions. Left frontal scalp is one of her areas of concern.      PCP: Tiffany Lew    Referring Provider:  Naa Harden PA-C  [address] [place] [address]    There were no vitals taken for this visit.  No Pain (0)      No Known Allergies      Do you need any medication refills at today's visit?   No.      Elina Mix RN on 1/9/2025 at 12:25 PM

## 2025-05-31 DIAGNOSIS — I10 BENIGN ESSENTIAL HYPERTENSION: Primary | ICD-10-CM

## 2025-06-03 ENCOUNTER — MYC MEDICAL ADVICE (OUTPATIENT)
Dept: ENDOCRINOLOGY | Facility: CLINIC | Age: 70
End: 2025-06-03
Payer: COMMERCIAL

## 2025-06-03 ENCOUNTER — TELEPHONE (OUTPATIENT)
Dept: ENDOCRINOLOGY | Facility: CLINIC | Age: 70
End: 2025-06-03
Payer: COMMERCIAL

## 2025-06-03 DIAGNOSIS — M85.9 LOW BONE DENSITY: ICD-10-CM

## 2025-06-03 DIAGNOSIS — I10 BENIGN ESSENTIAL HYPERTENSION: Primary | ICD-10-CM

## 2025-06-03 DIAGNOSIS — I10 BENIGN ESSENTIAL HYPERTENSION: ICD-10-CM

## 2025-06-03 DIAGNOSIS — E23.0 PANHYPOPITUITARISM: Primary | ICD-10-CM

## 2025-06-03 DIAGNOSIS — E78.5 HYPERLIPIDEMIA LDL GOAL <100: ICD-10-CM

## 2025-06-03 RX ORDER — LOSARTAN POTASSIUM AND HYDROCHLOROTHIAZIDE 25; 100 MG/1; MG/1
1 TABLET ORAL DAILY
Qty: 90 TABLET | Refills: 1 | Status: SHIPPED | OUTPATIENT
Start: 2025-06-03

## 2025-06-03 NOTE — TELEPHONE ENCOUNTER
Last Written Prescription:  losartan-hydrochlorothiazide (HYZAAR) 100-25 MG tablet 100 tablet 1 12/11/2024     ----------------------  Last Visit Date: 8/19/24  Future Visit Date: 8/25/25  ----------------------  Care Everywhere updated and checked for outside labs    Refill decision: Medication unable to be refilled by RN due to:  Abnormal labs/test: and Other:  Elevated BP readings  Diagnosis does not match medication indication, cannot fill per protocol.           Request from pharmacy:  Requested Prescriptions   Pending Prescriptions Disp Refills    losartan-hydrochlorothiazide (HYZAAR) 100-25 MG tablet [Pharmacy Med Name: Losartan Potassium-HCTZ 100-25 MG Oral Tablet] 100 tablet 2     Sig: TAKE 1 TABLET BY MOUTH DAILY       Angiotensin-II Receptors Failed - 6/3/2025 12:25 PM        Failed - Most recent blood pressure under 140/90 in past 12 months     BP Readings from Last 3 Encounters:   06/21/24 (!) 165/82   01/26/24 (!) 151/82   11/10/23 (!) 155/75       No data recorded            Failed - Has GFR on file in past 12 months and most recent value is normal        Failed - Medication indicated for associated diagnosis     The medication is prescribed for one or more of the following conditions:    Chronic Kidney Disease (CDK)    Heart Failure (HF)    Diabetes, Nephropathy   Hypertension    Coronary Artery Disease (CAD)   Raynaud's Disease   Ischemic cardiomyopathy   Cardiomyopathy   Pulmonary Hypertension          Passed - Medication is active on med list and the sig matches. RN to manually verify dose and sig if red X/fail.     If the protocol passes (green check), you do not need to verify med dose and sig.    A prescription matches if they are the same clinical intention.    For Example: once daily and every morning are the same.    The protocol can not identify upper and lower case letters as matching and will fail.     For Example: Take 1 tablet (50 mg) by mouth daily     TAKE 1 TABLET (50 MG) BY MOUTH  DAILY    For all fails (red x), verify dose and sig.    If the refill does match what is on file, the RN can still proceed to approve the refill request.       If they do not match, route to the appropriate provider.             Passed - Recent (12 month) or future (90 days) visit with authorizing provider's specialty (provided they have been seen in the past 15 months)     The patient must have completed an in-person or virtual visit within the past 12 months or has a future visit scheduled within the next 90 days with the authorizing provider s specialty.  Urgent care and e-visits do not qualify as an office visit for this protocol.          Passed - Patient is age 18 or older        Passed - No active pregnancy on record        Passed - Normal serum potassium on file in past 12 months     Recent Labs   Lab Test 08/12/24  1029   POTASSIUM 4.0                    Passed - No positive pregnancy test in past 12 months       Diuretics (Including Combos) Protocol Failed - 6/3/2025 12:25 PM        Failed - Most recent blood pressure under 140/90 in past 12 months     BP Readings from Last 3 Encounters:   06/21/24 (!) 165/82   01/26/24 (!) 151/82   11/10/23 (!) 155/75       No data recorded            Failed - Medication indicated for associated diagnosis     Medication is associated with one or more of the following diagnoses:     Edema   Hypertension   Heart Failure   Meniere's Disease   Bilateral localized swelling of lower limbs   Pulmonary Hypertension          Failed - Has GFR on file in past 12 months and most recent value is normal        Passed - Potassium level on file in past 12 months        Passed - Medication is active on med list and the sig matches. RN to manually verify dose and sig if red X/fail.     If the protocol passes (green check), you do not need to verify med dose and sig.    A prescription matches if they are the same clinical intention.    For Example: once daily and every morning are the  same.    The protocol can not identify upper and lower case letters as matching and will fail.     For Example: Take 1 tablet (50 mg) by mouth daily     TAKE 1 TABLET (50 MG) BY MOUTH DAILY    For all fails (red x), verify dose and sig.    If the refill does match what is on file, the RN can still proceed to approve the refill request.       If they do not match, route to the appropriate provider.             Passed - Recent (12 month) or future (90 days) visit with authorizing provider's specialty (provided they have been seen in the past 15 months)     The patient must have completed an in-person or virtual visit within the past 12 months or has a future visit scheduled within the next 90 days with the authorizing provider s specialty.  Urgent care and e-visits do not qualify as an office visit for this protocol.          Passed - Patient is age 18 or older        Passed - No active pregancy on record        Passed - No positive pregnancy test in past 12 months

## 2025-06-06 DIAGNOSIS — E78.5 HYPERLIPIDEMIA LDL GOAL <100: ICD-10-CM

## 2025-06-09 RX ORDER — SIMVASTATIN 40 MG
40 TABLET ORAL DAILY
Qty: 100 TABLET | Refills: 2 | OUTPATIENT
Start: 2025-06-09

## 2025-06-09 NOTE — TELEPHONE ENCOUNTER
Request to early  simvastatin (ZOCOR) 40 MG tablet 90 tablet 3 8/19/2024 -- No   Sig - Route: Take 1 tablet (40 mg) by mouth daily - Oral

## 2025-06-15 ENCOUNTER — HEALTH MAINTENANCE LETTER (OUTPATIENT)
Age: 70
End: 2025-06-15

## 2025-06-21 DIAGNOSIS — L64.9 ANDROGENETIC ALOPECIA: Primary | ICD-10-CM

## 2025-06-25 RX ORDER — DUTASTERIDE 0.5 MG/1
1 CAPSULE, LIQUID FILLED ORAL DAILY
Qty: 30 CAPSULE | Refills: 0 | Status: SHIPPED | OUTPATIENT
Start: 2025-06-25

## 2025-06-25 NOTE — TELEPHONE ENCOUNTER
Last Written Prescription:  dutasteride (AVODART) 0.5 MG capsule  0.5 mg, DAILY           Summary: Take 1 capsule (0.5 mg) by mouth daily, Disp-90 capsule, R-3, E-Prescribe  Dose, Route, Frequency: 0.5 mg, Oral, DAILYStart: 06/21/2024Ordered On: 06/21/2024Pharmacy: [id number] - [place], [place] - [address].ReportDx Associated: Taking: Long-term: Med Note:                Directions: Take 1 capsule (0.5 mg) by mouth daily  Ordering Department: [facility]  Authorized By: Carley Serrano MD  Dispense: 90 capsule  Refills: 3 ordered       ----------------------  Last Visit Date: 01/09/2025 Office Visit Derm - LAURA Harden   Future Visit Date: 7/8/25 [facility]  ----------------------      Refill decision: Medication refilled per  Medication Refill in Ambulatory Care  policy.   []  If no future appointment scheduled: Route to Clinic Coordinators to contact the pt for appointment.      Refill decision: Medication unable to be refilled by RN due to: Medication not included in refill protocol policy   BPH Agents [id number]/21/2025 04:26 PM   Protocol Details Medication is active on med list and the sig matches. RN to manually verify dose and sig if red X/fail.    Medication indicated for associated diagnosis   Provider no longer with [facility]      Request from pharmacy:  Requested Prescriptions   Pending Prescriptions Disp Refills    dutasteride (AVODART) 0.5 MG capsule [Pharmacy Med Name: Dutasteride Oral Capsule 0.5 MG] 90 capsule 0     Sig: TAKE ONE CAPSULE BY MOUTH ONCE DAILY       BPH Agents Failed - 6/25/2025  9:10 AM        Failed - Medication is active on med list and the sig matches. RN to manually verify dose and sig if red X/fail.     If the protocol passes (green check), you do not need to verify med dose and sig.    A prescription matches if they are the same clinical intention.    For Example: once daily and every morning are the same.    The protocol can not identify upper and lower case letters as matching  and will fail.     For Example: Take 1 tablet (50 mg) by mouth daily     TAKE 1 TABLET (50 MG) BY MOUTH DAILY    For all fails (red x), verify dose and sig.    If the refill does match what is on file, the RN can still proceed to approve the refill request.       If they do not match, route to the appropriate provider.             Failed - Medication indicated for associated diagnosis     Medication is associated with one or more of the following diagnoses:     Benign prostatic hyperplasia   Male pattern alopecia   Nocturia          Passed - Recent (12 month) or future (90 days) visit with authorizing provider's specialty (provided they have been seen in the past 15 months)     The patient must have completed an in-person or virtual visit within the past 12 months or has a future visit scheduled within the next 90 days with the authorizing provider s specialty.  Urgent care and e-visits do not qualify as an office visit for this protocol.          Passed - Patient is 18 years of age or older        Passed - No active pregnancy on record        Passed - No positive pregnancy test in past 12 months

## 2025-07-08 ENCOUNTER — OFFICE VISIT (OUTPATIENT)
Dept: DERMATOLOGY | Facility: CLINIC | Age: 70
End: 2025-07-08
Payer: COMMERCIAL

## 2025-07-08 DIAGNOSIS — L30.9 DERMATITIS: ICD-10-CM

## 2025-07-08 DIAGNOSIS — L64.9 ANDROGENETIC ALOPECIA: Primary | ICD-10-CM

## 2025-07-08 DIAGNOSIS — Z51.81 MEDICATION MONITORING ENCOUNTER: ICD-10-CM

## 2025-07-08 PROCEDURE — 99214 OFFICE O/P EST MOD 30 MIN: CPT | Performed by: DERMATOLOGY

## 2025-07-08 RX ORDER — CLOBETASOL PROPIONATE 0.05 G/100ML
SHAMPOO TOPICAL
Qty: 118 ML | Refills: 5 | Status: SHIPPED | OUTPATIENT
Start: 2025-07-08

## 2025-07-08 RX ORDER — IBUPROFEN 200 MG
400 TABLET ORAL PRN
COMMUNITY
Start: 2024-08-02

## 2025-07-08 RX ORDER — DUTASTERIDE 0.5 MG/1
1 CAPSULE, LIQUID FILLED ORAL DAILY
Qty: 90 CAPSULE | Refills: 1 | Status: SHIPPED | OUTPATIENT
Start: 2025-07-08

## 2025-07-08 NOTE — LETTER
7/8/2025      Maria Esther Lowe  [address] [facility] [id number]      Dear Colleague,    Thank you for referring your patient, Maria Esther Lowe, to the [facility]. Please see a copy of my visit note below.    [facility] Dermatology Note  Encounter Date: Jul 8, 2025  Office Visit    Dermatology Problem List:  1.  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 Repeat bx 5/20/2020 most consistent with androgenetic alopecia (which makes sense in setting of #2 as well)   - Biopsy in 2016 consistent with alopecia areata, but not clinically consistent; had outside ILK without improvement per report  - Repeat bx 5/20/2020 with nonscarring alopecia with marked miniaturization, favoring androgenetic alopecia. Also mild perifollicular fibrosis.  - Current tx: LLLT 3x weekly (started years ago), dutasteride 0.5mg daily, ketoconazole shampoo 2-3x weekly, Minoxidil 5% once daily, clobex shampoo,  multivitamin    -Previous tx: finasteride 5 mg daily, calcipotriene solution twice weekly, clobetasol solution 1-2x a week  Hair labs: 4/8/2022  TSH: 0.04 (low)  Cbc: normal 2 years ago  Ferritin:105 (2 years ago)  Zinc: 72 (9/15/2021)  Vitamin D: 65  2. Pituitary prolactinoma s/p removal in 1975 followed by radiation with subsequent development of panhypopituitarism   3.  TSH <0.01 in 6/30/2021  - She is also on estradiol per endocrinology  ____________________________________________    Assessment & Plan:     #  Female pattern hair loss, favor AGA. Has old bx with alopecia areata. Hair Metrix performed 2/8/24. She is much improved overall.   - start clobex shampoo, apply to dry scalp, leave on for 15 minutes, lather and rinse.   - Continue to use minoxidil 5% foam one or two times daily  - Continue HairMax low level laser comb 2x weekly  - Continue ketoconazole shampoo 2-3 times a week. Alternating with  clobex shampoo   - Pt hasn't been using the clobetasol solution   - Continue dutasteride 0.5mg daily. (refilled today)  - Future consideration: low dose oral minoxidil- Hx of long QT (counseled patient that she would need clearance from cardiology first again today), spironolactone, increasing topical minoxidil to twice daily, PRP-discussed benefits, risks, and cost  - labs ordered today: DHEAS, Free and total testosterone Ferritin, Iron studies, CMP, Zinc    Procedures Performed:   HairMetrix: In comparison to HairMetrix from 2/8/24 to 7/8/25  - Frontal anterior scalp: 118 to 165 today  - Mid scalp: 110 to 91 today  - Vertex scalp: 166 to 106 today, mild perifollicular scale  - Occipital scalp: 126 to 151, good scalp health  - Right temple: 90 to 108 today  - Left temple: 106 to 119 today, diffuse adherent scale      Follow-up: 6 month(s) in-person, or earlier for new or changing lesions    Staff and Scribe  I, KATERIN HURST, am serving as a scribe; to document services personally performed by Isabel Nguyen MD -based on data collection and the provider's statements to me.    Provider Disclosure:   The documentation recorded by the scribe accurately reflects the services I personally performed and the decisions made by me.    Isabel Nguyen MD  Professor   Department of Dermatology  [facility]: Phone: [phone], Fax:[phone]  [facility]: Phone: [phone], Fax: [phone]      ____________________________________________    CC: Hair Loss (Patient is here for a follow up was a  patient )    HPI:  Ms. Maria Esther Lowe is a 69 year old female who presents as a return patient for follow-up of hair loss, diagnosed as female pattern hair loss.   - Last seen in-clinic on 1/9/25 by Naa Redepenning PA-C.  - Shedding or thinning, or both: n/a  - Current tx: oral dutasteride 0.5 mg,  topical Rogaine, Low-level laser comb, ketoconazole shampoo   - If using Rogaine, 1 cannister lasts how long: about 2 weeks  No Any new medications, supplements, or products? (please list below)     No Scalp pain   No Scalp burning   No Scalp itching    No Eyebrow changes    No Eyelash changes   No Beard changes    No Other body hair changes    No Nail changes    No Additional symptoms? (please list below)     - Overall course: Pituitary tumor years ago, after which she developed hormone problems. Is going through a sleep apnea test tomorrow. Regarding her hair she reports that she applies topical Rogaine on the frontal scalp and temple region and states that 1 cannister lasts approximately 2 weeks. She recently got her hair colored for the first time.  Pt is unsure if dutasteride was helping at all. Thinks that her hair fall is at a standstill currently. She is using Actonis laser.     Pt makes custom earplugs. Patient is otherwise feeling well, in usual state of health, and has no additional skin concerns today.         Labs:  T4 and BMP  reviewed.    Physical Exam:  GEN: Well developed, well-nourished, in no acute distress, in a pleasant mood.    SKIN: Focused examination of face, scalp and hands was performed.  - Carlos part width of carlee tree pattern in the front, 2 in the front, 1.5 in the middle, 1.5 in the back, 1.2 in the vertex region, 1.1 in occipital region   - The layers of hair regrowth layers were noted to be  - 1 cm for the first  - robust at 3 cm at the second  - 8 cm at the third  - decrease in density on the sides, the parietals region  - no diffuse erythema   - no perifollicular erythema  - no perifollicular scale   - no scaling of the scalp   - negative hair pull test   - normal eyelash density  - fine, lightly pigmented fibers all the way across eyebrows  - no nail pitting or dystrophy   - no scalp folliculitis/pustules   - No other lesions of concern on areas examined.      Medications:  Current Outpatient Medications   Medication Sig Dispense Refill     CALCIUM 500 +D 500-400 MG-UNIT TABS Take 1 tablet by mouth 2 times daily. 180 tablet 3     clobetasol (TEMOVATE) 0.05 % external solution Use once weekly 60 mL 3     dutasteride (AVODART) 0.5 MG capsule TAKE ONE CAPSULE BY MOUTH ONCE DAILY 30 capsule 0     hydrocortisone (CORTEF) 10 MG tablet TAKE 1 TABLET BY MOUTH IN  THE MORNING. Double or triple dose for stress dosing when you are sick. 120 tablet 3     ibuprofen (ADVIL/MOTRIN) 200 MG tablet Take 400 mg by mouth as needed.       ketoconazole (NIZORAL) 2 % external shampoo Apply topically 2-3 times per week. 100 mL 11     levothyroxine (SYNTHROID/LEVOTHROID) 125 MCG tablet Take 1 tablet (125 mcg) by mouth daily. 90 tablet 4     losartan-hydrochlorothiazide (HYZAAR) 100-25 MG tablet Take 1 tablet by mouth daily. 90 tablet 1     minoxidil (ROGAINE) 5 % external solution Apply topically daily       simvastatin (ZOCOR) 40 MG tablet Take 1 tablet (40 mg) by mouth daily 90 tablet 3     No current facility-administered medications for this visit.      Past Medical History:   Patient Active Problem List   Diagnosis     Prolactinoma (H)     Panhypopituitarism     Hormone replacement therapy (postmenopausal)     Carpal tunnel syndrome     History of robot-assisted laparoscopic hysterectomy     Chronic kidney disease, stage 3b (H)     Past Medical History:   Diagnosis Date     Hyperlipidemia LDL goal < 130      Hypertension      Hypothyroidism      Osteopenia     DEXA: 12/11; T-score of -1.9        Panhypopituitarism        CC No referring provider defined for this encounter. on close of this encounter.      HairMetrix Summary (Date Jul 8, 2025)      Frontal anterior (4.5 cm)      Mid scalp (12 cm)      Vertex (24 cm)      Occipital (30 cm)      Right temporal (7 x 9 cm)      Left temporal (6 x 9 cm)      Summary          Again, thank you for allowing me to participate in the care of your  patient.        Sincerely,        Isabel Nguyen MD    Electronically signed

## 2025-07-08 NOTE — NURSING NOTE
Maria Esther Lowe's goals for this visit include:   Chief Complaint   Patient presents with    Hair Loss     Patient is here for a follow up was a  patient        She requests these members of her care team be copied on today's visit information:     PCP: Tiffany Lew    Referring Provider:  Tiffany Lew MD  [address]  [place] [address]    There were no vitals taken for this visit.    Do you need any medication refills at today's visit?         Mima Schroeder EMT

## 2025-07-08 NOTE — PROGRESS NOTES
[facility] Dermatology Note  Encounter Date: Jul 8, 2025  Office Visit    Dermatology Problem List:  1.  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 Repeat bx 5/20/2020 most consistent with androgenetic alopecia (which makes sense in setting of #2 as well)   - Biopsy in 2016 consistent with alopecia areata, but not clinically consistent; had outside ILK without improvement per report  - Repeat bx 5/20/2020 with nonscarring alopecia with marked miniaturization, favoring androgenetic alopecia. Also mild perifollicular fibrosis.  - Current tx: LLLT 3x weekly (started years ago), dutasteride 0.5mg daily, ketoconazole shampoo 2-3x weekly, Minoxidil 5% once daily, clobex shampoo,  multivitamin    -Previous tx: finasteride 5 mg daily, calcipotriene solution twice weekly, clobetasol solution 1-2x a week  Hair labs: 4/8/2022  TSH: 0.04 (low)  Cbc: normal 2 years ago  Ferritin:105 (2 years ago)  Zinc: 72 (9/15/2021)  Vitamin D: 65  2. Pituitary prolactinoma s/p removal in 1975 followed by radiation with subsequent development of panhypopituitarism   3.  TSH <0.01 in 6/30/2021  - She is also on estradiol per endocrinology  ____________________________________________    Assessment & Plan:     #  Female pattern hair loss, favor AGA. Has old bx with alopecia areata. Hair Metrix performed 2/8/24. She is much improved overall.   - start clobex shampoo, apply to dry scalp, leave on for 15 minutes, lather and rinse.   - Continue to use minoxidil 5% foam one or two times daily  - Continue HairMax low level laser comb 2x weekly  - Continue ketoconazole shampoo 2-3 times a week. Alternating with clobex shampoo   - Pt hasn't been using the clobetasol solution   - Continue dutasteride 0.5mg daily. (refilled today)  - Future consideration: low dose oral minoxidil- Hx of long QT (counseled patient that she would need clearance from  cardiology first again today), spironolactone, increasing topical minoxidil to twice daily, PRP-discussed benefits, risks, and cost  - labs ordered today: DHEAS, Free and total testosterone Ferritin, Iron studies, CMP, Zinc    Procedures Performed:   HairMetrix: In comparison to HairMetrix from 2/8/24 to 7/8/25  - Frontal anterior scalp: 118 to 165 today  - Mid scalp: 110 to 91 today  - Vertex scalp: 166 to 106 today, mild perifollicular scale  - Occipital scalp: 126 to 151, good scalp health  - Right temple: 90 to 108 today  - Left temple: 106 to 119 today, diffuse adherent scale      Follow-up: 6 month(s) in-person, or earlier for new or changing lesions    Staff and Scribe  I, KATERIN HURST, am serving as a scribe; to document services personally performed by Isabel Nguyen MD -based on data collection and the provider's statements to me.    Provider Disclosure:   The documentation recorded by the scribe accurately reflects the services I personally performed and the decisions made by me.    Isabel Nguyen MD  Professor   Department of Dermatology  [facility]: Phone: [phone], Fax:[phone]  [facility]: Phone: [phone], Fax: [phone]      ____________________________________________    CC: Hair Loss (Patient is here for a follow up was a  patient )    HPI:  Ms. Maria Esther Lowe is a 69 year old female who presents as a return patient for follow-up of hair loss, diagnosed as female pattern hair loss.   - Last seen in-clinic on 1/9/25 by Naa Harden PA-C.  - Shedding or thinning, or both: n/a  - Current tx: oral dutasteride 0.5 mg, topical Rogaine, Low-level laser comb, ketoconazole shampoo   - If using Rogaine, 1 cannister lasts how long: about 2 weeks  No Any new medications, supplements, or products? (please list below)     No Scalp pain   No Scalp burning   No  Scalp itching    No Eyebrow changes    No Eyelash changes   No Beard changes    No Other body hair changes    No Nail changes    No Additional symptoms? (please list below)     - Overall course: Pituitary tumor years ago, after which she developed hormone problems. Is going through a sleep apnea test tomorrow. Regarding her hair she reports that she applies topical Rogaine on the frontal scalp and temple region and states that 1 cannister lasts approximately 2 weeks. She recently got her hair colored for the first time.  Pt is unsure if dutasteride was helping at all. Thinks that her hair fall is at a standstill currently. She is using Actonis laser.     Pt makes custom earplugs. Patient is otherwise feeling well, in usual state of health, and has no additional skin concerns today.         Labs:  T4 and BMP  reviewed.    Physical Exam:  GEN: Well developed, well-nourished, in no acute distress, in a pleasant mood.    SKIN: Focused examination of face, scalp and hands was performed.  - Carlos part width of carlee tree pattern in the front, 2 in the front, 1.5 in the middle, 1.5 in the back, 1.2 in the vertex region, 1.1 in occipital region   - The layers of hair regrowth layers were noted to be  - 1 cm for the first  - robust at 3 cm at the second  - 8 cm at the third  - decrease in density on the sides, the parietals region  - no diffuse erythema   - no perifollicular erythema  - no perifollicular scale   - no scaling of the scalp   - negative hair pull test   - normal eyelash density  - fine, lightly pigmented fibers all the way across eyebrows  - no nail pitting or dystrophy   - no scalp folliculitis/pustules   - No other lesions of concern on areas examined.     Medications:  Current Outpatient Medications   Medication Sig Dispense Refill    CALCIUM 500 +D 500-400 MG-UNIT TABS Take 1 tablet by mouth 2 times daily. 180 tablet 3    clobetasol (TEMOVATE) 0.05 % external solution Use once weekly 60 mL 3     dutasteride (AVODART) 0.5 MG capsule TAKE ONE CAPSULE BY MOUTH ONCE DAILY 30 capsule 0    hydrocortisone (CORTEF) 10 MG tablet TAKE 1 TABLET BY MOUTH IN  THE MORNING. Double or triple dose for stress dosing when you are sick. 120 tablet 3    ibuprofen (ADVIL/MOTRIN) 200 MG tablet Take 400 mg by mouth as needed.      ketoconazole (NIZORAL) 2 % external shampoo Apply topically 2-3 times per week. 100 mL 11    levothyroxine (SYNTHROID/LEVOTHROID) 125 MCG tablet Take 1 tablet (125 mcg) by mouth daily. 90 tablet 4    losartan-hydrochlorothiazide (HYZAAR) 100-25 MG tablet Take 1 tablet by mouth daily. 90 tablet 1    minoxidil (ROGAINE) 5 % external solution Apply topically daily      simvastatin (ZOCOR) 40 MG tablet Take 1 tablet (40 mg) by mouth daily 90 tablet 3     No current facility-administered medications for this visit.      Past Medical History:   Patient Active Problem List   Diagnosis    Prolactinoma (H)    Panhypopituitarism    Hormone replacement therapy (postmenopausal)    Carpal tunnel syndrome    History of robot-assisted laparoscopic hysterectomy    Chronic kidney disease, stage 3b (H)     Past Medical History:   Diagnosis Date    Hyperlipidemia LDL goal < 130     Hypertension     Hypothyroidism     Osteopenia     DEXA: 12/11; T-score of -1.9       Panhypopituitarism        CC No referring provider defined for this encounter. on close of this encounter.

## 2025-07-09 NOTE — PROGRESS NOTES
HairMetrix Summary (Date Jul 8, 2025)      Frontal anterior (4.5 cm)      Mid scalp (12 cm)      Vertex (24 cm)      Occipital (30 cm)      Right temporal (7 x 9 cm)      Left temporal (6 x 9 cm)      Summary

## 2025-08-21 ENCOUNTER — LAB (OUTPATIENT)
Dept: LAB | Facility: CLINIC | Age: 70
End: 2025-08-21
Payer: COMMERCIAL

## 2025-08-21 DIAGNOSIS — E78.5 HYPERLIPIDEMIA LDL GOAL <100: ICD-10-CM

## 2025-08-21 DIAGNOSIS — E23.0 PANHYPOPITUITARISM: ICD-10-CM

## 2025-08-21 DIAGNOSIS — L64.9 ANDROGENETIC ALOPECIA: ICD-10-CM

## 2025-08-21 DIAGNOSIS — I10 BENIGN ESSENTIAL HYPERTENSION: ICD-10-CM

## 2025-08-21 DIAGNOSIS — M85.9 LOW BONE DENSITY: ICD-10-CM

## 2025-08-21 DIAGNOSIS — Z51.81 MEDICATION MONITORING ENCOUNTER: ICD-10-CM

## 2025-08-21 LAB
ALBUMIN SERPL BCG-MCNC: 4.3 G/DL (ref 3.5–5.2)
ALP SERPL-CCNC: 132 U/L (ref 40–150)
ALT SERPL W P-5'-P-CCNC: 45 U/L (ref 0–50)
ANION GAP SERPL CALCULATED.3IONS-SCNC: 13 MMOL/L (ref 7–15)
AST SERPL W P-5'-P-CCNC: 41 U/L (ref 0–45)
BILIRUB SERPL-MCNC: 0.4 MG/DL
BUN SERPL-MCNC: 22.3 MG/DL (ref 8–23)
CALCIUM SERPL-MCNC: 9.9 MG/DL (ref 8.8–10.4)
CHLORIDE SERPL-SCNC: 104 MMOL/L (ref 98–107)
CHOLEST SERPL-MCNC: 165 MG/DL
CREAT SERPL-MCNC: 1.35 MG/DL (ref 0.51–0.95)
CREAT UR-MCNC: 148 MG/DL
EGFRCR SERPLBLD CKD-EPI 2021: 42 ML/MIN/1.73M2
FASTING STATUS PATIENT QL REPORTED: NO
FASTING STATUS PATIENT QL REPORTED: NO
FERRITIN SERPL-MCNC: 120 NG/ML (ref 11–328)
GLUCOSE SERPL-MCNC: 97 MG/DL (ref 70–99)
HCO3 SERPL-SCNC: 24 MMOL/L (ref 22–29)
HDLC SERPL-MCNC: 47 MG/DL
IRON BINDING CAPACITY (ROCHE): 251 UG/DL (ref 240–430)
IRON SATN MFR SERPL: 34 % (ref 15–46)
IRON SERPL-MCNC: 85 UG/DL (ref 37–145)
LDLC SERPL CALC-MCNC: 95 MG/DL
MICROALBUMIN UR-MCNC: 252 MG/L
MICROALBUMIN/CREAT UR: 170.27 MG/G CR (ref 0–25)
NONHDLC SERPL-MCNC: 118 MG/DL
POTASSIUM SERPL-SCNC: 3.9 MMOL/L (ref 3.4–5.3)
PROLACTIN SERPL 3RD IS-MCNC: 16 NG/ML (ref 5–23)
PROT SERPL-MCNC: 7.3 G/DL (ref 6.4–8.3)
SHBG SERPL-SCNC: 66 NMOL/L (ref 30–135)
SODIUM SERPL-SCNC: 141 MMOL/L (ref 135–145)
T4 FREE SERPL-MCNC: 1.14 NG/DL (ref 0.9–1.7)
TRIGL SERPL-MCNC: 117 MG/DL

## 2025-08-23 LAB
DEPRECATED CALCIDIOL+CALCIFEROL SERPL-MC: <64 UG/L (ref 20–75)
TESTOST FREE SERPL-MCNC: ABNORMAL PG/ML
TESTOST SERPL-MCNC: <2 NG/DL (ref 8–60)
VITAMIN D2 SERPL-MCNC: <5 UG/L
VITAMIN D3 SERPL-MCNC: 59 UG/L

## 2025-08-24 ENCOUNTER — TELEPHONE (OUTPATIENT)
Dept: ENDOCRINOLOGY | Facility: CLINIC | Age: 70
End: 2025-08-24
Payer: COMMERCIAL

## 2025-08-24 DIAGNOSIS — E21.3 HYPERPARATHYROIDISM: Primary | ICD-10-CM

## 2025-08-24 LAB
RENIN PLAS-CCNC: 2.8 NG/ML/HR
VIT D+METAB SERPL-MCNC: 58 NG/ML (ref 20–50)

## 2025-08-25 ENCOUNTER — OFFICE VISIT (OUTPATIENT)
Dept: ENDOCRINOLOGY | Facility: CLINIC | Age: 70
End: 2025-08-25
Payer: COMMERCIAL

## 2025-08-25 VITALS
WEIGHT: 174 LBS | HEIGHT: 65 IN | HEART RATE: 83 BPM | OXYGEN SATURATION: 97 % | BODY MASS INDEX: 28.99 KG/M2 | DIASTOLIC BLOOD PRESSURE: 83 MMHG | SYSTOLIC BLOOD PRESSURE: 139 MMHG

## 2025-08-25 DIAGNOSIS — I10 BENIGN ESSENTIAL HYPERTENSION: ICD-10-CM

## 2025-08-25 DIAGNOSIS — E23.0 PANHYPOPITUITARISM: ICD-10-CM

## 2025-08-25 DIAGNOSIS — M85.9 LOW BONE DENSITY: Primary | ICD-10-CM

## 2025-08-25 DIAGNOSIS — E78.5 HYPERLIPIDEMIA LDL GOAL <100: ICD-10-CM

## 2025-08-25 DIAGNOSIS — Z78.0 MENOPAUSE: ICD-10-CM

## 2025-08-25 DIAGNOSIS — M85.80 OSTEOPENIA, UNSPECIFIED LOCATION: ICD-10-CM

## 2025-08-25 PROCEDURE — 3079F DIAST BP 80-89 MM HG: CPT | Performed by: INTERNAL MEDICINE

## 2025-08-25 PROCEDURE — G2211 COMPLEX E/M VISIT ADD ON: HCPCS | Performed by: INTERNAL MEDICINE

## 2025-08-25 PROCEDURE — 99214 OFFICE O/P EST MOD 30 MIN: CPT | Performed by: INTERNAL MEDICINE

## 2025-08-25 PROCEDURE — 3075F SYST BP GE 130 - 139MM HG: CPT | Performed by: INTERNAL MEDICINE

## 2025-08-25 RX ORDER — LEVOTHYROXINE SODIUM 150 UG/1
150 TABLET ORAL DAILY
Qty: 90 TABLET | Refills: 3 | Status: SHIPPED | OUTPATIENT
Start: 2025-08-25

## 2025-08-25 RX ORDER — LEVOTHYROXINE SODIUM 150 UG/1
150 TABLET ORAL AT BEDTIME
COMMUNITY

## 2025-08-25 RX ORDER — MAGNESIUM OXIDE 400 MG/1
400 TABLET ORAL DAILY
COMMUNITY

## 2025-08-25 RX ORDER — SIMVASTATIN 40 MG
40 TABLET ORAL DAILY
Qty: 30 TABLET | Refills: 0 | Status: SHIPPED | OUTPATIENT
Start: 2025-08-25

## 2025-08-25 RX ORDER — HYDROCORTISONE 5 MG/1
5 TABLET ORAL DAILY
Qty: 90 TABLET | Refills: 3 | Status: SHIPPED | OUTPATIENT
Start: 2025-08-25

## 2025-08-25 RX ORDER — LOSARTAN POTASSIUM AND HYDROCHLOROTHIAZIDE 25; 100 MG/1; MG/1
1 TABLET ORAL DAILY
Qty: 90 TABLET | Refills: 1 | Status: SHIPPED | OUTPATIENT
Start: 2025-08-25

## 2025-08-25 RX ORDER — HYDROCORTISONE 10 MG/1
TABLET ORAL
Qty: 120 TABLET | Refills: 3 | Status: SHIPPED | OUTPATIENT
Start: 2025-08-25

## (undated) DEVICE — LINEN ORTHO PACK 5446

## (undated) DEVICE — Device

## (undated) DEVICE — PAD CHUX UNDERPAD 30X36" P3036C

## (undated) DEVICE — KIT PATIENT POSITIONING PIGAZZI LATEX FREE 40580

## (undated) DEVICE — DAVINCI S MONOPOLAR SCISSORS PRECURVED HOT SHEARS 420179

## (undated) DEVICE — SPECIMEN CONTAINER 5OZ STERILE 2600SA

## (undated) DEVICE — DRAPE WARMER 66X44" ORS-300

## (undated) DEVICE — SEAL SET MYOSURE ROD LENS SCOPE SINGLE USE 40-902

## (undated) DEVICE — SYR 10ML LL W/O NDL 302995

## (undated) DEVICE — SPONGE COTTONOID 1/2X3" 80-1407

## (undated) DEVICE — PREP POVIDONE IODINE SCRUB 7.5% 120ML

## (undated) DEVICE — PAD CHUX UNDERPAD 30X30"

## (undated) DEVICE — SYR 50ML LL W/O NDL 309653

## (undated) DEVICE — LINEN TOWEL PACK X5 5464

## (undated) DEVICE — UTERINE MANIPULATOR RUMI 6.7MMX8CM UMB678

## (undated) DEVICE — ESU GROUND PAD UNIVERSAL W/O CORD

## (undated) DEVICE — STABILIZER ARCH KOH-EFFICIENT 2.5CM KC-ARCH-25

## (undated) DEVICE — SU MONOCRYL 4-0 PS-2 18" UND Y496G

## (undated) DEVICE — ESU CORD BIPOLAR GREEN 10-4000

## (undated) DEVICE — SUCTION CANISTER BEMIS HI FLOW 006772-901

## (undated) DEVICE — SUCTION TIP YANKAUER W/O VENT K86

## (undated) DEVICE — SOL NACL 0.9% IRRIG 3000ML BAG 2B7477

## (undated) DEVICE — COVER CAMERA IN-LIGHT DISP LT-C02

## (undated) DEVICE — NDL 30GA 0.5" 305106

## (undated) DEVICE — DAVINCI OBTURATOR 8MM BLADELESS 420023

## (undated) DEVICE — DRAPE SLEEVE 599

## (undated) DEVICE — GOWN REINFORCED XXLG 9071

## (undated) DEVICE — CATH TRAY FOLEY SURESTEP 16FR WDRAIN BAG STLK LATEX A300316A

## (undated) DEVICE — ADAPTER DRAPE ALLY AU-AD

## (undated) DEVICE — ADHESIVE SWIFTSET 0.8ML OCTYL SS6

## (undated) DEVICE — SOL WATER IRRIG 1000ML BOTTLE 2F7114

## (undated) DEVICE — TUBING SMOKE EVAC PNEUVIEW 9660-XE

## (undated) DEVICE — DAVINCI S DRAPE ARM INSTRUMENT 420015

## (undated) DEVICE — TUBING SYS AQUILEX BLUE INFLOW AQL-110 YLW OUTFLOW AQL-111

## (undated) DEVICE — SOL WATER IRRIG 1000ML BOTTLE 07139-09

## (undated) DEVICE — SYR 03ML LL W/O NDL

## (undated) DEVICE — GLOVE PROTEXIS BLUE W/NEU-THERA 7.0  2D73EB70

## (undated) DEVICE — PACK MINOR EYE

## (undated) DEVICE — GLOVE PROTEXIS W/NEU-THERA 7.0  2D73TE70

## (undated) DEVICE — PREP CHLORAPREP 26ML TINTED ORANGE  260815

## (undated) DEVICE — TUBING SUCTION MEDI-VAC 1/4"X20' N620A

## (undated) DEVICE — DAVINCI SI DRAPE ACCESSORY KIT 3-ARM 420290

## (undated) DEVICE — ENDO POUCH UNIV RETRIEVAL SYSTEM INZII 10MM CD001

## (undated) DEVICE — SOL NACL 0.9% IRRIG 1000ML BOTTLE 2F7124

## (undated) DEVICE — SUCTION MANIFOLD DORNOCH ULTRA CART UL-CL500

## (undated) DEVICE — DAVINCI S FCP BIPOLAR FENESTRATED 420205

## (undated) DEVICE — PAD PERI INDIV WRAP 11" 2022A

## (undated) DEVICE — DRAPE MAYO STAND 23X54 8337

## (undated) DEVICE — SOL NACL 0.9% INJ 1000ML BAG 2B1324X

## (undated) DEVICE — STRAP KNEE/BODY 31143004

## (undated) DEVICE — ENDO TROCAR FIRST ENTRY KII FIOS ADV FIX 05X100MM CFF03

## (undated) DEVICE — DAVINCI S NDL DRIVER MEGA 420194

## (undated) DEVICE — PREP POVIDONE IODINE SOLUTION 10% 120ML

## (undated) DEVICE — WIPES FOLEY CARE SURESTEP PROVON DFC100

## (undated) DEVICE — PROTECTOR ARM ONE-STEP TRENDELENBURG 40418

## (undated) DEVICE — GLOVE PROTEXIS W/NEU-THERA 6.5  2D73TE65

## (undated) DEVICE — BLADE KNIFE SURG 15 371115

## (undated) DEVICE — DRSG TEGADERM 2 3/8X2 3/4" 1624W

## (undated) DEVICE — LINEN GOWN X4 5410

## (undated) DEVICE — SPECIMEN BAG BEMIS HI FLOW SUCTION WHITE SOCK 533810

## (undated) DEVICE — NDL INSUFFLATION 13GA 150MM C2202

## (undated) DEVICE — SUCTION CANISTER MEDIVAC LINER 1500ML W/LID 65651-515

## (undated) DEVICE — DAVINCI S NDL DRIVER MEGA SUTURE CUT 420309

## (undated) DEVICE — GLOVE PROTEXIS W/NEU-THERA 7.5  2D73TE75

## (undated) DEVICE — ENDO POUCH UNIVERSAL RETRIEVAL SYSTEM INZII 12/15MM CD004

## (undated) DEVICE — SU WND CLOSURE VLOC 180 ABS 2-0 12" V-20 VLOCL0615

## (undated) DEVICE — ENDO POUCH UNIVERSAL RETRIEVAL SYSTEM INZII 5MM CD003

## (undated) DEVICE — TUBING IRRIG CYSTO/BLADDER SET 81" LF 2C4040

## (undated) DEVICE — GLOVE PROTEXIS BLUE W/NEU-THERA 7.5  2D73EB75

## (undated) DEVICE — DRSG TEGADERM IV ADVANCED 2.5X2.75" 1683

## (undated) DEVICE — DAVINCI S CANNULA SEAL 8.5-13MM 420206

## (undated) RX ORDER — FENTANYL CITRATE 50 UG/ML
INJECTION, SOLUTION INTRAMUSCULAR; INTRAVENOUS
Status: DISPENSED
Start: 2018-11-26

## (undated) RX ORDER — PROPOFOL 10 MG/ML
INJECTION, EMULSION INTRAVENOUS
Status: DISPENSED
Start: 2019-09-10

## (undated) RX ORDER — HYDROMORPHONE HYDROCHLORIDE 1 MG/ML
INJECTION, SOLUTION INTRAMUSCULAR; INTRAVENOUS; SUBCUTANEOUS
Status: DISPENSED
Start: 2018-09-11

## (undated) RX ORDER — ONDANSETRON 2 MG/ML
INJECTION INTRAMUSCULAR; INTRAVENOUS
Status: DISPENSED
Start: 2019-09-10

## (undated) RX ORDER — ACETAMINOPHEN 325 MG/1
TABLET ORAL
Status: DISPENSED
Start: 2018-11-26

## (undated) RX ORDER — CEFAZOLIN SODIUM 2 G/100ML
INJECTION, SOLUTION INTRAVENOUS
Status: DISPENSED
Start: 2018-09-11

## (undated) RX ORDER — METOPROLOL TARTRATE 1 MG/ML
INJECTION, SOLUTION INTRAVENOUS
Status: DISPENSED
Start: 2018-01-30

## (undated) RX ORDER — LIDOCAINE HYDROCHLORIDE 20 MG/ML
INJECTION, SOLUTION EPIDURAL; INFILTRATION; INTRACAUDAL; PERINEURAL
Status: DISPENSED
Start: 2018-01-30

## (undated) RX ORDER — LIDOCAINE HYDROCHLORIDE 20 MG/ML
INJECTION, SOLUTION EPIDURAL; INFILTRATION; INTRACAUDAL; PERINEURAL
Status: DISPENSED
Start: 2018-09-11

## (undated) RX ORDER — CIPROFLOXACIN 2 MG/ML
INJECTION, SOLUTION INTRAVENOUS
Status: DISPENSED
Start: 2018-09-11

## (undated) RX ORDER — PROPOFOL 10 MG/ML
INJECTION, EMULSION INTRAVENOUS
Status: DISPENSED
Start: 2018-01-30

## (undated) RX ORDER — ONDANSETRON 2 MG/ML
INJECTION INTRAMUSCULAR; INTRAVENOUS
Status: DISPENSED
Start: 2018-01-30

## (undated) RX ORDER — DEXAMETHASONE SODIUM PHOSPHATE 4 MG/ML
INJECTION, SOLUTION INTRA-ARTICULAR; INTRALESIONAL; INTRAMUSCULAR; INTRAVENOUS; SOFT TISSUE
Status: DISPENSED
Start: 2019-09-10

## (undated) RX ORDER — PROPOFOL 10 MG/ML
INJECTION, EMULSION INTRAVENOUS
Status: DISPENSED
Start: 2018-11-26

## (undated) RX ORDER — PROPOFOL 10 MG/ML
INJECTION, EMULSION INTRAVENOUS
Status: DISPENSED
Start: 2018-09-11

## (undated) RX ORDER — KETOROLAC TROMETHAMINE 30 MG/ML
INJECTION, SOLUTION INTRAMUSCULAR; INTRAVENOUS
Status: DISPENSED
Start: 2018-01-30

## (undated) RX ORDER — FENTANYL CITRATE 50 UG/ML
INJECTION, SOLUTION INTRAMUSCULAR; INTRAVENOUS
Status: DISPENSED
Start: 2018-01-30

## (undated) RX ORDER — FENTANYL CITRATE 50 UG/ML
INJECTION, SOLUTION INTRAMUSCULAR; INTRAVENOUS
Status: DISPENSED
Start: 2018-09-11

## (undated) RX ORDER — ACETAMINOPHEN 325 MG/1
TABLET ORAL
Status: DISPENSED
Start: 2018-09-11

## (undated) RX ORDER — ONDANSETRON 2 MG/ML
INJECTION INTRAMUSCULAR; INTRAVENOUS
Status: DISPENSED
Start: 2018-09-11